# Patient Record
Sex: FEMALE | Race: WHITE | NOT HISPANIC OR LATINO | Employment: OTHER | URBAN - NONMETROPOLITAN AREA
[De-identification: names, ages, dates, MRNs, and addresses within clinical notes are randomized per-mention and may not be internally consistent; named-entity substitution may affect disease eponyms.]

---

## 2018-02-02 ENCOUNTER — APPOINTMENT (OUTPATIENT)
Dept: URGENT CARE | Facility: PHYSICIAN GROUP | Age: 48
End: 2018-02-02

## 2019-04-24 ENCOUNTER — OFFICE VISIT (OUTPATIENT)
Dept: URGENT CARE | Facility: PHYSICIAN GROUP | Age: 49
End: 2019-04-24
Payer: COMMERCIAL

## 2019-04-24 VITALS
RESPIRATION RATE: 16 BRPM | TEMPERATURE: 98.6 F | HEART RATE: 86 BPM | WEIGHT: 195 LBS | BODY MASS INDEX: 28.88 KG/M2 | HEIGHT: 69 IN | SYSTOLIC BLOOD PRESSURE: 132 MMHG | OXYGEN SATURATION: 98 % | DIASTOLIC BLOOD PRESSURE: 80 MMHG

## 2019-04-24 DIAGNOSIS — R06.2 WHEEZING: ICD-10-CM

## 2019-04-24 DIAGNOSIS — H65.93 FLUID LEVEL BEHIND TYMPANIC MEMBRANE OF BOTH EARS: ICD-10-CM

## 2019-04-24 DIAGNOSIS — J06.9 URI WITH COUGH AND CONGESTION: ICD-10-CM

## 2019-04-24 PROCEDURE — 99204 OFFICE O/P NEW MOD 45 MIN: CPT | Performed by: PHYSICIAN ASSISTANT

## 2019-04-24 RX ORDER — METHYLPREDNISOLONE 4 MG/1
TABLET ORAL
Qty: 21 TAB | Refills: 0 | Status: SHIPPED | OUTPATIENT
Start: 2019-04-24 | End: 2019-07-26

## 2019-04-24 NOTE — PROGRESS NOTES
Chief Complaint   Patient presents with   • Nasal Congestion   • Fatigue   • Sinus Problem   • Headache       HISTORY OF PRESENT ILLNESS: Patient is a 49 y.o. female who presents today for the following:    Dry cough x 3 days  + nasal congestion (yellow/green drainage, clear during the day), ear pressure, ST, face feels hot, SOB  OTC meds: benadryl, nasal spray (afrin), alkaseltzer  Denies h/o asthma  Smoked for 31 years, quit about 7 years ago     There are no active problems to display for this patient.      Allergies:Codeine and Vicodin [hydrocodone-acetaminophen]    Current Outpatient Prescriptions Ordered in [facility]   Medication Sig Dispense Refill   • MethylPREDNISolone (MEDROL DOSEPAK) 4 MG Tablet Therapy Pack Use as package directs 21 Tab 0     No current Epic-ordered facility-administered medications on file.        No past medical history on file.    Social History   Substance Use Topics   • Smoking status: Former Smoker     Packs/day: 1.00     Years: 31.00     Quit date: 4/24/2012   • Smokeless tobacco: Never Used   • Alcohol use No       No family status information on file.   No family history on file.    Review of Systems:   Constitutional ROS: No unexpected change in weight, No weakness, No fatigue  Eye ROS: No recent significant change in vision, No eye pain, redness, discharge  Ear ROS: No drainage, No tinnitus or vertigo, No recent change in hearing  Mouth/Throat ROS: No teeth or gum problems, No bleeding gums, No tongue complaints  Neck ROS: No swollen glands, No significant pain in neck  Pulmonary ROS: Positive for mild intermittent shortness of breath.  Cardiovascular ROS: No diaphoresis, No edema, No palpitations  Gastrointestinal ROS: No change in bowel habits, No significant change in appetite, No nausea, vomiting, diarrhea, or constipation  Musculoskeletal/Extremities ROS: No peripheral edema, No pain, redness or swelling on the joints  Hematologic/Lymphatic ROS: No chills, No night sweats, No  "weight loss  Skin/Integumentary ROS: No edema, No evidence of rash, No itching      Exam:  /80   Pulse 86   Temp 37 °C (98.6 °F) (Temporal)   Resp 16   Ht 1.753 m (5' 9\")   Wt 88.5 kg (195 lb)   SpO2 98%   General: Well developed, well nourished. No distress.  Eye: PERRL/EOMI; conjunctivae clear, lids normal.  ENMT: Lips without lesions, MMM. Oropharynx is clear. Bilateral TMs are within normal limits with clear fluid partially bilaterally and bulging.  Pulmonary: Unlabored respiratory effort.  Fine scattered and expiratory wheezes.  Cardiovascular: Regular rate and rhythm without murmur.    Neurologic: Grossly nonfocal. No facial asymmetry noted.  Lymph: No cervical lymphadenopathy noted.  Skin: Warm, dry, good turgor. No rashes in visible areas.   Psych: Normal mood. Alert and oriented x3. Judgment and insight is normal.    Assessment/Plan:  Discussed likely viral etiology versus seasonal allergies.  Use all medication as directed. Discussed appropriate over-the-counter symptomatic medication, and when to return to clinic. Follow up for worsening or persistent symptoms.  1. URI with cough and congestion     2. Fluid level behind tympanic membrane of both ears     3. Wheezing  MethylPREDNISolone (MEDROL DOSEPAK) 4 MG Tablet Therapy Pack       "

## 2019-04-24 NOTE — LETTER
April 24, 2019         Patient: Michael Larsen   YOB: 1970   Date of Visit: 4/24/2019           To Whom it May Concern:    Michael Larsen was seen in my clinic on 4/24/2019.      If you have any questions or concerns, please don't hesitate to call.        Sincerely,           Selin Galindo P.A.-C.  Electronically Signed

## 2019-05-30 ENCOUNTER — HOSPITAL ENCOUNTER (OUTPATIENT)
Facility: MEDICAL CENTER | Age: 49
End: 2019-05-30
Attending: OBSTETRICS & GYNECOLOGY
Payer: COMMERCIAL

## 2019-05-30 ENCOUNTER — GYNECOLOGY VISIT (OUTPATIENT)
Dept: OBGYN | Facility: MEDICAL CENTER | Age: 49
End: 2019-05-30
Payer: COMMERCIAL

## 2019-05-30 VITALS
BODY MASS INDEX: 29.18 KG/M2 | WEIGHT: 197 LBS | HEIGHT: 69 IN | SYSTOLIC BLOOD PRESSURE: 120 MMHG | DIASTOLIC BLOOD PRESSURE: 78 MMHG

## 2019-05-30 DIAGNOSIS — Z01.419 WELL WOMAN EXAM: ICD-10-CM

## 2019-05-30 DIAGNOSIS — N93.9 ABNORMAL VAGINAL BLEEDING: ICD-10-CM

## 2019-05-30 DIAGNOSIS — N84.0 UTERINE POLYP: ICD-10-CM

## 2019-05-30 PROCEDURE — 88175 CYTOPATH C/V AUTO FLUID REDO: CPT

## 2019-05-30 PROCEDURE — 87624 HPV HI-RISK TYP POOLED RSLT: CPT

## 2019-05-30 PROCEDURE — 99386 PREV VISIT NEW AGE 40-64: CPT | Mod: 25 | Performed by: OBSTETRICS & GYNECOLOGY

## 2019-05-30 RX ORDER — ACETAMINOPHEN 500 MG
500-1000 TABLET ORAL EVERY 6 HOURS PRN
COMMUNITY
End: 2019-07-26

## 2019-05-30 NOTE — NON-PROVIDER
Pt here for new pt appt  Pt states that she has been having abnormal bleeding for months  Good #[id number]  Pharmacy verified.

## 2019-05-30 NOTE — LETTER
May 30, 2019         Patient: Michael Larsen   YOB: 1970   Date of Visit: 5/30/2019           To Whom it May Concern:    Michael Larsen was seen in my clinic on 5/30/2019. She may return to work on 05/30/2019.    If you have any questions or concerns, please don't hesitate to call.        Sincerely,           Prieto Santa M.D.  Electronically Signed

## 2019-05-30 NOTE — PROGRESS NOTES
Chief complaint: Annual exam and vaginal bleeding     Michael Larsen is a 49 y.o.  female who presents for her Annual Gynecologic Exam      HPI Comments: Pt presents for her annual well woman exam.  Patient reports that she underwent menopause around age 41.  She reports the last few months she is beginning to experience daily vaginal bleeding.  Prior she would bleed every few days.  She did notice a large lump which appears to be coming from her cervix on examination.  She reports bleeding after intercourse as well. No pain  No LMP recorded (lmp unknown). Patient is postmenopausal.    Mammogram unsure  Dexa scan never  Colonoscopy never    Review of Systems:   Pertinent positives documented in HPI and all other systems reviewed & are negative    PGYN Hx: None    All PMH, PSH, allergies, social history and FH reviewed and updated today:  Past Medical History:   Diagnosis Date   • Bleeding from the nose    • Bleeding tendency (HCC)    • Bronchitis    • Hay fever    • Hemorrhoids    • Hypertension    • Measles    • Migraine    • Urinary tract infection    • Venereal disease      Past Surgical History:   Procedure Laterality Date   • GYN SURGERY     • LUMPECTOMY       Medications:   Current Outpatient Prescriptions Ordered in [facility]   Medication Sig Dispense Refill   • Calcium Carbonate Antacid (TUMS E-X PO) Take  by mouth.     • Probiotic Product (PROBIOTIC-10 PO) Take  by mouth.     • Naproxen Sodium (ALEVE PO) Take  by mouth.     • acetaminophen (TYLENOL) 500 MG Tab Take 500-1,000 mg by mouth every 6 hours as needed.     • MethylPREDNISolone (MEDROL DOSEPAK) 4 MG Tablet Therapy Pack Use as package directs 21 Tab 0     No current Epic-ordered facility-administered medications on file.      Codeine and Vicodin [hydrocodone-acetaminophen]  Social History     Social History   • Marital status:      Spouse name: N/A   • Number of children: N/A   • Years of education: N/A     Social History Main Topics   •  "Smoking status: Former Smoker     Packs/day: 1.00     Years: 31.00     Quit date: 4/24/2012   • Smokeless tobacco: Never Used   • Alcohol use No   • Drug use: Yes     Types: Marijuana      Comment: occasionally; denies h/o illicitis   • Sexual activity: Yes     Other Topics Concern   • Not on file     Social History Narrative   • No narrative on file     Family History   Problem Relation Age of Onset   • Hypertension Mother    • Heart Disease Maternal Grandmother           Objective:   Vital measurements:  /78 (BP Location: Left arm, Patient Position: Sitting)   Ht 1.753 m (5' 9\")   Wt 89.4 kg (197 lb)   Body mass index is 29.09 kg/m². (Goal BM I>18 <25)    Physical Exam   Nursing note and vitals reviewed.  Constitutional: She is oriented to person, place, and time. She appears well-developed and well-nourished. No distress.     HEENT:   Head: Normocephalic and atraumatic.   Right Ear: External ear normal.   Left Ear: External ear normal.   Nose: Nose normal.   Eyes: Conjunctivae and EOM are normal. Pupils are equal, round, and reactive to light. No scleral icterus.     Neck: Normal range of motion. Neck supple. No tracheal deviation present. No thyromegaly present. No cervical or supraclavicular lymphadenopathy.    Pulmonary/Chest: Effort normal and breath sounds normal. No respiratory distress. She has no wheezes. She has no rales. She exhibits no tenderness.     Cardiovascular: Regular, rate and rhythm. No edema.    Breast:  Symmetrical, normal consistency without masses., No dimpling or skin changes, Normal nipples without discharge, no axillary lymphadenopathy    Abdominal: Soft. Bowel sounds are normal. She exhibits no distension and no mass. No tenderness. She has no rebound and no guarding.     Genitourinary:  Pelvic exam was performed with patient supine.  External genitalia with no abnormal pigmentation, labial fusion, rash, tenderness, lesion or injury to the labia bilaterally.  BUS normal  Vagina " is pink and moist with no lesions, foul discharge, erythema, tenderness or bleeding. No foreign body around the vagina or signs of injury.   Cervix exhibits no motion tenderness, no discharge and no friability, there is a large approximately 4 cm, fingerlike mass protruding through the cervix.  Suspicious for fibroid.  I attempted to remove this mass in the office with the help of ring forceps, patient complained of significant pain in the attempt was stopped.  Uterus is 8 cm not deviated, not enlarged, not fixed and not tender.  Right adnexa displays no mass, no tenderness and no fullness.  Left adnexa displays no mass, no tenderness and no fullness.     Musculoskeletal: Normal range of motion. Non tender. She exhibits no edema and no tenderness.     Lymphadenopathy: She has no cervical or supraclavicular adenopathy.     Neurological: She is alert and oriented to person, place, and time. She exhibits normal muscle tone.     Skin: Skin is warm and dry. No rash noted. She is not diaphoretic. No erythema. No pallor.     Psychiatric: She has a normal mood and affect. Her behavior is normal. Judgment and thought content normal.        Assessment:     1. Well woman exam  THINPREP PAP WITH HPV    REFERRAL TO GASTROENTEROLOGY    MA-SCREEN MAMMO W/CAD-BILAT    CANCELED: MA-SCREEN MAMMO W/CAD-BILAT   2. Abnormal vaginal bleeding  US-PELVIC COMPLETE (TRANSABDOMINAL/TRANSVAGINAL) (COMBO)   3. Uterine polyp  REFERRAL TO GYNECOLOGY         Plan:   Pap and physical exam performed.  HPV testing also performed  Self breast awareness discussed.  Counseling: breast self exam, mammography screening and menopause  Encouraged exercise and proper diet.  Mammograms annually. Patient given order for screening kathy mammogram.  See medications and orders placed in encounter report.  Weight bearing exercise daily to strengthen bones  Calcium 1200mg daily and 800 IU daily    Likely an endometrial polyp.  Will order ultrasound to assess for the  possibility of other polyps.  Patient was unable to tolerate removal in the office.  Referral for hysteroscopy with D&C made.    Referral to gastroenterology also made    All questions answered

## 2019-05-31 LAB
CYTOLOGY REG CYTOL: NORMAL
HPV HR 12 DNA CVX QL NAA+PROBE: NEGATIVE
HPV16 DNA SPEC QL NAA+PROBE: NEGATIVE
HPV18 DNA SPEC QL NAA+PROBE: NEGATIVE
SPECIMEN SOURCE: NORMAL

## 2019-06-18 ENCOUNTER — HOSPITAL ENCOUNTER (OUTPATIENT)
Dept: RADIOLOGY | Facility: MEDICAL CENTER | Age: 49
End: 2019-06-18
Attending: OBSTETRICS & GYNECOLOGY
Payer: COMMERCIAL

## 2019-06-18 DIAGNOSIS — N93.9 ABNORMAL VAGINAL BLEEDING: ICD-10-CM

## 2019-06-18 PROCEDURE — 76830 TRANSVAGINAL US NON-OB: CPT

## 2019-07-02 ENCOUNTER — GYNECOLOGY VISIT (OUTPATIENT)
Dept: OBGYN | Facility: CLINIC | Age: 49
End: 2019-07-02
Payer: COMMERCIAL

## 2019-07-02 VITALS
WEIGHT: 197 LBS | BODY MASS INDEX: 29.18 KG/M2 | HEIGHT: 69 IN | SYSTOLIC BLOOD PRESSURE: 126 MMHG | DIASTOLIC BLOOD PRESSURE: 88 MMHG

## 2019-07-02 DIAGNOSIS — R10.30 LOWER ABDOMINAL PAIN: ICD-10-CM

## 2019-07-02 DIAGNOSIS — N95.0 POSTMENOPAUSAL BLEEDING: ICD-10-CM

## 2019-07-02 PROCEDURE — 99214 OFFICE O/P EST MOD 30 MIN: CPT | Performed by: OBSTETRICS & GYNECOLOGY

## 2019-07-02 NOTE — PROGRESS NOTES
Chief complaint;    Michael Larsen is a 49 y.o.  who presents complaining of postmenopausal bleeding she has a little bit of bleeding every day wears a light a pad that she changes 2 times per day.  Patient is also having some lower abdominal pelvic pain which she has had over the last year patient does have GI issues and is got an appointment with GI for colonoscopy.  Patient is here today to review ultrasound results.    Review of systems; denies fever chills abdominal pain, denies chest pain shortness of breath or urinary symptoms  Past medical history-  Past Medical History:   Diagnosis Date   • Bleeding from the nose    • Bleeding tendency (HCC)    • Bronchitis    • Hay fever    • Hemorrhoids    • Hypertension    • Measles    • Migraine    • Urinary tract infection    • Venereal disease      Past surgical history-  Past Surgical History:   Procedure Laterality Date   • GYN SURGERY     • LUMPECTOMY       Allergies-Codeine and Vicodin [hydrocodone-acetaminophen]  Medications-  Current Outpatient Prescriptions on File Prior to Visit   Medication Sig Dispense Refill   • Calcium Carbonate Antacid (TUMS E-X PO) Take  by mouth.     • Probiotic Product (PROBIOTIC-10 PO) Take  by mouth.     • Naproxen Sodium (ALEVE PO) Take  by mouth.     • acetaminophen (TYLENOL) 500 MG Tab Take 500-1,000 mg by mouth every 6 hours as needed.     • MethylPREDNISolone (MEDROL DOSEPAK) 4 MG Tablet Therapy Pack Use as package directs 21 Tab 0     No current facility-administered medications on file prior to visit.      Social history-  Social History     Social History   • Marital status:      Spouse name: N/A   • Number of children: N/A   • Years of education: N/A     Occupational History   • Not on file.     Social History Main Topics   • Smoking status: Former Smoker     Packs/day: 1.00     Years: 3100     Quit date: 2012   • Smokeless tobacco: Never Used   • Alcohol use No   • Drug use: Yes     Types: Marijuana       "Comment: occasionally; denies h/o illicitis   • Sexual activity: Yes     Other Topics Concern   • Not on file     Social History Narrative   • No narrative on file     Past Family History-no history of breast or ovarian cancer    Physical examination;  Alert and oriented x3  General a thin well-developed well-nourished female in no apparent distress  Vitals:    07/02/19 0818   BP: 126/88   Weight: 89.4 kg (197 lb)   Height: 1.753 m (5' 9\")     Trans-vaginal ultrasound is normal and 2 atrial thickness 8.5 mm, no uterine fibroids ovaries not visualized    Impression;  Postmenopausal bleeding-possible endocervical polyp    Plan;  Patient is scheduled to have hysteroscopy with D&C and removal of cervical polyp/fibroid  Discussed the patient's lower abdominal pain and the need for further work-up per gastroenterology  Discussed the need for removal of the endocervical polyp/fibroid with Dr. Santa as originally scheduled  Discussed the results of endometrial thickness and lack of ovarian masses on ultrasound  All questions answered in detail  Discussed Assubel etiologies for her lower abdominal discomfort    25  Minutes spent with the patient in face-to-face contact, 100% of the time spent on counseling and coordination of care. All questions answered in detail.  "

## 2019-07-17 ENCOUNTER — GYNECOLOGY VISIT (OUTPATIENT)
Dept: OBGYN | Facility: MEDICAL CENTER | Age: 49
End: 2019-07-17
Payer: COMMERCIAL

## 2019-07-17 VITALS
BODY MASS INDEX: 28.88 KG/M2 | DIASTOLIC BLOOD PRESSURE: 82 MMHG | SYSTOLIC BLOOD PRESSURE: 120 MMHG | WEIGHT: 195 LBS | HEIGHT: 69 IN

## 2019-07-17 DIAGNOSIS — D21.9 FIBROID: ICD-10-CM

## 2019-07-17 DIAGNOSIS — N95.0 POSTMENOPAUSAL BLEEDING: ICD-10-CM

## 2019-07-17 NOTE — PROGRESS NOTES
"History and Physical Exam    Chief Complaint   Patient presents with   • Gynecologic Exam     Pre-op   Chief complaint: Preoperative visit    History of present illness: 49 y.o. presents with persistent postmenopausal bleeding and desires surgical therapy. Risks, benefits and alternative therapies have been discussed and patient still desires surgical therapy. Questions answered.    Please see previous dictations.  Patient has history of postmenopausal bleeding with thickened endometrium as well as a prolapsing cervical fibroid.    Pertinent positives documented in HPI and all other systems reviewed & are negative      All PMH, PSH, allergies, social history and FH reviewed and updated today:  Past Medical History:   Diagnosis Date   • Bleeding from the nose    • Bleeding tendency (HCC)    • Bronchitis    • Hay fever    • Hemorrhoids    • Hypertension    • Measles    • Migraine    • Urinary tract infection    • Venereal disease        Past Surgical History:   Procedure Laterality Date   • GYN SURGERY     • LUMPECTOMY         [unfilled]    Allergies:   Allergies   Allergen Reactions   • Codeine    • Vicodin [Hydrocodone-Acetaminophen]        Social History     Social History   • Marital status:      Spouse name: N/A   • Number of children: N/A   • Years of education: N/A     Occupational History   • Not on file.     Social History Main Topics   • Smoking status: Former Smoker     Packs/day: 1.00     Years: 31.00     Quit date: 4/24/2012   • Smokeless tobacco: Never Used   • Alcohol use No   • Drug use: Yes     Types: Marijuana      Comment: occasionally; denies h/o illicitis   • Sexual activity: Yes     Other Topics Concern   • Not on file     Social History Narrative   • No narrative on file       Family History   Problem Relation Age of Onset   • Hypertension Mother    • Heart Disease Maternal Grandmother        Physical exam:  /82 (BP Location: Left arm, Patient Position: Sitting)   Ht 1.753 m (5' 9\")   " Wt 88.5 kg (195 lb)     GENERAL APPEARANCE: healthy, alert, no distress  NECK nontender, no masses, thyromegaly or nodules  HEART RRR with normal S1 and S2 ,no murmurs, no gallops, no peripheral edema  LUNG clear to auscultation, normal respiratory effort  ABDOMEN Abdomen soft, non-tender. BS normal. No masses,  No organomegaly  EXTREMITIES:negative clubbing, cyanosis, edema    NEURO Awake, alert and oriented x 3, Normal gait, no sensory deficits  SKIN No rashes, or ulcers or lesions seen  PSYCHIATRIC: Patient shows appropriate affect, is alert and oriented x3, intact judgment and insight.        No diagnosis found.    Michael Larsen is a 49 y.o.  female with persistent postmenopausal bleeding/prolapsed cervical fibroid who presents for surgical consultation for a hysteroscopy, D&C and polypectomy/myomectomy.    Specific risks include but are not limited to pain, infection, bleeding, blood transfusion, damage to bowel, bladder or ureters, pelvic scarring, pelvic pain, pain with intercourse, DVT/pulmonary embolism, need for laparotomy and anesthesia risks.    After discussion of risks and benefits, all questions regarding procedure were answered for patient. Consents were signed.    Needs to be NPO after midnight day of surgery. Clean abdomen and umbilicus w/ antibacterial soap morning of surgery.    Postoperative course discussed with patient. Patient should return to office or emergency room for #1 fever greater than 101, #2 heavy vaginal bleeding soaking greater than one pad per hour, #3 uncontrolled pain, #4 inability to tolerate regular diet pass gas or moved bowels.    Follow up in 2 weeks after surgery for postop check.     Spent 30 mins with the patient with over 50% of the time discussing the procedure, risk and benefits and obtaining consent.

## 2019-07-26 DIAGNOSIS — Z01.812 PRE-OPERATIVE LABORATORY EXAMINATION: ICD-10-CM

## 2019-07-26 LAB — HCG SERPL QL: POSITIVE

## 2019-07-26 PROCEDURE — 36415 COLL VENOUS BLD VENIPUNCTURE: CPT

## 2019-07-26 PROCEDURE — 84703 CHORIONIC GONADOTROPIN ASSAY: CPT

## 2019-07-26 RX ORDER — COVID-19 ANTIGEN TEST
2 KIT MISCELLANEOUS
COMMUNITY
End: 2020-06-08

## 2019-07-29 ENCOUNTER — HOSPITAL ENCOUNTER (OUTPATIENT)
Facility: MEDICAL CENTER | Age: 49
End: 2019-07-29
Attending: OBSTETRICS & GYNECOLOGY | Admitting: OBSTETRICS & GYNECOLOGY
Payer: COMMERCIAL

## 2019-07-29 ENCOUNTER — ANESTHESIA EVENT (OUTPATIENT)
Dept: SURGERY | Facility: MEDICAL CENTER | Age: 49
End: 2019-07-29
Payer: COMMERCIAL

## 2019-07-29 ENCOUNTER — TELEPHONE (OUTPATIENT)
Dept: OBGYN | Facility: CLINIC | Age: 49
End: 2019-07-29

## 2019-07-29 ENCOUNTER — ANESTHESIA (OUTPATIENT)
Dept: SURGERY | Facility: MEDICAL CENTER | Age: 49
End: 2019-07-29
Payer: COMMERCIAL

## 2019-07-29 VITALS
HEIGHT: 65 IN | BODY MASS INDEX: 32.73 KG/M2 | OXYGEN SATURATION: 94 % | HEART RATE: 78 BPM | WEIGHT: 196.43 LBS | DIASTOLIC BLOOD PRESSURE: 91 MMHG | TEMPERATURE: 97.4 F | SYSTOLIC BLOOD PRESSURE: 155 MMHG | RESPIRATION RATE: 16 BRPM

## 2019-07-29 DIAGNOSIS — N84.0 ENDOMETRIAL POLYP: ICD-10-CM

## 2019-07-29 DIAGNOSIS — N95.0 POSTMENOPAUSAL BLEEDING: ICD-10-CM

## 2019-07-29 LAB — PATHOLOGY CONSULT NOTE: NORMAL

## 2019-07-29 PROCEDURE — 160046 HCHG PACU - 1ST 60 MINS PHASE II: Performed by: OBSTETRICS & GYNECOLOGY

## 2019-07-29 PROCEDURE — 160025 RECOVERY II MINUTES (STATS): Performed by: OBSTETRICS & GYNECOLOGY

## 2019-07-29 PROCEDURE — 160009 HCHG ANES TIME/MIN: Performed by: OBSTETRICS & GYNECOLOGY

## 2019-07-29 PROCEDURE — 160035 HCHG PACU - 1ST 60 MINS PHASE I: Performed by: OBSTETRICS & GYNECOLOGY

## 2019-07-29 PROCEDURE — 160041 HCHG SURGERY MINUTES - EA ADDL 1 MIN LEVEL 4: Performed by: OBSTETRICS & GYNECOLOGY

## 2019-07-29 PROCEDURE — 502240 HCHG MISC OR SUPPLY RC 0272: Performed by: OBSTETRICS & GYNECOLOGY

## 2019-07-29 PROCEDURE — 88305 TISSUE EXAM BY PATHOLOGIST: CPT | Mod: 59

## 2019-07-29 PROCEDURE — 501394 HCHG SOLUTION SORBITOL 3% 3000ML BAG: Performed by: OBSTETRICS & GYNECOLOGY

## 2019-07-29 PROCEDURE — 502587 HCHG PACK, D&C: Performed by: OBSTETRICS & GYNECOLOGY

## 2019-07-29 PROCEDURE — 700105 HCHG RX REV CODE 258: Performed by: OBSTETRICS & GYNECOLOGY

## 2019-07-29 PROCEDURE — 700111 HCHG RX REV CODE 636 W/ 250 OVERRIDE (IP): Performed by: ANESTHESIOLOGY

## 2019-07-29 PROCEDURE — 160048 HCHG OR STATISTICAL LEVEL 1-5: Performed by: OBSTETRICS & GYNECOLOGY

## 2019-07-29 PROCEDURE — 700101 HCHG RX REV CODE 250: Performed by: ANESTHESIOLOGY

## 2019-07-29 PROCEDURE — 160029 HCHG SURGERY MINUTES - 1ST 30 MINS LEVEL 4: Performed by: OBSTETRICS & GYNECOLOGY

## 2019-07-29 PROCEDURE — 160002 HCHG RECOVERY MINUTES (STAT): Performed by: OBSTETRICS & GYNECOLOGY

## 2019-07-29 PROCEDURE — 58558 HYSTEROSCOPY BIOPSY: CPT | Performed by: OBSTETRICS & GYNECOLOGY

## 2019-07-29 PROCEDURE — 160036 HCHG PACU - EA ADDL 30 MINS PHASE I: Performed by: OBSTETRICS & GYNECOLOGY

## 2019-07-29 RX ORDER — HYDROMORPHONE HYDROCHLORIDE 1 MG/ML
0.4 INJECTION, SOLUTION INTRAMUSCULAR; INTRAVENOUS; SUBCUTANEOUS
Status: DISCONTINUED | OUTPATIENT
Start: 2019-07-29 | End: 2019-07-29 | Stop reason: HOSPADM

## 2019-07-29 RX ORDER — SODIUM CHLORIDE, SODIUM LACTATE, POTASSIUM CHLORIDE, CALCIUM CHLORIDE 600; 310; 30; 20 MG/100ML; MG/100ML; MG/100ML; MG/100ML
INJECTION, SOLUTION INTRAVENOUS CONTINUOUS
Status: DISCONTINUED | OUTPATIENT
Start: 2019-07-29 | End: 2019-07-29 | Stop reason: HOSPADM

## 2019-07-29 RX ORDER — OXYCODONE AND ACETAMINOPHEN 10; 325 MG/1; MG/1
1-2 TABLET ORAL EVERY 4 HOURS PRN
Qty: 6 TAB | Refills: 0 | Status: SHIPPED | OUTPATIENT
Start: 2019-07-29 | End: 2019-07-31

## 2019-07-29 RX ORDER — HALOPERIDOL 5 MG/ML
1 INJECTION INTRAMUSCULAR
Status: COMPLETED | OUTPATIENT
Start: 2019-07-29 | End: 2019-07-29

## 2019-07-29 RX ORDER — ONDANSETRON 2 MG/ML
4 INJECTION INTRAMUSCULAR; INTRAVENOUS
Status: COMPLETED | OUTPATIENT
Start: 2019-07-29 | End: 2019-07-29

## 2019-07-29 RX ORDER — HYDROMORPHONE HYDROCHLORIDE 1 MG/ML
0.1 INJECTION, SOLUTION INTRAMUSCULAR; INTRAVENOUS; SUBCUTANEOUS
Status: DISCONTINUED | OUTPATIENT
Start: 2019-07-29 | End: 2019-07-29 | Stop reason: HOSPADM

## 2019-07-29 RX ORDER — OXYCODONE HCL 5 MG/5 ML
10 SOLUTION, ORAL ORAL
Status: DISCONTINUED | OUTPATIENT
Start: 2019-07-29 | End: 2019-07-29 | Stop reason: HOSPADM

## 2019-07-29 RX ORDER — HYDROMORPHONE HYDROCHLORIDE 1 MG/ML
0.2 INJECTION, SOLUTION INTRAMUSCULAR; INTRAVENOUS; SUBCUTANEOUS
Status: DISCONTINUED | OUTPATIENT
Start: 2019-07-29 | End: 2019-07-29 | Stop reason: HOSPADM

## 2019-07-29 RX ORDER — MEPERIDINE HYDROCHLORIDE 25 MG/ML
12.5 INJECTION INTRAMUSCULAR; INTRAVENOUS; SUBCUTANEOUS
Status: DISCONTINUED | OUTPATIENT
Start: 2019-07-29 | End: 2019-07-29 | Stop reason: HOSPADM

## 2019-07-29 RX ORDER — DIPHENHYDRAMINE HYDROCHLORIDE 50 MG/ML
12.5 INJECTION INTRAMUSCULAR; INTRAVENOUS
Status: DISCONTINUED | OUTPATIENT
Start: 2019-07-29 | End: 2019-07-29 | Stop reason: HOSPADM

## 2019-07-29 RX ORDER — OXYCODONE HCL 5 MG/5 ML
5 SOLUTION, ORAL ORAL
Status: DISCONTINUED | OUTPATIENT
Start: 2019-07-29 | End: 2019-07-29 | Stop reason: HOSPADM

## 2019-07-29 RX ADMIN — ONDANSETRON 4 MG: 2 INJECTION INTRAMUSCULAR; INTRAVENOUS at 11:13

## 2019-07-29 RX ADMIN — SODIUM CHLORIDE, POTASSIUM CHLORIDE, SODIUM LACTATE AND CALCIUM CHLORIDE: 600; 310; 30; 20 INJECTION, SOLUTION INTRAVENOUS at 09:47

## 2019-07-29 RX ADMIN — FENTANYL CITRATE 25 MCG: 0.05 INJECTION, SOLUTION INTRAMUSCULAR; INTRAVENOUS at 11:45

## 2019-07-29 RX ADMIN — SODIUM CHLORIDE, POTASSIUM CHLORIDE, SODIUM LACTATE AND CALCIUM CHLORIDE: 600; 310; 30; 20 INJECTION, SOLUTION INTRAVENOUS at 09:00

## 2019-07-29 RX ADMIN — PROPOFOL 150 MG: 10 INJECTION, EMULSION INTRAVENOUS at 09:55

## 2019-07-29 RX ADMIN — FENTANYL CITRATE 50 MCG: 50 INJECTION, SOLUTION INTRAMUSCULAR; INTRAVENOUS at 09:50

## 2019-07-29 RX ADMIN — HALOPERIDOL LACTATE 1 MG: 5 INJECTION, SOLUTION INTRAMUSCULAR at 11:43

## 2019-07-29 RX ADMIN — MIDAZOLAM HYDROCHLORIDE 2 MG: 1 INJECTION, SOLUTION INTRAMUSCULAR; INTRAVENOUS at 09:53

## 2019-07-29 RX ADMIN — FENTANYL CITRATE 50 MCG: 50 INJECTION, SOLUTION INTRAMUSCULAR; INTRAVENOUS at 09:55

## 2019-07-29 RX ADMIN — HALOPERIDOL LACTATE 1 MG: 5 INJECTION, SOLUTION INTRAMUSCULAR at 12:08

## 2019-07-29 RX ADMIN — FENTANYL CITRATE 50 MCG: 50 INJECTION, SOLUTION INTRAMUSCULAR; INTRAVENOUS at 10:22

## 2019-07-29 RX ADMIN — FENTANYL CITRATE 50 MCG: 50 INJECTION, SOLUTION INTRAMUSCULAR; INTRAVENOUS at 10:15

## 2019-07-29 RX ADMIN — DIPHENHYDRAMINE HYDROCHLORIDE 12.5 MG: 50 INJECTION INTRAMUSCULAR; INTRAVENOUS at 12:35

## 2019-07-29 ASSESSMENT — PAIN SCALES - GENERAL: PAIN_LEVEL: 2

## 2019-07-29 NOTE — OR SURGEON
Immediate Post OP Note    PreOp Diagnosis: Vaginal bleeding, endometrial polyp    PostOp Diagnosis: Same    Procedure(s):  HYSTEROSCOPY, WITH VIDEO IMAGING -WITH MYOSURE - Wound Class: Clean Contaminated  DILATION AND CURETTAGE - Wound Class: Clean Contaminated    Surgeon(s):  Prieto Santa M.D.    Anesthesiologist/Type of Anesthesia:  Anesthesiologist: Hood Duarte M.D./General    Surgical Staff:  Circulator: Bambi Hughes R.N.  Scrub Person: Brigid Pathak    Specimens removed if any:  ID Type Source Tests Collected by Time Destination   A : polyp Tissue Other PATHOLOGY SPECIMEN Prieto Santa M.D. 7/29/2019 10:17 AM    B : endometrial currettings  Tissue Other PATHOLOGY SPECIMEN Prieto Santa M.D. 7/29/2019 10:17 AM        Estimated Blood Loss: 5 mL    Findings: Large, approximately 4 cm endometrial polyp noted.  Prolapsing through the cervical canal.  Small, 5 mm polyp noted on the right aspect of the uterus    Complications: None        7/29/2019 11:02 AM Prieto Santa M.D.

## 2019-07-29 NOTE — ANESTHESIA POSTPROCEDURE EVALUATION
Patient: Michael Larsen    Procedure Summary     Date:  07/29/19 Room / Location:  [facility] ROOM 21 / SURGERY SAME DAY [facility]    Anesthesia Start:  0947 Anesthesia Stop:  1056    Procedures:       HYSTEROSCOPY, WITH VIDEO IMAGING -WITH MYOSURE (N/A Uterus)      DILATION AND CURETTAGE (N/A Uterus) Diagnosis:  (UTERINE POLYPS)    Surgeon:  Prieto Santa M.D. Responsible Provider:  Hood Duarte M.D.    Anesthesia Type:  general ASA Status:  2          Final Anesthesia Type: general  Last vitals  BP   Blood Pressure: 155/91, NIBP: 112/77    Temp   36.6 °C (97.9 °F)    Pulse   Pulse: 70   Resp   16    SpO2   96 %      Anesthesia Post Evaluation    Patient location during evaluation: PACU  Patient participation: complete - patient participated  Level of consciousness: awake and alert  Pain score: 2    Airway patency: patent  Anesthetic complications: no  Cardiovascular status: hemodynamically stable  Respiratory status: acceptable  Hydration status: euvolemic    PONV: none           Nurse Pain Score: 0 (NPRS)

## 2019-07-29 NOTE — ANESTHESIA PREPROCEDURE EVALUATION
Relevant Problems   No relevant active problems       Physical Exam    Airway   Mallampati: II  TM distance: >3 FB  Neck ROM: full       Cardiovascular - normal exam  Rhythm: regular  Rate: normal  (-) murmur     Dental - normal exam         Pulmonary - normal exam  Breath sounds clear to auscultation     Abdominal   Abdomen: soft  Bowel sounds: normal   Neurological - normal exam                 Anesthesia Plan    ASA 2       Plan - general       Airway plan will be LMA        Induction: intravenous          Informed Consent:

## 2019-07-29 NOTE — OR NURSING
1126 Small amount of dried blood noted on pad under patient, pad changed, will continue to monitor. Pt states she still has nausea, but it has improved.  1131 pt asleep.  1143 Pt states nausea is back, also c/o 6/10 pain, medicated see MAR.   1207 One episode of emesis, re-medicated with haldol.  Pt's  at bedside, prescription given.   1235 Pt had another episode of emesis about 20cc, yellow. Medicated with Benadryl IV.  Pt had stress incontinence after emesis episode, small amount of urine noted. Pericare done, pad changed.   1315 pt states she is feeling better, tolerating ice chips. Pt meets criteria for phase 2.   1350 Pt tolerating water. Pt up to bathroom, smalla mount of blood on peripad, voided. Pt states she does not need pain medication.   1400 Patient verbalized readiness to go home. Discharge instructions discussed with patient and , copy given. Patient verbalized understanding to instructions. Belongings with patient. Dentures on.  1402 Discharge criteria met. PIV out, Discharged via wheelcahir.

## 2019-07-29 NOTE — OP REPORT
DATE OF SERVICE: July 29, 2019     PREOPERATIVE DIAGNOSES:  1.  Vaginal bleeding  2.  Endometrial polyp     POSTOPERATIVE DIAGNOSES:  1.  Same  2.  Intrauterine polyp  PROCEDURE PERFORMED: Hysteroscopy with polypectomy and endometrial curettings    SURGEON:  Prieto Santa MD     ASSISTANT: none     ANESTHESIA:  Spinal.     ANESTHESIOLOGIST: Hood Duarte MD     SPECIMEN: Endometrial curettings, endometrial polyp     ESTIMATED BLOOD LOSS: 5 mL     FINDINGS: Large, approximately 4 cm endometrial polyp noted, prolapsing through cervical canal.  Small 5 mm polyp on the right aspect of the uterus.     COMPLICATIONS:  None.     PROCEDURE:  After appropriate consents were obtained, the patient was taken to the operating room where spinal  anesthesia was applied without complications.  The patient was then prepped and draped in the usual sterile manner.       Cervix visualized the help of a speculum, there is noted to be a fingerlike projection through the cervical canal.  Is then grasped with ring forceps and approximately 4 cm polyp was removed.    Hysteroscope was then introduced into the uterus.  There is noted to be a stock at the uterine fundus, likely from the prior removed and immature polyp.  There is also noted to be a very small, approximate 5 mill meter polyp on the right aspect of the uterus.    Endometrial curettings were obtained.    The stalk and 5 mm polyp on the right aspect the uterus were then removed to help with the MyoSure device.    Evaluation of the uterus once again showed no evidence of any further polyps.    Fluid deficit: 450 mL    Sponge, instrument, and lap counts were correct x2.  Patient tolerated the procedure well and went to recovery room in stable condition.        ____________________________________  Prieto Santa MD

## 2019-07-29 NOTE — ANESTHESIA PROCEDURE NOTES
Airway  Date/Time: 7/29/2019 9:55 AM  Performed by: JENN MADDEN  Authorized by: JENN MADDEN     Location:  OR  Urgency:  Elective  Indications for Airway Management:  Anesthesia  Spontaneous Ventilation: absent    Sedation Level:  Deep  Preoxygenated: Yes    Final Airway Type:  Supraglottic airway  Final Supraglottic Airway:  Standard LMA  SGA Size:  3  Number of Attempts at Approach:  1

## 2019-07-29 NOTE — OR NURSING
1054  Pt arrived to PACU from OR with Dr. Duarte and RN.  LMA in place.  Pt sleeping comfortably, maintaining sats well on RA.  VSS.  Abd assessed; soft with minimal sanginous mel bleeding noted.    1058  LMA dc'd  1113  Pt waking up but very drowsy.  Reporting need to void.  Bedpan placed.  Pt c/o nausea - Zofran given.  Quease ease given for comfort.  1126  Small void from pt.  Report to Jennyfer PARDO

## 2019-07-29 NOTE — TELEPHONE ENCOUNTER
Pt called stating she had surgery today and was unable to get pain medication, needs clarification.  Called pharmacy, spoke with pharmacist Sadia who stated she spoke with someone and got it taken care of. Spoke with patient and notified her

## 2019-07-29 NOTE — ANESTHESIA TIME REPORT
Anesthesia Start and Stop Event Times     Date Time Event    7/29/2019 0947 Anesthesia Start     1056 Anesthesia Stop        Responsible Staff  07/29/19    Name Role Begin End    Hood Duarte M.D. Anesth 0947 1056        Preop Diagnosis (Free Text):  Pre-op Diagnosis     UTERINE POLYPS        Preop Diagnosis (Codes):  Diagnosis Information     Diagnosis Code(s):         Post op Diagnosis  Polyp, uterus corpus      Premium Reason  Non-Premium    Comments:

## 2019-07-29 NOTE — DISCHARGE INSTRUCTIONS
ACTIVITY: Rest and take it easy for the first 24 hours.  A responsible adult is recommended to remain with you during that time.  It is normal to feel sleepy.  We encourage you to not do anything that requires balance, judgment or coordination.    MILD FLU-LIKE SYMPTOMS ARE NORMAL. YOU MAY EXPERIENCE GENERALIZED MUSCLE ACHES, THROAT IRRITATION, HEADACHE AND/OR SOME NAUSEA.    FOR 24 HOURS DO NOT:  Drive, operate machinery or run household appliances.  Drink beer or alcoholic beverages.   Make important decisions or sign legal documents.    SPECIAL INSTRUCTIONS: see hand-out.    Please call/return to clinic if:     Pain uncontrolled by pain medication   Fever greater 100.4   Heavy vaginal bleeding   Or if you have any other question concerns     Follow-up 2 weeks in clinic for postoperative exam    DIET: To avoid nausea, slowly advance diet as tolerated, avoiding spicy or greasy foods for the first day.  Add more substantial food to your diet according to your physician's instructions.  Babies can be fed formula or breast milk as soon as they are hungry.  INCREASE FLUIDS AND FIBER TO AVOID CONSTIPATION.    SURGICAL DRESSING/BATHING: may shower tomorrow, no tub bath/hot tubs/swimming until cleared by doctor.     FOLLOW-UP APPOINTMENT:  A follow-up appointment should be arranged with your doctor ; call to schedule.    You should CALL YOUR PHYSICIAN if you develop:  Fever greater than 101 degrees F.  Pain not relieved by medication, or persistent nausea or vomiting.  Excessive bleeding (blood soaking through dressing) or unexpected drainage from the wound.  Extreme redness or swelling around the incision site, drainage of pus or foul smelling drainage.  Inability to urinate or empty your bladder within 8 hours.  Problems with breathing or chest pain.    You should call 911 if you develop problems with breathing or chest pain.  If you are unable to contact your doctor or surgical center, you should go to the nearest  emergency room or urgent care center.  Physician's telephone #: Dr. Santa [phone]    If any questions arise, call your doctor.  If your doctor is not available, please feel free to call the Surgical Center at [phone].  The Center is open Monday through Friday from 7AM to 7PM.  You can also call the HEALTH HOTLINE open 24 hours/day, 7 days/week and speak to a nurse at [phone], or toll free at [phone].    A registered nurse may call you a few days after your surgery to see how you are doing after your procedure.    MEDICATIONS: Resume taking daily medication.  Take prescribed pain medication with food.  If no medication is prescribed, you may take non-aspirin pain medication if needed.  PAIN MEDICATION CAN BE VERY CONSTIPATING.  Take a stool softener or laxative such as senokot, pericolace, or milk of magnesia if needed.    Prescription given for percocet.  Last pain medication given at none.    If your physician has prescribed pain medication that includes Acetaminophen (Tylenol), do not take additional Acetaminophen (Tylenol) while taking the prescribed medication.    Depression / Suicide Risk    As you are discharged from this [facility] facility, it is important to learn how to keep safe from harming yourself.    Recognize the warning signs:  · Abrupt changes in personality, positive or negative- including increase in energy   · Giving away possessions  · Change in eating patterns- significant weight changes-  positive or negative  · Change in sleeping patterns- unable to sleep or sleeping all the time   · Unwillingness or inability to communicate  · Depression  · Unusual sadness, discouragement and loneliness  · Talk of wanting to die  · Neglect of personal appearance   · Rebelliousness- reckless behavior  · Withdrawal from people/activities they love  · Confusion- inability to concentrate     If you or a loved one observes any of these behaviors or has concerns about self-harm,  here's what you can do:  · Talk about it- your feelings and reasons for harming yourself  · Remove any means that you might use to hurt yourself (examples: pills, rope, extension cords, firearm)  · Get professional help from the community (Mental Health, Substance Abuse, psychological counseling)  · Do not be alone:Call your Safe Contact- someone whom you trust who will be there for you.  · Call your local CRISIS HOTLINE 798-6483 or 411-145-9105  · Call your local Children's Mobile Crisis Response Team [place] (931) 981-1582 or www.Wayin  · Call the toll free National Suicide Prevention Hotlines   · National Suicide Prevention Lifeline 704-612-[facility] (3503)  · National Hope Line Network 800-SUICIDE (150-1274)

## 2019-08-14 ENCOUNTER — GYNECOLOGY VISIT (OUTPATIENT)
Dept: OBGYN | Facility: MEDICAL CENTER | Age: 49
End: 2019-08-14
Payer: COMMERCIAL

## 2019-08-14 VITALS — BODY MASS INDEX: 32.28 KG/M2 | SYSTOLIC BLOOD PRESSURE: 140 MMHG | DIASTOLIC BLOOD PRESSURE: 80 MMHG | WEIGHT: 194 LBS

## 2019-08-14 DIAGNOSIS — Z48.89 POSTOPERATIVE VISIT: ICD-10-CM

## 2019-08-14 PROCEDURE — 99024 POSTOP FOLLOW-UP VISIT: CPT | Performed by: OBSTETRICS & GYNECOLOGY

## 2019-08-22 PROBLEM — Z48.89 POSTOPERATIVE VISIT: Status: ACTIVE | Noted: 2019-08-22

## 2019-08-22 NOTE — PROGRESS NOTES
Chief complaint: Postoperative visit    SUBJECTIVE:  Michael Larsen presents to the clinic for weeks following hysteroscopy and D&C    Eating a regular diet without difficulty.   Bowel movement are Normal.    The patient is not having any pain. .   Patient Denies Incisional pain, drainage or redness    OBJECTIVE:  /80   Wt 88 kg (194 lb)   LMP  (LMP Unknown) Comment: postmenopausal bleeding   BMI 32.28 kg/m²   Current Outpatient Medications on File Prior to Visit   Medication Sig Dispense Refill   • Naproxen Sodium (ALEVE) 220 MG Cap Take 2 Caps by mouth 1 time daily as needed.     • Aspirin Effervescent (CARO-SELTZER PO) Take 1-2 Tabs by mouth as needed. Pt unsure of dose     • Alpha-D-Galactosidase (ANTI-GAS PO) Take 1 Tab by mouth as needed. Pt unsure of dose     • raNITIdine HCl (ACID REDUCER PO) Take 1 Tab by mouth as needed. Pt unsure of dose     • Calcium Carbonate Antacid (TUMS E-X PO) Take 1 Tab by mouth as needed. Pt unsure of dose     • Probiotic Product (PROBIOTIC-10 PO) Take 1 Tab by mouth every bedtime.       No current facility-administered medications on file prior to visit.        Constitutional:  alert, healthy, no distress.  Abdomen:  soft, bowel sounds active, non-tender, non-distended.  Incision: No incision.    IMPRESSION: Doing well postoperatively.  Pt is to increase activities as tolerated.    Lab:   Recent Results (from the past 1008 hour(s))   HCG,QUAL (OUTPATIENT ONLY)    Collection Time: 07/26/19  9:17 AM   Result Value Ref Range    Beta-Hcg Qualitative Serum Positive (A) Negative   Histology Request    Collection Time: 07/29/19 12:45 PM   Result Value Ref Range    Pathology Request Sent to Histo      SURGICAL PATHOLOGY CONSULTATION    FINAL DIAGNOSIS:    A. Uterine polyp:         Benign endometrial polyp.         No atypia or malignancy identified.  B. Endometrial curettings:         Disrupted fragments of benign endometrial glands and stroma          admixed with blood  and detached strips of benign squamous          epithelium.         No hyperplasia, atypia, or malignancy identified.      PLAN:  Continue any current medications.  (See Med List for details.)  Return to clinic: prn if symptoms worsen or fail to improve.    Normal endometrial polyp.  No evidence of malignancy.    Discussed with patient that polyps may return.  If patient has any further bleeding, she should return to clinic    All questions answered

## 2020-06-03 SDOH — ECONOMIC STABILITY: INCOME INSECURITY: IN THE LAST 12 MONTHS, WAS THERE A TIME WHEN YOU WERE NOT ABLE TO PAY THE MORTGAGE OR RENT ON TIME?: NO

## 2020-06-03 SDOH — ECONOMIC STABILITY: HOUSING INSECURITY: IN THE LAST 12 MONTHS, HOW MANY PLACES HAVE YOU LIVED?: 1

## 2020-06-03 SDOH — ECONOMIC STABILITY: HOUSING INSECURITY
IN THE LAST 12 MONTHS, WAS THERE A TIME WHEN YOU DID NOT HAVE A STEADY PLACE TO SLEEP OR SLEPT IN A SHELTER (INCLUDING NOW)?: NO

## 2020-06-03 SDOH — ECONOMIC STABILITY: TRANSPORTATION INSECURITY
IN THE PAST 12 MONTHS, HAS THE LACK OF TRANSPORTATION KEPT YOU FROM MEDICAL APPOINTMENTS OR FROM GETTING MEDICATIONS?: NO

## 2020-06-03 SDOH — HEALTH STABILITY: MENTAL HEALTH
STRESS IS WHEN SOMEONE FEELS TENSE, NERVOUS, ANXIOUS, OR CAN'T SLEEP AT NIGHT BECAUSE THEIR MIND IS TROUBLED. HOW STRESSED ARE YOU?: NOT AT ALL

## 2020-06-03 SDOH — HEALTH STABILITY: PHYSICAL HEALTH: ON AVERAGE, HOW MANY MINUTES DO YOU ENGAGE IN EXERCISE AT THIS LEVEL?: 30 MINUTES

## 2020-06-03 SDOH — HEALTH STABILITY: PHYSICAL HEALTH: ON AVERAGE, HOW MANY DAYS PER WEEK DO YOU ENGAGE IN MODERATE TO STRENUOUS EXERCISE (LIKE A BRISK WALK)?: 3 DAYS

## 2020-06-03 SDOH — ECONOMIC STABILITY: TRANSPORTATION INSECURITY
IN THE PAST 12 MONTHS, HAS LACK OF RELIABLE TRANSPORTATION KEPT YOU FROM MEDICAL APPOINTMENTS, MEETINGS, WORK OR FROM GETTING THINGS NEEDED FOR DAILY LIVING?: NO

## 2020-06-03 ASSESSMENT — SOCIAL DETERMINANTS OF HEALTH (SDOH)
WITHIN THE PAST 12 MONTHS, YOU WORRIED THAT YOUR FOOD WOULD RUN OUT BEFORE YOU GOT THE MONEY TO BUY MORE: NEVER TRUE
HOW OFTEN DO YOU HAVE A DRINK CONTAINING ALCOHOL: MONTHLY OR LESS
HOW OFTEN DO YOU ATTENT MEETINGS OF THE CLUB OR ORGANIZATION YOU BELONG TO?: 1 TO 4 TIMES PER YEAR
HOW OFTEN DO YOU HAVE SIX OR MORE DRINKS ON ONE OCCASION: NEVER
IN A TYPICAL WEEK, HOW MANY TIMES DO YOU TALK ON THE PHONE WITH FAMILY, FRIENDS, OR NEIGHBORS?: THREE TIMES A WEEK
HOW HARD IS IT FOR YOU TO PAY FOR THE VERY BASICS LIKE FOOD, HOUSING, MEDICAL CARE, AND HEATING?: NOT VERY HARD
HOW MANY DRINKS CONTAINING ALCOHOL DO YOU HAVE ON A TYPICAL DAY WHEN YOU ARE DRINKING: 1 OR 2
WITHIN THE PAST 12 MONTHS, THE FOOD YOU BOUGHT JUST DIDN'T LAST AND YOU DIDN'T HAVE MONEY TO GET MORE: NEVER TRUE
DO YOU BELONG TO ANY CLUBS OR ORGANIZATIONS SUCH AS CHURCH GROUPS UNIONS, FRATERNAL OR ATHLETIC GROUPS, OR SCHOOL GROUPS?: YES
HOW OFTEN DO YOU GET TOGETHER WITH FRIENDS OR RELATIVES?: TWICE A WEEK
HOW OFTEN DO YOU ATTEND CHURCH OR RELIGIOUS SERVICES?: 1 TO 4 TIMES PER YEAR

## 2020-06-07 NOTE — PROGRESS NOTES
Subjective:     CC:  Diagnoses of Need for vaccination, Finger numbness, Generalized abdominal pain, History of vertigo, Gastroesophageal reflux disease without esophagitis, Pain in both hands, Encounter for screening mammogram for breast cancer, Obesity (BMI 30-39.9), and Ear congestion, bilateral were pertinent to this visit.    HISTORY OF THE PRESENT ILLNESS: Patient is a 50 y.o. female. This pleasant patient is here today to establish care and discuss hand pain/numbness, hx of vertigo    Hand numbness  Onset about 2-3 weeks ago after putting prolonged pressure on her left hand while painting.  Has numbness     Abdominal pain  Onset: started having hysteroscopy July 29, 2019 and D&C.  States she had a fibroid removed at that time.  She then had a colonoscopy 11/15/2019 and was told she needed to use a squaty potty due to her anatomy making having BMs difficult.  States she gets constipated regularly and stools are getting more narrow over the last year.  States she sometimes feels like she gets frequent diarrhea and at other times has severe constipation.  Her  had a colon abscess which is what she is concerned she could have.  She takes calcium carbonate, fiber and multivitamins without relief.  States she is not eating a well balanced diet.  She is eating out a lot, chips, cookies, dairy.  Pain is primarily right lower pelvic region and does not radiate.    History of vertigo  Onset: Started after rolling over in bed.  She then started vomiting and continued to feel lightheaded for 4 days.  Symptoms improved with BRAT diet.  Improved: 4 days later. Had similar symptoms about 1 month later after standing up very quickly at their cabin where there was not good ventilation and had a gas heater going, but no recurrence of symptoms since that time.  That last episode was 2 week ago. She reports 2 head injuries in the past- age 5 and 15 yo.  Symptoms will reoccur if she looks up for a long time.  No current  chest pain, palpitations.      GERD (gastroesophageal reflux disease)  Onset: several years ago.  No blood in stools.  Worse after eating citrus.  Has improved with ranitidine in the past    Ear congestion, bilateral  Has had chronic ear congestion, AM nasal stuffiness.  No fevers, chills, sore throat.  Has not tried anything to improve symptoms.      Allergies: Codeine; Other food; Penicillins; and Vicodin [hydrocodone-acetaminophen]    Current Outpatient Medications Ordered in Epic   Medication Sig Dispense Refill   • Multiple Vitamins-Calcium (ONE-A-DAY WOMENS FORMULA PO) Take 1 Tab by mouth every day.     • fluticasone (FLONASE) 50 MCG/ACT nasal spray Spray 1 Spray in nose every day. 16 g 1   • Alpha-D-Galactosidase (ANTI-GAS PO) Take 1 Tab by mouth as needed. Pt unsure of dose     • Calcium Carbonate Antacid (TUMS E-X PO) Take 1 Tab by mouth as needed. Pt unsure of dose     • Probiotic Product (PROBIOTIC-10 PO) Take 1 Tab by mouth every bedtime.       No current Epic-ordered facility-administered medications on file.        Past Medical History:   Diagnosis Date   • Bleeding from the nose     hx of last one 10 years ago    • Bowel habit changes     constipation    • Dental disorder     dentures    • Hay fever    • Heart burn    • Hemorrhoids    • Hypertension     borderline, never been on medication    • Indigestion    • Measles     as a child    • Migraine    • Pain     lower back pain   • PONV (postoperative nausea and vomiting) 07/29/2019   • Urinary tract infection 1994   • Venereal disease        Past Surgical History:   Procedure Laterality Date   • HYSTEROSCOPY WITH VIDEO OPERATIVE N/A 7/29/2019    Procedure: HYSTEROSCOPY, WITH VIDEO IMAGING -WITH MYOSURE;  Surgeon: Prieto Santa M.D.;  Location: SURGERY SAME [facility];  Service: Obstetrics   • DILATION AND CURETTAGE N/A 7/29/2019    Procedure: DILATION AND CURETTAGE;  Surgeon: Prieto Santa M.D.;  Location: SURGERY SAME DAY [facility]  "ORS;  Service: Obstetrics   • LUMPECTOMY Left 1988   • LAPAROSCOPY  1987   • GYN SURGERY         Social History     Tobacco Use   • Smoking status: Former Smoker     Packs/day: 1.00     Years: 31.00     Pack years: 31.00     Last attempt to quit: 2012     Years since quittin.1   • Smokeless tobacco: Never Used   Substance Use Topics   • Alcohol use: Yes     Frequency: Monthly or less     Drinks per session: 1 or 2     Binge frequency: Never     Comment: rarely   • Drug use: Yes     Frequency: 7.0 times per week     Types: Marijuana     Comment: occasionally; denies h/o illicitis       Social History     Social History Narrative   • Not on file       Family History   Problem Relation Age of Onset   • Hypertension Mother    • Lung Disease Mother         COPD   • Heart Disease Maternal Grandmother    • Lung Disease Maternal Uncle         lung cancer       Health Maintenance: Tdap given today, mammogram ordered    ROS:   Gen: no fevers/chills, no changes in weight  Eyes: no changes in vision  ENT: no sore throat, no hearing loss, no bloody nose  Pulm: no sob, no cough  CV: no chest pain, no palpitations  GI: no nausea/vomiting, no diarrhea  : no dysuria  MSk: no myalgias  Skin: no rash  Neuro: no headaches  Heme/Lymph: no easy bruising      Objective:     Exam: /82 (BP Location: Left arm, BP Cuff Size: Adult)   Pulse 88   Temp 36.6 °C (97.9 °F) (Temporal)   Resp 16   Ht 1.651 m (5' 5\")   Wt 85.5 kg (188 lb 7.9 oz)   SpO2 95%  Body mass index is 31.37 kg/m².    General: Normal appearing. No distress.  HEENT: Normocephalic.  Canals are clear bilaterally, tympanic membranes are benign, nasal mucosa boggy, pale with clear nasal discharge, oropharynx is without erythema, edema or exudates.  No sinus tenderness with palpations.  Eyes: Eyes conjunctiva clear lids without ptosis, pupils equal and reactive to light accommodation, ears normal shape and contour  Neck: Supple. Thyroid is not " enlarged.  Pulmonary: Clear to ausculation.  Normal effort. No rales, ronchi, or wheezing.  Cardiovascular: Regular rate and rhythm without murmur.   Abdomen: Soft, nontender, nondistended. Normal bowel sounds. Liver and spleen are not palpable  Neurologic: No gross motor deficits  Lymph: No cervical or supraclavicular lymph nodes are palpable  Skin: Warm and dry.  No obvious lesions.  Musculoskeletal: Normal gait. No extremity cyanosis, clubbing, or edema.  +numbness of left thumb, 2nd and lateral 3rd distant digits.  Tenderness of bilateral base of thumbs but otherwise hands are nontender and no wrist pain.  Strength 5/5 bilateral hands and wrists.  Elbow is nontender with full range of motion.  Psych: Normal mood and affect. Alert and oriented x3. Judgment and insight is normal.  PHQ2 =0    Assessment & Plan:   50 y.o. female with the following -    1. Need for vaccination  Pt is due for Tdap.  Risks and benefits discussed.  Vaccine given  - Tdap Vaccine =>6YO IM    2. Finger numbness  New issue starting about 2-3 weeks ago after prolonged pressure applied to her left wrist.  No change in symptoms with Phalen's test.  Likely carpal tunnel.  Hand xray ordered.  Recommended carpal tunnel brace at night and during the day when doing repetitive motions and avoiding prolonged wrist pressure.  Pt to follow-up in 1 month for re-evaluation or sooner if symptoms worsen.    3. Generalized abdominal pain  Chronic, stable, intermittent.  Benign examination today.  Previous pelvic US reviewed and pt is now s/p fibroid removal, so not likely due to fibroid.  She reports colonoscopy 11/2019 without significant abnormalities, however her stool is more narrow since that time with intermittent diarrhea and constipation.  Will check labs, abdominal xray and GI referral given. Follow-up precautions discussed   - CBC WITH DIFFERENTIAL; Future  - Comp Metabolic Panel; Future  - Lipid Profile; Future  - HEMOGLOBIN A1C; Future  -  DX-ABDOMEN COMPLETE WITH AP OR PA CXR; Future  - REFERRAL TO GASTROENTEROLOGY    4. History of vertigo  Chronic, intermittent with last episode 2 weeks ago.  Possibly due to sinus congestion from allergies given her examination.  Will do trial of flonase and follow-up precautions given.    5. Gastroesophageal reflux disease without esophagitis  Chronic, stable.  No red flag signs today.  -hold ranitidine given contaminants recently noted by FDA  -continue tums prn  -GI referral given her allergies limit use of PPIs, H2 blockers potentially  -encouraged GERD prevention diet  -follow-up precautions given    6. Pain in both hands  Chronic for the last several years, slowly worsening.  Likely due to OA.  Will check xray and treat based on results  - DX-HAND 2- LEFT; Future  - DX-HAND 2- RIGHT; Future    7. Encounter for screening mammogram for breast cancer  Pt states she is asymptomatic.  Screening mammogram ordered  - MA-SCREENING MAMMO BILAT W/CAD; Future    8. Obesity (BMI 30-39.9)  Chronic, stable.    - Patient identified as having weight management issue.  Appropriate orders and counseling given.    9. Ear congestion, bilateral  Chronic, intermittent.  Likely due to allergies given normal ear examination today.  Will do trial of flonase and pt to return in 1 month for recheck or sooner if symptoms worsen.  - fluticasone (FLONASE) 50 MCG/ACT nasal spray; Spray 1 Spray in nose every day.  Dispense: 16 g; Refill: 1      Return in about 4 weeks (around 7/6/2020) for follow-up hand and abdominal pain.    Please note that this dictation was created using voice recognition software. I have made every reasonable attempt to correct obvious errors, but I expect that there are errors of grammar and possibly content that I did not discover before finalizing the note.

## 2020-06-08 ENCOUNTER — OFFICE VISIT (OUTPATIENT)
Dept: MEDICAL GROUP | Facility: MEDICAL CENTER | Age: 50
End: 2020-06-08
Payer: COMMERCIAL

## 2020-06-08 VITALS
HEART RATE: 88 BPM | OXYGEN SATURATION: 95 % | BODY MASS INDEX: 31.4 KG/M2 | WEIGHT: 188.49 LBS | HEIGHT: 65 IN | RESPIRATION RATE: 16 BRPM | TEMPERATURE: 97.9 F | DIASTOLIC BLOOD PRESSURE: 82 MMHG | SYSTOLIC BLOOD PRESSURE: 132 MMHG

## 2020-06-08 DIAGNOSIS — R20.0 FINGER NUMBNESS: ICD-10-CM

## 2020-06-08 DIAGNOSIS — E66.9 OBESITY (BMI 30-39.9): ICD-10-CM

## 2020-06-08 DIAGNOSIS — Z23 NEED FOR VACCINATION: ICD-10-CM

## 2020-06-08 DIAGNOSIS — R10.84 GENERALIZED ABDOMINAL PAIN: ICD-10-CM

## 2020-06-08 DIAGNOSIS — H93.8X3 EAR CONGESTION, BILATERAL: ICD-10-CM

## 2020-06-08 DIAGNOSIS — M79.641 PAIN IN BOTH HANDS: ICD-10-CM

## 2020-06-08 DIAGNOSIS — Z12.31 ENCOUNTER FOR SCREENING MAMMOGRAM FOR BREAST CANCER: ICD-10-CM

## 2020-06-08 DIAGNOSIS — M79.642 PAIN IN BOTH HANDS: ICD-10-CM

## 2020-06-08 DIAGNOSIS — Z87.898 HISTORY OF VERTIGO: ICD-10-CM

## 2020-06-08 DIAGNOSIS — K21.9 GASTROESOPHAGEAL REFLUX DISEASE WITHOUT ESOPHAGITIS: ICD-10-CM

## 2020-06-08 PROBLEM — R10.9 ABDOMINAL PAIN: Status: ACTIVE | Noted: 2020-06-08

## 2020-06-08 PROBLEM — Z48.89 POSTOPERATIVE VISIT: Status: RESOLVED | Noted: 2019-08-22 | Resolved: 2020-06-08

## 2020-06-08 PROBLEM — M79.643 HAND PAIN: Status: ACTIVE | Noted: 2020-06-08

## 2020-06-08 PROBLEM — D21.9 FIBROID: Status: RESOLVED | Noted: 2019-07-17 | Resolved: 2020-06-08

## 2020-06-08 PROBLEM — N95.0 POSTMENOPAUSAL BLEEDING: Status: RESOLVED | Noted: 2019-07-17 | Resolved: 2020-06-08

## 2020-06-08 PROCEDURE — 99204 OFFICE O/P NEW MOD 45 MIN: CPT | Mod: 25 | Performed by: FAMILY MEDICINE

## 2020-06-08 PROCEDURE — 90715 TDAP VACCINE 7 YRS/> IM: CPT | Performed by: FAMILY MEDICINE

## 2020-06-08 PROCEDURE — 90471 IMMUNIZATION ADMIN: CPT | Performed by: FAMILY MEDICINE

## 2020-06-08 RX ORDER — FLUTICASONE PROPIONATE 50 MCG
1 SPRAY, SUSPENSION (ML) NASAL DAILY
Qty: 16 G | Refills: 1 | Status: SHIPPED
Start: 2020-06-08 | End: 2020-10-21

## 2020-06-08 ASSESSMENT — PATIENT HEALTH QUESTIONNAIRE - PHQ9: CLINICAL INTERPRETATION OF PHQ2 SCORE: 0

## 2020-06-08 NOTE — ASSESSMENT & PLAN NOTE
Onset: started having hysteroscopy July 29, 2019 and D&C.  States she had a fibroid removed at that time.  She then had a colonoscopy 11/15/2019 and was told she needed to use a squaty potty due to her anatomy making having BMs difficult.  States she gets constipated regularly and stools are getting more narrow over the last year.  States she sometimes feels like she gets frequent diarrhea and at other times has severe constipation.  Her  had a colon abscess which is what she is concerned she could have.  She takes calcium carbonate, fiber and multivitamins without relief.  States she is not eating a well balanced diet.  She is eating out a lot, chips, cookies, dairy.  Pain is primarily right lower pelvic region and does not radiate.

## 2020-06-08 NOTE — ASSESSMENT & PLAN NOTE
Has had chronic ear congestion, AM nasal stuffiness.  No fevers, chills, sore throat.  Has not tried anything to improve symptoms.

## 2020-06-08 NOTE — ASSESSMENT & PLAN NOTE
Onset: Started after rolling over in bed.  She then started vomiting and continued to feel lightheaded for 4 days.  Symptoms improved with BRAT diet.  Improved: 4 days later. Had similar symptoms about 1 month later after standing up very quickly at their cabin where there was not good ventilation and had a gas heater going, but no recurrence of symptoms since that time.  That last episode was 2 week ago. She reports 2 head injuries in the past- age 5 and 15 yo.  Symptoms will reoccur if she looks up for a long time.  No current chest pain, palpitations.

## 2020-06-08 NOTE — ASSESSMENT & PLAN NOTE
Onset: several years ago.  No blood in stools.  Worse after eating citrus.  Has improved with ranitidine in the past

## 2020-06-08 NOTE — ASSESSMENT & PLAN NOTE
Onset about 2-3 weeks ago after putting prolonged pressure on her left hand while painting.  Has numbness

## 2020-07-01 ENCOUNTER — HOSPITAL ENCOUNTER (OUTPATIENT)
Dept: RADIOLOGY | Facility: MEDICAL CENTER | Age: 50
End: 2020-07-01
Attending: FAMILY MEDICINE
Payer: COMMERCIAL

## 2020-07-01 ENCOUNTER — HOSPITAL ENCOUNTER (OUTPATIENT)
Dept: LAB | Facility: MEDICAL CENTER | Age: 50
End: 2020-07-01
Attending: FAMILY MEDICINE
Payer: COMMERCIAL

## 2020-07-01 ENCOUNTER — HOSPITAL ENCOUNTER (OUTPATIENT)
Dept: LAB | Facility: MEDICAL CENTER | Age: 50
End: 2020-07-01
Attending: INTERNAL MEDICINE
Payer: COMMERCIAL

## 2020-07-01 DIAGNOSIS — M79.641 PAIN IN BOTH HANDS: ICD-10-CM

## 2020-07-01 DIAGNOSIS — R10.84 GENERALIZED ABDOMINAL PAIN: ICD-10-CM

## 2020-07-01 DIAGNOSIS — M79.642 PAIN IN BOTH HANDS: ICD-10-CM

## 2020-07-01 DIAGNOSIS — Z12.31 ENCOUNTER FOR SCREENING MAMMOGRAM FOR BREAST CANCER: ICD-10-CM

## 2020-07-01 LAB
ALBUMIN SERPL BCP-MCNC: 4 G/DL (ref 3.2–4.9)
ALBUMIN SERPL BCP-MCNC: 4.2 G/DL (ref 3.2–4.9)
ALBUMIN/GLOB SERPL: 1.4 G/DL
ALP SERPL-CCNC: 104 U/L (ref 30–99)
ALP SERPL-CCNC: 105 U/L (ref 30–99)
ALT SERPL-CCNC: 13 U/L (ref 2–50)
ALT SERPL-CCNC: 14 U/L (ref 2–50)
ANION GAP SERPL CALC-SCNC: 10 MMOL/L (ref 7–16)
AST SERPL-CCNC: 12 U/L (ref 12–45)
AST SERPL-CCNC: 12 U/L (ref 12–45)
BASOPHILS # BLD AUTO: 0.4 % (ref 0–1.8)
BASOPHILS # BLD AUTO: 0.6 % (ref 0–1.8)
BASOPHILS # BLD: 0.04 K/UL (ref 0–0.12)
BASOPHILS # BLD: 0.05 K/UL (ref 0–0.12)
BILIRUB CONJ SERPL-MCNC: <0.2 MG/DL (ref 0.1–0.5)
BILIRUB INDIRECT SERPL-MCNC: ABNORMAL MG/DL (ref 0–1)
BILIRUB SERPL-MCNC: 0.7 MG/DL (ref 0.1–1.5)
BILIRUB SERPL-MCNC: 0.7 MG/DL (ref 0.1–1.5)
BUN SERPL-MCNC: 12 MG/DL (ref 8–22)
CALCIUM SERPL-MCNC: 9.7 MG/DL (ref 8.5–10.5)
CHLORIDE SERPL-SCNC: 103 MMOL/L (ref 96–112)
CHOLEST SERPL-MCNC: 189 MG/DL (ref 100–199)
CO2 SERPL-SCNC: 27 MMOL/L (ref 20–33)
CREAT SERPL-MCNC: 0.6 MG/DL (ref 0.5–1.4)
CRP SERPL HS-MCNC: 0.91 MG/DL (ref 0–0.75)
EOSINOPHIL # BLD AUTO: 0.17 K/UL (ref 0–0.51)
EOSINOPHIL # BLD AUTO: 0.18 K/UL (ref 0–0.51)
EOSINOPHIL NFR BLD: 1.9 % (ref 0–6.9)
EOSINOPHIL NFR BLD: 2 % (ref 0–6.9)
ERYTHROCYTE [DISTWIDTH] IN BLOOD BY AUTOMATED COUNT: 45.9 FL (ref 35.9–50)
ERYTHROCYTE [DISTWIDTH] IN BLOOD BY AUTOMATED COUNT: 46.4 FL (ref 35.9–50)
EST. AVERAGE GLUCOSE BLD GHB EST-MCNC: 117 MG/DL
FASTING STATUS PATIENT QL REPORTED: NORMAL
GLOBULIN SER CALC-MCNC: 2.9 G/DL (ref 1.9–3.5)
GLUCOSE SERPL-MCNC: 99 MG/DL (ref 65–99)
HBA1C MFR BLD: 5.7 % (ref 0–5.6)
HCT VFR BLD AUTO: 46.6 % (ref 37–47)
HCT VFR BLD AUTO: 48.1 % (ref 37–47)
HDLC SERPL-MCNC: 47 MG/DL
HGB BLD-MCNC: 15 G/DL (ref 12–16)
HGB BLD-MCNC: 15.1 G/DL (ref 12–16)
IMM GRANULOCYTES # BLD AUTO: 0.01 K/UL (ref 0–0.11)
IMM GRANULOCYTES # BLD AUTO: 0.02 K/UL (ref 0–0.11)
IMM GRANULOCYTES NFR BLD AUTO: 0.1 % (ref 0–0.9)
IMM GRANULOCYTES NFR BLD AUTO: 0.2 % (ref 0–0.9)
LDLC SERPL CALC-MCNC: 125 MG/DL
LYMPHOCYTES # BLD AUTO: 3.24 K/UL (ref 1–4.8)
LYMPHOCYTES # BLD AUTO: 3.34 K/UL (ref 1–4.8)
LYMPHOCYTES NFR BLD: 35.4 % (ref 22–41)
LYMPHOCYTES NFR BLD: 36.9 % (ref 22–41)
MCH RBC QN AUTO: 29.3 PG (ref 27–33)
MCH RBC QN AUTO: 29.8 PG (ref 27–33)
MCHC RBC AUTO-ENTMCNC: 31.4 G/DL (ref 33.6–35)
MCHC RBC AUTO-ENTMCNC: 32.2 G/DL (ref 33.6–35)
MCV RBC AUTO: 92.5 FL (ref 81.4–97.8)
MCV RBC AUTO: 93.4 FL (ref 81.4–97.8)
MONOCYTES # BLD AUTO: 0.5 K/UL (ref 0–0.85)
MONOCYTES # BLD AUTO: 0.6 K/UL (ref 0–0.85)
MONOCYTES NFR BLD AUTO: 5.5 % (ref 0–13.4)
MONOCYTES NFR BLD AUTO: 6.6 % (ref 0–13.4)
NEUTROPHILS # BLD AUTO: 4.95 K/UL (ref 2–7.15)
NEUTROPHILS # BLD AUTO: 5.1 K/UL (ref 2–7.15)
NEUTROPHILS NFR BLD: 54.8 % (ref 44–72)
NEUTROPHILS NFR BLD: 55.6 % (ref 44–72)
NRBC # BLD AUTO: 0 K/UL
NRBC # BLD AUTO: 0 K/UL
NRBC BLD-RTO: 0 /100 WBC
NRBC BLD-RTO: 0 /100 WBC
PLATELET # BLD AUTO: 332 K/UL (ref 164–446)
PLATELET # BLD AUTO: 350 K/UL (ref 164–446)
PMV BLD AUTO: 10.1 FL (ref 9–12.9)
PMV BLD AUTO: 10.2 FL (ref 9–12.9)
POTASSIUM SERPL-SCNC: 4.8 MMOL/L (ref 3.6–5.5)
PROT SERPL-MCNC: 7 G/DL (ref 6–8.2)
PROT SERPL-MCNC: 7.1 G/DL (ref 6–8.2)
RBC # BLD AUTO: 5.04 M/UL (ref 4.2–5.4)
RBC # BLD AUTO: 5.15 M/UL (ref 4.2–5.4)
SODIUM SERPL-SCNC: 140 MMOL/L (ref 135–145)
T4 FREE SERPL-MCNC: 1.22 NG/DL (ref 0.93–1.7)
TRIGL SERPL-MCNC: 86 MG/DL (ref 0–149)
TSH SERPL DL<=0.005 MIU/L-ACNC: 1.5 UIU/ML (ref 0.38–5.33)
WBC # BLD AUTO: 9 K/UL (ref 4.8–10.8)
WBC # BLD AUTO: 9.2 K/UL (ref 4.8–10.8)

## 2020-07-01 PROCEDURE — 77067 SCR MAMMO BI INCL CAD: CPT

## 2020-07-01 PROCEDURE — 80076 HEPATIC FUNCTION PANEL: CPT

## 2020-07-01 PROCEDURE — 83516 IMMUNOASSAY NONANTIBODY: CPT

## 2020-07-01 PROCEDURE — 73120 X-RAY EXAM OF HAND: CPT | Mod: RT

## 2020-07-01 PROCEDURE — 74022 RADEX COMPL AQT ABD SERIES: CPT

## 2020-07-01 PROCEDURE — 82784 ASSAY IGA/IGD/IGG/IGM EACH: CPT

## 2020-07-01 PROCEDURE — 80061 LIPID PANEL: CPT

## 2020-07-01 PROCEDURE — 85025 COMPLETE CBC W/AUTO DIFF WBC: CPT

## 2020-07-01 PROCEDURE — 83036 HEMOGLOBIN GLYCOSYLATED A1C: CPT

## 2020-07-01 PROCEDURE — 80053 COMPREHEN METABOLIC PANEL: CPT

## 2020-07-01 PROCEDURE — 84443 ASSAY THYROID STIM HORMONE: CPT

## 2020-07-01 PROCEDURE — 73120 X-RAY EXAM OF HAND: CPT | Mod: LT

## 2020-07-01 PROCEDURE — 86140 C-REACTIVE PROTEIN: CPT

## 2020-07-01 PROCEDURE — 85025 COMPLETE CBC W/AUTO DIFF WBC: CPT | Mod: 91

## 2020-07-01 PROCEDURE — 36415 COLL VENOUS BLD VENIPUNCTURE: CPT

## 2020-07-01 PROCEDURE — 84439 ASSAY OF FREE THYROXINE: CPT

## 2020-07-03 DIAGNOSIS — R92.8 ABNORMAL MAMMOGRAM: ICD-10-CM

## 2020-07-03 LAB
IGA SERPL-MCNC: 185 MG/DL (ref 68–408)
TTG IGA SER IA-ACNC: <2 U/ML (ref 0–3)

## 2020-07-06 ENCOUNTER — OFFICE VISIT (OUTPATIENT)
Dept: MEDICAL GROUP | Facility: MEDICAL CENTER | Age: 50
End: 2020-07-06
Payer: COMMERCIAL

## 2020-07-06 VITALS
SYSTOLIC BLOOD PRESSURE: 120 MMHG | BODY MASS INDEX: 31.49 KG/M2 | WEIGHT: 189 LBS | OXYGEN SATURATION: 96 % | HEART RATE: 80 BPM | TEMPERATURE: 97.9 F | DIASTOLIC BLOOD PRESSURE: 72 MMHG | HEIGHT: 65 IN

## 2020-07-06 DIAGNOSIS — M79.641 PAIN IN BOTH HANDS: ICD-10-CM

## 2020-07-06 DIAGNOSIS — M79.642 PAIN IN BOTH HANDS: ICD-10-CM

## 2020-07-06 DIAGNOSIS — R20.0 HAND NUMBNESS: ICD-10-CM

## 2020-07-06 DIAGNOSIS — R92.8 ABNORMAL MAMMOGRAM: ICD-10-CM

## 2020-07-06 DIAGNOSIS — K21.9 GASTROESOPHAGEAL REFLUX DISEASE WITHOUT ESOPHAGITIS: ICD-10-CM

## 2020-07-06 DIAGNOSIS — R10.84 GENERALIZED ABDOMINAL PAIN: ICD-10-CM

## 2020-07-06 DIAGNOSIS — H93.8X3 EAR CONGESTION, BILATERAL: ICD-10-CM

## 2020-07-06 PROCEDURE — 99213 OFFICE O/P EST LOW 20 MIN: CPT | Performed by: FAMILY MEDICINE

## 2020-07-06 ASSESSMENT — FIBROSIS 4 INDEX: FIB4 SCORE: 0.5

## 2020-07-06 NOTE — PROGRESS NOTES
Subjective:     CC: hand numbness, abdominal pain, ear congestion, mammogram results    HPI:   Michael presents today with     Abnormal mammogram  Pt had BIRAD 0 mammogram and has pending US.  She reports having hx of mass on that breast in the past and having a biopsy that was benign.  No noted skin lesions.    Hand numbness  Onset about 1.5 months ago.  States the numbness has resolved except for mild numbness on her right middle finger.  Reports having fallen onto her right elbow on the sidewalk prior to doing the painting.  States she has chronic pain on the end of her right elbow, now, since that fall.    GERD (gastroesophageal reflux disease)  Chronic, stable, intermittent.  Occurs after eating.  States that she does not have current pain.    Abdominal pain  Chronic, stable, intermittent.  She has seen GI since last visit and they ordered labs and has a follow-up appointment pending.    Hand pain  Chronic, has improved with using wrist guards.  She is avoiding repetitive motions.  States strength is normal.    Ear congestion, bilateral  Chronic, persistent.  Improves with yawning and feels popping sensation.  She has used hte nasal       Past Medical History:   Diagnosis Date   • Bleeding from the nose     hx of last one 10 years ago    • Bowel habit changes     constipation    • Dental disorder     dentures    • Hay fever    • Heart burn    • Hemorrhoids    • Hypertension     borderline, never been on medication    • Indigestion    • Measles     as a child    • Migraine    • Pain     lower back pain   • PONV (postoperative nausea and vomiting) 2019   • Urinary tract infection    • Venereal disease        Social History     Tobacco Use   • Smoking status: Former Smoker     Packs/day: 1.00     Years: 31.00     Pack years: 31.00     Last attempt to quit: 2012     Years since quittin.2   • Smokeless tobacco: Never Used   Substance Use Topics   • Alcohol use: Yes     Frequency: Monthly or less     " Drinks per session: 1 or 2     Binge frequency: Never     Comment: rarely   • Drug use: Yes     Frequency: 7.0 times per week     Types: Marijuana     Comment: occasionally; denies h/o illicitis       Current Outpatient Medications Ordered in Epic   Medication Sig Dispense Refill   • Multiple Vitamins-Calcium (ONE-A-DAY WOMENS FORMULA PO) Take 1 Tab by mouth every day.     • fluticasone (FLONASE) 50 MCG/ACT nasal spray Spray 1 Spray in nose every day. 16 g 1   • Alpha-D-Galactosidase (ANTI-GAS PO) Take 1 Tab by mouth as needed. Pt unsure of dose     • Calcium Carbonate Antacid (TUMS E-X PO) Take 1 Tab by mouth as needed. Pt unsure of dose     • Probiotic Product (PROBIOTIC-10 PO) Take 1 Tab by mouth every bedtime.       No current Epic-ordered facility-administered medications on file.        Allergies:  Codeine; Other food; Penicillins; and Vicodin [hydrocodone-acetaminophen]    Health Maintenance: encouraged zoster vaccination    ROS:  Gen: no fevers/chills, no changes in weight  Eyes: no changes in vision  ENT: no sore throat, no hearing loss, no bloody nose  Pulm: no sob, no cough  CV: no chest pain, no palpitations  GI: no nausea/vomiting, no diarrhea  : no dysuria  MSk: no myalgias  Skin: no rash  Neuro: no headaches, no numbness/tingling  Heme/Lymph: no easy bruising      Objective:     Exam:  /72 (BP Location: Left arm, Patient Position: Sitting, BP Cuff Size: Adult long)   Pulse 80   Temp 36.6 °C (97.9 °F) (Temporal)   Ht 1.651 m (5' 5\")   Wt 85.7 kg (189 lb)   LMP  (LMP Unknown) Comment: postmenopausal bleeding   SpO2 96%   BMI 31.45 kg/m²  Body mass index is 31.45 kg/m².    Gen: Alert and oriented, No apparent distress.  Neck: Neck is supple without lymphadenopathy.  HEENT: TMs with fluid noted behind bilaterally.  No TM erythema. Normal TM light reflexes.  PERRL.  Pt declined nasal and OP evaluation given COVID19 exposure risk  Lungs: Normal effort, CTA bilaterally, no wheezes, rhonchi, " or rales  Abd:  Soft, nontender, nondistended.  Normoactive bowel sounds.  CV: Regular rate and rhythm. No murmurs, rubs, or gallops.  Ext: No clubbing, cyanosis, edema. Mild numbness of left 2nd and third distal fingers, otherwise UE sensation intact.  Left elbow olecrenon tenderness to palpation but no step offs, bruising, or skin abnormalities.  Strength 5/5 UE bilaterally.    Assessment & Plan:     50 y.o. female with the following -     1. Abnormal mammogram  New issue.  Pt to get US in 1 week.  Will follow-up results.    2. Hand numbness  Chronic, improving.  Will check elbow xray and if normal refer to physiatry.  Encouraged tylenol prn elbow pain and avoid repetitive motions.  Pt to use wrist guards if doing repetitive motions.    3. Gastroesophageal reflux disease without esophagitis  Chronic, stable, intermittent.  Pt to follow-up with GI as scheduled.    4. Generalized abdominal pain  Chronic, stable, intermittent.  Pt to follow-up with GI as scheduled.    5. Pain in both hands  Chronic, improving  Encouraged tylenol prn pain and avoid repetitive motions.  Pt to use wrist guards if doing repetitive motions.    6. Ear congestion, bilateral  Chronic, stable.  Pt has only used flonase 2-3 times since last visit.  No fevers, chills, PND, sore throat to suggest infection.  Pt to do flonase daily for 2-3 weeks and return if no improvement or sooner if symptoms worsen. Patient expressed understanding and agreement with plan.      Return if symptoms worsen or fail to improve.    Please note that this dictation was created using voice recognition software. I have made every reasonable attempt to correct obvious errors, but I expect that there are errors of grammar and possibly content that I did not discover before finalizing the note.

## 2020-07-06 NOTE — ASSESSMENT & PLAN NOTE
Chronic, has improved with using wrist guards.  She is avoiding repetitive motions.  States strength is normal.

## 2020-07-06 NOTE — ASSESSMENT & PLAN NOTE
Onset about 1.5 months ago.  States the numbness has resolved except for mild numbness on her right middle finger.  Reports having fallen onto her right elbow on the sidewalk prior to doing the painting.  States she has chronic pain on the end of her right elbow, now, since that fall.

## 2020-07-06 NOTE — ASSESSMENT & PLAN NOTE
Chronic, stable, intermittent.  She has seen GI since last visit and they ordered labs and has a follow-up appointment pending.

## 2020-07-06 NOTE — ASSESSMENT & PLAN NOTE
Pt had BIRAD 0 mammogram and has pending US.  She reports having hx of mass on that breast in the past and having a biopsy that was benign.  No noted skin lesions.

## 2020-07-09 ENCOUNTER — HOSPITAL ENCOUNTER (OUTPATIENT)
Dept: RADIOLOGY | Facility: MEDICAL CENTER | Age: 50
End: 2020-07-09
Attending: FAMILY MEDICINE
Payer: COMMERCIAL

## 2020-07-09 DIAGNOSIS — R92.8 ABNORMAL MAMMOGRAM: ICD-10-CM

## 2020-07-09 PROCEDURE — 76642 ULTRASOUND BREAST LIMITED: CPT | Mod: RT

## 2020-07-10 ENCOUNTER — HOSPITAL ENCOUNTER (OUTPATIENT)
Dept: RADIOLOGY | Facility: MEDICAL CENTER | Age: 50
End: 2020-07-10
Attending: FAMILY MEDICINE
Payer: COMMERCIAL

## 2020-07-10 DIAGNOSIS — R92.8 ABNORMAL FINDING ON BREAST IMAGING: ICD-10-CM

## 2020-07-10 LAB — PATHOLOGY CONSULT NOTE: NORMAL

## 2020-07-10 PROCEDURE — 88305 TISSUE EXAM BY PATHOLOGIST: CPT

## 2020-07-10 PROCEDURE — 19083 BX BREAST 1ST LESION US IMAG: CPT | Mod: RT

## 2020-07-13 ENCOUNTER — PATIENT MESSAGE (OUTPATIENT)
Dept: MEDICAL GROUP | Facility: MEDICAL CENTER | Age: 50
End: 2020-07-13

## 2020-07-13 ENCOUNTER — TELEPHONE (OUTPATIENT)
Dept: RADIOLOGY | Facility: MEDICAL CENTER | Age: 50
End: 2020-07-13

## 2020-07-13 ENCOUNTER — TELEPHONE (OUTPATIENT)
Dept: MEDICAL GROUP | Facility: MEDICAL CENTER | Age: 50
End: 2020-07-13

## 2020-07-13 NOTE — TELEPHONE ENCOUNTER
Called patient and LVM for her to return call to discuss the good news regarding her biopsy results.  Requested she let me know when a good time to call her back would be if I am not available when she returns the call. -Dr. Kiran

## 2020-07-17 DIAGNOSIS — R92.8 ABNORMAL MAMMOGRAM: ICD-10-CM

## 2020-08-25 NOTE — PROGRESS NOTES
Subjective:     CC: blood in urine, concern for UTI    HPI:   Michael presents today with     Hematuria  Onset: 1 day  Had pelvic pressure, blush redness noted when wiping and pressure-like discomfort with urination yesterday. Has mild low back pain. Denies vaginal bleeding, but states that the blood she noticed after wiping could be coming from her vagina.  No abnormal vaginal discharge.  She stayed home all day and was in bed due to having stomach pains, decreased appetite.  She drank ensure to stay well hydrated.  States she had pressure with urination but no pain.  No urgency or frequency today.  No bleeding today and pelvic discomfort has improved.  No fevers or chills.  States she had similar issues in the past which resolve after a day.   She has not taken anything to help improve symptoms.    Irritability  She reports feeling more irritable lately than normal and feels that her co-workers are unprofessional and are not reliable, stab each other in the back and that her boss is not supportive.      Pelvic pain  Onset about 3-4 months ago.  Pt reports suprapubic and RLQ discomfort intermittently.  Symptoms are sometimes associated with nausea, vomiting, diarrhea.  Pain is sharp and achy.  She tries to rest when symptoms occur and it then self resolves after about 24 hours.  She has seen GI in the past re: abdominal pain.  She reports symptoms started after having D&C for hemorrhoids and that it feels similar to when she had hemorrhoids in the past.  No weight loss.      Past Medical History:   Diagnosis Date   • Bleeding from the nose     hx of last one 10 years ago    • Bowel habit changes     constipation    • Dental disorder     dentures    • Hay fever    • Heart burn    • Hemorrhoids    • Hypertension     borderline, never been on medication    • Indigestion    • Measles     as a child    • Migraine    • Pain     lower back pain   • PONV (postoperative nausea and vomiting) 07/29/2019   • Urinary tract  "infection    • Venereal disease        Social History     Tobacco Use   • Smoking status: Former Smoker     Packs/day: 1.00     Years: 31.00     Pack years: 31.00     Quit date: 2012     Years since quittin.3   • Smokeless tobacco: Never Used   Substance Use Topics   • Alcohol use: Yes     Frequency: Monthly or less     Drinks per session: 1 or 2     Binge frequency: Never     Comment: rarely   • Drug use: Yes     Frequency: 7.0 times per week     Types: Marijuana     Comment: occasionally; denies h/o illicitis       Current Outpatient Medications Ordered in Epic   Medication Sig Dispense Refill   • Multiple Vitamins-Calcium (ONE-A-DAY WOMENS FORMULA PO) Take 1 Tab by mouth every day.     • fluticasone (FLONASE) 50 MCG/ACT nasal spray Spray 1 Spray in nose every day. 16 g 1   • Alpha-D-Galactosidase (ANTI-GAS PO) Take 1 Tab by mouth as needed. Pt unsure of dose     • Calcium Carbonate Antacid (TUMS E-X PO) Take 1 Tab by mouth as needed. Pt unsure of dose     • Probiotic Product (PROBIOTIC-10 PO) Take 1 Tab by mouth every bedtime.       No current Epic-ordered facility-administered medications on file.        Allergies:  Codeine, Other food, Penicillins, and Vicodin [hydrocodone-acetaminophen]    Health Maintenance: pap is UTD    ROS:  Gen: no fevers/chills, no changes in weight  Eyes: no changes in vision  ENT: no sore throat, no hearing loss, no bloody nose  Pulm: no sob, no cough  CV: no chest pain, no palpitations  GI: no nausea/vomiting, no diarrhea  : no dysuria  MSk: no myalgias  Skin: no rash  Neuro: no headaches, no numbness/tingling  Heme/Lymph: no easy bruising      Objective:       Exam:  /80 (BP Location: Right arm, Patient Position: Sitting, BP Cuff Size: Adult long)   Pulse 88   Temp 36.8 °C (98.2 °F) (Temporal)   Resp 16   Ht 1.651 m (5' 5\")   Wt 84.4 kg (186 lb)   LMP  (LMP Unknown) Comment: postmenopausal bleeding   SpO2 96%   BMI 30.95 kg/m²  Body mass index is 30.95 " kg/m².    Gen: Alert and oriented, No apparent distress.  Neck: Neck is supple without lymphadenopathy.  Lungs: Normal effort, CTA bilaterally, no wheezes, rhonchi, or rales  CV: Regular rate and rhythm. No murmurs, rubs, or gallops.  Ext: No clubbing, cyanosis, edema.  PHQ9 = 1  Pelvic exam:  Perineum: No external lesions are noted, color is symmetrical throughout  Vagina: Vaginal vault is well rugated.  Cervix: Parous, nonfriable  Uterus:enlarged size  Bimanual: uterine size & shape: enlarged but not tender.  No cervical motion tenderness   Chaperone offered and pt agreed.  Chaperone was Cherrie Guillermo MA.      Assessment & Plan:     50 y.o. female with the following -     1. Hematuria, unspecified type  New issue.  UA today with leukocytes and nitrites but with 2+ blood.  Pt states urinary discomfort has resolved, has no CVA tenderness.  Will send for culture and treat if positive.  Will check pelvic US given enlarged uterus to determine if bleeding is possibly from uterine source such as fibroid.  Will plan to refer to urology if negative. Patient expressed understanding and agreement with plan.  - URINE CULTURE(NEW); Future  - US-PELVIC COMPLETE (TRANSABDOMINAL/TRANSVAGINAL) (COMBO); Future  - POCT Urinalysis    2. Irritability  Chronic issue per patient report.  She denies anxiety and depression.  She declines  referral.  Denies SI/HI.  Encouraged stress reduction methods and follow-up precautions given.    3. Pelvic pain  New issue, intermittent for the last few months.  Will check US.  Previous GI workup revealed no celiac disease.  Her colonoscopy is up to date.  Pt to follow-up after US for further evaluation and treatment. Patient expressed understanding and agreement with plan.  - US-PELVIC COMPLETE (TRANSABDOMINAL/TRANSVAGINAL) (COMBO); Future  - POCT Urinalysis    Return if symptoms worsen or fail to improve.    Please note that this dictation was created using voice recognition software. I have made  every reasonable attempt to correct obvious errors, but I expect that there are errors of grammar and possibly content that I did not discover before finalizing the note.

## 2020-08-26 ENCOUNTER — OFFICE VISIT (OUTPATIENT)
Dept: MEDICAL GROUP | Facility: MEDICAL CENTER | Age: 50
End: 2020-08-26
Payer: COMMERCIAL

## 2020-08-26 ENCOUNTER — HOSPITAL ENCOUNTER (OUTPATIENT)
Facility: MEDICAL CENTER | Age: 50
End: 2020-08-26
Attending: FAMILY MEDICINE
Payer: COMMERCIAL

## 2020-08-26 VITALS
RESPIRATION RATE: 16 BRPM | WEIGHT: 186 LBS | HEART RATE: 88 BPM | TEMPERATURE: 98.2 F | SYSTOLIC BLOOD PRESSURE: 140 MMHG | BODY MASS INDEX: 30.99 KG/M2 | HEIGHT: 65 IN | DIASTOLIC BLOOD PRESSURE: 80 MMHG | OXYGEN SATURATION: 96 %

## 2020-08-26 DIAGNOSIS — R31.9 HEMATURIA, UNSPECIFIED TYPE: ICD-10-CM

## 2020-08-26 DIAGNOSIS — R45.4 IRRITABILITY: ICD-10-CM

## 2020-08-26 DIAGNOSIS — R10.2 PELVIC PAIN: ICD-10-CM

## 2020-08-26 LAB
APPEARANCE UR: CLEAR
BILIRUB UR STRIP-MCNC: NEGATIVE MG/DL
COLOR UR AUTO: YELLOW
GLUCOSE UR STRIP.AUTO-MCNC: NEGATIVE MG/DL
KETONES UR STRIP.AUTO-MCNC: NEGATIVE MG/DL
LEUKOCYTE ESTERASE UR QL STRIP.AUTO: NEGATIVE
NITRITE UR QL STRIP.AUTO: NEGATIVE
PH UR STRIP.AUTO: 5.5 [PH] (ref 5–8)
PROT UR QL STRIP: NEGATIVE MG/DL
RBC UR QL AUTO: NORMAL
SP GR UR STRIP.AUTO: 1.03
UROBILINOGEN UR STRIP-MCNC: 0.2 MG/DL

## 2020-08-26 PROCEDURE — 99213 OFFICE O/P EST LOW 20 MIN: CPT | Performed by: FAMILY MEDICINE

## 2020-08-26 PROCEDURE — 81002 URINALYSIS NONAUTO W/O SCOPE: CPT | Performed by: FAMILY MEDICINE

## 2020-08-26 PROCEDURE — 87086 URINE CULTURE/COLONY COUNT: CPT

## 2020-08-26 ASSESSMENT — PATIENT HEALTH QUESTIONNAIRE - PHQ9: CLINICAL INTERPRETATION OF PHQ2 SCORE: 0

## 2020-08-26 ASSESSMENT — FIBROSIS 4 INDEX: FIB4 SCORE: 0.5

## 2020-08-26 NOTE — LETTER
[facility]  [id number]     August 26, 2020    Patient: Michael Larsen   YOB: 1970   Date of Visit: 8/26/2020       To Whom It May Concern:    Michael Larsen was seen and treated in our department on 8/26/2020.  Please excuse her from work from 8/25/20-8/26/2020.  She can return to work on 8/27/2020 without restrictions.    Sincerely,     Sade Kiran D.O.

## 2020-08-27 ENCOUNTER — GYNECOLOGY VISIT (OUTPATIENT)
Dept: OBGYN | Facility: CLINIC | Age: 50
End: 2020-08-27
Payer: COMMERCIAL

## 2020-08-27 VITALS — BODY MASS INDEX: 31.32 KG/M2 | DIASTOLIC BLOOD PRESSURE: 86 MMHG | WEIGHT: 188.2 LBS | SYSTOLIC BLOOD PRESSURE: 135 MMHG

## 2020-08-27 DIAGNOSIS — R10.2 PELVIC PAIN: ICD-10-CM

## 2020-08-27 PROCEDURE — 99213 OFFICE O/P EST LOW 20 MIN: CPT | Performed by: OBSTETRICS & GYNECOLOGY

## 2020-08-27 ASSESSMENT — FIBROSIS 4 INDEX: FIB4 SCORE: 0.5

## 2020-08-27 NOTE — NON-PROVIDER
Pt here for Gyn visit  Good Phone#: [phone]  Pharmacy verified.  Pt states having pelvic px for on and off x 1 yr.  Pt states no other complaints for today.

## 2020-08-29 LAB
BACTERIA UR CULT: NORMAL
SIGNIFICANT IND 70042: NORMAL
SITE SITE: NORMAL
SOURCE SOURCE: NORMAL

## 2020-08-30 ENCOUNTER — HOSPITAL ENCOUNTER (OUTPATIENT)
Dept: RADIOLOGY | Facility: MEDICAL CENTER | Age: 50
End: 2020-08-30
Attending: FAMILY MEDICINE
Payer: COMMERCIAL

## 2020-08-30 DIAGNOSIS — R31.9 HEMATURIA, UNSPECIFIED TYPE: ICD-10-CM

## 2020-08-30 DIAGNOSIS — R10.2 PELVIC PAIN: ICD-10-CM

## 2020-08-30 PROCEDURE — 76830 TRANSVAGINAL US NON-OB: CPT

## 2020-09-01 ENCOUNTER — GYNECOLOGY VISIT (OUTPATIENT)
Dept: OBGYN | Facility: CLINIC | Age: 50
End: 2020-09-01
Payer: COMMERCIAL

## 2020-09-01 VITALS — DIASTOLIC BLOOD PRESSURE: 84 MMHG | BODY MASS INDEX: 31.12 KG/M2 | WEIGHT: 187 LBS | SYSTOLIC BLOOD PRESSURE: 126 MMHG

## 2020-09-01 DIAGNOSIS — R10.2 PELVIC PAIN: ICD-10-CM

## 2020-09-01 DIAGNOSIS — R10.31 RIGHT LOWER QUADRANT ABDOMINAL PAIN: ICD-10-CM

## 2020-09-01 PROCEDURE — 99213 OFFICE O/P EST LOW 20 MIN: CPT | Performed by: OBSTETRICS & GYNECOLOGY

## 2020-09-01 ASSESSMENT — FIBROSIS 4 INDEX: FIB4 SCORE: 0.5

## 2020-09-01 NOTE — PROGRESS NOTES
Chief complaint: Follow-up pelvic pain      History of present illness: 50 y.o. presents to office for follow-up ultrasound for pelvic pain.  Patient still reports right lower quadrant pain, some association with food intake.  Still reports pain is sharp, in the right lower quadrant, radiation to her back.      Review of systems:  Pertinent positives documented in HPI and a comprehensive review of system is negative    All PMH, PSH, allergies, social history and FH reviewed and updated today:  Past Medical History:   Diagnosis Date   • Bleeding from the nose     hx of last one 10 years ago    • Bowel habit changes     constipation    • Dental disorder     dentures    • Hay fever    • Heart burn    • Hemorrhoids    • Hypertension     borderline, never been on medication    • Indigestion    • Measles     as a child    • Migraine    • Pain     lower back pain   • PONV (postoperative nausea and vomiting) 07/29/2019   • Urinary tract infection 1994   • Venereal disease        Past Surgical History:   Procedure Laterality Date   • HYSTEROSCOPY WITH VIDEO OPERATIVE N/A 7/29/2019    Procedure: HYSTEROSCOPY, WITH VIDEO IMAGING -WITH MYOSURE;  Surgeon: Prieto Santa M.D.;  Location: SURGERY SAME DAY [facility];  Service: Obstetrics   • DILATION AND CURETTAGE N/A 7/29/2019    Procedure: DILATION AND CURETTAGE;  Surgeon: Prieto Santa M.D.;  Location: SURGERY SAME DAY [facility];  Service: Obstetrics   • LUMPECTOMY Left 02/1988   • LAPAROSCOPY  04/1987   • GYN SURGERY         Allergies:   Allergies   Allergen Reactions   • Codeine      Vomiting    • Other Food      grapejuice-vomiting    • Penicillins      Vomiting    • Vicodin [Hydrocodone-Acetaminophen]      Nightmares, nausea, twitching        Social History     Socioeconomic History   • Marital status:      Spouse name: Not on file   • Number of children: Not on file   • Years of education: Not on file   • Highest education level: Some college, no  degree   Occupational History   • Not on file   Social Needs   • Financial resource strain: Not very hard   • Food insecurity     Worry: Never true     Inability: Never true   • Transportation needs     Medical: No     Non-medical: No   Tobacco Use   • Smoking status: Former Smoker     Packs/day: 1.00     Years: 31.     Pack years: 31.     Quit date: 2012     Years since quittin.3   • Smokeless tobacco: Never Used   Substance and Sexual Activity   • Alcohol use: Yes     Frequency: Monthly or less     Drinks per session: 1 or 2     Binge frequency: Never     Comment: rarely   • Drug use: Yes     Frequency: 7.0 times per week     Types: Marijuana     Comment: occasionally; denies h/o illicitis   • Sexual activity: Yes   Lifestyle   • Physical activity     Days per week: 3 days     Minutes per session: 30 min   • Stress: Not at all   Relationships   • Social connections     Talks on phone: Three times a week     Gets together: Twice a week     Attends Mu-ism service: 1 to 4 times per year     Active member of club or organization: Yes     Attends meetings of clubs or organizations: 1 to 4 times per year     Relationship status:    • Intimate partner violence     Fear of current or ex partner: Not on file     Emotionally abused: Not on file     Physically abused: Not on file     Forced sexual activity: Not on file   Other Topics Concern   • Not on file   Social History Narrative   • Not on file       Family History   Problem Relation Age of Onset   • Hypertension Mother    • Lung Disease Mother         COPD   • Heart Disease Maternal Grandmother    • Lung Disease Maternal Uncle         lung cancer       Physical exam:  /84 (BP Location: Right arm, Patient Position: Sitting)   Wt 84.8 kg (187 lb)     GENERAL APPEARANCE: healthy, alert, no distress  NEURO Awake, alert and oriented x 3, Normal gait, no sensory deficits  SKIN No rashes, or ulcers or lesions seen  PSYCHIATRIC: Patient shows  appropriate affect, is alert and oriented x3, intact judgment and insight.      Assessment:  1. Right lower quadrant abdominal pain  CT-ABDOMEN-PELVIS WITH & W/O       Plan:    Pelvic ultrasound does not show any abnormalities of the uterus and/or ovaries.  Discussed the patient the fact that she is postmenopausal coupled a normal ultrasound that the pain is unlikely to be gynecological in origin.  Neck step at this time is to obtain a CT of the abdomen pelvis to assess for any causes of her pain.  If any abnormalities are noted, patient be referred to the appropriate specialty.  If no abnormalities are noted, discussed with the patient possibility of returning to gastroenterologist to discuss her symptoms as she does report a longstanding history of GI problems throughout her entire life.    All questions answered    Spent  15 minutes in face-to-face patient contact in which greater than 50% of that visit was spent in counseling/coordination of care including medical and surgical options of care.

## 2020-09-11 NOTE — PROGRESS NOTES
Chief Complaint   Patient presents with   • Gynecologic Exam     Complaint: Pelvic pain    History of present illness: 50 y.o. presents to office for discussion of pelvic pain.  Patient reports right lower quadrant pain, some association with food intake.  Describes a sharp with radiation to her back, 710 in intensity, associated with some food intake.  Patient also reports constipation.  No fevers, chills, sweats, chest pain or shortness of breath      Review of systems:  Pertinent positives documented in HPI and a comprehensive review of system is negative    All PMH, PSH, allergies, social history and FH reviewed and updated today:  Past Medical History:   Diagnosis Date   • Bleeding from the nose     hx of last one 10 years ago    • Bowel habit changes     constipation    • Dental disorder     dentures    • Hay fever    • Heart burn    • Hemorrhoids    • Hypertension     borderline, never been on medication    • Indigestion    • Measles     as a child    • Migraine    • Pain     lower back pain   • PONV (postoperative nausea and vomiting) 07/29/2019   • Urinary tract infection 1994   • Venereal disease        Past Surgical History:   Procedure Laterality Date   • HYSTEROSCOPY WITH VIDEO OPERATIVE N/A 7/29/2019    Procedure: HYSTEROSCOPY, WITH VIDEO IMAGING -WITH MYOSURE;  Surgeon: Prieto Santa M.D.;  Location: SURGERY SAME DAY [facility];  Service: Obstetrics   • DILATION AND CURETTAGE N/A 7/29/2019    Procedure: DILATION AND CURETTAGE;  Surgeon: Prieto Santa M.D.;  Location: SURGERY SAME DAY [facility];  Service: Obstetrics   • LUMPECTOMY Left 02/1988   • LAPAROSCOPY  04/1987   • GYN SURGERY         Allergies:   Allergies   Allergen Reactions   • Codeine      Vomiting    • Other Food      grapejuice-vomiting    • Penicillins      Vomiting    • Vicodin [Hydrocodone-Acetaminophen]      Nightmares, nausea, twitching        Social History     Socioeconomic History   • Marital status:       Spouse name: Not on file   • Number of children: Not on file   • Years of education: Not on file   • Highest education level: Some college, no degree   Occupational History   • Not on file   Social Needs   • Financial resource strain: Not very hard   • Food insecurity     Worry: Never true     Inability: Never true   • Transportation needs     Medical: No     Non-medical: No   Tobacco Use   • Smoking status: Former Smoker     Packs/day: 1.00     Years: 31.00     Pack years: 31.00     Quit date: 2012     Years since quittin.3   • Smokeless tobacco: Never Used   Substance and Sexual Activity   • Alcohol use: Yes     Frequency: Monthly or less     Drinks per session: 1 or 2     Binge frequency: Never     Comment: rarely   • Drug use: Yes     Frequency: 7.0 times per week     Types: Marijuana     Comment: occasionally; denies h/o illicitis   • Sexual activity: Yes   Lifestyle   • Physical activity     Days per week: 3 days     Minutes per session: 30 min   • Stress: Not at all   Relationships   • Social connections     Talks on phone: Three times a week     Gets together: Twice a week     Attends Pentecostal service: 1 to 4 times per year     Active member of club or organization: Yes     Attends meetings of clubs or organizations: 1 to 4 times per year     Relationship status:    • Intimate partner violence     Fear of current or ex partner: Not on file     Emotionally abused: Not on file     Physically abused: Not on file     Forced sexual activity: Not on file   Other Topics Concern   • Not on file   Social History Narrative   • Not on file       Family History   Problem Relation Age of Onset   • Hypertension Mother    • Lung Disease Mother         COPD   • Heart Disease Maternal Grandmother    • Lung Disease Maternal Uncle         lung cancer       Physical exam:  /86   Wt 85.4 kg (188 lb 3.2 oz)     GENERAL APPEARANCE: healthy, alert, no distress  NECK nontender, no masses, thyromegaly or  nodules  ABDOMEN Abdomen soft, mild right lower quadrant tenderness to palpation. BS normal. No masses,  No organomegaly  FEMALE GYN: normal female external genitalia without lesions, BUS normal, no vaginal discharge noted, vulva pink without erythema or friability, urethra is normal without discharge or scarring, no bladder fullness or masses, normal external genitalia, no erythema, no discharge, normal vagina and normal vaginal tone, normal cervix, mild palpation right lower quadrant, no masses, normal anus and perineum.  NEURO Awake, alert and oriented x 3, Normal gait, no sensory deficits  SKIN No rashes, or ulcers or lesions seen  PSYCHIATRIC: Patient shows appropriate affect, is alert and oriented x3, intact judgment and insight.      Assessment:  1. Pelvic pain         Plan:    No clear etiology for her pain at this time.  Given her other comorbidities and GI issues, I do find it unlikely that this is secondary to a gynecological problem.  I will order an ultrasound for evaluation of the pelvis, patient was asked to follow-up after the ultrasound    Also discussed with the patient use of MiraLAX to help with bowel movements.    If ultrasound is negative for any pathology, will discuss follow-up with gastroenterology with patient    Questions answered    Spent  15 minutes in face-to-face patient contact in which greater than 50% of that visit was spent in counseling/coordination of care including medical and surgical options of care.

## 2020-09-26 ENCOUNTER — HOSPITAL ENCOUNTER (OUTPATIENT)
Dept: RADIOLOGY | Facility: MEDICAL CENTER | Age: 50
End: 2020-09-26
Attending: OBSTETRICS & GYNECOLOGY
Payer: COMMERCIAL

## 2020-09-26 DIAGNOSIS — R10.31 RIGHT LOWER QUADRANT ABDOMINAL PAIN: ICD-10-CM

## 2020-09-26 PROCEDURE — 74177 CT ABD & PELVIS W/CONTRAST: CPT

## 2020-09-26 PROCEDURE — 700117 HCHG RX CONTRAST REV CODE 255: Performed by: INTERNAL MEDICINE

## 2020-09-26 RX ADMIN — IOHEXOL 100 ML: 350 INJECTION, SOLUTION INTRAVENOUS at 10:50

## 2020-09-26 RX ADMIN — IOHEXOL 25 ML: 240 INJECTION, SOLUTION INTRATHECAL; INTRAVASCULAR; INTRAVENOUS; ORAL at 10:49

## 2020-09-30 ENCOUNTER — TELEPHONE (OUTPATIENT)
Dept: OBGYN | Facility: CLINIC | Age: 50
End: 2020-09-30

## 2020-09-30 NOTE — TELEPHONE ENCOUNTER
Patient called to schedule a follow up based on CT results. Per , patient's results are normal, he recommends that she see a GI for the abdominal pain, as it is not related to gynecology. If patient would like to schedule a follow up, Dr. Santa is willing to see her at his next available [facility] November. [facility]

## 2020-10-20 NOTE — PROGRESS NOTES
Subjective:     CC: follow-up hematuria and CT scan.  She does not want to follow-up regarding her hands or other medical issues today    HPI:   Michael presents today with     Abdominal pain  Chronic for many months, located primarily in the right lower quadrant, stable.  States she has put herself on a diet and is losing weight and has found that the pain is slightly improved but not resolved.  She was previously seeing GI but was lost to follow-up.  Had intermittent constipation but states that has improved with diet and probiotic supplementation.  She is avoiding NSAIDS since that improves her GERD symptoms.    Nephrolithiasis  Unclear onset but she has noticed intermittent blush urine without stone passage.    No fevers, chills, nausea, vomiting, suprapubic pain.    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  She tried topical icy hot at home without relief.  She reports no weakness or numbness unless she is very tired and then her left leg feels more fatigued than right.  No changes in bowels.      Suspected COVID-19 virus infection  Pt reports at the end of her visit, that her close coworker's  and child both have COVID19.  The patient has had a frontal headache for the last day which is similar to her baseline headaches. She feels slightly more fatigued than normal but is very stressed at home and irritable due to wanting to quit her job.  No cough or SOB.  No fevers or chills.  No diarrhea.  She would like COVID19  testing.    Irritability  Chronic, stable.  She denies depression or anxiety.  No SI/Hi.  She has many home stressors with her son and son's girlfriend living in her home and she has a contentious relationship with her son's girlfriend.  She does not report feeling unsafe at home.  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      Past Medical History:   Diagnosis Date   • Bleeding from the nose     hx of last one 10 years ago    • Bowel habit changes     constipation    • Dental disorder     dentures    • Hay fever    • Heart burn    • Hemorrhoids    • Hypertension     borderline, never been on medication    • Indigestion    • Measles     as a child    • Migraine    • Pain     lower back pain   • PONV (postoperative nausea and vomiting) 2019   • Urinary tract infection    • Venereal disease        Social History     Tobacco Use   • Smoking status: Former Smoker     Packs/day: 1.00     Years: 31.00     Pack years: 31.00     Quit date: 2012     Years since quittin.4   • Smokeless tobacco: Never Used   Substance Use Topics   • Alcohol use: Yes     Frequency: Monthly or less     Drinks per session: 1 or 2     Binge frequency: Never     Comment: rarely   • Drug use: Yes     Frequency: 7.0 times per week     Types: Marijuana     Comment: occasionally; denies h/o illicitis       Current Outpatient Medications Ordered in Epic   Medication Sig Dispense Refill   • Multiple Vitamins-Calcium (ONE-A-DAY WOMENS FORMULA PO) Take 1 Tab by mouth every day.     • Probiotic Product (PROBIOTIC-10 PO) Take 1 Tab by mouth every bedtime.       No current Epic-ordered facility-administered medications on file.        Allergies:  Codeine, Other food, Penicillins, and Vicodin [hydrocodone-acetaminophen]    Health Maintenance: vaccinations deferred given suspected COVID19    ROS:  Gen: no fevers/chills, no changes in weight  Eyes: no changes in vision  ENT: no sore  "throat, no hearing loss, no bloody nose  Pulm: no sob, no cough  CV: no chest pain, no palpitations  GI: no nausea/vomiting, no diarrhea  : no dysuria  MSk: no myalgias  Skin: no rash  Neuro: no headaches, no numbness/tingling  Heme/Lymph: no easy bruising      Objective:       Exam:  /78 (BP Location: Left arm, Patient Position: Sitting, BP Cuff Size: Adult)   Pulse 92   Temp 36.7 °C (98.1 °F) (Temporal)   Resp 14   Ht 1.651 m (5' 5\")   Wt 84.8 kg (187 lb)   LMP  (LMP Unknown) Comment: postmenopausal bleeding   SpO2 97%   BMI 31.12 kg/m²  Body mass index is 31.12 kg/m².    Gen: Alert and oriented, No apparent distress.  Neck: Neck is supple without lymphadenopathy.  Lungs: Normal effort, CTA bilaterally, no wheezes, rhonchi, or rales  Abd: Soft, nontender, nondistended.  No CVA tenderness.  CV: Regular rate and rhythm. No murmurs, rubs, or gallops.  Ext: No clubbing, cyanosis, edema.  Back: Full ROM, 5/5 LE strength, sensation intact bilaterally in LE, no TTP over spinous processes, +paraspinals mild hypertonicity, SI joint nontender, straight leg raise negative  Psych: Mood irritated.  Affect very irritable, yelling, cursing when talking about home and work life but then laughing and joyful.  No SI/HI.    CT abdomen results from 9/26/2020 reviewed with patient    Assessment & Plan:     50 y.o. female with the following -     1. Generalized abdominal pain  Chronic, stable.  Pt has been referred to GI in the past but was lost to follow-up.  Strongly recommended she schedule follow-up appointment with GI for further evaluation and treatment.    2. Nephrolithiasis  New issue.  7mm renal stone noted on CT scan 9/26/2020.  Pt has been having intermittent blood tinge when wiping without known passage of stone.  Recommended hydration, ibuprofen with food (risk for worsening GERD and GI bleed discussed) and urgent referral to urology given.  Follow-up precautions given.  - REFERRAL TO UROLOGY    3. Chronic " bilateral low back pain without sciatica  Chronic, stable.  Pt declines further work up and/or PT referral.  Recommended OTC biofreeze, salon paws and tylenol prn.  Will monitor.    4. Suspected COVID-19 virus infection  New issue.  Discussed options of testing through the county vs insurance.  She desires testing through insurance.  Recommended she call to find out the cost prior to getting tested.  Recommended home quarantine until results obtained.  Encouraged rest, hydration, tylenol OTC prn. Follow-up precautions given.  - COVID/SARS COV-2 PCR; Future  Addendum: COVID19 test was negative.  [employer] message sent to patient and letter written for her re: results. -Dr. Sade Kiran    5. Irritability  Chronic, stable.  Pt declines further work-up at this time and states she just needs to talk about her frustrations, so she comes to PCP to discuss. No SI/HI. Will monitor.      Return in about 4 weeks (around 11/18/2020) for low back pain and kidney stone followup.    Please note that this dictation was created using voice recognition software. I have made every reasonable attempt to correct obvious errors, but I expect that there are errors of grammar and possibly content that I did not discover before finalizing the note.

## 2020-10-21 ENCOUNTER — OFFICE VISIT (OUTPATIENT)
Dept: MEDICAL GROUP | Facility: MEDICAL CENTER | Age: 50
End: 2020-10-21
Payer: COMMERCIAL

## 2020-10-21 ENCOUNTER — HOSPITAL ENCOUNTER (OUTPATIENT)
Dept: LAB | Facility: MEDICAL CENTER | Age: 50
End: 2020-10-21
Attending: FAMILY MEDICINE
Payer: COMMERCIAL

## 2020-10-21 VITALS
HEIGHT: 65 IN | WEIGHT: 187 LBS | OXYGEN SATURATION: 97 % | TEMPERATURE: 98.1 F | RESPIRATION RATE: 14 BRPM | DIASTOLIC BLOOD PRESSURE: 78 MMHG | HEART RATE: 92 BPM | SYSTOLIC BLOOD PRESSURE: 138 MMHG | BODY MASS INDEX: 31.16 KG/M2

## 2020-10-21 DIAGNOSIS — R10.84 GENERALIZED ABDOMINAL PAIN: ICD-10-CM

## 2020-10-21 DIAGNOSIS — N20.0 NEPHROLITHIASIS: ICD-10-CM

## 2020-10-21 DIAGNOSIS — Z20.822 SUSPECTED COVID-19 VIRUS INFECTION: ICD-10-CM

## 2020-10-21 DIAGNOSIS — M54.50 CHRONIC BILATERAL LOW BACK PAIN WITHOUT SCIATICA: ICD-10-CM

## 2020-10-21 DIAGNOSIS — G89.29 CHRONIC BILATERAL LOW BACK PAIN WITHOUT SCIATICA: ICD-10-CM

## 2020-10-21 DIAGNOSIS — R45.4 IRRITABILITY: ICD-10-CM

## 2020-10-21 LAB — COVID ORDER STATUS COVID19: NORMAL

## 2020-10-21 PROCEDURE — C9803 HOPD COVID-19 SPEC COLLECT: HCPCS

## 2020-10-21 PROCEDURE — U0003 INFECTIOUS AGENT DETECTION BY NUCLEIC ACID (DNA OR RNA); SEVERE ACUTE RESPIRATORY SYNDROME CORONAVIRUS 2 (SARS-COV-2) (CORONAVIRUS DISEASE [COVID-19]), AMPLIFIED PROBE TECHNIQUE, MAKING USE OF HIGH THROUGHPUT TECHNOLOGIES AS DESCRIBED BY CMS-2020-01-R: HCPCS

## 2020-10-21 PROCEDURE — 99214 OFFICE O/P EST MOD 30 MIN: CPT | Mod: CS | Performed by: FAMILY MEDICINE

## 2020-10-21 ASSESSMENT — FIBROSIS 4 INDEX: FIB4 SCORE: 0.5

## 2020-10-21 NOTE — ASSESSMENT & PLAN NOTE
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  She tried topical icy hot at home without relief.  She reports no weakness or numbness unless she is very tired and then her left leg feels more fatigued than right.  No changes in bowels.

## 2020-10-21 NOTE — ASSESSMENT & PLAN NOTE
Chronic, stable.  She denies depression or anxiety.  No SI/Hi.  She has many home stressors with her son and son's girlfriend living in her home and she has a contentious relationship with her son's girlfriend.  She does not report feeling unsafe at home.  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

## 2020-10-21 NOTE — LETTER
October 21, 2020    To Whom It May Concern:         This is confirmation that Michael Larsen attended her scheduled appointment with Sade Kiran D.O. on 10/21/20.  She is to stay at home in quarantine until her COVID19 test results come back.  At that time, further instructions will be given to her.         If you have any questions please do not hesitate to call me at the phone number listed below.    Sincerely,          Sade Kiran D.O.  [phone]

## 2020-10-21 NOTE — ASSESSMENT & PLAN NOTE
Pt reports at the end of her visit, that her close coworker's  and child both have COVID19.  The patient has had a frontal headache for the last day which is similar to her baseline headaches. She feels slightly more fatigued than normal but is very stressed at home and irritable due to wanting to quit her job.  No cough or SOB.  No fevers or chills.  No diarrhea.  She would like COVID19 testing.

## 2020-10-21 NOTE — ASSESSMENT & PLAN NOTE
Chronic for many months, located primarily in the right lower quadrant, stable.  States she has put herself on a diet and is losing weight and has found that the pain is slightly improved but not resolved.  She was previously seeing GI but was lost to follow-up.  Had intermittent constipation but states that has improved with diet and probiotic supplementation.  She is avoiding NSAIDS since that improves her GERD symptoms.

## 2020-10-21 NOTE — LETTER
10/22/2020               Michael Larsen   [address]  [facility] [address]        Dear Michael (MR#[id number]),    This letter is to inform you that your COVID-19 test result is NEGATIVE.  This means that the virus that causes COVID-19 was not found in your sample.      A review of your test during your recent visit requires that we notify you of the following:    Your nasal swab was negative for the novel coronavirus (COVID-19).     You are encouraged to stay at home until you have had no fever for 24 hours without the use of fever reducing medications and your symptoms are improving. There is no need for further self-quarantine for 14 days for COVID-19 unless otherwise directed by the Health Department.     For any further questions regarding COVID-19, please contact the [facility] at [phone].  Thank you for your cooperation in the matter.      Sincerely,      [facility]

## 2020-10-22 ENCOUNTER — TELEPHONE (OUTPATIENT)
Dept: MEDICAL GROUP | Facility: MEDICAL CENTER | Age: 50
End: 2020-10-22

## 2020-10-22 LAB
SARS-COV-2 RNA RESP QL NAA+PROBE: NOTDETECTED
SPECIMEN SOURCE: NORMAL

## 2020-10-22 NOTE — TELEPHONE ENCOUNTER
----- Message from Sade Kiran D.O. sent at 10/22/2020  9:36 AM PDT -----  Results released to [employer].  Please inform her that her COVID19 test was negative and provide her with the letter I have written today regarding her results. -Dr. Sade Kiran

## 2020-11-17 NOTE — PROGRESS NOTES
Virtual Visit: Established Patient   This visit was conducted via [email or web address] using secure and encrypted videoconferencing technology. The patient was in a private location in the state of [place]     The patient's identity was confirmed and verbal consent was obtained for this virtual visit.    Subjective:   CC:   Chief Complaint   Patient presents with   • Follow-Up   • Results     CT results   • Lab Results       Michael Larsen is a 50 y.o. female presenting for evaluation and management of:    Since last visit, she has seen the urologist who stated that since it is not obstructing he is not concerned regarding the kidney stone. She has a follow-up appointment with him on 11/30/2020. She discussed that she has an overactive bladder with him and will follow-up with him regarding her bladder.      She continues to have continued lower abdominal pain but has not scheduled her appointment, yet.  Last week she missed 1.5 days from work due to having diarrhea, stomach pain.  Her son had the same issue and they feel it was due to food poisoning. Diarrhea symptoms have now resolved and her chronic lower abdominal pain is at baseline with some epigastric pain after eating.  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  Pain is sharp and not gas-pain like.  Pain is worse after eating red meat, onions, gas-producing foods.  She had some rectal bleeding 5 days ago when having a hard bowel movement.  No blood in her stools, but it was only when she wiped.  Blood in stools has resolved. She is changing insurances in about 6 week and declines to see GI before then due to having changing insurance soon.    Pt states her previous covid symptoms- fever, fatigue, congestion have resolved.  States she is now feeling well and just has rare occasional stomach upset but states that is more related to her dietary choices.  She feels like when she eat  better and doesn't overeat, then she feels better.    ROS   Denies any recent fevers or chills. No vomiting. No chest pains or shortness of breath.     Allergies   Allergen Reactions   • Codeine      Vomiting    • Other Food      grapejuice-vomiting    • Penicillins      Vomiting    • Vicodin [Hydrocodone-Acetaminophen]      Nightmares, nausea, twitching        Current medicines (including changes today)  Current Outpatient Medications   Medication Sig Dispense Refill   • FIBER ADULT GUMMIES PO Take 1 Each by mouth every day.     • Multiple Vitamins-Calcium (ONE-A-DAY WOMENS FORMULA PO) Take 1 Tab by mouth every day.     • Probiotic Product (PROBIOTIC-10 PO) Take 1 Tab by mouth every bedtime.       No current facility-administered medications for this visit.        Patient Active Problem List    Diagnosis Date Noted   • Rectal bleeding 11/18/2020   • Generalized abdominal pain 11/18/2020   • Nephrolithiasis 10/21/2020   • Chronic bilateral low back pain without sciatica 10/21/2020   • Suspected COVID-19 virus infection 10/21/2020   • Hematuria 08/26/2020   • Irritability 08/26/2020   • Pelvic pain 08/26/2020   • Abnormal mammogram 07/03/2020   • Hand numbness 06/08/2020   • Abdominal pain 06/08/2020   • History of vertigo 06/08/2020   • GERD (gastroesophageal reflux disease) 06/08/2020   • Hand pain 06/08/2020   • Obesity (BMI 30-39.9) 06/08/2020   • Ear congestion, bilateral 06/08/2020       Family History   Problem Relation Age of Onset   • Hypertension Mother    • Lung Disease Mother         COPD   • Heart Disease Maternal Grandmother    • Lung Disease Maternal Uncle         lung cancer       She  has a past medical history of Bleeding from the nose, Bowel habit changes, Dental disorder, Hay fever, Heart burn, Hemorrhoids, Hypertension, Indigestion, Measles, Migraine, Pain, PONV (postoperative nausea and vomiting) (07/29/2019), Urinary tract infection (1994), and Venereal disease.  She  has a past surgical  "history that includes lumpectomy (Left, 02/1988); gyn surgery; laparoscopy (04/1987); hysteroscopy with video operative (N/A, 7/29/2019); and dilation and curettage (N/A, 7/29/2019).       Objective:   /89 (BP Location: Left arm, Patient Position: Sitting, BP Cuff Size: Adult)   Pulse 71   Ht 1.651 m (5' 5\")   Wt 84.8 kg (187 lb)   LMP  (LMP Unknown) Comment: postmenopausal bleeding   BMI 31.12 kg/m²     Physical Exam:  Constitutional: Alert, no distress, well-groomed.  Skin: No rashes in visible areas.  Eye: Round. Conjunctiva clear, lids normal. No icterus.   ENMT: Lips pink without lesions, good dentition, moist mucous membranes. Phonation normal.  Neck: No masses, no thyromegaly. Moves freely without pain.  Respiratory: Unlabored respiratory effort, no cough or audible wheeze  Abd: Pt reports no tenderness when pushing on her abdomen.  Psych: Alert and oriented x3, normal affect and mood.       Assessment and Plan:   The following treatment plan was discussed:     1. Nephrolithiasis  Chronic issue, stable, asymptomatic.  Pt to follow-up with nephrology given her stone was noted to be 7mm and would likely have difficulty passing.  Follow-up precautions given.    2. Gastroesophageal reflux disease without esophagitis  Chronic, stable, well controlled with dietary changes with flares noted only when she does not eat a GERD prevention diet.  Recommended GERD prevention diet at all times and follow-up with GI re:generalized abdominal pain asap given recent rectal bleeding and persistent pain.    3. Rectal bleeding  New issue 5 days ago, self improved.  Unclear etiology but likely hemorrhoids since blood was only when wiping and no blood noted in stools or tarry stools.  Recommended she schedule appt with GI asap for further evaluation and possible colonoscopy. Patient expressed understanding and agreement with plan.    4. Generalized abdominal pain  Chronic, overall stable but she is reporting some " epigastric pain at times.  Will check H. Pylori and treat if possible.  Otherwise, recommended GI evaluation for colonoscopy and GERD prevention diet as per above and to avoid known triggers including red meat and gas producing foods.  Follow-up precautions given for worsening symptoms. Patient expressed understanding and agreement with plan.  - H.PYLORI STOOL ANTIGEN; Future    Follow-up: Return in about 4 weeks (around 12/16/2020) for Medication review.

## 2020-11-18 ENCOUNTER — TELEMEDICINE (OUTPATIENT)
Dept: MEDICAL GROUP | Facility: MEDICAL CENTER | Age: 50
End: 2020-11-18
Payer: COMMERCIAL

## 2020-11-18 VITALS
WEIGHT: 187 LBS | SYSTOLIC BLOOD PRESSURE: 120 MMHG | BODY MASS INDEX: 31.16 KG/M2 | DIASTOLIC BLOOD PRESSURE: 89 MMHG | HEIGHT: 65 IN | HEART RATE: 71 BPM

## 2020-11-18 DIAGNOSIS — K21.9 GASTROESOPHAGEAL REFLUX DISEASE WITHOUT ESOPHAGITIS: ICD-10-CM

## 2020-11-18 DIAGNOSIS — N20.0 NEPHROLITHIASIS: ICD-10-CM

## 2020-11-18 DIAGNOSIS — K62.5 RECTAL BLEEDING: ICD-10-CM

## 2020-11-18 DIAGNOSIS — R10.84 GENERALIZED ABDOMINAL PAIN: ICD-10-CM

## 2020-11-18 PROCEDURE — 99214 OFFICE O/P EST MOD 30 MIN: CPT | Mod: 95,CR | Performed by: FAMILY MEDICINE

## 2020-11-18 ASSESSMENT — FIBROSIS 4 INDEX: FIB4 SCORE: 0.5

## 2020-12-11 ENCOUNTER — APPOINTMENT (OUTPATIENT)
Dept: RADIOLOGY | Facility: MEDICAL CENTER | Age: 50
DRG: 660 | End: 2020-12-11
Attending: UROLOGY
Payer: COMMERCIAL

## 2020-12-11 ENCOUNTER — ANESTHESIA (OUTPATIENT)
Dept: SURGERY | Facility: MEDICAL CENTER | Age: 50
End: 2020-12-11

## 2020-12-11 ENCOUNTER — HOSPITAL ENCOUNTER (OUTPATIENT)
Facility: MEDICAL CENTER | Age: 50
End: 2020-12-11
Attending: PHYSICIAN ASSISTANT
Payer: COMMERCIAL

## 2020-12-11 ENCOUNTER — APPOINTMENT (OUTPATIENT)
Dept: RADIOLOGY | Facility: MEDICAL CENTER | Age: 50
DRG: 660 | End: 2020-12-11
Attending: EMERGENCY MEDICINE
Payer: COMMERCIAL

## 2020-12-11 ENCOUNTER — OFFICE VISIT (OUTPATIENT)
Dept: MEDICAL GROUP | Facility: MEDICAL CENTER | Age: 50
End: 2020-12-11
Payer: COMMERCIAL

## 2020-12-11 ENCOUNTER — HOSPITAL ENCOUNTER (INPATIENT)
Facility: MEDICAL CENTER | Age: 50
LOS: 2 days | DRG: 660 | End: 2020-12-14
Attending: EMERGENCY MEDICINE | Admitting: STUDENT IN AN ORGANIZED HEALTH CARE EDUCATION/TRAINING PROGRAM
Payer: COMMERCIAL

## 2020-12-11 ENCOUNTER — ANESTHESIA EVENT (OUTPATIENT)
Dept: SURGERY | Facility: MEDICAL CENTER | Age: 50
End: 2020-12-11

## 2020-12-11 VITALS
DIASTOLIC BLOOD PRESSURE: 88 MMHG | OXYGEN SATURATION: 98 % | SYSTOLIC BLOOD PRESSURE: 132 MMHG | HEART RATE: 76 BPM | WEIGHT: 188.4 LBS | HEIGHT: 65 IN | TEMPERATURE: 97.8 F | BODY MASS INDEX: 31.39 KG/M2

## 2020-12-11 DIAGNOSIS — R31.9 HEMATURIA, UNSPECIFIED TYPE: ICD-10-CM

## 2020-12-11 DIAGNOSIS — N20.1 OBSTRUCTION OF LEFT URETEROPELVIC JUNCTION (UPJ) DUE TO STONE: ICD-10-CM

## 2020-12-11 DIAGNOSIS — N39.0 URINARY TRACT INFECTION WITH HEMATURIA, SITE UNSPECIFIED: ICD-10-CM

## 2020-12-11 DIAGNOSIS — Z96.0 STATUS POST CYSTOSCOPY WITH URETERAL STENT PLACEMENT: ICD-10-CM

## 2020-12-11 DIAGNOSIS — R31.9 URINARY TRACT INFECTION WITH HEMATURIA, SITE UNSPECIFIED: ICD-10-CM

## 2020-12-11 PROBLEM — D72.829 LEUKOCYTOSIS: Status: ACTIVE | Noted: 2020-12-11

## 2020-12-11 LAB
ALBUMIN SERPL BCP-MCNC: 4.1 G/DL (ref 3.2–4.9)
ALBUMIN/GLOB SERPL: 1.2 G/DL
ALP SERPL-CCNC: 112 U/L (ref 30–99)
ALT SERPL-CCNC: 13 U/L (ref 2–50)
ANION GAP SERPL CALC-SCNC: 11 MMOL/L (ref 7–16)
APPEARANCE UR: ABNORMAL
APPEARANCE UR: NORMAL
AST SERPL-CCNC: 20 U/L (ref 12–45)
BACTERIA #/AREA URNS HPF: ABNORMAL /HPF
BASOPHILS # BLD AUTO: 0.4 % (ref 0–1.8)
BASOPHILS # BLD: 0.05 K/UL (ref 0–0.12)
BILIRUB SERPL-MCNC: 0.8 MG/DL (ref 0.1–1.5)
BILIRUB UR QL STRIP.AUTO: ABNORMAL
BILIRUB UR STRIP-MCNC: NEGATIVE MG/DL
BUN SERPL-MCNC: 12 MG/DL (ref 8–22)
CALCIUM SERPL-MCNC: 9.5 MG/DL (ref 8.4–10.2)
CHLORIDE SERPL-SCNC: 101 MMOL/L (ref 96–112)
CO2 SERPL-SCNC: 26 MMOL/L (ref 20–33)
COLOR UR AUTO: NORMAL
COLOR UR: YELLOW
COVID ORDER STATUS COVID19: NORMAL
CREAT SERPL-MCNC: 0.67 MG/DL (ref 0.5–1.4)
EOSINOPHIL # BLD AUTO: 0.15 K/UL (ref 0–0.51)
EOSINOPHIL NFR BLD: 1.3 % (ref 0–6.9)
ERYTHROCYTE [DISTWIDTH] IN BLOOD BY AUTOMATED COUNT: 43.3 FL (ref 35.9–50)
GLOBULIN SER CALC-MCNC: 3.3 G/DL (ref 1.9–3.5)
GLUCOSE SERPL-MCNC: 124 MG/DL (ref 65–99)
GLUCOSE UR STRIP.AUTO-MCNC: NEGATIVE MG/DL
GLUCOSE UR STRIP.AUTO-MCNC: NEGATIVE MG/DL
HCT VFR BLD AUTO: 44.9 % (ref 37–47)
HGB BLD-MCNC: 14.6 G/DL (ref 12–16)
IMM GRANULOCYTES # BLD AUTO: 0.04 K/UL (ref 0–0.11)
IMM GRANULOCYTES NFR BLD AUTO: 0.3 % (ref 0–0.9)
KETONES UR STRIP.AUTO-MCNC: 40 MG/DL
KETONES UR STRIP.AUTO-MCNC: NORMAL MG/DL
LEUKOCYTE ESTERASE UR QL STRIP.AUTO: NEGATIVE
LEUKOCYTE ESTERASE UR QL STRIP.AUTO: NEGATIVE
LIPASE SERPL-CCNC: 21 U/L (ref 7–58)
LYMPHOCYTES # BLD AUTO: 4.39 K/UL (ref 1–4.8)
LYMPHOCYTES NFR BLD: 38.1 % (ref 22–41)
MCH RBC QN AUTO: 29.4 PG (ref 27–33)
MCHC RBC AUTO-ENTMCNC: 32.5 G/DL (ref 33.6–35)
MCV RBC AUTO: 90.5 FL (ref 81.4–97.8)
MICRO URNS: ABNORMAL
MONOCYTES # BLD AUTO: 0.56 K/UL (ref 0–0.85)
MONOCYTES NFR BLD AUTO: 4.9 % (ref 0–13.4)
MUCOUS THREADS #/AREA URNS HPF: ABNORMAL /HPF
NEUTROPHILS # BLD AUTO: 6.32 K/UL (ref 2–7.15)
NEUTROPHILS NFR BLD: 55 % (ref 44–72)
NITRITE UR QL STRIP.AUTO: NEGATIVE
NITRITE UR QL STRIP.AUTO: POSITIVE
NRBC # BLD AUTO: 0 K/UL
NRBC BLD-RTO: 0 /100 WBC
PH UR STRIP.AUTO: 6.5 [PH] (ref 5–8)
PH UR STRIP.AUTO: 6.5 [PH] (ref 5–8)
PLATELET # BLD AUTO: 338 K/UL (ref 164–446)
PMV BLD AUTO: 9.7 FL (ref 9–12.9)
POTASSIUM SERPL-SCNC: 3.6 MMOL/L (ref 3.6–5.5)
PROT SERPL-MCNC: 7.4 G/DL (ref 6–8.2)
PROT UR QL STRIP: 100 MG/DL
PROT UR QL STRIP: NORMAL MG/DL
RBC # BLD AUTO: 4.96 M/UL (ref 4.2–5.4)
RBC # URNS HPF: >150 /HPF
RBC UR QL AUTO: ABNORMAL
RBC UR QL AUTO: NORMAL
SARS-COV+SARS-COV-2 AG RESP QL IA.RAPID: NOTDETECTED
SODIUM SERPL-SCNC: 138 MMOL/L (ref 135–145)
SP GR UR STRIP.AUTO: 1.02
SP GR UR STRIP.AUTO: 1.03
SPECIMEN SOURCE: NORMAL
UROBILINOGEN UR STRIP-MCNC: 0.2 MG/DL
WBC # BLD AUTO: 11.5 K/UL (ref 4.8–10.8)
WBC #/AREA URNS HPF: ABNORMAL /HPF

## 2020-12-11 PROCEDURE — 501838 HCHG SUTURE GENERAL: Performed by: UROLOGY

## 2020-12-11 PROCEDURE — 51702 INSERT TEMP BLADDER CATH: CPT

## 2020-12-11 PROCEDURE — 96374 THER/PROPH/DIAG INJ IV PUSH: CPT

## 2020-12-11 PROCEDURE — 0T978ZX DRAINAGE OF LEFT URETER, VIA NATURAL OR ARTIFICIAL OPENING ENDOSCOPIC, DIAGNOSTIC: ICD-10-PCS | Performed by: UROLOGY

## 2020-12-11 PROCEDURE — 700117 HCHG RX CONTRAST REV CODE 255: Performed by: UROLOGY

## 2020-12-11 PROCEDURE — 501329 HCHG SET, CYSTO IRRIG Y TUR: Performed by: UROLOGY

## 2020-12-11 PROCEDURE — G0378 HOSPITAL OBSERVATION PER HR: HCPCS

## 2020-12-11 PROCEDURE — 87205 SMEAR GRAM STAIN: CPT

## 2020-12-11 PROCEDURE — 160048 HCHG OR STATISTICAL LEVEL 1-5: Performed by: UROLOGY

## 2020-12-11 PROCEDURE — 500879 HCHG PACK, CYSTO: Performed by: UROLOGY

## 2020-12-11 PROCEDURE — 74176 CT ABD & PELVIS W/O CONTRAST: CPT

## 2020-12-11 PROCEDURE — 85025 COMPLETE CBC W/AUTO DIFF WBC: CPT

## 2020-12-11 PROCEDURE — 160009 HCHG ANES TIME/MIN: Performed by: UROLOGY

## 2020-12-11 PROCEDURE — 160036 HCHG PACU - EA ADDL 30 MINS PHASE I: Performed by: UROLOGY

## 2020-12-11 PROCEDURE — 99220 PR INITIAL OBSERVATION CARE,LEVL III: CPT | Performed by: HOSPITALIST

## 2020-12-11 PROCEDURE — 99291 CRITICAL CARE FIRST HOUR: CPT

## 2020-12-11 PROCEDURE — 160035 HCHG PACU - 1ST 60 MINS PHASE I: Performed by: UROLOGY

## 2020-12-11 PROCEDURE — 81001 URINALYSIS AUTO W/SCOPE: CPT

## 2020-12-11 PROCEDURE — 87086 URINE CULTURE/COLONY COUNT: CPT

## 2020-12-11 PROCEDURE — 303105 HCHG CATHETER EXTRA

## 2020-12-11 PROCEDURE — 36415 COLL VENOUS BLD VENIPUNCTURE: CPT

## 2020-12-11 PROCEDURE — 0T778DZ DILATION OF LEFT URETER WITH INTRALUMINAL DEVICE, VIA NATURAL OR ARTIFICIAL OPENING ENDOSCOPIC: ICD-10-PCS | Performed by: UROLOGY

## 2020-12-11 PROCEDURE — 87075 CULTR BACTERIA EXCEPT BLOOD: CPT

## 2020-12-11 PROCEDURE — 87102 FUNGUS ISOLATION CULTURE: CPT

## 2020-12-11 PROCEDURE — 83690 ASSAY OF LIPASE: CPT

## 2020-12-11 PROCEDURE — 700105 HCHG RX REV CODE 258: Performed by: EMERGENCY MEDICINE

## 2020-12-11 PROCEDURE — 80053 COMPREHEN METABOLIC PANEL: CPT

## 2020-12-11 PROCEDURE — C2617 STENT, NON-COR, TEM W/O DEL: HCPCS | Performed by: UROLOGY

## 2020-12-11 PROCEDURE — 700111 HCHG RX REV CODE 636 W/ 250 OVERRIDE (IP)

## 2020-12-11 PROCEDURE — 160039 HCHG SURGERY MINUTES - EA ADDL 1 MIN LEVEL 3: Performed by: UROLOGY

## 2020-12-11 PROCEDURE — C1769 GUIDE WIRE: HCPCS | Performed by: UROLOGY

## 2020-12-11 PROCEDURE — C9803 HOPD COVID-19 SPEC COLLECT: HCPCS | Performed by: EMERGENCY MEDICINE

## 2020-12-11 PROCEDURE — 87086 URINE CULTURE/COLONY COUNT: CPT | Mod: 91

## 2020-12-11 PROCEDURE — 160028 HCHG SURGERY MINUTES - 1ST 30 MINS LEVEL 3: Performed by: UROLOGY

## 2020-12-11 PROCEDURE — 87426 SARSCOV CORONAVIRUS AG IA: CPT

## 2020-12-11 PROCEDURE — BT1F1ZZ FLUOROSCOPY OF LEFT KIDNEY, URETER AND BLADDER USING LOW OSMOLAR CONTRAST: ICD-10-PCS | Performed by: UROLOGY

## 2020-12-11 PROCEDURE — 81002 URINALYSIS NONAUTO W/O SCOPE: CPT | Performed by: PHYSICIAN ASSISTANT

## 2020-12-11 PROCEDURE — 700111 HCHG RX REV CODE 636 W/ 250 OVERRIDE (IP): Performed by: EMERGENCY MEDICINE

## 2020-12-11 PROCEDURE — 99213 OFFICE O/P EST LOW 20 MIN: CPT | Performed by: PHYSICIAN ASSISTANT

## 2020-12-11 PROCEDURE — 160002 HCHG RECOVERY MINUTES (STAT): Performed by: UROLOGY

## 2020-12-11 PROCEDURE — 99000 SPECIMEN HANDLING OFFICE-LAB: CPT | Performed by: PHYSICIAN ASSISTANT

## 2020-12-11 PROCEDURE — 96375 TX/PRO/DX INJ NEW DRUG ADDON: CPT

## 2020-12-11 DEVICE — STENT UROLOGICAL POLARIS 6X24  ULTRA: Type: IMPLANTABLE DEVICE | Site: URETER | Status: NON-FUNCTIONAL

## 2020-12-11 RX ORDER — HYDROMORPHONE HYDROCHLORIDE 1 MG/ML
0.1 INJECTION, SOLUTION INTRAMUSCULAR; INTRAVENOUS; SUBCUTANEOUS
Status: DISCONTINUED | OUTPATIENT
Start: 2020-12-11 | End: 2020-12-11 | Stop reason: HOSPADM

## 2020-12-11 RX ORDER — DIPHENHYDRAMINE HYDROCHLORIDE 50 MG/ML
12.5 INJECTION INTRAMUSCULAR; INTRAVENOUS
Status: DISCONTINUED | OUTPATIENT
Start: 2020-12-11 | End: 2020-12-11 | Stop reason: HOSPADM

## 2020-12-11 RX ORDER — SODIUM CHLORIDE, SODIUM LACTATE, POTASSIUM CHLORIDE, CALCIUM CHLORIDE 600; 310; 30; 20 MG/100ML; MG/100ML; MG/100ML; MG/100ML
1000 INJECTION, SOLUTION INTRAVENOUS ONCE
Status: COMPLETED | OUTPATIENT
Start: 2020-12-11 | End: 2020-12-12

## 2020-12-11 RX ORDER — ONDANSETRON 2 MG/ML
4 INJECTION INTRAMUSCULAR; INTRAVENOUS ONCE
Status: COMPLETED | OUTPATIENT
Start: 2020-12-11 | End: 2020-12-11

## 2020-12-11 RX ORDER — OXYCODONE HCL 5 MG/5 ML
10 SOLUTION, ORAL ORAL
Status: DISCONTINUED | OUTPATIENT
Start: 2020-12-11 | End: 2020-12-11 | Stop reason: HOSPADM

## 2020-12-11 RX ORDER — CEFTRIAXONE 2 G/1
INJECTION, POWDER, FOR SOLUTION INTRAMUSCULAR; INTRAVENOUS
Status: COMPLETED
Start: 2020-12-11 | End: 2020-12-11

## 2020-12-11 RX ORDER — MORPHINE SULFATE 4 MG/ML
4 INJECTION, SOLUTION INTRAMUSCULAR; INTRAVENOUS ONCE
Status: COMPLETED | OUTPATIENT
Start: 2020-12-11 | End: 2020-12-11

## 2020-12-11 RX ORDER — HYDROMORPHONE HYDROCHLORIDE 1 MG/ML
0.2 INJECTION, SOLUTION INTRAMUSCULAR; INTRAVENOUS; SUBCUTANEOUS
Status: DISCONTINUED | OUTPATIENT
Start: 2020-12-11 | End: 2020-12-11 | Stop reason: HOSPADM

## 2020-12-11 RX ORDER — MIDAZOLAM HYDROCHLORIDE 1 MG/ML
1 INJECTION INTRAMUSCULAR; INTRAVENOUS
Status: DISCONTINUED | OUTPATIENT
Start: 2020-12-11 | End: 2020-12-11 | Stop reason: HOSPADM

## 2020-12-11 RX ORDER — OXYCODONE HCL 5 MG/5 ML
5 SOLUTION, ORAL ORAL
Status: DISCONTINUED | OUTPATIENT
Start: 2020-12-11 | End: 2020-12-11 | Stop reason: HOSPADM

## 2020-12-11 RX ORDER — IPRATROPIUM BROMIDE AND ALBUTEROL SULFATE 2.5; .5 MG/3ML; MG/3ML
3 SOLUTION RESPIRATORY (INHALATION)
Status: DISCONTINUED | OUTPATIENT
Start: 2020-12-11 | End: 2020-12-11 | Stop reason: HOSPADM

## 2020-12-11 RX ORDER — SODIUM CHLORIDE 9 MG/ML
INJECTION, SOLUTION INTRAVENOUS
Status: DISPENSED
Start: 2020-12-11 | End: 2020-12-12

## 2020-12-11 RX ORDER — SODIUM CHLORIDE, SODIUM LACTATE, POTASSIUM CHLORIDE, CALCIUM CHLORIDE 600; 310; 30; 20 MG/100ML; MG/100ML; MG/100ML; MG/100ML
INJECTION, SOLUTION INTRAVENOUS CONTINUOUS
Status: DISCONTINUED | OUTPATIENT
Start: 2020-12-11 | End: 2020-12-11 | Stop reason: HOSPADM

## 2020-12-11 RX ORDER — HYDROMORPHONE HYDROCHLORIDE 1 MG/ML
0.4 INJECTION, SOLUTION INTRAMUSCULAR; INTRAVENOUS; SUBCUTANEOUS
Status: DISCONTINUED | OUTPATIENT
Start: 2020-12-11 | End: 2020-12-11 | Stop reason: HOSPADM

## 2020-12-11 RX ORDER — LABETALOL HYDROCHLORIDE 5 MG/ML
5 INJECTION, SOLUTION INTRAVENOUS
Status: DISCONTINUED | OUTPATIENT
Start: 2020-12-11 | End: 2020-12-11 | Stop reason: HOSPADM

## 2020-12-11 RX ORDER — HALOPERIDOL 5 MG/ML
1 INJECTION INTRAMUSCULAR
Status: DISCONTINUED | OUTPATIENT
Start: 2020-12-11 | End: 2020-12-11 | Stop reason: HOSPADM

## 2020-12-11 RX ORDER — ONDANSETRON 2 MG/ML
INJECTION INTRAMUSCULAR; INTRAVENOUS
Status: COMPLETED
Start: 2020-12-11 | End: 2020-12-11

## 2020-12-11 RX ORDER — MEPERIDINE HYDROCHLORIDE 25 MG/ML
12.5 INJECTION INTRAMUSCULAR; INTRAVENOUS; SUBCUTANEOUS
Status: DISCONTINUED | OUTPATIENT
Start: 2020-12-11 | End: 2020-12-11 | Stop reason: HOSPADM

## 2020-12-11 RX ORDER — KETOROLAC TROMETHAMINE 30 MG/ML
30 INJECTION, SOLUTION INTRAMUSCULAR; INTRAVENOUS ONCE
Status: COMPLETED | OUTPATIENT
Start: 2020-12-11 | End: 2020-12-11

## 2020-12-11 RX ORDER — HYDRALAZINE HYDROCHLORIDE 20 MG/ML
5 INJECTION INTRAMUSCULAR; INTRAVENOUS
Status: DISCONTINUED | OUTPATIENT
Start: 2020-12-11 | End: 2020-12-11 | Stop reason: HOSPADM

## 2020-12-11 RX ORDER — ONDANSETRON 2 MG/ML
4 INJECTION INTRAMUSCULAR; INTRAVENOUS
Status: COMPLETED | OUTPATIENT
Start: 2020-12-11 | End: 2020-12-11

## 2020-12-11 RX ADMIN — CEFTRIAXONE SODIUM 2 G: 2 INJECTION, POWDER, FOR SOLUTION INTRAMUSCULAR; INTRAVENOUS at 18:31

## 2020-12-11 RX ADMIN — ONDANSETRON 4 MG: 2 INJECTION INTRAMUSCULAR; INTRAVENOUS at 22:54

## 2020-12-11 RX ADMIN — ONDANSETRON HYDROCHLORIDE 4 MG: 2 SOLUTION INTRAMUSCULAR; INTRAVENOUS at 18:32

## 2020-12-11 RX ADMIN — SODIUM CHLORIDE, POTASSIUM CHLORIDE, SODIUM LACTATE AND CALCIUM CHLORIDE 1000 ML: 600; 310; 30; 20 INJECTION, SOLUTION INTRAVENOUS at 20:35

## 2020-12-11 RX ADMIN — MORPHINE SULFATE 4 MG: 4 INJECTION INTRAVENOUS at 19:19

## 2020-12-11 RX ADMIN — KETOROLAC TROMETHAMINE 30 MG: 30 INJECTION, SOLUTION INTRAMUSCULAR; INTRAVENOUS at 18:32

## 2020-12-11 ASSESSMENT — ENCOUNTER SYMPTOMS
CHILLS: 1
SHORTNESS OF BREATH: 1
COUGH: 0
NAUSEA: 1
PSYCHIATRIC NEGATIVE: 1
GASTROINTESTINAL NEGATIVE: 1
CARDIOVASCULAR NEGATIVE: 1
DIARRHEA: 1
FLANK PAIN: 1
FEVER: 1
RESPIRATORY NEGATIVE: 1
CONSTIPATION: 1
EYES NEGATIVE: 1
CONSTITUTIONAL NEGATIVE: 1
VOMITING: 1
ABDOMINAL PAIN: 1
NEUROLOGICAL NEGATIVE: 1
WHEEZING: 0
HEADACHES: 1

## 2020-12-11 ASSESSMENT — PATIENT HEALTH QUESTIONNAIRE - PHQ9
2. FEELING DOWN, DEPRESSED, IRRITABLE, OR HOPELESS: NOT AT ALL
SUM OF ALL RESPONSES TO PHQ9 QUESTIONS 1 AND 2: 0
1. LITTLE INTEREST OR PLEASURE IN DOING THINGS: NOT AT ALL

## 2020-12-11 ASSESSMENT — FIBROSIS 4 INDEX
FIB4 SCORE: 0.5
FIB4 SCORE: 0.5

## 2020-12-11 NOTE — PROGRESS NOTES
Subjective:      Michael Larsen is a 50 y.o. female who presents with Cystitis (back pain, abdominal cramps, blood in urine, fatigue, nausea)          Chief Complaint   Patient presents with   • Cystitis     back pain, abdominal cramps, blood in urine, fatigue, nausea       HPI  Patient presents for same day access to discuss concern with blood in urine. She is a patient of Dr. Kiran and has an ongoing workup with urology due to blood in urine. This initially started in August 2020. She has had associated pelvic/lower abdominal discomfort, fatigue, nausea. She has had normal pelvic ultrasound, normal exam with gynecology and generally normal CT with 7mm renal stone. She is scheduled to have uroscopy procedure with urology. She noticed an increase in dark urine and odor and was advised by Dr. Kiran to come in for evaluation to r/o infection. She has no fever/chills. No vomiting. No vaginal bleeding or discharge. She has bilat back/flank pain. She does have h/o passing a kidney stone 30 years ago.     ROSno weight change. No fever/chills. No headache/dizziness. No focal or general weakness. No sore throat, nasal congestion, ear pain. No chest pain, SOB or difficulty breathing. No rash or skin lesion. No joint redness or swelling.    Active Ambulatory Problems     Diagnosis Date Noted   • Hand numbness 06/08/2020   • Abdominal pain 06/08/2020   • History of vertigo 06/08/2020   • GERD (gastroesophageal reflux disease) 06/08/2020   • Hand pain 06/08/2020   • Obesity (BMI 30-39.9) 06/08/2020   • Ear congestion, bilateral 06/08/2020   • Abnormal mammogram 07/03/2020   • Hematuria 08/26/2020   • Irritability 08/26/2020   • Pelvic pain 08/26/2020   • Nephrolithiasis 10/21/2020   • Chronic bilateral low back pain without sciatica 10/21/2020   • Suspected COVID-19 virus infection 10/21/2020   • Rectal bleeding 11/18/2020   • Generalized abdominal pain 11/18/2020     Resolved Ambulatory Problems     Diagnosis Date  "Noted   • Fibroid 07/17/2019   • Postmenopausal bleeding 07/17/2019   • Postoperative visit 08/22/2019     Past Medical History:   Diagnosis Date   • Bleeding from the nose    • Bowel habit changes    • Dental disorder    • Hay fever    • Heart burn    • Hemorrhoids    • Hypertension    • Indigestion    • Measles    • Migraine    • Pain    • PONV (postoperative nausea and vomiting) 07/29/2019   • Urinary tract infection 1994   • Venereal disease      Current Outpatient Medications   Medication Sig Dispense Refill   • FIBER ADULT GUMMIES PO Take 1 Each by mouth every day.     • Multiple Vitamins-Calcium (ONE-A-DAY WOMENS FORMULA PO) Take 1 Tab by mouth every day.     • Probiotic Product (PROBIOTIC-10 PO) Take 1 Tab by mouth every bedtime.       No current facility-administered medications for this visit.    allergy:Codeine, Other food, Penicillins, and Vicodin [hydrocodone-acetaminophen]  Non-smoker   Objective:     /88 (BP Location: Right arm, Patient Position: Sitting, BP Cuff Size: Adult long)   Pulse 76   Temp 36.6 °C (97.8 °F) (Temporal)   Ht 1.651 m (5' 5\")   Wt 85.5 kg (188 lb 6.4 oz)   LMP  (LMP Unknown) Comment: postmenopausal bleeding   SpO2 98%   BMI 31.35 kg/m²      Physical Exam  Vitals signs and nursing note reviewed.   Constitutional:       General: She is not in acute distress.     Appearance: Normal appearance. She is normal weight. She is not ill-appearing, toxic-appearing or diaphoretic.   Cardiovascular:      Rate and Rhythm: Normal rate and regular rhythm.   Skin:     General: Skin is warm and dry.      Coloration: Skin is not pale.      Findings: No rash.   Neurological:      General: No focal deficit present.      Mental Status: She is alert and oriented to person, place, and time.   Psychiatric:         Mood and Affect: Mood normal.         Behavior: Behavior normal.         Thought Content: Thought content normal.         Judgment: Judgment normal.                 Lab Results "   Component Value Date/Time    POCCOLOR Brown 12/11/2020 01:45 PM    POCAPPEAR Cloudy 12/11/2020 01:45 PM    POCLEUKEST Negative 12/11/2020 01:45 PM    POCNITRITE Negative 12/11/2020 01:45 PM    POCUROBILIGE 0.2 12/11/2020 01:45 PM    POCPROTEIN 1+ 12/11/2020 01:45 PM    POCURPH 6.5 12/11/2020 01:45 PM    POCBLOOD 3+ 12/11/2020 01:45 PM    POCSPGRV 1.030 12/11/2020 01:45 PM    POCKETONES Trace 12/11/2020 01:45 PM    POCBILIRUBIN Negative 12/11/2020 01:45 PM    POCGLUCUA Negative 12/11/2020 01:45 PM      Assessment/Plan:        1. Hematuria, unspecified type    - POCT Urinalysis  - URINE CULTURE(NEW); Future    No current evidence for infection. ER precautions given. Will contact patient with culture results. She will f/u with urology as scheduled.

## 2020-12-12 LAB
ALBUMIN SERPL BCP-MCNC: 3.9 G/DL (ref 3.2–4.9)
ALBUMIN/GLOB SERPL: 1.3 G/DL
ALP SERPL-CCNC: 102 U/L (ref 30–99)
ALT SERPL-CCNC: 11 U/L (ref 2–50)
ANION GAP SERPL CALC-SCNC: 13 MMOL/L (ref 7–16)
AST SERPL-CCNC: 16 U/L (ref 12–45)
BASOPHILS # BLD AUTO: 0.1 % (ref 0–1.8)
BASOPHILS # BLD: 0.02 K/UL (ref 0–0.12)
BILIRUB SERPL-MCNC: 0.8 MG/DL (ref 0.1–1.5)
BUN SERPL-MCNC: 12 MG/DL (ref 8–22)
CALCIUM SERPL-MCNC: 8.9 MG/DL (ref 8.4–10.2)
CHLORIDE SERPL-SCNC: 104 MMOL/L (ref 96–112)
CO2 SERPL-SCNC: 22 MMOL/L (ref 20–33)
CREAT SERPL-MCNC: 0.6 MG/DL (ref 0.5–1.4)
EOSINOPHIL # BLD AUTO: 0 K/UL (ref 0–0.51)
EOSINOPHIL NFR BLD: 0 % (ref 0–6.9)
ERYTHROCYTE [DISTWIDTH] IN BLOOD BY AUTOMATED COUNT: 44 FL (ref 35.9–50)
GLOBULIN SER CALC-MCNC: 2.9 G/DL (ref 1.9–3.5)
GLUCOSE SERPL-MCNC: 122 MG/DL (ref 65–99)
HCT VFR BLD AUTO: 43.2 % (ref 37–47)
HGB BLD-MCNC: 13.9 G/DL (ref 12–16)
IMM GRANULOCYTES # BLD AUTO: 0.05 K/UL (ref 0–0.11)
IMM GRANULOCYTES NFR BLD AUTO: 0.3 % (ref 0–0.9)
LYMPHOCYTES # BLD AUTO: 2.1 K/UL (ref 1–4.8)
LYMPHOCYTES NFR BLD: 13.7 % (ref 22–41)
MCH RBC QN AUTO: 29.1 PG (ref 27–33)
MCHC RBC AUTO-ENTMCNC: 32.2 G/DL (ref 33.6–35)
MCV RBC AUTO: 90.4 FL (ref 81.4–97.8)
MONOCYTES # BLD AUTO: 0.75 K/UL (ref 0–0.85)
MONOCYTES NFR BLD AUTO: 4.9 % (ref 0–13.4)
NEUTROPHILS # BLD AUTO: 12.43 K/UL (ref 2–7.15)
NEUTROPHILS NFR BLD: 81 % (ref 44–72)
NRBC # BLD AUTO: 0 K/UL
NRBC BLD-RTO: 0 /100 WBC
PLATELET # BLD AUTO: 301 K/UL (ref 164–446)
PMV BLD AUTO: 9.7 FL (ref 9–12.9)
POTASSIUM SERPL-SCNC: 3.8 MMOL/L (ref 3.6–5.5)
PROT SERPL-MCNC: 6.8 G/DL (ref 6–8.2)
RBC # BLD AUTO: 4.78 M/UL (ref 4.2–5.4)
SODIUM SERPL-SCNC: 139 MMOL/L (ref 135–145)
WBC # BLD AUTO: 15.4 K/UL (ref 4.8–10.8)

## 2020-12-12 PROCEDURE — 770006 HCHG ROOM/CARE - MED/SURG/GYN SEMI*

## 2020-12-12 PROCEDURE — 96375 TX/PRO/DX INJ NEW DRUG ADDON: CPT

## 2020-12-12 PROCEDURE — 96372 THER/PROPH/DIAG INJ SC/IM: CPT

## 2020-12-12 PROCEDURE — A9270 NON-COVERED ITEM OR SERVICE: HCPCS | Performed by: HOSPITALIST

## 2020-12-12 PROCEDURE — 96365 THER/PROPH/DIAG IV INF INIT: CPT

## 2020-12-12 PROCEDURE — 85025 COMPLETE CBC W/AUTO DIFF WBC: CPT

## 2020-12-12 PROCEDURE — 99232 SBSQ HOSP IP/OBS MODERATE 35: CPT | Performed by: STUDENT IN AN ORGANIZED HEALTH CARE EDUCATION/TRAINING PROGRAM

## 2020-12-12 PROCEDURE — A9270 NON-COVERED ITEM OR SERVICE: HCPCS | Performed by: STUDENT IN AN ORGANIZED HEALTH CARE EDUCATION/TRAINING PROGRAM

## 2020-12-12 PROCEDURE — 96376 TX/PRO/DX INJ SAME DRUG ADON: CPT

## 2020-12-12 PROCEDURE — 700105 HCHG RX REV CODE 258: Performed by: HOSPITALIST

## 2020-12-12 PROCEDURE — 700102 HCHG RX REV CODE 250 W/ 637 OVERRIDE(OP): Performed by: HOSPITALIST

## 2020-12-12 PROCEDURE — 36415 COLL VENOUS BLD VENIPUNCTURE: CPT

## 2020-12-12 PROCEDURE — 80053 COMPREHEN METABOLIC PANEL: CPT

## 2020-12-12 PROCEDURE — 700102 HCHG RX REV CODE 250 W/ 637 OVERRIDE(OP): Performed by: STUDENT IN AN ORGANIZED HEALTH CARE EDUCATION/TRAINING PROGRAM

## 2020-12-12 PROCEDURE — 700111 HCHG RX REV CODE 636 W/ 250 OVERRIDE (IP): Performed by: HOSPITALIST

## 2020-12-12 RX ORDER — OXYCODONE HYDROCHLORIDE 5 MG/1
5 TABLET ORAL
Status: DISCONTINUED | OUTPATIENT
Start: 2020-12-12 | End: 2020-12-14 | Stop reason: HOSPADM

## 2020-12-12 RX ORDER — POLYETHYLENE GLYCOL 3350 17 G/17G
1 POWDER, FOR SOLUTION ORAL
Status: DISCONTINUED | OUTPATIENT
Start: 2020-12-12 | End: 2020-12-14 | Stop reason: HOSPADM

## 2020-12-12 RX ORDER — SODIUM CHLORIDE 9 MG/ML
INJECTION, SOLUTION INTRAVENOUS CONTINUOUS
Status: DISCONTINUED | OUTPATIENT
Start: 2020-12-12 | End: 2020-12-13

## 2020-12-12 RX ORDER — NAPROXEN SODIUM 220 MG
220-440 TABLET ORAL 2 TIMES DAILY PRN
Status: ON HOLD | COMMUNITY
End: 2020-12-24

## 2020-12-12 RX ORDER — MORPHINE SULFATE 4 MG/ML
4 INJECTION, SOLUTION INTRAMUSCULAR; INTRAVENOUS
Status: DISCONTINUED | OUTPATIENT
Start: 2020-12-12 | End: 2020-12-14 | Stop reason: HOSPADM

## 2020-12-12 RX ORDER — ONDANSETRON 2 MG/ML
4 INJECTION INTRAMUSCULAR; INTRAVENOUS EVERY 4 HOURS PRN
Status: DISCONTINUED | OUTPATIENT
Start: 2020-12-12 | End: 2020-12-14 | Stop reason: HOSPADM

## 2020-12-12 RX ORDER — ACETAMINOPHEN 325 MG/1
650 TABLET ORAL EVERY 6 HOURS PRN
Status: DISCONTINUED | OUTPATIENT
Start: 2020-12-12 | End: 2020-12-14 | Stop reason: HOSPADM

## 2020-12-12 RX ORDER — AMOXICILLIN 250 MG
2 CAPSULE ORAL 2 TIMES DAILY
Status: DISCONTINUED | OUTPATIENT
Start: 2020-12-12 | End: 2020-12-14 | Stop reason: HOSPADM

## 2020-12-12 RX ORDER — PHENAZOPYRIDINE HYDROCHLORIDE 200 MG/1
200 TABLET, FILM COATED ORAL
Status: DISCONTINUED | OUTPATIENT
Start: 2020-12-12 | End: 2020-12-14 | Stop reason: HOSPADM

## 2020-12-12 RX ORDER — BISACODYL 10 MG
10 SUPPOSITORY, RECTAL RECTAL
Status: DISCONTINUED | OUTPATIENT
Start: 2020-12-12 | End: 2020-12-14 | Stop reason: HOSPADM

## 2020-12-12 RX ORDER — TAMSULOSIN HYDROCHLORIDE 0.4 MG/1
0.4 CAPSULE ORAL
Status: DISCONTINUED | OUTPATIENT
Start: 2020-12-12 | End: 2020-12-14 | Stop reason: HOSPADM

## 2020-12-12 RX ORDER — PROCHLORPERAZINE EDISYLATE 5 MG/ML
5-10 INJECTION INTRAMUSCULAR; INTRAVENOUS EVERY 4 HOURS PRN
Status: DISCONTINUED | OUTPATIENT
Start: 2020-12-12 | End: 2020-12-14 | Stop reason: HOSPADM

## 2020-12-12 RX ORDER — OXYCODONE HYDROCHLORIDE 10 MG/1
10 TABLET ORAL
Status: DISCONTINUED | OUTPATIENT
Start: 2020-12-12 | End: 2020-12-14 | Stop reason: HOSPADM

## 2020-12-12 RX ORDER — HEPARIN SODIUM 5000 [USP'U]/ML
5000 INJECTION, SOLUTION INTRAVENOUS; SUBCUTANEOUS EVERY 8 HOURS
Status: DISCONTINUED | OUTPATIENT
Start: 2020-12-12 | End: 2020-12-14 | Stop reason: HOSPADM

## 2020-12-12 RX ORDER — OXYBUTYNIN CHLORIDE 5 MG/1
5 TABLET ORAL 3 TIMES DAILY PRN
Status: DISCONTINUED | OUTPATIENT
Start: 2020-12-12 | End: 2020-12-14 | Stop reason: HOSPADM

## 2020-12-12 RX ORDER — ONDANSETRON 4 MG/1
4 TABLET, ORALLY DISINTEGRATING ORAL EVERY 4 HOURS PRN
Status: DISCONTINUED | OUTPATIENT
Start: 2020-12-12 | End: 2020-12-14 | Stop reason: HOSPADM

## 2020-12-12 RX ORDER — PROMETHAZINE HYDROCHLORIDE 25 MG/1
12.5-25 SUPPOSITORY RECTAL EVERY 4 HOURS PRN
Status: DISCONTINUED | OUTPATIENT
Start: 2020-12-12 | End: 2020-12-14 | Stop reason: HOSPADM

## 2020-12-12 RX ORDER — PROMETHAZINE HYDROCHLORIDE 25 MG/1
12.5-25 TABLET ORAL EVERY 4 HOURS PRN
Status: DISCONTINUED | OUTPATIENT
Start: 2020-12-12 | End: 2020-12-14 | Stop reason: HOSPADM

## 2020-12-12 RX ADMIN — TAMSULOSIN HYDROCHLORIDE 0.4 MG: 0.4 CAPSULE ORAL at 14:46

## 2020-12-12 RX ADMIN — PHENAZOPYRIDINE HYDROCHLORIDE 200 MG: 200 TABLET ORAL at 16:38

## 2020-12-12 RX ADMIN — SODIUM CHLORIDE: 9 INJECTION, SOLUTION INTRAVENOUS at 11:53

## 2020-12-12 RX ADMIN — OXYCODONE HYDROCHLORIDE 10 MG: 10 TABLET ORAL at 16:38

## 2020-12-12 RX ADMIN — HEPARIN SODIUM 5000 UNITS: 5000 INJECTION, SOLUTION INTRAVENOUS; SUBCUTANEOUS at 22:48

## 2020-12-12 RX ADMIN — OXYCODONE HYDROCHLORIDE 10 MG: 10 TABLET ORAL at 20:56

## 2020-12-12 RX ADMIN — ONDANSETRON 4 MG: 2 INJECTION INTRAMUSCULAR; INTRAVENOUS at 20:56

## 2020-12-12 RX ADMIN — SENNOSIDES-DOCUSATE SODIUM TAB 8.6-50 MG 2 TABLET: 8.6-5 TAB at 16:38

## 2020-12-12 RX ADMIN — ONDANSETRON 4 MG: 2 INJECTION INTRAMUSCULAR; INTRAVENOUS at 11:48

## 2020-12-12 RX ADMIN — ONDANSETRON 4 MG: 2 INJECTION INTRAMUSCULAR; INTRAVENOUS at 00:18

## 2020-12-12 RX ADMIN — MORPHINE SULFATE 4 MG: 4 INJECTION INTRAVENOUS at 00:27

## 2020-12-12 RX ADMIN — SODIUM CHLORIDE: 9 INJECTION, SOLUTION INTRAVENOUS at 00:19

## 2020-12-12 RX ADMIN — HEPARIN SODIUM 5000 UNITS: 5000 INJECTION, SOLUTION INTRAVENOUS; SUBCUTANEOUS at 14:46

## 2020-12-12 RX ADMIN — MORPHINE SULFATE 4 MG: 4 INJECTION INTRAVENOUS at 03:30

## 2020-12-12 RX ADMIN — HEPARIN SODIUM 5000 UNITS: 5000 INJECTION, SOLUTION INTRAVENOUS; SUBCUTANEOUS at 05:12

## 2020-12-12 RX ADMIN — ONDANSETRON 4 MG: 2 INJECTION INTRAMUSCULAR; INTRAVENOUS at 16:38

## 2020-12-12 RX ADMIN — PROCHLORPERAZINE EDISYLATE 10 MG: 5 INJECTION INTRAMUSCULAR; INTRAVENOUS at 03:02

## 2020-12-12 RX ADMIN — OXYCODONE HYDROCHLORIDE 10 MG: 10 TABLET ORAL at 11:44

## 2020-12-12 RX ADMIN — MORPHINE SULFATE 4 MG: 4 INJECTION INTRAVENOUS at 09:52

## 2020-12-12 RX ADMIN — CEFTRIAXONE SODIUM 2 G: 2 INJECTION, POWDER, FOR SOLUTION INTRAMUSCULAR; INTRAVENOUS at 05:12

## 2020-12-12 ASSESSMENT — COGNITIVE AND FUNCTIONAL STATUS - GENERAL
SUGGESTED CMS G CODE MODIFIER DAILY ACTIVITY: CH
MOBILITY SCORE: 24
SUGGESTED CMS G CODE MODIFIER MOBILITY: CH
DAILY ACTIVITIY SCORE: 24

## 2020-12-12 ASSESSMENT — ENCOUNTER SYMPTOMS
COUGH: 0
BLURRED VISION: 0
NECK PAIN: 0
DIZZINESS: 0
CARDIOVASCULAR NEGATIVE: 1
PALPITATIONS: 0
HEADACHES: 0
DOUBLE VISION: 0
FLANK PAIN: 1
CHILLS: 1
MYALGIAS: 0
RESPIRATORY NEGATIVE: 1
HEARTBURN: 0
FEVER: 0
EYES NEGATIVE: 1
DIARRHEA: 0
SHORTNESS OF BREATH: 0
ABDOMINAL PAIN: 1
DEPRESSION: 0

## 2020-12-12 ASSESSMENT — PAIN DESCRIPTION - PAIN TYPE
TYPE: ACUTE PAIN

## 2020-12-12 NOTE — ED PROVIDER NOTES
ED Provider Note    This patient was cared for during the COVID-19 pandemic. History and physical exam may be limited/truncated by the inherent challenges of PPE and the need to decrease staff exposure to novel coronavirus. Some aspects of disease management may be different to protect staff and help slow the spread of disease. I verified that, if possible, the patient was wearing a mask and I was wearing appropriate PPE every time I encountered the patient.       CHIEF COMPLAINT  Chief Complaint   Patient presents with   • Blood in Urine     Onset yesterday TO  today Told to come to ER if S/S worsen   • Abdominal Pain   • Back Pain     Upper and lower       HPI  Michael Larsen is a 50 y.o. female who presents with blood in her urine and abdominal pain for 1 day.  It is a cramping 10/10 pain, constant, no modifying factors.  Has associated nausea and vomiting. Loose stool but no blood in her stool.  No fever, no chills. No URI symptoms. No chest pain. She has had burning with urination and increased frequency. She was out today and pain got worse and felt she wasn't going to be able to get home which is an hour away.    She has a history of abdominal pain but this is different.  This is worse then past pain and feels different because it is constant.  She is due to see a urology for hematuria and lower abdominal pain.  Also supposed to see a GI doctor.         REVIEW OF SYSTEMS  See HPI. All other systems are negative.     PAST MEDICAL HISTORY   has a past medical history of Bleeding from the nose, Bowel habit changes, Dental disorder, Hay fever, Heart burn, Hemorrhoids, Hypertension, Indigestion, Measles, Migraine, Pain, PONV (postoperative nausea and vomiting) (07/29/2019), Urinary tract infection (1994), and Venereal disease.    SOCIAL HISTORY  Social History     Tobacco Use   • Smoking status: Former Smoker     Packs/day: 1.00     Years: 31.00     Pack years: 31.00     Quit date: 4/24/2012     Years  "since quittin.6   • Smokeless tobacco: Never Used   Substance and Sexual Activity   • Alcohol use: Yes     Frequency: Monthly or less     Drinks per session: 1 or 2     Binge frequency: Never     Comment: rarely   • Drug use: Yes     Frequency: 7.0 times per week     Types: Marijuana     Comment: occasionally; denies h/o illicitis   • Sexual activity: Yes       SURGICAL HISTORY   has a past surgical history that includes lumpectomy (Left, 1988); gyn surgery; laparoscopy (1987); hysteroscopy with video operative (N/A, 2019); and dilation and curettage (N/A, 2019).    CURRENT MEDICATIONS  Home Medications     Reviewed by Kike Ramires R.N. (Registered Nurse) on 20 at 2142  Med List Status: <None>   Medication Last Dose Status   FIBER ADULT GUMMIES PO  Active   Multiple Vitamins-Calcium (ONE-A-DAY WOMENS FORMULA PO)  Active   povidone-iodine (Betadine) 0.23% oral solution 15 mL  Active   Probiotic Product (PROBIOTIC-10 PO)  Active   SODIUM CHLORIDE 0.9 % IV SOLN  Active                ALLERGIES  Allergies   Allergen Reactions   • Codeine      Vomiting    • Other Food      grapejuice-vomiting    • Penicillins      Vomiting    • Vicodin [Hydrocodone-Acetaminophen]      Nightmares, nausea, twitching        PHYSICAL EXAM  VITAL SIGNS: /103   Pulse 61   Temp 36.2 °C (97.2 °F) (Temporal)   Resp 12   Ht 1.651 m (5' 5\")   Wt 84.9 kg (187 lb 2.7 oz)   LMP  (LMP Unknown) Comment: postmenopausal bleeding   SpO2 100%   BMI 31.15 kg/m² .  Constitutional: Alert uncomfortable appearing  HENT: No signs of trauma, Bilateral external ears normal, Nose normal.   Eyes: Pupils are equal and reactive, Conjunctiva normal, Non-icteric.   Neck: Normal range of motion, No tenderness, Supple, No stridor.   Cardiovascular: Regular rate and rhythm, no murmurs.   Thorax & Lungs: Normal breath sounds, No respiratory distress, No wheezing, No chest tenderness.   Abdomen: Bowel sounds normal, Soft, " epigastric and mid lower abdominal pain with palpation, No masses, No peritoneal signs.  Skin: Warm, Dry, No erythema, No rash.   Back: No bony tenderness, No CVA tenderness.   Musculoskeletal:  no major deformities noted.   Neurologic: Alert,  No focal deficits noted.   Psychiatric: Affect normal, Judgment normal, Mood normal.       DIAGNOSTIC STUDIES / PROCEDURES      LABS  Labs Reviewed   CBC WITH DIFFERENTIAL - Abnormal; Notable for the following components:       Result Value    WBC 11.5 (*)     MCHC 32.5 (*)     All other components within normal limits   COMP METABOLIC PANEL - Abnormal; Notable for the following components:    Glucose 124 (*)     Alkaline Phosphatase 112 (*)     All other components within normal limits   URINALYSIS,CULTURE IF INDICATED - Abnormal; Notable for the following components:    Character Hazy (*)     Ketones 40 (*)     Protein 100 (*)     Bilirubin Small (*)     Nitrite Positive (*)     Occult Blood Large (*)     All other components within normal limits    Narrative:     Indication for culture:->Patient WITHOUT an indwelling Gardner  catheter in place with new onset of Dysuria, Frequency,  Urgency, and/or Suprapubic pain   URINE MICROSCOPIC (W/UA) - Abnormal; Notable for the following components:    RBC >150 (*)     Bacteria Moderate (*)     All other components within normal limits    Narrative:     Indication for culture:->Patient WITHOUT an indwelling Gardner  catheter in place with new onset of Dysuria, Frequency,  Urgency, and/or Suprapubic pain   LIPASE   ESTIMATED GFR   COVID/SARS COV-2    Narrative:     Is patient being admitted?->Yes  Does this patient meet criteria for Rush/Cepheid per [facility]  Inpatient Workflow? (See workflow link below)->Yes  Expected turn around time?->(Felicita/Abbott) Antigen dry swab  test asymptomatic surgical patient  Have you been in close contact with a person who is suspected  or known to be positive for COVID-19 within the last 30 days  (e.g. last  seen that person < 30 days ago)->No   URINE CULTURE(NEW)    Narrative:     Indication for culture:->Patient WITHOUT an indwelling Gardner  catheter in place with new onset of Dysuria, Frequency,  Urgency, and/or Suprapubic pain   SARS-COV ANTIGEN MARILU    Narrative:     Is patient being admitted?->Yes  Does this patient meet criteria for Rush/Cepheid per [facility]  Inpatient Workflow? (See workflow link below)->Yes  Expected turn around time?->(Felicita/Abbott) Antigen dry swab  test asymptomatic surgical patient  Have you been in close contact with a person who is suspected  or known to be positive for COVID-19 within the last 30 days  (e.g. last seen that person < 30 days ago)->No   FUNGAL CULTURE    Narrative:     Indication for culture:->Patient WITHOUT an indwelling Gardner  catheter in place with new onset of Dysuria, Frequency,  Urgency, and/or Suprapubic pain       RADIOLOGY  DX-PORTABLE FLUOROSCOPY < 1 HOUR Is the patient pregnant? No   Final Result      Fluoroscopy provided for left ureteral stent placement.      CT-RENAL COLIC EVALUATION(A/P W/O)   Final Result         9 mm left UPJ stone with mild hydronephrosis.                   Medical records reviewed for continuity of care. Patient was seen earlier today in her PMD office. She has had an ongoing work up for hematuria and pelvic/lower abdominal discomfort.  She has had a normal pelvic ultrasound, normal exam with gynecology, CT with 7mm renal stone.  She is scheduled to see urology.     CT 9/27/20: 1.  Normal appendix.  2.  No CT evidence for colitis or diverticulitis.  3.  No focal mesenteric inflammatory process.  4.  Probable small liver cysts, too definitively characterize.    US pelvis 8/30/20 unable to visualize right ovary, normal let ovary      Differential Diagnosis includes but is not limited to: exacerbation of ongoing pain, infected stone, pyelonephritis, ureteral stone, hydronephrosis, pancreatitis    COURSE & MEDICAL DECISION MAKING  Pertinent Labs  & Imaging studies reviewed. (See chart for details)      This is a 50-year-old female that presents with back and abdominal pain.  She has a history of a large left-sided renal stone.  She is quite uncomfortable appearing and while she has had some of these issues ongoing it seems that her symptoms got significantly worse acutely tonight.    Urinalysis shows evidence of infection with positive nitrates no evidence of epithelial cells and moderate bacteria.    Kidney function is normal she has a minimally elevated white count.    Given her discomfort a Noncon CT scan was performed which showed a 9 mm UPJ stone.  Patient was given antibiotics for presumed UTI.  Given the large stone evidence of hydronephrosis and evidence of urine infection I did speak with urology who recommended admission for stent placement.    I spoke with Dr. Crain, hospitalist who is agreeable to consult for hospitalization.  Patient will be hospitalized in guarded condition.    HYDRATION: Based on the patient's presentation of Dehydration the patient was given IV fluids. IV Hydration was used because oral hydration failed due to NPO status. Upon recheck following hydration, the patient was improved.        FINAL IMPRESSION  1. Obstruction of left ureteropelvic junction (UPJ) due to stone     2. Urinary tract infection with hematuria, site unspecified         2.   3.    This dictation has been creating using voice recognition software. The accuracy of the dictation is limited the abilities of the software.  I expect there may be some errors of grammar and possibly content. I made every attempt to manually correct the errors within my dictation. However errors related to this voice recognition software may still exist and should be interpreted within the appropriate context.      The note accurately reflects work and decisions made by me.  Tanya Charlton M.D.  12/11/2020  11:14 PM

## 2020-12-12 NOTE — PROGRESS NOTES
Report received from PACU RN.Pt has stable vitals with complains of pain 7/10 and nausea. Waiting for pharmacist to verify and administered. All needs met.Call light in place.Bed in low position.Rounding in place

## 2020-12-12 NOTE — PROGRESS NOTES
"[facility]    Progress Note    Service: [facility]  Patient's Name: Michael Larsen  MRN: [id number]  Admit Date:12/11/2020  Today's Date: 12/12/2020   Room #: 2210/01      Identification:  50 y.o. female with left kidney stone now s/p left ureteral stent placement    POD#1     Overnight-Interval events:   - fevers/chills Subjective/ROS:   Patient seen and examined. Moderate abdominal pain and stent irritation. Overall not feeling well. WBC 15  No CP/SOB/F/C     Physical Exam:  Current Vitals:   /81   Pulse 81   Temp 37.1 °C (98.7 °F) (Oral)   Resp 18   Ht 1.651 m (5' 5\")   Wt 84.9 kg (187 lb 2.7 oz)   LMP  (LMP Unknown) Comment: postmenopausal bleeding   SpO2 99%   BMI 31.15 kg/m²     12/10 1900 - 12/12 0659  In: 1172.7 [I.V.:1172.7]  Out: 50 [Urine:50]   GEN : normocephalic, mild distress, A&O X4   RES:  no acute respiratory distress  ABD: Soft ND  :   No garcia  INC:    EXT:  No edema, SCD's in place     Labs:   Lab Results   Component Value Date/Time    WBC 15.4 (H) 12/12/2020 06:48 AM    HEMATOCRIT 43.2 12/12/2020 06:48 AM    SODIUM 139 12/12/2020 04:39 AM    POTASSIUM 3.8 12/12/2020 04:39 AM    CHLORIDE 104 12/12/2020 04:39 AM    CO2 22 12/12/2020 04:39 AM    BUN 12 12/12/2020 04:39 AM    CREATININE 0.60 12/12/2020 04:39 AM    CALCIUM 8.9 12/12/2020 04:39 AM        Lab Results   Component Value Date/Time    GLUCOSE 122 (H) 12/12/2020 04:39 AM    GLUCOSE 124 (H) 12/11/2020 05:06 PM    GLUCOSE 99 07/01/2020 09:30 AM          Assessment/Plan  Michael Larsen is a 50 y.o. female with left kidney stone and possible UTI now s/p ureteral stent placement.    - will need 10 days antibiotics. Follow cultures, abx per medicine and culture directed  - moderate stent irritation, can try flomax, pyridium and oxybutynin prn  - monitor labs  - will need outpatient management of stent and stone, we will arrange in 2-3 weeks  - urology following    Clifton Covarrubias PA-C  [facility]           "

## 2020-12-12 NOTE — ASSESSMENT & PLAN NOTE
Due to stone and presumed UTI  Continue Ceftriaxone  Patient is not septic at this time will not be placed on sepsis protocol

## 2020-12-12 NOTE — CONSULTS
[facility] Consult/H&P Note    Primary Service: medicine  Attending: Tanya Charlton M.D.  Patient's Name/MRN: Michael Larsen, [id number]    Admit Date:12/11/2020  Today's Date: 12/11/2020   Length of stay:  LOS: 0 days   Room #: -ROOM 2/02      Reason for consult/chief complaint: left flank pain  ID/HPI: Michael Larsen is a 50 y.o. female patient with 9.4mm left UPJ stone, flank pain x 1 day, nausea and vomiting. Her ua was +bacteria/+N, minimal wbcs, wbc count 11.5, and vitals wnl - afebrile. This is her first stone. She endorses symptoms of UTI - dysuria and pain and hematuria for last 24 hours. Severe pain in the left flank, not well controlled with morphine.        Past Medical History:   Past Medical History:   Diagnosis Date   • Bleeding from the nose     hx of last one 10 years ago    • Bowel habit changes     constipation    • Dental disorder     dentures    • Hay fever    • Heart burn    • Hemorrhoids    • Hypertension     borderline, never been on medication    • Indigestion    • Measles     as a child    • Migraine    • Pain     lower back pain   • PONV (postoperative nausea and vomiting) 07/29/2019   • Urinary tract infection 1994   • Venereal disease         Past Surgical History:   Past Surgical History:   Procedure Laterality Date   • HYSTEROSCOPY WITH VIDEO OPERATIVE N/A 7/29/2019    Procedure: HYSTEROSCOPY, WITH VIDEO IMAGING -WITH MYOSURE;  Surgeon: Prieto Santa M.D.;  Location: SURGERY SAME DAY [facility] ORS;  Service: Obstetrics   • DILATION AND CURETTAGE N/A 7/29/2019    Procedure: DILATION AND CURETTAGE;  Surgeon: Prieto Santa M.D.;  Location: SURGERY SAME DAY [facility] ORS;  Service: Obstetrics   • LUMPECTOMY Left 02/1988   • LAPAROSCOPY  04/1987   • GYN SURGERY          Family History:   Family History   Problem Relation Age of Onset   • Hypertension Mother    • Lung Disease Mother         COPD   • Heart Disease Maternal Grandmother    • Lung Disease Maternal Uncle   "       lung cancer         Social History:   Social History     Tobacco Use   • Smoking status: Former Smoker     Packs/day: 1.00     Years: 31.00     Pack years: 31.00     Quit date: 2012     Years since quittin.6   • Smokeless tobacco: Never Used   Substance Use Topics   • Alcohol use: Yes     Frequency: Monthly or less     Drinks per session: 1 or 2     Binge frequency: Never     Comment: rarely   • Drug use: Yes     Frequency: 7.0 times per week     Types: Marijuana     Comment: occasionally; denies h/o illicitis      Social History     Social History Narrative   • Not on file        Allergies: she Codeine, Other food, Penicillins, and Vicodin [hydrocodone-acetaminophen]    Medications:   (Not in a hospital admission)        Review of Systems  Review of Systems   Constitutional: Negative.    HENT: Negative.    Eyes: Negative.    Respiratory: Negative.    Cardiovascular: Negative.    Gastrointestinal: Negative.    Genitourinary: Positive for flank pain and hematuria.   Skin: Negative.    Neurological: Negative.    Endo/Heme/Allergies: Negative.    Psychiatric/Behavioral: Negative.    All other systems reviewed and are negative.       Physical Exam  VITAL SIGNS: /103   Pulse 60   Temp 36.3 °C (97.4 °F) (Temporal)   Resp 16   Ht 1.651 m (5' 5\")   Wt 84.9 kg (187 lb 2.7 oz)   LMP  (LMP Unknown) Comment: postmenopausal bleeding   SpO2 97%   BMI 31.15 kg/m²   Physical Exam   Constitutional: She is oriented to person, place, and time. She appears distressed.   HENT:   Head: Normocephalic and atraumatic.   Eyes: Pupils are equal, round, and reactive to light. Conjunctivae are normal.   Neck: Normal range of motion. Neck supple.   Cardiovascular: Normal rate and regular rhythm.   Pulmonary/Chest: Effort normal and breath sounds normal.   Abdominal: Soft. Bowel sounds are normal.   Musculoskeletal:         General: No tenderness or edema.   Neurological: She is alert and oriented to person, place, " and time. GCS score is 15.   Skin: Skin is warm. She is diaphoretic.   Psychiatric: Mood, memory, affect and judgment normal.   Vitals reviewed.        Labs:  Recent Labs     12/11/20  1706   WBC 11.5*   RBC 4.96   HEMOGLOBIN 14.6   HEMATOCRIT 44.9   MCV 90.5   MCH 29.4   MCHC 32.5*   RDW 43.3   PLATELETCT 338   MPV 9.7     Recent Labs     12/11/20  1706   SODIUM 138   POTASSIUM 3.6   CHLORIDE 101   CO2 26   GLUCOSE 124*   BUN 12   CREATININE 0.67   CALCIUM 9.5         Glucose:  Recent Labs     12/11/20  1706   GLUCOSE 124*     Coags:  No results for input(s): INR in the last 72 hours.      Urinalysis:   Recent Labs     12/11/20  1706   COLORURINE Yellow   CLARITY Hazy*   SPECGRAVITY 1.025   PHURINE 6.5   GLUCOSEUR Negative   KETONES 40*   NITRITE Positive*   OCCULTBLOOD Large*   RBCURINE >150*   BACTERIA Moderate*       Imaging:  CT-RENAL COLIC EVALUATION(A/P W/O)   Final Result         9 mm left UPJ stone with mild hydronephrosis.                   I personally reviewed the imaging      Assessment/Recommendation   50 y.o. F with 9mm UPJ stone and UA concerning for urinary tract infection, she is hemodynamically stable and otherwise showing no signs of urosepsis.     · To OR for left ureteral stent and cystoscopy  · Admit to hospital medicine, observe overnight for any signs of urosepsis.   · Discussed r/b/a of this surgery and signed consent day of procedure.   · Will need definitive stone surgery a few weeks from now and after course of antibiotics.

## 2020-12-12 NOTE — ASSESSMENT & PLAN NOTE
Seen on CT  Urologist Dr. Paulson consulted, s/p cystoscopy and ureteral stent placement 12/11  Concern for complicated UTI due to obstruction, continue Ceftriaxone for now  Will need 10 day course of Abx, urine culture pending. Will narrow Abx coverage when culture results  Pain from ureteral stent irritation, continue pain control with narcotics, flomax, pyridium, and oxybutynin prn per urology recommendation  Patient will need to follow up with urology for definitive treatment of nephrolithiasis in 2-3 weeks following discharge

## 2020-12-12 NOTE — PROGRESS NOTES
Pharmacy Medication Reconciliation      Medication reconciliation updated and complete per pt  Allergies have been verified and updated   No oral ABX within the last 14 days  Patient home pharmacy:[facility]

## 2020-12-12 NOTE — H&P
Hospital Medicine History & Physical Note    Date of Service  12/11/2020    Primary Care Physician  Sade Kiran D.O.    Consultants  Urologist Dr. Paulson    Code Status  Full code per patient    Chief Complaint  Chief Complaint   Patient presents with   • Blood in Urine     Onset yesterday TO UC today Told to come to ER if S/S worsen   • Abdominal Pain   • Back Pain     Upper and lower        History of Presenting Illness  50 y.o. female w/h/o heartburn, hemorrhoids, hypertension, chronic back pain who presented 12/11/2020 with hematuria and abdominal pain.  Patient is a  and went to work today and noticed that she had hematuria.  She also started having nausea vomiting and abdominal pain.  She is worried and could not keep fluids down.  Thus she came to the hospital.  She does have history of nephrolithiasis.  She was found to have a obstructive stone and this urologist was consulted and hospitalist was consulted for admission.      Review of Systems  Review of Systems   Constitutional: Positive for chills and fever.   Respiratory: Positive for shortness of breath. Negative for cough and wheezing.    Cardiovascular: Negative for chest pain.   Gastrointestinal: Positive for abdominal pain, constipation, diarrhea, nausea and vomiting.   Genitourinary: Positive for dysuria.   Neurological: Positive for headaches (at work).     all other systems reviewed and are negative    Past Medical History   has a past medical history of Bleeding from the nose, Bowel habit changes, Dental disorder, Hay fever, Heart burn, Hemorrhoids, Hypertension, Indigestion, Measles, Migraine, Pain, PONV (postoperative nausea and vomiting) (07/29/2019), Urinary tract infection (1994), and Venereal disease.    Surgical History   has a past surgical history that includes lumpectomy (Left, 02/1988); gyn surgery; laparoscopy (04/1987); hysteroscopy with video operative (N/A, 7/29/2019); and dilation and curettage (N/A,  "7/29/2019).    Family History  family history includes Heart Disease in her maternal grandmother; Hypertension in her mother; Lung Disease in her maternal uncle and mother.    Social History   reports that she quit smoking about 8 years ago. She has a 31.00 pack-year smoking history. She has never used smokeless tobacco. She reports current alcohol use. She reports current drug use. Frequency: 7.00 times per week. Drug: Marijuana.    Allergies  Allergies   Allergen Reactions   • Codeine      Vomiting    • Other Food      grapejuice-vomiting    • Penicillins      Vomiting    • Vicodin [Hydrocodone-Acetaminophen]      Nightmares, nausea, twitching        Medications  No current facility-administered medications on file prior to encounter.      Current Outpatient Medications on File Prior to Encounter   Medication Sig Dispense Refill   • FIBER ADULT GUMMIES PO Take 1 Each by mouth every day.     • Multiple Vitamins-Calcium (ONE-A-DAY WOMENS FORMULA PO) Take 1 Tab by mouth every day.     • Probiotic Product (PROBIOTIC-10 PO) Take 1 Tab by mouth every bedtime.         Physical Exam  Weight/BMI: Body mass index is 31.15 kg/m².  /102   Pulse 84   Temp 36.3 °C (97.4 °F) (Temporal)   Resp 16   Ht 1.651 m (5' 5\")   Wt 84.9 kg (187 lb 2.7 oz)   SpO2 100%    Vitals:    12/11/20 1654 12/11/20 1851 12/11/20 1909   BP: 160/102     Pulse: 75 (!) 59 84   Resp: 16     Temp: 36.3 °C (97.4 °F)     TempSrc: Temporal     SpO2: 97% 100% 100%   Weight: 84.9 kg (187 lb 2.7 oz)     Height: 1.651 m (5' 5\")      Oxygen Therapy:  Pulse Oximetry: 100 %, O2 Delivery Device: None - Room Air  Physical Exam  Constitutional:       General: She is not in acute distress.     Appearance: She is well-developed. She is not diaphoretic.   HENT:      Head: Normocephalic.      Right Ear: External ear normal.      Left Ear: External ear normal.      Mouth/Throat:      Pharynx: No oropharyngeal exudate or posterior oropharyngeal erythema.   Eyes: "      General:         Right eye: No discharge.         Left eye: No discharge.      Extraocular Movements: Extraocular movements intact.      Conjunctiva/sclera: Conjunctivae normal.   Cardiovascular:      Rate and Rhythm: Normal rate and regular rhythm.      Heart sounds: No gallop.    Pulmonary:      Effort: No respiratory distress.      Breath sounds: No wheezing.   Abdominal:      General: There is no distension.      Tenderness: There is abdominal tenderness. There is guarding.   Musculoskeletal:         General: No swelling or tenderness.   Skin:     General: Skin is warm.   Neurological:      Mental Status: She is alert and oriented to person, place, and time. Mental status is at baseline.   Psychiatric:      Comments: Anxious and initially somewhat agitated         Laboratory:   Objective   Recent Results (from the past 24 hour(s))   POCT Urinalysis    Collection Time: 12/11/20  1:45 PM   Result Value Ref Range    POC Color Brown Negative    POC Appearance Cloudy Negative    POC Leukocyte Esterase Negative Negative    POC Nitrites Negative Negative    POC Urobiligen 0.2 Negative (0.2) mg/dL    POC Protein 1+ Negative mg/dL    POC Urine PH 6.5 5.0 - 8.0    POC Blood 3+ Negative    POC Specific Gravity 1.030 <1.005 - >1.030    POC Ketones Trace Negative mg/dL    POC Bilirubin Negative Negative mg/dL    POC Glucose Negative Negative mg/dL   CBC WITH DIFFERENTIAL    Collection Time: 12/11/20  5:06 PM   Result Value Ref Range    WBC 11.5 (H) 4.8 - 10.8 K/uL    RBC 4.96 4.20 - 5.40 M/uL    Hemoglobin 14.6 12.0 - 16.0 g/dL    Hematocrit 44.9 37.0 - 47.0 %    MCV 90.5 81.4 - 97.8 fL    MCH 29.4 27.0 - 33.0 pg    MCHC 32.5 (L) 33.6 - 35.0 g/dL    RDW 43.3 35.9 - 50.0 fL    Platelet Count 338 164 - 446 K/uL    MPV 9.7 9.0 - 12.9 fL    Neutrophils-Polys 55.00 44.00 - 72.00 %    Lymphocytes 38.10 22.00 - 41.00 %    Monocytes 4.90 0.00 - 13.40 %    Eosinophils 1.30 0.00 - 6.90 %    Basophils 0.40 0.00 - 1.80 %     Immature Granulocytes 0.30 0.00 - 0.90 %    Nucleated RBC 0.00 /100 WBC    Neutrophils (Absolute) 6.32 2.00 - 7.15 K/uL    Lymphs (Absolute) 4.39 1.00 - 4.80 K/uL    Monos (Absolute) 0.56 0.00 - 0.85 K/uL    Eos (Absolute) 0.15 0.00 - 0.51 K/uL    Baso (Absolute) 0.05 0.00 - 0.12 K/uL    Immature Granulocytes (abs) 0.04 0.00 - 0.11 K/uL    NRBC (Absolute) 0.00 K/uL   COMP METABOLIC PANEL    Collection Time: 12/11/20  5:06 PM   Result Value Ref Range    Sodium 138 135 - 145 mmol/L    Potassium 3.6 3.6 - 5.5 mmol/L    Chloride 101 96 - 112 mmol/L    Co2 26 20 - 33 mmol/L    Anion Gap 11.0 7.0 - 16.0    Glucose 124 (H) 65 - 99 mg/dL    Bun 12 8 - 22 mg/dL    Creatinine 0.67 0.50 - 1.40 mg/dL    Calcium 9.5 8.4 - 10.2 mg/dL    AST(SGOT) 20 12 - 45 U/L    ALT(SGPT) 13 2 - 50 U/L    Alkaline Phosphatase 112 (H) 30 - 99 U/L    Total Bilirubin 0.8 0.1 - 1.5 mg/dL    Albumin 4.1 3.2 - 4.9 g/dL    Total Protein 7.4 6.0 - 8.2 g/dL    Globulin 3.3 1.9 - 3.5 g/dL    A-G Ratio 1.2 g/dL   LIPASE    Collection Time: 12/11/20  5:06 PM   Result Value Ref Range    Lipase 21 7 - 58 U/L   URINALYSIS,CULTURE IF INDICATED    Collection Time: 12/11/20  5:06 PM    Specimen: Urine, Clean Catch; Blood   Result Value Ref Range    Color Yellow     Character Hazy (A)     Specific Gravity 1.025 <1.035    Ph 6.5 5.0 - 8.0    Glucose Negative Negative mg/dL    Ketones 40 (A) Negative mg/dL    Protein 100 (A) Negative mg/dL    Bilirubin Small (A) Negative    Nitrite Positive (A) Negative    Leukocyte Esterase Negative Negative    Occult Blood Large (A) Negative    Micro Urine Req Microscopic    URINE MICROSCOPIC (W/UA)    Collection Time: 12/11/20  5:06 PM   Result Value Ref Range    WBC 0-2 /hpf    RBC >150 (A) /hpf    Bacteria Moderate (A) None /hpf    Mucous Threads Few /hpf   ESTIMATED GFR    Collection Time: 12/11/20  5:06 PM   Result Value Ref Range    GFR If African American >60 >60 mL/min/1.73 m 2    GFR If Non  >60 >60  mL/min/1.73 m 2       (click the triangle to expand results)    Imaging:  CT-RENAL COLIC EVALUATION(A/P W/O)   Final Result         9 mm left UPJ stone with mild hydronephrosis.                   Assessment/Plan:  I anticipate this patient is appropriate for observation status at this time.    Leukocytosis- (present on admission)  Assessment & Plan  Due to stone and UTI    Patient is not septic at this time will not be placed on sepsis protocol    Obstruction of left ureteropelvic junction (UPJ) due to stone- (present on admission)  Assessment & Plan  Seen on CT  Urologist Dr. Paulson consulted  Covering with ceftriaxone  Urine culture pending  N.p.o. for surgery  Strain all Urine    Chronic bilateral low back pain without sciatica- (present on admission)  Assessment & Plan  Chronic pain    Obesity (BMI 30-39.9)- (present on admission)  Assessment & Plan  Body mass index is 31.15 kg/m².      GERD (gastroesophageal reflux disease)- (present on admission)  Assessment & Plan  Not on meds for this outpatient      VTE prophylaxis:  sc heparin

## 2020-12-12 NOTE — OP REPORT
[facility] Operative Report    Pre-operative Diagnosis: 1. Left ureteral urinary stone  2. Obstructive pyelonephritis     Post-operative Diagnosis: Same as above   Procedure: CYSTOSCOPY, WITH URETERAL STENT INSERTION:    Surgeon: Daniel Paulson M.D., MD   OR staff: Circulator: Kike Ramires R.N.  Scrub Person: Phoenix Galvan  Radiology Technologist: Kady Brown    Anesthesia: Anesthesiologist: Brett Quintana M.D.  General   Estimated Blood Loss: minimal   IV fluids <1L crystalloid   Specimens: Urine from left kidney (open ended catheter past the stone in renal pelvis)   Drains: 1. [id number] JJ stent OFF strings  2. 16F 2 way garcia catheter, gravity drainage.   Complications: None   Wound class infected   Condition: Stable, procedure well tolerated    Disposition:  PACU   Findings: 1. JJ stent  easily place on respective side  2. Stone 9mm left UPJ visible on fluoroscopy  3. RPG showed moderate hydronephrosis, stone likely pushed back into the kidney with wire  4. moderate purulent material from UO after stent placement     Indications for Procedure:  50 y.o. female with hx of 9mm  Stone at the left upj,.  Her WBC was 12, and her. UA was +N/bacteria suspicious for an infection.  she had no fevers in the ED but was having chills and uti symptoms.  Because of concern for an infected stone, the decision was made to take the patinet for urgent cystooscopy with stenting of the affected side.  After a full discussion of alternatives, risks, and benefits the patient consented to proceeding with cystoscopy and JJ stent insertion on respective side.  She understands the risk of inability to access ureter, the need for second procedures, that she may have stent discomfort until this is removed, bleeding, infection, severe sepsis ureteral injury or stricture, bladder injury, post op urinary retention requiring garcia catheter, and the general pulmonary and cardiovascular risks associated with anesthesia.      Procedure in Detail:  After obtaining informed consent patient was brought to room, and placed under general anesthesia. She was then placed in the lithotomy position and her genitals were prepped and draped in the usual sterile fashion.  The patient received ceftriaxone in the ED for perioperative antiobiotics. SCDs were placed for DVT prophylaxis    With RPG:  Cystoscopy was then performed with 21Fr scope, 30 degree lens. The anterior urethra was normal. The ureters were noted to be in orthotopic position. The bladder had mild cystitis cystica in appearance. There was no bladder stones, significant cloudiness, or puss noted from the ureters.  Sensor wire was placed up the left ureter and pushed stone back into kidney. Open ended catheter placed over the wire and fluid was drawn back and sent for culture. Pyelogram was performed with 2cc contrast. Sensor wire was replaced into kidney.  A [id number] stent withOUT strings was placed using fluoro, curl extending into left lower pole. A nice curl was visualized in the bladder endoscopically.    A 16 Gabonese 2-way Gardner catheter which was connected to gravity drainage and 10 cc placed in the balloon.  The case was terminated at this point the patient awoken from anesthesia and brought to the PACU in condition.    Disposition: PACU, overnight 1-2 d admission to hospital medicine, catheter likely out tomorrow, discharge on po antibiotics, definitive stone surgery in 2-4 weeks.

## 2020-12-12 NOTE — PROGRESS NOTES
Hospital Medicine Daily Progress Note    Date of Service  12/12/2020    Chief Complaint  50 y.o. female admitted 12/11/2020 with hematuria, abdominal pain, back pain    Hospital Course  50 y.o. female w/h/o heartburn, hemorrhoids, hypertension, chronic back pain who presented 12/11/2020 with hematuria and abdominal pain.  Patient is a  and went to work today and noticed that she had hematuria.  She also started having nausea vomiting and abdominal pain.  She is worried and could not keep fluids down.  Thus she came to the hospital.  She does have history of nephrolithiasis.  She was found to have a obstructive stone and this urologist was consulted and hospitalist was consulted for admission.    Patient underwent cystoscopy and ureteral stent placement 12/11 by urology. Concern for infected obstructed nephrolithiasis. She was started on Ceftriaxone and urine cultures pending.    Interval Problem Update  Patient feeling somewhat improved today. She has garcia inplace and reports improved pain, however is having cramping sensation following stent placement.    Consultants/Specialty  Urology - Dr. Paulson    Code Status  Full Code    Disposition  TBD    Review of Systems  Review of Systems   Constitutional: Positive for chills and malaise/fatigue. Negative for fever.   HENT: Negative.  Negative for hearing loss and tinnitus.    Eyes: Negative.  Negative for blurred vision and double vision.   Respiratory: Negative.  Negative for cough and shortness of breath.    Cardiovascular: Negative.  Negative for chest pain and palpitations.   Gastrointestinal: Positive for abdominal pain. Negative for diarrhea and heartburn.   Genitourinary: Positive for flank pain and hematuria. Negative for dysuria.   Musculoskeletal: Negative for myalgias and neck pain.   Skin: Negative.  Negative for rash.   Neurological: Negative for dizziness and headaches.   Psychiatric/Behavioral: Negative for depression and suicidal  ideas.        Physical Exam  Temp:  [36 °C (96.8 °F)-37.1 °C (98.7 °F)] 37.1 °C (98.7 °F)  Pulse:  [] 81  Resp:  [12-18] 18  BP: (116-160)/() 133/81  SpO2:  [94 %-100 %] 99 %    Physical Exam  Constitutional:       General: She is not in acute distress.  HENT:      Head: Normocephalic and atraumatic.      Nose: Nose normal.      Mouth/Throat:      Mouth: Mucous membranes are moist.      Pharynx: Oropharynx is clear.   Eyes:      Conjunctiva/sclera: Conjunctivae normal.      Pupils: Pupils are equal, round, and reactive to light.   Neck:      Musculoskeletal: Normal range of motion. No neck rigidity.   Cardiovascular:      Rate and Rhythm: Normal rate and regular rhythm.   Pulmonary:      Effort: Pulmonary effort is normal.      Breath sounds: Normal breath sounds.   Abdominal:      Palpations: Abdomen is soft.      Tenderness: There is abdominal tenderness. There is no guarding or rebound.   Musculoskeletal: Normal range of motion.      Right lower leg: No edema.      Left lower leg: No edema.   Lymphadenopathy:      Cervical: No cervical adenopathy.   Skin:     General: Skin is warm and dry.      Capillary Refill: Capillary refill takes less than 2 seconds.   Neurological:      General: No focal deficit present.      Mental Status: She is alert and oriented to person, place, and time.   Psychiatric:         Mood and Affect: Mood normal.         Behavior: Behavior normal.         Fluids    Intake/Output Summary (Last 24 hours) at 12/12/2020 1356  Last data filed at 12/12/2020 0900  Gross per 24 hour   Intake 1532.72 ml   Output 50 ml   Net 1482.72 ml       Laboratory  Recent Labs     12/11/20  1706 12/12/20  0648   WBC 11.5* 15.4*   RBC 4.96 4.78   HEMOGLOBIN 14.6 13.9   HEMATOCRIT 44.9 43.2   MCV 90.5 90.4   MCH 29.4 29.1   MCHC 32.5* 32.2*   RDW 43.3 44.0   PLATELETCT 338 301   MPV 9.7 9.7     Recent Labs     12/11/20  1706 12/12/20  0439   SODIUM 138 139   POTASSIUM 3.6 3.8   CHLORIDE 101 104   CO2 26  22   GLUCOSE 124* 122*   BUN 12 12   CREATININE 0.67 0.60   CALCIUM 9.5 8.9                   Imaging  DX-PORTABLE FLUOROSCOPY < 1 HOUR Is the patient pregnant? No   Final Result      Fluoroscopy provided for left ureteral stent placement.      CT-RENAL COLIC EVALUATION(A/P W/O)   Final Result         9 mm left UPJ stone with mild hydronephrosis.                    Assessment/Plan  Leukocytosis- (present on admission)  Assessment & Plan  Due to stone and UTI    Patient is not septic at this time will not be placed on sepsis protocol    Obstruction of left ureteropelvic junction (UPJ) due to stone- (present on admission)  Assessment & Plan  Seen on CT  Urologist Dr. Paulson consulted  Covering with ceftriaxone  Urine culture pending  N.p.o. for surgery  Strain all Urine    Chronic bilateral low back pain without sciatica- (present on admission)  Assessment & Plan  Chronic pain    Obesity (BMI 30-39.9)- (present on admission)  Assessment & Plan  Body mass index is 31.15 kg/m².      GERD (gastroesophageal reflux disease)- (present on admission)  Assessment & Plan  Not on meds for this outpatient         VTE prophylaxis: Heparin

## 2020-12-12 NOTE — OR NURSING
2234: To PACU from OR via gurney, sleeping, respirations spontaneous and non-labored. Garcia to gravity draining clear pink urine.  2240: Rouses spontaneously, denies discomfort    2245: large bile colored emesis, plan antiemetic at pt request. Pt c/o urge to void, educated on presence of garcia.  2255: O2 resumed as SPO2 drops to 88% on RA  2300: Drowsy, dozing intermittently, nausea decreased.  2315: Stable for transfer to floor

## 2020-12-13 LAB
ALBUMIN SERPL BCP-MCNC: 3.3 G/DL (ref 3.2–4.9)
ALBUMIN/GLOB SERPL: 1.1 G/DL
ALP SERPL-CCNC: 89 U/L (ref 30–99)
ALT SERPL-CCNC: 12 U/L (ref 2–50)
ANION GAP SERPL CALC-SCNC: 12 MMOL/L (ref 7–16)
AST SERPL-CCNC: 16 U/L (ref 12–45)
BILIRUB SERPL-MCNC: 0.7 MG/DL (ref 0.1–1.5)
BUN SERPL-MCNC: 10 MG/DL (ref 8–22)
CALCIUM SERPL-MCNC: 8.6 MG/DL (ref 8.4–10.2)
CHLORIDE SERPL-SCNC: 104 MMOL/L (ref 96–112)
CO2 SERPL-SCNC: 23 MMOL/L (ref 20–33)
CREAT SERPL-MCNC: 0.56 MG/DL (ref 0.5–1.4)
ERYTHROCYTE [DISTWIDTH] IN BLOOD BY AUTOMATED COUNT: 44.9 FL (ref 35.9–50)
GLOBULIN SER CALC-MCNC: 3 G/DL (ref 1.9–3.5)
GLUCOSE SERPL-MCNC: 92 MG/DL (ref 65–99)
GRAM STN SPEC: NORMAL
HCT VFR BLD AUTO: 41.7 % (ref 37–47)
HGB BLD-MCNC: 13.3 G/DL (ref 12–16)
MAGNESIUM SERPL-MCNC: 1.7 MG/DL (ref 1.5–2.5)
MCH RBC QN AUTO: 29.3 PG (ref 27–33)
MCHC RBC AUTO-ENTMCNC: 31.9 G/DL (ref 33.6–35)
MCV RBC AUTO: 91.9 FL (ref 81.4–97.8)
PLATELET # BLD AUTO: 274 K/UL (ref 164–446)
PMV BLD AUTO: 10 FL (ref 9–12.9)
POTASSIUM SERPL-SCNC: 3.3 MMOL/L (ref 3.6–5.5)
PROT SERPL-MCNC: 6.3 G/DL (ref 6–8.2)
RBC # BLD AUTO: 4.54 M/UL (ref 4.2–5.4)
SIGNIFICANT IND 70042: NORMAL
SITE SITE: NORMAL
SODIUM SERPL-SCNC: 139 MMOL/L (ref 135–145)
SOURCE SOURCE: NORMAL
WBC # BLD AUTO: 14.4 K/UL (ref 4.8–10.8)

## 2020-12-13 PROCEDURE — 700102 HCHG RX REV CODE 250 W/ 637 OVERRIDE(OP): Performed by: HOSPITALIST

## 2020-12-13 PROCEDURE — 99232 SBSQ HOSP IP/OBS MODERATE 35: CPT | Performed by: STUDENT IN AN ORGANIZED HEALTH CARE EDUCATION/TRAINING PROGRAM

## 2020-12-13 PROCEDURE — 85027 COMPLETE CBC AUTOMATED: CPT

## 2020-12-13 PROCEDURE — A9270 NON-COVERED ITEM OR SERVICE: HCPCS | Performed by: HOSPITALIST

## 2020-12-13 PROCEDURE — A9270 NON-COVERED ITEM OR SERVICE: HCPCS | Performed by: STUDENT IN AN ORGANIZED HEALTH CARE EDUCATION/TRAINING PROGRAM

## 2020-12-13 PROCEDURE — 80053 COMPREHEN METABOLIC PANEL: CPT

## 2020-12-13 PROCEDURE — 700111 HCHG RX REV CODE 636 W/ 250 OVERRIDE (IP): Performed by: HOSPITALIST

## 2020-12-13 PROCEDURE — 770006 HCHG ROOM/CARE - MED/SURG/GYN SEMI*

## 2020-12-13 PROCEDURE — 83735 ASSAY OF MAGNESIUM: CPT

## 2020-12-13 PROCEDURE — 36415 COLL VENOUS BLD VENIPUNCTURE: CPT

## 2020-12-13 PROCEDURE — 700102 HCHG RX REV CODE 250 W/ 637 OVERRIDE(OP): Performed by: STUDENT IN AN ORGANIZED HEALTH CARE EDUCATION/TRAINING PROGRAM

## 2020-12-13 PROCEDURE — 700105 HCHG RX REV CODE 258: Performed by: HOSPITALIST

## 2020-12-13 RX ORDER — FAMOTIDINE 20 MG/1
20 TABLET, FILM COATED ORAL 2 TIMES DAILY PRN
Status: DISCONTINUED | OUTPATIENT
Start: 2020-12-13 | End: 2020-12-14 | Stop reason: HOSPADM

## 2020-12-13 RX ORDER — POTASSIUM CHLORIDE 20 MEQ/1
20 TABLET, EXTENDED RELEASE ORAL 2 TIMES DAILY
Status: COMPLETED | OUTPATIENT
Start: 2020-12-13 | End: 2020-12-13

## 2020-12-13 RX ADMIN — OXYCODONE HYDROCHLORIDE 10 MG: 10 TABLET ORAL at 20:29

## 2020-12-13 RX ADMIN — MORPHINE SULFATE 4 MG: 4 INJECTION INTRAVENOUS at 11:44

## 2020-12-13 RX ADMIN — OXYCODONE HYDROCHLORIDE 5 MG: 5 TABLET ORAL at 08:52

## 2020-12-13 RX ADMIN — PHENAZOPYRIDINE HYDROCHLORIDE 200 MG: 200 TABLET ORAL at 08:52

## 2020-12-13 RX ADMIN — SODIUM CHLORIDE: 9 INJECTION, SOLUTION INTRAVENOUS at 00:10

## 2020-12-13 RX ADMIN — CEFTRIAXONE SODIUM 2 G: 2 INJECTION, POWDER, FOR SOLUTION INTRAMUSCULAR; INTRAVENOUS at 05:50

## 2020-12-13 RX ADMIN — OXYBUTYNIN CHLORIDE 5 MG: 5 TABLET ORAL at 03:20

## 2020-12-13 RX ADMIN — ACETAMINOPHEN 650 MG: 325 TABLET, FILM COATED ORAL at 17:57

## 2020-12-13 RX ADMIN — PHENAZOPYRIDINE HYDROCHLORIDE 200 MG: 200 TABLET ORAL at 11:12

## 2020-12-13 RX ADMIN — POTASSIUM CHLORIDE 20 MEQ: 1500 TABLET, EXTENDED RELEASE ORAL at 16:45

## 2020-12-13 RX ADMIN — TAMSULOSIN HYDROCHLORIDE 0.4 MG: 0.4 CAPSULE ORAL at 08:52

## 2020-12-13 RX ADMIN — HEPARIN SODIUM 5000 UNITS: 5000 INJECTION, SOLUTION INTRAVENOUS; SUBCUTANEOUS at 05:53

## 2020-12-13 RX ADMIN — ONDANSETRON 4 MG: 4 TABLET, ORALLY DISINTEGRATING ORAL at 20:29

## 2020-12-13 RX ADMIN — OXYBUTYNIN CHLORIDE 5 MG: 5 TABLET ORAL at 11:15

## 2020-12-13 RX ADMIN — POTASSIUM CHLORIDE 20 MEQ: 1500 TABLET, EXTENDED RELEASE ORAL at 08:52

## 2020-12-13 RX ADMIN — ONDANSETRON 4 MG: 2 INJECTION INTRAMUSCULAR; INTRAVENOUS at 11:15

## 2020-12-13 RX ADMIN — ACETAMINOPHEN 650 MG: 325 TABLET, FILM COATED ORAL at 11:14

## 2020-12-13 RX ADMIN — ACETAMINOPHEN 650 MG: 325 TABLET, FILM COATED ORAL at 03:19

## 2020-12-13 RX ADMIN — SENNOSIDES-DOCUSATE SODIUM TAB 8.6-50 MG 2 TABLET: 8.6-5 TAB at 05:50

## 2020-12-13 RX ADMIN — HEPARIN SODIUM 5000 UNITS: 5000 INJECTION, SOLUTION INTRAVENOUS; SUBCUTANEOUS at 15:05

## 2020-12-13 RX ADMIN — ONDANSETRON 4 MG: 2 INJECTION INTRAMUSCULAR; INTRAVENOUS at 03:19

## 2020-12-13 RX ADMIN — FAMOTIDINE 20 MG: 20 TABLET, FILM COATED ORAL at 17:54

## 2020-12-13 RX ADMIN — PHENAZOPYRIDINE HYDROCHLORIDE 200 MG: 200 TABLET ORAL at 16:44

## 2020-12-13 RX ADMIN — SENNOSIDES-DOCUSATE SODIUM TAB 8.6-50 MG 2 TABLET: 8.6-5 TAB at 16:44

## 2020-12-13 ASSESSMENT — ENCOUNTER SYMPTOMS
MYALGIAS: 0
HEADACHES: 0
DIARRHEA: 0
DEPRESSION: 0
EYES NEGATIVE: 1
CARDIOVASCULAR NEGATIVE: 1
COUGH: 0
RESPIRATORY NEGATIVE: 1
DOUBLE VISION: 0
BLURRED VISION: 0
FLANK PAIN: 1
ABDOMINAL PAIN: 1
FEVER: 0
CHILLS: 1
SHORTNESS OF BREATH: 0
HEARTBURN: 0
PALPITATIONS: 0
NECK PAIN: 0
DIZZINESS: 0

## 2020-12-13 ASSESSMENT — PAIN DESCRIPTION - PAIN TYPE
TYPE: ACUTE PAIN

## 2020-12-13 NOTE — PROGRESS NOTES
Garcia catheter discontinued per MD orders, 500ml of bloody clear urine in garcia catheter bag, tolerated procedure well.

## 2020-12-13 NOTE — PROGRESS NOTES
Received report from day shift nurse. Assumed pt care at 1915. Pt is A&Ox4, resting comfortably in bed. Pt on r.a. No signs of SOB/respiratory distress. Labs noted, VSS. Pt c/o no pain at this moment. Assessment complete. Garcia observed draining hematuria. Fall precautions in place. Bed at lowest position. Call light and personal belongings within reach. RN encouraging use of IS and mobility.     Overnight pt c/o intermittent nausea, medicated per MAR. Pt c/o bladder irritation, educated on placement of stent and garcia, given Oxybutynin per MAR. Urine draining well through garcia, continues to be blood tinged.

## 2020-12-13 NOTE — PROGRESS NOTES
"[facility]    Progress Note    Service: [facility]  Patient's Name: Michael Larsen  MRN: [id number]  Admit Date:12/11/2020  Today's Date: 12/13/2020   Room #: 2210/01      Identification:  50 y.o. female with left kidney stone now s/p left ureteral stent placement    POD#2     Overnight-Interval events:   - none Subjective/ROS:   Patient seen and examined. Feeling improved from prior, able to get some food in. Notes headache. Continues to have hematuria. Gardner in place. Mild stent irritation. WBC trending down  No CP/SOB/F/C     Physical Exam:  Current Vitals:   /69   Pulse 75   Temp 37.3 °C (99.2 °F) (Oral)   Resp 18   Ht 1.651 m (5' 5\")   Wt 84.9 kg (187 lb 2.7 oz)   LMP  (LMP Unknown) Comment: postmenopausal bleeding   SpO2 94%   BMI 31.15 kg/m²     12/11 1900 - 12/13 0659  In: 2132.7 [P.O.:960; I.V.:1172.7]  Out: 1150 [Urine:1150]   GEN : normocephalic, mild distress, A&O X4   RES:  no acute respiratory distress  ABD: Soft ND  :   Gardner with sofia red  INC:    EXT:  No edema, SCD's in place     Labs:   Lab Results   Component Value Date/Time    WBC 14.4 (H) 12/13/2020 04:34 AM    HEMATOCRIT 41.7 12/13/2020 04:34 AM    SODIUM 139 12/13/2020 04:34 AM    POTASSIUM 3.3 (L) 12/13/2020 04:34 AM    CHLORIDE 104 12/13/2020 04:34 AM    CO2 23 12/13/2020 04:34 AM    BUN 10 12/13/2020 04:34 AM    CREATININE 0.56 12/13/2020 04:34 AM    CALCIUM 8.6 12/13/2020 04:34 AM    MAGNESIUM 1.7 12/13/2020 04:34 AM        Lab Results   Component Value Date/Time    GLUCOSE 92 12/13/2020 04:34 AM    GLUCOSE 122 (H) 12/12/2020 04:39 AM    GLUCOSE 124 (H) 12/11/2020 05:06 PM    GLUCOSE 99 07/01/2020 09:30 AM          Assessment/Plan  Michael Larsen is a 50 y.o. female with left kidney stone and possible UTI now s/p ureteral stent placement.    - recommend home on 10 days antibiotics. Follow cultures, abx per medicine and culture directed  - moderate stent irritation, can try flomax, pyridium and oxybutynin " prn. Can discharged on these meds  - garcia to be removed  - monitor labs  - will need outpatient management of stent and stone, we will arrange in 2-3 weeks  - urology signing off and okay with discharge when medically cleared    Clifton Covarrubias PA-C  [facility]

## 2020-12-13 NOTE — PROGRESS NOTES
Urinated in toilet post garcia catheter removal urine yellow and clear patient denies pain or discomfort while urinating.

## 2020-12-13 NOTE — PROGRESS NOTES
Hospital Medicine Daily Progress Note    Date of Service  12/13/2020    Chief Complaint  50 y.o. female admitted 12/11/2020 with hematuria, abdominal pain, back pain    Hospital Course  50 y.o. female w/h/o heartburn, hemorrhoids, hypertension, chronic back pain who presented 12/11/2020 with hematuria and abdominal pain.  Patient is a  and went to work today and noticed that she had hematuria.  She also started having nausea vomiting and abdominal pain.  She is worried and could not keep fluids down.  Thus she came to the hospital.  She does have history of nephrolithiasis.  She was found to have a obstructive stone and this urologist was consulted and hospitalist was consulted for admission.    Patient underwent cystoscopy and ureteral stent placement 12/11 by urology. Concern for infected obstructed nephrolithiasis. She was started on Ceftriaxone and urine cultures pending.    Interval Problem Update    Patient feeling much better. Still some suprapubic discomfort likely from ureteral stent. Gardner removed today. Awaiting results of cultures obtained during cystoscopy.    Consultants/Specialty  Urology - Dr. Paulson    Code Status  Full Code    Disposition  Likely home    Review of Systems  Review of Systems   Constitutional: Positive for chills and malaise/fatigue. Negative for fever.   HENT: Negative.  Negative for hearing loss and tinnitus.    Eyes: Negative.  Negative for blurred vision and double vision.   Respiratory: Negative.  Negative for cough and shortness of breath.    Cardiovascular: Negative.  Negative for chest pain and palpitations.   Gastrointestinal: Positive for abdominal pain. Negative for diarrhea and heartburn.   Genitourinary: Positive for flank pain and hematuria. Negative for dysuria.   Musculoskeletal: Negative for myalgias and neck pain.   Skin: Negative.  Negative for rash.   Neurological: Negative for dizziness and headaches.   Psychiatric/Behavioral: Negative for  depression and suicidal ideas.        Physical Exam  Temp:  [36.9 °C (98.5 °F)-37.3 °C (99.2 °F)] 37.1 °C (98.7 °F)  Pulse:  [72-80] 76  Resp:  [18] 18  BP: (125-161)/(69-95) 144/84  SpO2:  [92 %-94 %] 92 %    Physical Exam  Constitutional:       General: She is not in acute distress.  HENT:      Head: Normocephalic and atraumatic.      Nose: Nose normal.      Mouth/Throat:      Mouth: Mucous membranes are moist.      Pharynx: Oropharynx is clear.   Eyes:      Conjunctiva/sclera: Conjunctivae normal.      Pupils: Pupils are equal, round, and reactive to light.   Neck:      Musculoskeletal: Normal range of motion. No neck rigidity.   Cardiovascular:      Rate and Rhythm: Normal rate and regular rhythm.   Pulmonary:      Effort: Pulmonary effort is normal.      Breath sounds: Normal breath sounds.   Abdominal:      Palpations: Abdomen is soft.      Tenderness: There is abdominal tenderness. There is no guarding or rebound.   Musculoskeletal: Normal range of motion.      Right lower leg: No edema.      Left lower leg: No edema.   Lymphadenopathy:      Cervical: No cervical adenopathy.   Skin:     General: Skin is warm and dry.      Capillary Refill: Capillary refill takes less than 2 seconds.   Neurological:      General: No focal deficit present.      Mental Status: She is alert and oriented to person, place, and time.   Psychiatric:         Mood and Affect: Mood normal.         Behavior: Behavior normal.         Fluids    Intake/Output Summary (Last 24 hours) at 12/13/2020 1121  Last data filed at 12/13/2020 0900  Gross per 24 hour   Intake 960 ml   Output 1600 ml   Net -640 ml       Laboratory  Recent Labs     12/11/20  1706 12/12/20  0648 12/13/20  0434   WBC 11.5* 15.4* 14.4*   RBC 4.96 4.78 4.54   HEMOGLOBIN 14.6 13.9 13.3   HEMATOCRIT 44.9 43.2 41.7   MCV 90.5 90.4 91.9   MCH 29.4 29.1 29.3   MCHC 32.5* 32.2* 31.9*   RDW 43.3 44.0 44.9   PLATELETCT 338 301 274   MPV 9.7 9.7 10.0     Recent Labs      12/11/20  1706 12/12/20  0439 12/13/20  0434   SODIUM 138 139 139   POTASSIUM 3.6 3.8 3.3*   CHLORIDE 101 104 104   CO2 26 22 23   GLUCOSE 124* 122* 92   BUN 12 12 10   CREATININE 0.67 0.60 0.56   CALCIUM 9.5 8.9 8.6                   Imaging  DX-PORTABLE FLUOROSCOPY < 1 HOUR Is the patient pregnant? No   Final Result      Fluoroscopy provided for left ureteral stent placement.      CT-RENAL COLIC EVALUATION(A/P W/O)   Final Result         9 mm left UPJ stone with mild hydronephrosis.                    Assessment/Plan  * Obstruction of left ureteropelvic junction (UPJ) due to stone- (present on admission)  Assessment & Plan  Seen on CT  Urologist Dr. Paulson consulted, s/p cystoscopy and ureteral stent placement 12/11  Concern for complicated UTI due to obstruction, continue Ceftriaxone for now  Will need 10 day course of Abx, urine culture pending. Will narrow Abx coverage when culture results  Pain from ureteral stent irritation, continue pain control with narcotics, flomax, pyridium, and oxybutynin prn per urology recommendation  Patient will need to follow up with urology for definitive treatment of nephrolithiasis in 2-3 weeks following discharge    Leukocytosis- (present on admission)  Assessment & Plan  Due to stone and presumed UTI  Continue Ceftriaxone  Patient is not septic at this time will not be placed on sepsis protocol    Chronic bilateral low back pain without sciatica- (present on admission)  Assessment & Plan  Chronic pain    Obesity (BMI 30-39.9)- (present on admission)  Assessment & Plan  Body mass index is 31.15 kg/m².      GERD (gastroesophageal reflux disease)- (present on admission)  Assessment & Plan  Not on meds for this outpatient       VTE prophylaxis: Heparin

## 2020-12-14 VITALS
SYSTOLIC BLOOD PRESSURE: 126 MMHG | HEART RATE: 81 BPM | WEIGHT: 187.17 LBS | OXYGEN SATURATION: 93 % | RESPIRATION RATE: 18 BRPM | DIASTOLIC BLOOD PRESSURE: 82 MMHG | HEIGHT: 65 IN | BODY MASS INDEX: 31.18 KG/M2 | TEMPERATURE: 99 F

## 2020-12-14 PROBLEM — D72.829 LEUKOCYTOSIS: Status: RESOLVED | Noted: 2020-12-11 | Resolved: 2020-12-14

## 2020-12-14 PROBLEM — N20.1 OBSTRUCTION OF LEFT URETEROPELVIC JUNCTION (UPJ) DUE TO STONE: Status: RESOLVED | Noted: 2020-12-11 | Resolved: 2020-12-14

## 2020-12-14 PROBLEM — Z96.0 STATUS POST CYSTOSCOPY WITH URETERAL STENT PLACEMENT: Status: ACTIVE | Noted: 2020-12-14

## 2020-12-14 LAB
ANION GAP SERPL CALC-SCNC: 13 MMOL/L (ref 7–16)
BACTERIA UR CULT: NORMAL
BASOPHILS # BLD AUTO: 0.5 % (ref 0–1.8)
BASOPHILS # BLD: 0.04 K/UL (ref 0–0.12)
BUN SERPL-MCNC: 8 MG/DL (ref 8–22)
CALCIUM SERPL-MCNC: 8.9 MG/DL (ref 8.4–10.2)
CHLORIDE SERPL-SCNC: 103 MMOL/L (ref 96–112)
CO2 SERPL-SCNC: 24 MMOL/L (ref 20–33)
CREAT SERPL-MCNC: 0.53 MG/DL (ref 0.5–1.4)
EOSINOPHIL # BLD AUTO: 0.14 K/UL (ref 0–0.51)
EOSINOPHIL NFR BLD: 1.7 % (ref 0–6.9)
ERYTHROCYTE [DISTWIDTH] IN BLOOD BY AUTOMATED COUNT: 42.6 FL (ref 35.9–50)
GLUCOSE SERPL-MCNC: 79 MG/DL (ref 65–99)
HCT VFR BLD AUTO: 41 % (ref 37–47)
HGB BLD-MCNC: 13.2 G/DL (ref 12–16)
IMM GRANULOCYTES # BLD AUTO: 0.02 K/UL (ref 0–0.11)
IMM GRANULOCYTES NFR BLD AUTO: 0.2 % (ref 0–0.9)
LYMPHOCYTES # BLD AUTO: 3.13 K/UL (ref 1–4.8)
LYMPHOCYTES NFR BLD: 37.4 % (ref 22–41)
MAGNESIUM SERPL-MCNC: 1.9 MG/DL (ref 1.5–2.5)
MCH RBC QN AUTO: 29 PG (ref 27–33)
MCHC RBC AUTO-ENTMCNC: 32.2 G/DL (ref 33.6–35)
MCV RBC AUTO: 90.1 FL (ref 81.4–97.8)
MONOCYTES # BLD AUTO: 0.57 K/UL (ref 0–0.85)
MONOCYTES NFR BLD AUTO: 6.8 % (ref 0–13.4)
NEUTROPHILS # BLD AUTO: 4.48 K/UL (ref 2–7.15)
NEUTROPHILS NFR BLD: 53.4 % (ref 44–72)
NRBC # BLD AUTO: 0 K/UL
NRBC BLD-RTO: 0 /100 WBC
PLATELET # BLD AUTO: 276 K/UL (ref 164–446)
PMV BLD AUTO: 10.6 FL (ref 9–12.9)
POTASSIUM SERPL-SCNC: 3.5 MMOL/L (ref 3.6–5.5)
RBC # BLD AUTO: 4.55 M/UL (ref 4.2–5.4)
SIGNIFICANT IND 70042: NORMAL
SITE SITE: NORMAL
SODIUM SERPL-SCNC: 140 MMOL/L (ref 135–145)
SOURCE SOURCE: NORMAL
WBC # BLD AUTO: 8.4 K/UL (ref 4.8–10.8)

## 2020-12-14 PROCEDURE — 700105 HCHG RX REV CODE 258: Performed by: HOSPITALIST

## 2020-12-14 PROCEDURE — 700111 HCHG RX REV CODE 636 W/ 250 OVERRIDE (IP): Performed by: HOSPITALIST

## 2020-12-14 PROCEDURE — 85025 COMPLETE CBC W/AUTO DIFF WBC: CPT

## 2020-12-14 PROCEDURE — 99239 HOSP IP/OBS DSCHRG MGMT >30: CPT | Performed by: STUDENT IN AN ORGANIZED HEALTH CARE EDUCATION/TRAINING PROGRAM

## 2020-12-14 PROCEDURE — 700102 HCHG RX REV CODE 250 W/ 637 OVERRIDE(OP): Performed by: STUDENT IN AN ORGANIZED HEALTH CARE EDUCATION/TRAINING PROGRAM

## 2020-12-14 PROCEDURE — A9270 NON-COVERED ITEM OR SERVICE: HCPCS | Performed by: HOSPITALIST

## 2020-12-14 PROCEDURE — 80048 BASIC METABOLIC PNL TOTAL CA: CPT

## 2020-12-14 PROCEDURE — A9270 NON-COVERED ITEM OR SERVICE: HCPCS | Performed by: STUDENT IN AN ORGANIZED HEALTH CARE EDUCATION/TRAINING PROGRAM

## 2020-12-14 PROCEDURE — 36415 COLL VENOUS BLD VENIPUNCTURE: CPT

## 2020-12-14 PROCEDURE — 83735 ASSAY OF MAGNESIUM: CPT

## 2020-12-14 PROCEDURE — 700102 HCHG RX REV CODE 250 W/ 637 OVERRIDE(OP): Performed by: HOSPITALIST

## 2020-12-14 RX ORDER — TAMSULOSIN HYDROCHLORIDE 0.4 MG/1
0.4 CAPSULE ORAL
Qty: 20 CAP | Refills: 0 | Status: ON HOLD
Start: 2020-12-15 | End: 2020-12-24

## 2020-12-14 RX ORDER — OXYCODONE HYDROCHLORIDE AND ACETAMINOPHEN 5; 325 MG/1; MG/1
1 TABLET ORAL EVERY 6 HOURS PRN
Qty: 12 TAB | Refills: 0 | Status: SHIPPED | OUTPATIENT
Start: 2020-12-14 | End: 2020-12-17

## 2020-12-14 RX ORDER — ONDANSETRON 4 MG/1
4 TABLET, ORALLY DISINTEGRATING ORAL EVERY 4 HOURS PRN
Qty: 10 TAB | Refills: 0 | Status: ON HOLD
Start: 2020-12-14 | End: 2020-12-24

## 2020-12-14 RX ORDER — FAMOTIDINE 20 MG/1
20 TABLET, FILM COATED ORAL 2 TIMES DAILY PRN
Qty: 60 TAB | Refills: 0 | Status: SHIPPED | OUTPATIENT
Start: 2020-12-14 | End: 2024-01-26

## 2020-12-14 RX ORDER — PHENAZOPYRIDINE HYDROCHLORIDE 200 MG/1
200 TABLET, FILM COATED ORAL 3 TIMES DAILY PRN
Qty: 20 TAB | Refills: 0 | Status: SHIPPED
Start: 2020-12-14 | End: 2021-03-17

## 2020-12-14 RX ORDER — OXYBUTYNIN CHLORIDE 5 MG/1
5 TABLET ORAL 3 TIMES DAILY PRN
Qty: 60 TAB | Refills: 0 | Status: SHIPPED
Start: 2020-12-14 | End: 2021-07-20

## 2020-12-14 RX ORDER — SULFAMETHOXAZOLE AND TRIMETHOPRIM 800; 160 MG/1; MG/1
1 TABLET ORAL 2 TIMES DAILY
Qty: 20 TAB | Refills: 0 | Status: SHIPPED | OUTPATIENT
Start: 2020-12-14 | End: 2020-12-24

## 2020-12-14 RX ORDER — POTASSIUM CHLORIDE 20 MEQ/1
40 TABLET, EXTENDED RELEASE ORAL ONCE
Status: COMPLETED | OUTPATIENT
Start: 2020-12-14 | End: 2020-12-14

## 2020-12-14 RX ADMIN — SENNOSIDES-DOCUSATE SODIUM TAB 8.6-50 MG 2 TABLET: 8.6-5 TAB at 04:01

## 2020-12-14 RX ADMIN — CEFTRIAXONE SODIUM 2 G: 2 INJECTION, POWDER, FOR SOLUTION INTRAMUSCULAR; INTRAVENOUS at 04:04

## 2020-12-14 RX ADMIN — PHENAZOPYRIDINE HYDROCHLORIDE 200 MG: 200 TABLET ORAL at 11:19

## 2020-12-14 RX ADMIN — OXYCODONE HYDROCHLORIDE 10 MG: 10 TABLET ORAL at 04:01

## 2020-12-14 RX ADMIN — POTASSIUM CHLORIDE 40 MEQ: 1500 TABLET, EXTENDED RELEASE ORAL at 08:09

## 2020-12-14 RX ADMIN — ONDANSETRON 4 MG: 4 TABLET, ORALLY DISINTEGRATING ORAL at 04:01

## 2020-12-14 RX ADMIN — TAMSULOSIN HYDROCHLORIDE 0.4 MG: 0.4 CAPSULE ORAL at 08:09

## 2020-12-14 RX ADMIN — PHENAZOPYRIDINE HYDROCHLORIDE 200 MG: 200 TABLET ORAL at 08:10

## 2020-12-14 RX ADMIN — MAGNESIUM HYDROXIDE 30 ML: 400 SUSPENSION ORAL at 08:24

## 2020-12-14 RX ADMIN — ONDANSETRON 4 MG: 4 TABLET, ORALLY DISINTEGRATING ORAL at 08:13

## 2020-12-14 NOTE — DISCHARGE INSTRUCTIONS
Discharge Instructions    Discharged to home by car with relative. Discharged via wheelchair, hospital escort: Yes.  Special equipment needed: Not Applicable    Be sure to schedule a follow-up appointment with your primary care doctor or any specialists as instructed.     Discharge Plan:        I understand that a diet low in cholesterol, fat, and sodium is recommended for good health. Unless I have been given specific instructions below for another diet, I accept this instruction as my diet prescription.   Other diet: Regular as tolerated     Special Instructions: None    · Is patient discharged on Warfarin / Coumadin?   No     Depression / Suicide Risk    As you are discharged from this [facility] facility, it is important to learn how to keep safe from harming yourself.    Recognize the warning signs:  · Abrupt changes in personality, positive or negative- including increase in energy   · Giving away possessions  · Change in eating patterns- significant weight changes-  positive or negative  · Change in sleeping patterns- unable to sleep or sleeping all the time   · Unwillingness or inability to communicate  · Depression  · Unusual sadness, discouragement and loneliness  · Talk of wanting to die  · Neglect of personal appearance   · Rebelliousness- reckless behavior  · Withdrawal from people/activities they love  · Confusion- inability to concentrate     If you or a loved one observes any of these behaviors or has concerns about self-harm, here's what you can do:  · Talk about it- your feelings and reasons for harming yourself  · Remove any means that you might use to hurt yourself (examples: pills, rope, extension cords, firearm)  · Get professional help from the community (Mental Health, Substance Abuse, psychological counseling)  · Do not be alone:Call your Safe Contact- someone whom you trust who will be there for you.  · Call your local CRISIS HOTLINE 862-9322 or 389-613-5643  · Call your local Children's  Mobile Crisis Response Team [place] (557) 998-5298 or www.[employer]  · Call the toll free National Suicide Prevention Hotlines   · National Suicide Prevention Lifeline 769-905-IJLZ (4231)  · [employer] Hope Line Network 800-SUICIDE (445-6359)    Kidney Stones    Kidney stones (urolithiasis) are solid, rock-like deposits that form inside of the organs that make urine (kidneys). A kidney stone may form in a kidney and move into the bladder, where it can cause intense pain and block the flow of urine. Kidney stones are created when high levels of certain minerals are found in the urine. They are usually passed through urination, but in some cases, medical treatment may be needed to remove them.  What are the causes?  Kidney stones may be caused by:  · A condition in which certain glands produce too much parathyroid hormone (primary hyperparathyroidism), which causes too much calcium buildup in the blood.  · Buildup of uric acid crystals in the bladder (hyperuricosuria). Uric acid is a chemical that the body produces when you eat certain foods. It usually exits the body in the urine.  · Narrowing (stricture) of one or both of the tubes that drain urine from the kidneys to the bladder (ureters).  · A kidney blockage that is present at birth (congenital obstruction).  · Past surgery on the kidney or the ureters, such as gastric bypass surgery.  What increases the risk?  The following factors make you more likely to develop kidney stones:  · Having had a kidney stone in the past.  · Having a family history of kidney stones.  · Not drinking enough water.  · Eating a diet that is high in protein, salt (sodium), or sugar.  · Being overweight or obese.  What are the signs or symptoms?  Symptoms of a kidney stone may include:  · Nausea.  · Vomiting.  · Blood in the urine (hematuria).  · Pain in the side of the abdomen, right below the ribs (flank pain). Pain usually spreads (radiates) to the groin.  · Needing to  urinate frequently or urgently.  How is this diagnosed?  This condition may be diagnosed based on:  · Your medical history.  · A physical exam.  · Blood tests.  · Urine tests.  · CT scan.  · Abdominal X-ray.  · A procedure to examine the inside of the bladder (cystoscopy).  How is this treated?  Treatment for kidney stones depends on the size, location, and makeup of the stones. Treatment may involve:  · Analyzing your urine before and after you pass the stone through urination.  · Being monitored at the hospital until you pass the stone through urination.  · Increasing your fluid intake and decreasing the amount of calcium and protein in your diet.  · A procedure to break up kidney stones in the bladder using:  ? A focused beam of light (laser therapy).  ? Shock waves (extracorporeal shock wave lithotripsy).  · Surgery to remove kidney stones. This may be needed if you have severe pain or have stones that block your urinary tract.  Follow these instructions at home:  Eating and drinking  · Drink enough fluid to keep your urine clear or pale yellow. This will help you to pass the kidney stone.  · If directed, change your diet. This may include:  ? Limiting how much sodium you eat.  ? Eating more fruits and vegetables.  ? Limiting how much meat, poultry, fish, and eggs you eat.  · Follow instructions from your health care provider about eating or drinking restrictions.  General instructions  · Collect urine samples as told by your health care provider. You may need to collect a urine sample:  ? 24 hours after you pass the stone.  ? 8-12 weeks after passing the kidney stone, and every 6-12 months after that.  · Strain your urine every time you urinate, for as long as directed. Use the strainer that your health care provider recommends.  · Do not throw out the kidney stone after passing it. Keep the stone so it can be tested by your health care provider. Testing the makeup of your kidney stone may help prevent you from  getting kidney stones in the future.  · Take over-the-counter and prescription medicines only as told by your health care provider.  · Keep all follow-up visits as told by your health care provider. This is important. You may need follow-up X-rays or ultrasounds to make sure that your stone has passed.  How is this prevented?  To prevent another kidney stone:  · Drink enough fluid to keep your urine clear or pale yellow. This is the best way to prevent kidney stones.  · Eat a healthy diet and follow recommendations from your health care provider about foods to avoid. You may be instructed to eat a low-protein diet. Recommendations vary depending on the type of kidney stone that you have.  · Maintain a healthy weight.  Contact a health care provider if:  · You have pain that gets worse or does not get better with medicine.  Get help right away if:  · You have a fever or chills.  · You develop severe pain.  · You develop new abdominal pain.  · You faint.  · You are unable to urinate.  This information is not intended to replace advice given to you by your health care provider. Make sure you discuss any questions you have with your health care provider.  Document Released: 12/18/2006 Document Revised: 07/30/2019 Document Reviewed: 06/02/2017  [employer] Patient Education © 2020 [employer]  Urinary Tract Infection, Adult  A urinary tract infection (UTI) is an infection of any part of the urinary tract. The urinary tract includes:  · The kidneys.  · The ureters.  · The bladder.  · The urethra.  These organs make, store, and get rid of pee (urine) in the body.  What are the causes?  This is caused by germs (bacteria) in your genital area. These germs grow and cause swelling (inflammation) of your urinary tract.  What increases the risk?  You are more likely to develop this condition if:  · You have a small, thin tube (catheter) to drain pee.  · You cannot control when you pee or poop (incontinence).  · You are female,  and:  ? You use these methods to prevent pregnancy:  ? A medicine that kills sperm (spermicide).  ? A device that blocks sperm (diaphragm).  ? You have low levels of a female hormone (estrogen).  ? You are pregnant.  · You have genes that add to your risk.  · You are sexually active.  · You take antibiotic medicines.  · You have trouble peeing because of:  ? A prostate that is bigger than normal, if you are male.  ? A blockage in the part of your body that drains pee from the bladder (urethra).  ? A kidney stone.  ? A nerve condition that affects your bladder (neurogenic bladder).  ? Not getting enough to drink.  ? Not peeing often enough.  · You have other conditions, such as:  ? Diabetes.  ? A weak disease-fighting system (immune system).  ? Sickle cell disease.  ? Gout.  ? Injury of the spine.  What are the signs or symptoms?  Symptoms of this condition include:  · Needing to pee right away (urgently).  · Peeing often.  · Peeing small amounts often.  · Pain or burning when peeing.  · Blood in the pee.  · Pee that smells bad or not like normal.  · Trouble peeing.  · Pee that is cloudy.  · Fluid coming from the vagina, if you are female.  · Pain in the belly or lower back.  Other symptoms include:  · Throwing up (vomiting).  · No urge to eat.  · Feeling mixed up (confused).  · Being tired and grouchy (irritable).  · A fever.  · Watery poop (diarrhea).  How is this treated?  This condition may be treated with:  · Antibiotic medicine.  · Other medicines.  · Drinking enough water.  Follow these instructions at home:    Medicines  · Take over-the-counter and prescription medicines only as told by your doctor.  · If you were prescribed an antibiotic medicine, take it as told by your doctor. Do not stop taking it even if you start to feel better.  General instructions  · Make sure you:  ? Pee until your bladder is empty.  ? Do not hold pee for a long time.  ? Empty your bladder after sex.  ? Wipe from front to back after  pooping if you are a female. Use each tissue one time when you wipe.  · Drink enough fluid to keep your pee pale yellow.  · Keep all follow-up visits as told by your doctor. This is important.  Contact a doctor if:  · You do not get better after 1-2 days.  · Your symptoms go away and then come back.  Get help right away if:  · You have very bad back pain.  · You have very bad pain in your lower belly.  · You have a fever.  · You are sick to your stomach (nauseous).  · You are throwing up.  Summary  · A urinary tract infection (UTI) is an infection of any part of the urinary tract.  · This condition is caused by germs in your genital area.  · There are many risk factors for a UTI. These include having a small, thin tube to drain pee and not being able to control when you pee or poop.  · Treatment includes antibiotic medicines for germs.  · Drink enough fluid to keep your pee pale yellow.  This information is not intended to replace advice given to you by your health care provider. Make sure you discuss any questions you have with your health care provider.  Document Released: 06/05/2009 Document Revised: 12/05/2019 Document Reviewed: 06/27/2019  [employer] Patient Education © 2020 [employer]

## 2020-12-14 NOTE — PROGRESS NOTES
Educated pt on discharge instruction. Questions answered. Pt verbalizes understanding. Pt calls for her ride. IV out.

## 2020-12-14 NOTE — DISCHARGE SUMMARY
Discharge Summary    CHIEF COMPLAINT ON ADMISSION  Chief Complaint   Patient presents with   • Blood in Urine     Onset yesterday TO UC today Told to come to ER if S/S worsen   • Abdominal Pain   • Back Pain     Upper and lower       Reason for Admission  back pain, blood in urine, n/v      Admission Date  12/11/2020    CODE STATUS  Full Code    HPI & HOSPITAL COURSE    50 y.o. female w/h/o heartburn, hemorrhoids, hypertension, chronic back pain who presented 12/11/2020 with hematuria and abdominal pain.  Patient is a  and went to work today and noticed that she had hematuria.  She also started having nausea vomiting and abdominal pain.  She is worried and could not keep fluids down.  Thus she came to the hospital.  She does have history of nephrolithiasis.  She was found to have a obstructive stone and this urologist was consulted and hospitalist was consulted for admission.     Patient underwent cystoscopy and ureteral stent placement 12/11 by urology. Concern for infected obstructed nephrolithiasis. She was started on Ceftriaxone. Patient garcia was removed and she was able to urinate normally. Urine cultures with no growth, however I discussed with urologist and there was hilary pus from kidney to ureteral stone and she will be treated with 14 days of empirical Abx due to retained ureteral stent.    Patient will be treated with 10 additional days of Bactrim. She will be discharged with percocet for pain and with Flomax, oxybutynin, and pyridium to take PRN for ureteral / bladder spasms and irritation from stent.    Patient will need to follow up with urology for definitive treatment of nephrolithiasis.    Therefore, she is discharged in good and stable condition to home with close outpatient follow-up.    The patient met 2-midnight criteria for an inpatient stay at the time of discharge.    Discharge Date  12/14/2020    FOLLOW UP ITEMS POST DISCHARGE  Follow up with PCP for further  management  Plan    DISCHARGE DIAGNOSES  Principal Problem (Resolved):    Obstruction of left ureteropelvic junction (UPJ) due to stone POA: Yes  Active Problems:    GERD (gastroesophageal reflux disease) POA: Yes    Obesity (BMI 30-39.9) POA: Yes    Chronic bilateral low back pain without sciatica POA: Yes    Status post cystoscopy with ureteral stent placement POA: Yes  Resolved Problems:    Leukocytosis POA: Yes      FOLLOW UP  Future Appointments   Date Time Provider Department Center   12/16/2020  2:40 PM Sdae Kiran D.O. [facility]     Sade Kiran D.O.  [address]  [address]  [facility] [address]  [phone]    In 1 week        MEDICATIONS ON DISCHARGE     Medication List      START taking these medications      Instructions   famotidine 20 MG Tabs  Commonly known as: PEPCID   Take 1 Tab by mouth 2 times a day as needed (Heartburn).  Dose: 20 mg     ondansetron 4 MG Tbdp  Commonly known as: ZOFRAN ODT   Take 1 Tab by mouth every four hours as needed for Nausea (give PO if no IV route available).  Dose: 4 mg     oxybutynin 5 MG Tabs  Commonly known as: DITROPAN   Take 1 Tab by mouth 3 times a day as needed (Bladder irritation).  Dose: 5 mg     oxyCODONE-acetaminophen 5-325 MG Tabs  Commonly known as: PERCOCET   Take 1 Tab by mouth every 6 hours as needed for up to 3 days.  Dose: 1 Tab     phenazopyridine 200 MG Tabs  Commonly known as: PYRIDIUM   Take 1 Tab by mouth 3 times a day as needed (bladder irritation).  Dose: 200 mg     sulfamethoxazole-trimethoprim 800-160 MG tablet  Commonly known as: BACTRIM DS   Take 1 Tab by mouth 2 times a day for 10 days.  Dose: 1 Tab     tamsulosin 0.4 MG capsule  Start taking on: December 15, 2020  Commonly known as: FLOMAX   Take 1 Cap by mouth 1/2 hour after breakfast.  Dose: 0.4 mg        CONTINUE taking these medications      Instructions   Aleve 220 MG tablet  Generic drug: naproxen   Take 220-440 mg by mouth 2 times a day as needed (pain).  Dose:  220-440 mg     FIBER ADULT GUMMIES PO   Take 2 Each by mouth every morning.  Dose: 2 Each     ONE-A-DAY WOMENS FORMULA PO   Take 1 Tab by mouth every evening.  Dose: 1 Tab     PROBIOTIC-10 PO   Take 2 Tabs by mouth every morning.  Dose: 2 Tab            Allergies  Allergies   Allergen Reactions   • Hydrocodone Nausea     Nightmares & Nausea     • Codeine Vomiting and Nausea     Vomiting    • Other Food Vomiting     Grape juice            • Penicillins Vomiting     Vomiting        DIET  Orders Placed This Encounter   Procedures   • Diet Order Diet: Regular     Standing Status:   Standing     Number of Occurrences:   1     Order Specific Question:   Diet:     Answer:   Regular [1]       ACTIVITY  As tolerated.  Weight bearing as tolerated    CONSULTATIONS  Urology - Dr. Paulson    PROCEDURES  12/11: Cystoscopy with ureteral stent placement    LABORATORY  Lab Results   Component Value Date    SODIUM 140 12/14/2020    POTASSIUM 3.5 (L) 12/14/2020    CHLORIDE 103 12/14/2020    CO2 24 12/14/2020    GLUCOSE 79 12/14/2020    BUN 8 12/14/2020    CREATININE 0.53 12/14/2020        Lab Results   Component Value Date    WBC 8.4 12/14/2020    HEMOGLOBIN 13.2 12/14/2020    HEMATOCRIT 41.0 12/14/2020    PLATELETCT 276 12/14/2020        Total time of the discharge process exceeds 47 minutes.

## 2020-12-15 LAB
BACTERIA UR CULT: NORMAL
GRAM STN SPEC: NORMAL
SIGNIFICANT IND 70042: NORMAL
SITE SITE: NORMAL
SOURCE SOURCE: NORMAL

## 2020-12-15 NOTE — PROGRESS NOTES
Virtual Visit: Established Patient   This visit was conducted via Zoom using secure and encrypted videoconferencing technology. The patient was in a private location in the state of [place].    The patient's identity was confirmed and verbal consent was obtained for this virtual visit.    Subjective:   CC:   Chief Complaint   Patient presents with   • Follow-Up     hospitalized       Michael Larsen is a 50 y.o. female presenting for evaluation and management of:    Post-hospital follow-up re: renal stone    Pt was hospitalized on 12/11/2020-12/14/2020.  She was admitted for a ureteral stone and UTI.  She was given flomax, oxybutynin and pyridium prn and Bactrim for 10 days upon discharge and reports taking them as prescribed without negative SEs other than dry mouth which improves with staying well hydrated.  She was also given percocet which she was breaking in half after coming home and has not taken any percocet today since it makes her groggy.  She has a lot of pressure with urinating and urine is very orange.  States her urine is no longer bloody and red.  She has intermittent back pain and lower abdominal pain that is stable and improved compared to prior to hospital admission.    Of note, she was seen by urology 10/2020 and was due to follow-up 11/30/20 for further testing but since she did not have a copy of her CT and had no work coverage, she changed the appointment to 12/30/2020 of this month.  However, upon discharge her urologist inpatient (Dr. Paulson) stated he would like to see her s/p discharge for close follow-up for continuity of care since he put the stent in.  She is awaiting his call re: follow-up appointment.    ROS   Denies any recent fevers or chills. No nausea or vomiting. No chest pains or shortness of breath.     Allergies   Allergen Reactions   • Hydrocodone Nausea     Nightmares & Nausea     • Codeine Vomiting and Nausea     Vomiting    • Other Food Vomiting     Grape  juice            • Penicillins Vomiting     Vomiting        Current medicines (including changes today)  Current Outpatient Medications   Medication Sig Dispense Refill   • sulfamethoxazole-trimethoprim (BACTRIM DS) 800-160 MG tablet Take 1 Tab by mouth 2 times a day for 10 days. 20 Tab 0   • famotidine (PEPCID) 20 MG Tab Take 1 Tab by mouth 2 times a day as needed (Heartburn). 60 Tab 0   • oxyCODONE-acetaminophen (PERCOCET) 5-325 MG Tab Take 1 Tab by mouth every 6 hours as needed for up to 3 days. 12 Tab 0   • ondansetron (ZOFRAN ODT) 4 MG TABLET DISPERSIBLE Take 1 Tab by mouth every four hours as needed for Nausea (give PO if no IV route available). 10 Tab 0   • oxybutynin (DITROPAN) 5 MG Tab Take 1 Tab by mouth 3 times a day as needed (Bladder irritation). 60 Tab 0   • phenazopyridine (PYRIDIUM) 200 MG Tab Take 1 Tab by mouth 3 times a day as needed (bladder irritation). 20 Tab 0   • tamsulosin (FLOMAX) 0.4 MG capsule Take 1 Cap by mouth 1/2 hour after breakfast. 20 Cap 0   • naproxen (ALEVE) 220 MG tablet Take 220-440 mg by mouth 2 times a day as needed (pain).     • FIBER ADULT GUMMIES PO Take 2 Each by mouth every morning.     • Multiple Vitamins-Calcium (ONE-A-DAY WOMENS FORMULA PO) Take 1 Tab by mouth every evening.     • Probiotic Product (PROBIOTIC-10 PO) Take 2 Tabs by mouth every morning.       No current facility-administered medications for this visit.        Patient Active Problem List    Diagnosis Date Noted   • Status post cystoscopy with ureteral stent placement 12/14/2020   • Rectal bleeding 11/18/2020   • Generalized abdominal pain 11/18/2020   • Nephrolithiasis 10/21/2020   • Chronic bilateral low back pain without sciatica 10/21/2020   • Suspected COVID-19 virus infection 10/21/2020   • Hematuria 08/26/2020   • Irritability 08/26/2020   • Pelvic pain 08/26/2020   • Abnormal mammogram 07/03/2020   • Hand numbness 06/08/2020   • Abdominal pain 06/08/2020   • History of vertigo 06/08/2020   • GERD  (gastroesophageal reflux disease) 06/08/2020   • Hand pain 06/08/2020   • Obesity (BMI 30-39.9) 06/08/2020   • Ear congestion, bilateral 06/08/2020       Family History   Problem Relation Age of Onset   • Hypertension Mother    • Lung Disease Mother         COPD   • Heart Disease Maternal Grandmother    • Lung Disease Maternal Uncle         lung cancer       She  has a past medical history of Bleeding from the nose, Bowel habit changes, Dental disorder, Hay fever, Heart burn, Hemorrhoids, Hypertension, Indigestion, Measles, Migraine, Pain, PONV (postoperative nausea and vomiting) (07/29/2019), Urinary tract infection (1994), and Venereal disease.  She  has a past surgical history that includes lumpectomy (Left, 02/1988); gyn surgery; laparoscopy (04/1987); hysteroscopy with video operative (N/A, 7/29/2019); dilation and curettage (N/A, 7/29/2019); and pr cystoscopy,insert ureteral stent (Left, 12/11/2020).       Objective:   /83 (BP Location: Right arm, Patient Position: Sitting, BP Cuff Size: Adult) Comment: pt stated  Pulse 75 Comment: pt stated  Wt 79.4 kg (175 lb) Comment: pt stated  LMP  (LMP Unknown) Comment: postmenopausal bleeding   BMI 29.12 kg/m²     Physical Exam:  Constitutional: Alert, no distress, well-groomed.  Skin: No rashes in visible areas.  Eye: Round. Conjunctiva clear, lids normal. No icterus.   ENMT: Lips pink without lesions, good dentition, moist mucous membranes. Phonation normal.  Neck: No masses, no thyromegaly. Moves freely without pain.  Respiratory: Unlabored respiratory effort, no cough or audible wheeze  Psych: Alert and oriented x3, normal affect and mood.       Assessment and Plan:   The following treatment plan was discussed:     1. Nephrolithiasis  Chronic, worsening issue for which she was recently hospitalized and had stent placed.  She needs close urology follow-up and would like seen by a different urologist than she had previously seen outpatient.  Therefore, new  urgent referral was placed.  Pt continue continue current medications as prescribed, rest, hydrate.  Follow-up and ER precautions discussed. Patient expressed understanding and agreement with plan.  - REFERRAL TO UROLOGY    Follow-up: Return if symptoms worsen or fail to improve.

## 2020-12-16 ENCOUNTER — TELEMEDICINE (OUTPATIENT)
Dept: MEDICAL GROUP | Facility: MEDICAL CENTER | Age: 50
End: 2020-12-16
Payer: COMMERCIAL

## 2020-12-16 VITALS
BODY MASS INDEX: 29.12 KG/M2 | HEART RATE: 75 BPM | WEIGHT: 175 LBS | SYSTOLIC BLOOD PRESSURE: 138 MMHG | DIASTOLIC BLOOD PRESSURE: 83 MMHG

## 2020-12-16 DIAGNOSIS — N20.0 NEPHROLITHIASIS: ICD-10-CM

## 2020-12-16 PROCEDURE — 99213 OFFICE O/P EST LOW 20 MIN: CPT | Mod: 95,CR | Performed by: FAMILY MEDICINE

## 2020-12-16 ASSESSMENT — FIBROSIS 4 INDEX: FIB4 SCORE: 0.87

## 2020-12-17 LAB
BACTERIA SPEC ANAEROBE CULT: NORMAL
SIGNIFICANT IND 70042: NORMAL
SITE SITE: NORMAL
SOURCE SOURCE: NORMAL

## 2020-12-19 ENCOUNTER — HOSPITAL ENCOUNTER (INPATIENT)
Facility: MEDICAL CENTER | Age: 50
LOS: 1 days | DRG: 699 | End: 2020-12-20
Attending: EMERGENCY MEDICINE | Admitting: HOSPITALIST
Payer: COMMERCIAL

## 2020-12-19 ENCOUNTER — APPOINTMENT (OUTPATIENT)
Dept: RADIOLOGY | Facility: MEDICAL CENTER | Age: 50
DRG: 699 | End: 2020-12-19
Attending: EMERGENCY MEDICINE
Payer: COMMERCIAL

## 2020-12-19 DIAGNOSIS — T83.592A: ICD-10-CM

## 2020-12-19 DIAGNOSIS — N39.0 ACUTE URINARY TRACT INFECTION: ICD-10-CM

## 2020-12-19 DIAGNOSIS — K59.00 CONSTIPATION, UNSPECIFIED CONSTIPATION TYPE: ICD-10-CM

## 2020-12-19 DIAGNOSIS — N20.0 LEFT NEPHROLITHIASIS: ICD-10-CM

## 2020-12-19 DIAGNOSIS — N20.0 NEPHROLITHIASIS: ICD-10-CM

## 2020-12-19 LAB
ANION GAP SERPL CALC-SCNC: 9 MMOL/L (ref 7–16)
APPEARANCE UR: CLEAR
BASOPHILS # BLD AUTO: 0.4 % (ref 0–1.8)
BASOPHILS # BLD: 0.05 K/UL (ref 0–0.12)
BILIRUB UR QL STRIP.AUTO: NEGATIVE
BUN SERPL-MCNC: 11 MG/DL (ref 8–22)
CALCIUM SERPL-MCNC: 9.4 MG/DL (ref 8.4–10.2)
CHLORIDE SERPL-SCNC: 104 MMOL/L (ref 96–112)
CO2 SERPL-SCNC: 25 MMOL/L (ref 20–33)
COLOR UR: YELLOW
CREAT SERPL-MCNC: 0.82 MG/DL (ref 0.5–1.4)
EOSINOPHIL # BLD AUTO: 0.39 K/UL (ref 0–0.51)
EOSINOPHIL NFR BLD: 3.4 % (ref 0–6.9)
EPITHELIAL CELLS RENAL  1715R: ABNORMAL /HPF
ERYTHROCYTE [DISTWIDTH] IN BLOOD BY AUTOMATED COUNT: 44.5 FL (ref 35.9–50)
GLUCOSE SERPL-MCNC: 105 MG/DL (ref 65–99)
GLUCOSE UR STRIP.AUTO-MCNC: NEGATIVE MG/DL
HCT VFR BLD AUTO: 41.5 % (ref 37–47)
HGB BLD-MCNC: 13.7 G/DL (ref 12–16)
IMM GRANULOCYTES # BLD AUTO: 0.03 K/UL (ref 0–0.11)
IMM GRANULOCYTES NFR BLD AUTO: 0.3 % (ref 0–0.9)
KETONES UR STRIP.AUTO-MCNC: NEGATIVE MG/DL
LEUKOCYTE ESTERASE UR QL STRIP.AUTO: ABNORMAL
LYMPHOCYTES # BLD AUTO: 2.76 K/UL (ref 1–4.8)
LYMPHOCYTES NFR BLD: 24.3 % (ref 22–41)
MCH RBC QN AUTO: 29.7 PG (ref 27–33)
MCHC RBC AUTO-ENTMCNC: 33 G/DL (ref 33.6–35)
MCV RBC AUTO: 89.8 FL (ref 81.4–97.8)
MICRO URNS: ABNORMAL
MONOCYTES # BLD AUTO: 0.52 K/UL (ref 0–0.85)
MONOCYTES NFR BLD AUTO: 4.6 % (ref 0–13.4)
MUCOUS THREADS #/AREA URNS HPF: ABNORMAL /HPF
NEUTROPHILS # BLD AUTO: 7.61 K/UL (ref 2–7.15)
NEUTROPHILS NFR BLD: 67 % (ref 44–72)
NITRITE UR QL STRIP.AUTO: NEGATIVE
NRBC # BLD AUTO: 0 K/UL
NRBC BLD-RTO: 0 /100 WBC
PH UR STRIP.AUTO: 7 [PH] (ref 5–8)
PLATELET # BLD AUTO: 320 K/UL (ref 164–446)
PMV BLD AUTO: 9.5 FL (ref 9–12.9)
POTASSIUM SERPL-SCNC: 4.1 MMOL/L (ref 3.6–5.5)
PROT UR QL STRIP: NEGATIVE MG/DL
RBC # BLD AUTO: 4.62 M/UL (ref 4.2–5.4)
RBC # URNS HPF: ABNORMAL /HPF
RBC UR QL AUTO: ABNORMAL
SODIUM SERPL-SCNC: 138 MMOL/L (ref 135–145)
SP GR UR STRIP.AUTO: 1.01
WBC # BLD AUTO: 11.4 K/UL (ref 4.8–10.8)
WBC #/AREA URNS HPF: ABNORMAL /HPF

## 2020-12-19 PROCEDURE — 85610 PROTHROMBIN TIME: CPT

## 2020-12-19 PROCEDURE — 700105 HCHG RX REV CODE 258: Performed by: EMERGENCY MEDICINE

## 2020-12-19 PROCEDURE — 85025 COMPLETE CBC W/AUTO DIFF WBC: CPT

## 2020-12-19 PROCEDURE — 74176 CT ABD & PELVIS W/O CONTRAST: CPT

## 2020-12-19 PROCEDURE — 87086 URINE CULTURE/COLONY COUNT: CPT

## 2020-12-19 PROCEDURE — 81001 URINALYSIS AUTO W/SCOPE: CPT

## 2020-12-19 PROCEDURE — 80048 BASIC METABOLIC PNL TOTAL CA: CPT

## 2020-12-19 PROCEDURE — 36415 COLL VENOUS BLD VENIPUNCTURE: CPT

## 2020-12-19 PROCEDURE — 700111 HCHG RX REV CODE 636 W/ 250 OVERRIDE (IP): Performed by: EMERGENCY MEDICINE

## 2020-12-19 PROCEDURE — 96365 THER/PROPH/DIAG IV INF INIT: CPT

## 2020-12-19 PROCEDURE — A9270 NON-COVERED ITEM OR SERVICE: HCPCS | Performed by: EMERGENCY MEDICINE

## 2020-12-19 PROCEDURE — 700102 HCHG RX REV CODE 250 W/ 637 OVERRIDE(OP): Performed by: EMERGENCY MEDICINE

## 2020-12-19 PROCEDURE — 99285 EMERGENCY DEPT VISIT HI MDM: CPT

## 2020-12-19 PROCEDURE — 96375 TX/PRO/DX INJ NEW DRUG ADDON: CPT

## 2020-12-19 RX ORDER — SODIUM CHLORIDE 9 MG/ML
INJECTION, SOLUTION INTRAVENOUS CONTINUOUS
Status: DISCONTINUED | OUTPATIENT
Start: 2020-12-19 | End: 2020-12-20 | Stop reason: HOSPADM

## 2020-12-19 RX ORDER — KETOROLAC TROMETHAMINE 30 MG/ML
30 INJECTION, SOLUTION INTRAMUSCULAR; INTRAVENOUS ONCE
Status: COMPLETED | OUTPATIENT
Start: 2020-12-19 | End: 2020-12-19

## 2020-12-19 RX ORDER — ONDANSETRON 2 MG/ML
4 INJECTION INTRAMUSCULAR; INTRAVENOUS ONCE
Status: COMPLETED | OUTPATIENT
Start: 2020-12-19 | End: 2020-12-19

## 2020-12-19 RX ORDER — BISACODYL 5 MG
10 TABLET, DELAYED RELEASE (ENTERIC COATED) ORAL ONCE
Status: COMPLETED | OUTPATIENT
Start: 2020-12-19 | End: 2020-12-19

## 2020-12-19 RX ADMIN — KETOROLAC TROMETHAMINE 30 MG: 30 INJECTION, SOLUTION INTRAMUSCULAR at 22:40

## 2020-12-19 RX ADMIN — SODIUM CHLORIDE 1000 ML: 9 INJECTION, SOLUTION INTRAVENOUS at 22:39

## 2020-12-19 RX ADMIN — BISACODYL 10 MG: 5 TABLET, COATED ORAL at 23:30

## 2020-12-19 RX ADMIN — ONDANSETRON 4 MG: 2 INJECTION INTRAMUSCULAR; INTRAVENOUS at 22:40

## 2020-12-19 RX ADMIN — CEFTRIAXONE SODIUM 2 G: 2 INJECTION, POWDER, FOR SOLUTION INTRAMUSCULAR; INTRAVENOUS at 23:30

## 2020-12-19 ASSESSMENT — FIBROSIS 4 INDEX: FIB4 SCORE: 0.87

## 2020-12-20 ENCOUNTER — ANESTHESIA EVENT (OUTPATIENT)
Dept: SURGERY | Facility: MEDICAL CENTER | Age: 50
DRG: 699 | End: 2020-12-20
Payer: COMMERCIAL

## 2020-12-20 ENCOUNTER — ANESTHESIA (OUTPATIENT)
Dept: SURGERY | Facility: MEDICAL CENTER | Age: 50
DRG: 699 | End: 2020-12-20
Payer: COMMERCIAL

## 2020-12-20 ENCOUNTER — APPOINTMENT (OUTPATIENT)
Dept: RADIOLOGY | Facility: MEDICAL CENTER | Age: 50
DRG: 699 | End: 2020-12-20
Attending: UROLOGY
Payer: COMMERCIAL

## 2020-12-20 VITALS
BODY MASS INDEX: 31.37 KG/M2 | HEIGHT: 65 IN | SYSTOLIC BLOOD PRESSURE: 136 MMHG | TEMPERATURE: 99 F | DIASTOLIC BLOOD PRESSURE: 83 MMHG | HEART RATE: 78 BPM | OXYGEN SATURATION: 95 % | RESPIRATION RATE: 16 BRPM | WEIGHT: 188.27 LBS

## 2020-12-20 PROBLEM — F12.90 MARIJUANA USE: Status: ACTIVE | Noted: 2020-12-20

## 2020-12-20 PROBLEM — N39.0 COMPLICATED UTI (URINARY TRACT INFECTION): Status: ACTIVE | Noted: 2020-12-20

## 2020-12-20 LAB
COVID ORDER STATUS COVID19: NORMAL
COVID ORDER STATUS COVID19: NORMAL
INR PPP: 0.96 (ref 0.87–1.13)
LACTATE BLD-SCNC: 1 MMOL/L (ref 0.5–2)
LACTATE BLD-SCNC: 1.1 MMOL/L (ref 0.5–2)
LACTATE BLD-SCNC: 1.3 MMOL/L (ref 0.5–2)
PROTHROMBIN TIME: 12.5 SEC (ref 12–14.6)
SARS-COV+SARS-COV-2 AG RESP QL IA.RAPID: NOTDETECTED
SARS-COV-2 RNA RESP QL NAA+PROBE: NOTDETECTED
SPECIMEN SOURCE: NORMAL
SPECIMEN SOURCE: NORMAL

## 2020-12-20 PROCEDURE — 160048 HCHG OR STATISTICAL LEVEL 1-5: Performed by: UROLOGY

## 2020-12-20 PROCEDURE — 501329 HCHG SET, CYSTO IRRIG Y TUR: Performed by: UROLOGY

## 2020-12-20 PROCEDURE — 700111 HCHG RX REV CODE 636 W/ 250 OVERRIDE (IP): Performed by: ANESTHESIOLOGY

## 2020-12-20 PROCEDURE — 36415 COLL VENOUS BLD VENIPUNCTURE: CPT

## 2020-12-20 PROCEDURE — C1769 GUIDE WIRE: HCPCS | Performed by: UROLOGY

## 2020-12-20 PROCEDURE — 160028 HCHG SURGERY MINUTES - 1ST 30 MINS LEVEL 3: Performed by: UROLOGY

## 2020-12-20 PROCEDURE — 83605 ASSAY OF LACTIC ACID: CPT

## 2020-12-20 PROCEDURE — 160002 HCHG RECOVERY MINUTES (STAT): Performed by: UROLOGY

## 2020-12-20 PROCEDURE — 770006 HCHG ROOM/CARE - MED/SURG/GYN SEMI*

## 2020-12-20 PROCEDURE — 700101 HCHG RX REV CODE 250: Performed by: ANESTHESIOLOGY

## 2020-12-20 PROCEDURE — 160035 HCHG PACU - 1ST 60 MINS PHASE I: Performed by: UROLOGY

## 2020-12-20 PROCEDURE — 700105 HCHG RX REV CODE 258: Performed by: EMERGENCY MEDICINE

## 2020-12-20 PROCEDURE — U0003 INFECTIOUS AGENT DETECTION BY NUCLEIC ACID (DNA OR RNA); SEVERE ACUTE RESPIRATORY SYNDROME CORONAVIRUS 2 (SARS-COV-2) (CORONAVIRUS DISEASE [COVID-19]), AMPLIFIED PROBE TECHNIQUE, MAKING USE OF HIGH THROUGHPUT TECHNOLOGIES AS DESCRIBED BY CMS-2020-01-R: HCPCS

## 2020-12-20 PROCEDURE — C9803 HOPD COVID-19 SPEC COLLECT: HCPCS | Performed by: HOSPITALIST

## 2020-12-20 PROCEDURE — 160009 HCHG ANES TIME/MIN: Performed by: UROLOGY

## 2020-12-20 PROCEDURE — 700111 HCHG RX REV CODE 636 W/ 250 OVERRIDE (IP): Performed by: HOSPITALIST

## 2020-12-20 PROCEDURE — 87426 SARSCOV CORONAVIRUS AG IA: CPT

## 2020-12-20 PROCEDURE — 87040 BLOOD CULTURE FOR BACTERIA: CPT

## 2020-12-20 PROCEDURE — 99236 HOSP IP/OBS SAME DATE HI 85: CPT | Mod: AI | Performed by: HOSPITALIST

## 2020-12-20 PROCEDURE — A9270 NON-COVERED ITEM OR SERVICE: HCPCS | Performed by: HOSPITALIST

## 2020-12-20 PROCEDURE — 0TP98DZ REMOVAL OF INTRALUMINAL DEVICE FROM URETER, VIA NATURAL OR ARTIFICIAL OPENING ENDOSCOPIC: ICD-10-PCS | Performed by: UROLOGY

## 2020-12-20 PROCEDURE — 700102 HCHG RX REV CODE 250 W/ 637 OVERRIDE(OP): Performed by: HOSPITALIST

## 2020-12-20 RX ORDER — PROCHLORPERAZINE EDISYLATE 5 MG/ML
5-10 INJECTION INTRAMUSCULAR; INTRAVENOUS EVERY 4 HOURS PRN
Status: DISCONTINUED | OUTPATIENT
Start: 2020-12-20 | End: 2020-12-20 | Stop reason: HOSPADM

## 2020-12-20 RX ORDER — PROMETHAZINE HYDROCHLORIDE 25 MG/1
12.5-25 SUPPOSITORY RECTAL EVERY 4 HOURS PRN
Status: DISCONTINUED | OUTPATIENT
Start: 2020-12-20 | End: 2020-12-20 | Stop reason: HOSPADM

## 2020-12-20 RX ORDER — ONDANSETRON 4 MG/1
4 TABLET, ORALLY DISINTEGRATING ORAL EVERY 4 HOURS PRN
Status: DISCONTINUED | OUTPATIENT
Start: 2020-12-20 | End: 2020-12-20 | Stop reason: HOSPADM

## 2020-12-20 RX ORDER — PROMETHAZINE HYDROCHLORIDE 25 MG/1
12.5-25 TABLET ORAL EVERY 4 HOURS PRN
Status: DISCONTINUED | OUTPATIENT
Start: 2020-12-20 | End: 2020-12-20 | Stop reason: HOSPADM

## 2020-12-20 RX ORDER — HALOPERIDOL 5 MG/ML
0.5 INJECTION INTRAMUSCULAR
Status: DISCONTINUED | OUTPATIENT
Start: 2020-12-20 | End: 2020-12-20 | Stop reason: HOSPADM

## 2020-12-20 RX ORDER — AMOXICILLIN 250 MG
2 CAPSULE ORAL 2 TIMES DAILY
Status: DISCONTINUED | OUTPATIENT
Start: 2020-12-20 | End: 2020-12-20 | Stop reason: HOSPADM

## 2020-12-20 RX ORDER — OXYCODONE HYDROCHLORIDE 5 MG/1
5 TABLET ORAL EVERY 4 HOURS PRN
Qty: 30 TAB | Refills: 0 | Status: SHIPPED | OUTPATIENT
Start: 2020-12-20 | End: 2020-12-30

## 2020-12-20 RX ORDER — SODIUM CHLORIDE, SODIUM LACTATE, POTASSIUM CHLORIDE, AND CALCIUM CHLORIDE .6; .31; .03; .02 G/100ML; G/100ML; G/100ML; G/100ML
500 INJECTION, SOLUTION INTRAVENOUS
Status: DISCONTINUED | OUTPATIENT
Start: 2020-12-20 | End: 2020-12-20 | Stop reason: HOSPADM

## 2020-12-20 RX ORDER — PHENAZOPYRIDINE HYDROCHLORIDE 200 MG/1
200 TABLET, FILM COATED ORAL 3 TIMES DAILY PRN
Status: DISCONTINUED | OUTPATIENT
Start: 2020-12-20 | End: 2020-12-20 | Stop reason: HOSPADM

## 2020-12-20 RX ORDER — SODIUM CHLORIDE, SODIUM LACTATE, POTASSIUM CHLORIDE, CALCIUM CHLORIDE 600; 310; 30; 20 MG/100ML; MG/100ML; MG/100ML; MG/100ML
INJECTION, SOLUTION INTRAVENOUS CONTINUOUS
Status: DISCONTINUED | OUTPATIENT
Start: 2020-12-20 | End: 2020-12-20 | Stop reason: HOSPADM

## 2020-12-20 RX ORDER — TAMSULOSIN HYDROCHLORIDE 0.4 MG/1
0.4 CAPSULE ORAL
Status: DISCONTINUED | OUTPATIENT
Start: 2020-12-20 | End: 2020-12-20 | Stop reason: HOSPADM

## 2020-12-20 RX ORDER — MORPHINE SULFATE 4 MG/ML
2 INJECTION, SOLUTION INTRAMUSCULAR; INTRAVENOUS
Status: DISCONTINUED | OUTPATIENT
Start: 2020-12-20 | End: 2020-12-20 | Stop reason: HOSPADM

## 2020-12-20 RX ORDER — ONDANSETRON 2 MG/ML
4 INJECTION INTRAMUSCULAR; INTRAVENOUS EVERY 4 HOURS PRN
Status: DISCONTINUED | OUTPATIENT
Start: 2020-12-20 | End: 2020-12-20 | Stop reason: HOSPADM

## 2020-12-20 RX ORDER — OXYBUTYNIN CHLORIDE 5 MG/1
5 TABLET ORAL 3 TIMES DAILY PRN
Status: DISCONTINUED | OUTPATIENT
Start: 2020-12-20 | End: 2020-12-20 | Stop reason: HOSPADM

## 2020-12-20 RX ORDER — MIDAZOLAM HYDROCHLORIDE 1 MG/ML
INJECTION INTRAMUSCULAR; INTRAVENOUS PRN
Status: DISCONTINUED | OUTPATIENT
Start: 2020-12-20 | End: 2020-12-20 | Stop reason: SURG

## 2020-12-20 RX ORDER — ONDANSETRON 2 MG/ML
4 INJECTION INTRAMUSCULAR; INTRAVENOUS
Status: DISCONTINUED | OUTPATIENT
Start: 2020-12-20 | End: 2020-12-20 | Stop reason: HOSPADM

## 2020-12-20 RX ORDER — LIDOCAINE HYDROCHLORIDE 20 MG/ML
INJECTION, SOLUTION EPIDURAL; INFILTRATION; INTRACAUDAL; PERINEURAL PRN
Status: DISCONTINUED | OUTPATIENT
Start: 2020-12-20 | End: 2020-12-20 | Stop reason: SURG

## 2020-12-20 RX ORDER — BISACODYL 10 MG
10 SUPPOSITORY, RECTAL RECTAL
Status: DISCONTINUED | OUTPATIENT
Start: 2020-12-20 | End: 2020-12-20 | Stop reason: HOSPADM

## 2020-12-20 RX ORDER — POLYETHYLENE GLYCOL 3350 17 G/17G
1 POWDER, FOR SOLUTION ORAL
Status: DISCONTINUED | OUTPATIENT
Start: 2020-12-20 | End: 2020-12-20 | Stop reason: HOSPADM

## 2020-12-20 RX ADMIN — LIDOCAINE HYDROCHLORIDE 100 MG: 20 INJECTION, SOLUTION EPIDURAL; INFILTRATION; INTRACAUDAL; PERINEURAL at 11:38

## 2020-12-20 RX ADMIN — MIDAZOLAM HYDROCHLORIDE 1 MG: 1 INJECTION, SOLUTION INTRAMUSCULAR; INTRAVENOUS at 11:41

## 2020-12-20 RX ADMIN — SODIUM CHLORIDE 1000 ML: 9 INJECTION, SOLUTION INTRAVENOUS at 02:05

## 2020-12-20 RX ADMIN — MORPHINE SULFATE 2 MG: 4 INJECTION INTRAVENOUS at 13:26

## 2020-12-20 RX ADMIN — ALFENTANIL HYDROCHLORIDE 150 MCG: 500 INJECTION, SOLUTION INTRAVENOUS at 11:39

## 2020-12-20 RX ADMIN — MIDAZOLAM HYDROCHLORIDE 1 MG: 1 INJECTION, SOLUTION INTRAMUSCULAR; INTRAVENOUS at 11:33

## 2020-12-20 RX ADMIN — ALFENTANIL HYDROCHLORIDE 150 MCG: 500 INJECTION, SOLUTION INTRAVENOUS at 11:42

## 2020-12-20 RX ADMIN — PROPOFOL 100 MCG/KG/MIN: 10 INJECTION, EMULSION INTRAVENOUS at 11:39

## 2020-12-20 RX ADMIN — TAMSULOSIN HYDROCHLORIDE 0.4 MG: 0.4 CAPSULE ORAL at 13:25

## 2020-12-20 RX ADMIN — FENTANYL CITRATE 50 MCG: 50 INJECTION, SOLUTION INTRAMUSCULAR; INTRAVENOUS at 12:09

## 2020-12-20 ASSESSMENT — PAIN DESCRIPTION - PAIN TYPE
TYPE: SURGICAL PAIN
TYPE: ACUTE PAIN
TYPE: SURGICAL PAIN
TYPE: ACUTE PAIN

## 2020-12-20 ASSESSMENT — ENCOUNTER SYMPTOMS
VOMITING: 0
COUGH: 0
DEPRESSION: 0
BLURRED VISION: 0
WEAKNESS: 0
NAUSEA: 1
MYALGIAS: 0
SHORTNESS OF BREATH: 0
ABDOMINAL PAIN: 1
BRUISES/BLEEDS EASILY: 0
DOUBLE VISION: 0
DIZZINESS: 0
INSOMNIA: 0
SORE THROAT: 0
NECK PAIN: 0
HEADACHES: 0
PALPITATIONS: 0
FEVER: 0

## 2020-12-20 ASSESSMENT — PAIN SCALES - GENERAL: PAIN_LEVEL: 0

## 2020-12-20 NOTE — ED TRIAGE NOTES
Pt presents to the ED with painful urination. Pt was admitted 1 week ago for kidney stones. Urethral stent was placed. Pt still having severe pain and difficulty urinating.

## 2020-12-20 NOTE — OR NURSING
1152- To PACU from OR via bed. Respirations spontaneous and non-labored on 6L O2 via mask. Rouses spontaneously, reports same bladder discomfort as prior to surgery. No nausea reported at this time. PO fluids offered. SCDs on. LR infusing via PIV.  1207- Reports burning in bladder; 7/10. See MAR.  1222- Reports improved pain, 5/10.   1224- Meets criteria for transfer back to room. Report given to EDVIN Mcconnell. Family updated via telephone. Awaiting transport.

## 2020-12-20 NOTE — PROGRESS NOTES
Patient seen and examined, admitted earlier today.  Patient was anxious and frustrated when seen, phone calls made and patient feeling better about her situation.

## 2020-12-20 NOTE — H&P
Hospital Medicine History & Physical Note    Date of Service  12/20/2020    Primary Care Physician  Sade Kiran D.O.    Consultants  Urology: Dr. Rosenthal    Code Status  Full Code    Chief Complaint  Chief Complaint   Patient presents with   • Painful Urination   • Nephrolithiasis       History of Presenting Illness  50 y.o. female, who was admitted here from 12/11/2020 and discharged last Monday on 12/14/2020 with obstructive uropathy and 9 mm stone that required placement of double-J ureteral stent who presented to the ER on 12/19/2020 with complains of worsening dysuria.  Evaluation done in the ED showed positive UTI.  CT imaging demonstrated left double-J ureteral stent with a loop in the UPJ and stone was pushed back to the lower pole of the left kidney that is now nonobstructive.  Patient is receiving 2 g of Rocephin in the ED.  ERP contacted urology and discussed the case with Dr. Rosenthal who is requesting patient to be n.p.o. and will evaluate in the morning.    Review of Systems  Review of Systems   Constitutional: Positive for malaise/fatigue. Negative for fever.   HENT: Negative for congestion and sore throat.    Eyes: Negative for blurred vision and double vision.   Respiratory: Negative for cough and shortness of breath.    Cardiovascular: Negative for chest pain and palpitations.   Gastrointestinal: Positive for abdominal pain and nausea. Negative for vomiting.   Genitourinary: Positive for dysuria, frequency and urgency.   Musculoskeletal: Negative for myalgias and neck pain.   Skin: Negative for itching and rash.   Neurological: Negative for dizziness, weakness and headaches.   Endo/Heme/Allergies: Does not bruise/bleed easily.   Psychiatric/Behavioral: Negative for depression. The patient does not have insomnia.        Past Medical History   has a past medical history of Bleeding from the nose, Bowel habit changes, Dental disorder, Hay fever, Heart burn, Hemorrhoids, Hypertension, Indigestion,  Measles, Migraine, Pain, PONV (postoperative nausea and vomiting) (07/29/2019), Urinary tract infection (1994), and Venereal disease.    Surgical History   has a past surgical history that includes lumpectomy (Left, 02/1988); gyn surgery; laparoscopy (04/1987); hysteroscopy with video operative (N/A, 7/29/2019); dilation and curettage (N/A, 7/29/2019); and pr cystoscopy,insert ureteral stent (Left, 12/11/2020).     Family History  family history includes Heart Disease in her maternal grandmother; Hypertension in her mother; Lung Disease in her maternal uncle and mother.     Social History   reports that she quit smoking about 8 years ago. She has a 31.00 pack-year smoking history. She has never used smokeless tobacco. She reports current alcohol use. She reports current drug use. Frequency: 7.00 times per week. Drug: Marijuana.    Allergies  Allergies   Allergen Reactions   • Hydrocodone Nausea     Nightmares & Nausea     • Codeine Vomiting and Nausea     Vomiting    • Other Food Vomiting     Grape juice            • Penicillins Vomiting     Vomiting        Medications  Prior to Admission Medications   Prescriptions Last Dose Informant Patient Reported? Taking?   FIBER ADULT GUMMIES PO  Patient Yes No   Sig: Take 2 Each by mouth every morning.   Multiple Vitamins-Calcium (ONE-A-DAY WOMENS FORMULA PO)  Patient Yes No   Sig: Take 1 Tab by mouth every evening.   Probiotic Product (PROBIOTIC-10 PO)  Patient Yes No   Sig: Take 2 Tabs by mouth every morning.   famotidine (PEPCID) 20 MG Tab   No No   Sig: Take 1 Tab by mouth 2 times a day as needed (Heartburn).   naproxen (ALEVE) 220 MG tablet  Patient Yes No   Sig: Take 220-440 mg by mouth 2 times a day as needed (pain).   ondansetron (ZOFRAN ODT) 4 MG TABLET DISPERSIBLE   No No   Sig: Take 1 Tab by mouth every four hours as needed for Nausea (give PO if no IV route available).   oxybutynin (DITROPAN) 5 MG Tab   No No   Sig: Take 1 Tab by mouth 3 times a day as needed  (Bladder irritation).   phenazopyridine (PYRIDIUM) 200 MG Tab   No No   Sig: Take 1 Tab by mouth 3 times a day as needed (bladder irritation).   sulfamethoxazole-trimethoprim (BACTRIM DS) 800-160 MG tablet   No No   Sig: Take 1 Tab by mouth 2 times a day for 10 days.   tamsulosin (FLOMAX) 0.4 MG capsule   No No   Sig: Take 1 Cap by mouth 1/2 hour after breakfast.      Facility-Administered Medications: None       Physical Exam  Temp:  [37 °C (98.6 °F)] 37 °C (98.6 °F)  Pulse:  [89] 89  Resp:  [20] 20  BP: (143)/(97) 143/97  SpO2:  [95 %] 95 %    Physical Exam  Constitutional:       Appearance: Normal appearance.   HENT:      Head: Normocephalic and atraumatic.      Mouth/Throat:      Mouth: Mucous membranes are moist.      Pharynx: Oropharynx is clear.   Eyes:      Extraocular Movements: Extraocular movements intact.      Pupils: Pupils are equal, round, and reactive to light.   Neck:      Musculoskeletal: Normal range of motion and neck supple.   Cardiovascular:      Rate and Rhythm: Normal rate and regular rhythm.      Heart sounds: Normal heart sounds.   Pulmonary:      Effort: Pulmonary effort is normal.      Breath sounds: Normal breath sounds.   Abdominal:      General: Abdomen is flat. Bowel sounds are normal. There is no distension.      Palpations: Abdomen is soft.      Tenderness: There is abdominal tenderness (Pelvic pain on palpation).   Skin:     General: Skin is warm and dry.   Neurological:      General: No focal deficit present.      Mental Status: She is alert and oriented to person, place, and time.   Psychiatric:         Mood and Affect: Mood normal.         Behavior: Behavior normal.         Laboratory:  Recent Labs     12/19/20  2230   WBC 11.4*   RBC 4.62   HEMOGLOBIN 13.7   HEMATOCRIT 41.5   MCV 89.8   MCH 29.7   MCHC 33.0*   RDW 44.5   PLATELETCT 320   MPV 9.5     Recent Labs     12/19/20  2230   SODIUM 138   POTASSIUM 4.1   CHLORIDE 104   CO2 25   GLUCOSE 105*   BUN 11   CREATININE 0.82    CALCIUM 9.4     Recent Labs     12/19/20  2230   GLUCOSE 105*         No results for input(s): NTPROBNP in the last 72 hours.      No results for input(s): TROPONINT in the last 72 hours.    Imaging:  CT-RENAL COLIC EVALUATION(A/P W/O)   Final Result         1. Left double-J ureteral stent with the loop in the UPJ, more medial than expected. Previous UPJ stone was pushed back into the lower pole left kidney, now nonobstructive.      2. No hydronephrosis. No ureteral calculus seen.      3. Cholelithiasis.            Assessment/Plan:  I anticipate this patient will require at least two midnights for appropriate medical management, necessitating inpatient admission.    * Complicated UTI (urinary tract infection)  Assessment & Plan  -Inpatient status to medical floor.  -Recently hospitalized here from 12/11/2020 to 12/14/2020 for obstructive uropathy in need of placement of double-J ureteral stent.  -She has acute UTI without sepsis at this time.  Kidney stone was pushed back to the lower pole of the kidney.  -I appreciate urology consult and recommendation.  ERP discussed case with Dr. Rosenthal.  -We will keep patient n.p.o.  -Pain and nausea control as needed.  -Antibiotic therapy with Rocephin.    Status post cystoscopy with ureteral stent placement- (present on admission)  Assessment & Plan  -As above.    Nephrolithiasis- (present on admission)  Assessment & Plan  -Plan as above.    Chronic bilateral low back pain without sciatica- (present on admission)  Assessment & Plan  -She is now on IV medication for pain control that will likely also control her back pain.      DVT Ppx: SCD's

## 2020-12-20 NOTE — ANESTHESIA QCDR
2019 [facility] Clinical Data Registry (for Quality Improvement)     Postoperative nausea/vomiting risk protocol (Adult = 18 yrs and Pediatric 3-17 yrs)- (430 and 463)  General inhalation anesthetic (NOT TIVA) with PONV risk factors: No  Provision of anti-emetic therapy with at least 2 different classes of agents: N/A  Patient DID NOT receive anti-emetic therapy and reason is documented in Medical Record: N/A    Multimodal Pain Management- (477)  Non-emergent surgery AND patient age >= 18: No  Use of Multimodal Pain Management, two or more drugs and/or interventions, NOT including systemic opioids:   Exception: Documented allergy to multiple classes of analgesics:     Smoking Abstinence (404)  Patient is current smoker (cigarette, pipe, e-cig, marijuanna): Yes  Elective Surgery: No  Abstinence instructions provided prior to day of surgery:   Patient abstained from smoking on day of surgery:     Pre-Op Beta-Blocker in Isolated CABG (44)  Isolated CABG AND patient age >= 18: No  Beta-blocker admin within 24 hours of surgical incision:   Exception:of medical reason(s) for not administering beta blocker within 24 hours prior to surgical incision (e.g., not  indicated,other medical reason):     PACU assessment of acute postoperative pain prior to Anesthesia Care End- Applies to Patients Age = 18- (ABG7)  Initial PACU pain score is which of the following: < 7/10  Patient unable to report pain score: N/A    Post-anesthetic transfer of care checklist/protocol to PACU/ICU- (426 and 427)  Upon conclusion of case, patient transferred to which of the following locations: PACU/Non-ICU  Use of transfer checklist/protocol: Yes  Exclusion: Service Performed in Patient Hospital Room (and thus did not require transfer): N/A  Unplanned admission to ICU related to anesthesia service up through end of PACU care- (MD51)  Unplanned admission to ICU (not initially anticipated at anesthesia start time): No

## 2020-12-20 NOTE — OP REPORT
Preop diagnosis-significant stent pain, indwelling left ureteral stent and left nephrolithiasis  Postop diagnosis-same as preop diagnosis  Procedure performed cystoscopy and stent removal  Surgeon-Ed Rosenthal MD  Anesthesiologist Demetris Yu MD  Anesthesia type-General anesthesia  EBL 0  Complications-none  Specimen-stent    Procedure in detail:  The patient is transferred to the OR on her hospital bed.  She is transferred from that hospital bed to the OR table.  She is then given an excellent general anesthetic.  She is placed into the dorsal lithotomy position.  She is prepped and draped in usual fashion.  Timeout is performed to confirm patient identification, procedure to be performed, review allergies and preop antibiotics.  Once timeout is completed agreed upon the case started.  22 Moroccan rigid cystoscope with 30 degree angle lenses lubricated white balanced and then passed through the urethra.  Stent is immediately encountered coming from the left ureteral orifice.  It is grasped with an alligator grasper and removed.  Is removed intact without difficulty.    Prep was then cleaned off patient.  Is taken out of lithotomy position.  Is transferred back to her hospital bed in the recovery room stable condition

## 2020-12-20 NOTE — OR SURGEON
Immediate Post OP Note    PreOp Diagnosis: Left nephrolithiasis with an indwelling left double-J ureteral stent and significant stent pain    PostOp Diagnosis: Same as preop diagnosis    Procedure(s):  CYSTOSCOPY, WITH URETERAL STENT INSERTION  LITHOTRIPSY, USING LASER  URETEROSCOPY    Surgeon(s):  Ed Rosenthal M.D.    Anesthesiologist/Type of Anesthesia:  Anesthesiologist: Demetris Yu M.D./* No anesthesia type entered *    Surgical Staff:  * No surgical staff found *    Specimens removed if any:  * No specimens in log *    Estimated Blood Loss: 0    Findings: Unremarkable cystoscopy    Complications: None        12/20/2020 11:53 AM Ed Rosenthal M.D.

## 2020-12-20 NOTE — CONSULTS
UROLOGY Consult Note:    Ed Rosenthal M.D.  Date & Time note created:    12/20/2020   5:35 AM     Referring MD:  Dr. Thomas    Patient ID:   Name:             Michael Larsen   YOB: 1970  Age:                 50 y.o.  female   MRN:               [id number]                                                             Reason for Consult:      Pain from and indwelling left ureteral stent and kidney stone left side.    History of Present Illness:    This patient was treated for an acute urinary tract infection and an obstructing left UPJ stone 12/11/2020.  She was discharged home.  She came back to the [facility] yesterday because she is experiencing lower abdominal pain and left flank pain.    Review of Systems:      Constitutional: Denies fevers, Denies weight changes  Eyes: Denies changes in vision, no eye pain  Ears/Nose/Throat/Mouth: Denies nasal congestion or sore throat   Cardiovascular: no chest pain, no palpitations   Respiratory: no shortness of breath , Denies cough  Gastrointestinal/Hepatic: Denies abdominal pain, nausea, vomiting, diarrhea, constipation or GI bleeding   Genitourinary: Denies hematuria, dysuria or frequency  Musculoskeletal/Rheum: Denies  joint pain and swelling, no edema  Skin: Denies rash  Neurological: Denies headache, confusion, memory loss or focal weakness/parasthesias  Psychiatric: denies mood disorder   Endocrine: Roxanna thyroid problems  Heme/Oncology/Lymph Nodes: Denies enlarged lymph nodes, denies brusing or known bleeding disorder  All other systems were reviewed and are negative (AMA/CMS criteria)                Past Medical History:   Past Medical History:   Diagnosis Date   • Bleeding from the nose     hx of last one 10 years ago    • Bowel habit changes     constipation    • Dental disorder     dentures    • Hay fever    • Heart burn    • Hemorrhoids    • Hypertension     borderline, never been on medication    • Indigestion    • Measles      as a child    • Migraine    • Pain     lower back pain   • PONV (postoperative nausea and vomiting) 07/29/2019   • Urinary tract infection 1994   • Venereal disease      Active Hospital Problems    Diagnosis   • Chronic bilateral low back pain without sciatica [M54.5, G89.29]     Priority: Low   • Complicated UTI (urinary tract infection) [N39.0]   • Status post cystoscopy with ureteral stent placement [Z96.0]   • Nephrolithiasis [N20.0]       Past Surgical History:  Past Surgical History:   Procedure Laterality Date   • PB CYSTOSCOPY,INSERT URETERAL STENT Left 12/11/2020    Procedure: CYSTOSCOPY, WITH URETERAL STENT INSERTION;  Surgeon: Daniel Paulson M.D.;  Location: SURGERY [facility];  Service: Urology   • HYSTEROSCOPY WITH VIDEO OPERATIVE N/A 7/29/2019    Procedure: HYSTEROSCOPY, WITH VIDEO IMAGING -WITH MYOSURE;  Surgeon: Prieto Santa M.D.;  Location: SURGERY SAME DAY [facility]S;  Service: Obstetrics   • DILATION AND CURETTAGE N/A 7/29/2019    Procedure: DILATION AND CURETTAGE;  Surgeon: Prieto Santa M.D.;  Location: SURGERY SAME DAY [facility];  Service: Obstetrics   • LUMPECTOMY Left 02/1988   • LAPAROSCOPY  04/1987   • GYN SURGERY         Hospital Medications:    Current Facility-Administered Medications:   •  oxybutynin (DITROPAN) tablet 5 mg, 5 mg, Oral, TID PRN, Tiny Christopher M.D.  •  phenazopyridine (PYRIDIUM) tablet 200 mg, 200 mg, Oral, TID PRN, Tiny Christopher M.D.  •  tamsulosin (FLOMAX) capsule 0.4 mg, 0.4 mg, Oral, AFTER BREAKFAST, Tiny Christopher M.D.  •  senna-docusate (PERICOLACE or SENOKOT S) 8.6-50 MG per tablet 2 Tab, 2 Tab, Oral, BID, Stopped at 12/20/20 0600 **AND** polyethylene glycol/lytes (MIRALAX) PACKET 1 Packet, 1 Packet, Oral, QDAY PRN **AND** magnesium hydroxide (MILK OF MAGNESIA) suspension 30 mL, 30 mL, Oral, QDAY PRN **AND** bisacodyl (DULCOLAX) suppository 10 mg, 10 mg, Rectal, QDAY PRN, Tiny Christopher M.D.  •  lactated  ringers infusion (BOLUS), 500 mL, Intravenous, Once PRN, Tiny Christopher M.D.  •  ondansetron (ZOFRAN) syringe/vial injection 4 mg, 4 mg, Intravenous, Q4HRS PRN, Tiny Christopher M.D.  •  ondansetron (ZOFRAN ODT) dispertab 4 mg, 4 mg, Oral, Q4HRS PRN, Tiny Christopher M.D.  •  promethazine (PHENERGAN) tablet 12.5-25 mg, 12.5-25 mg, Oral, Q4HRS PRN, Tiny Christopher M.D.  •  promethazine (PHENERGAN) suppository 12.5-25 mg, 12.5-25 mg, Rectal, Q4HRS PRN, Tiny Christopher M.D.  •  prochlorperazine (COMPAZINE) injection 5-10 mg, 5-10 mg, Intravenous, Q4HRS PRN, Tiny Christopher M.D.  •  cefTRIAXone (ROCEPHIN) 2 g in  mL IVPB, 2 g, Intravenous, Q24HRS, Tiny Christopher M.D.  •  morphine (pf) 4 mg/mL injection 2 mg, 2 mg, Intravenous, Q3HRS PRN, Tiny Christopher M.D.  •  NS infusion, , Intravenous, Continuous, Tiny Christopher M.D., Last Rate: 100 mL/hr at 12/20/20 0212, Rate Change at 12/20/20 0212    Current Outpatient Medications:  Medications Prior to Admission   Medication Sig Dispense Refill Last Dose   • sulfamethoxazole-trimethoprim (BACTRIM DS) 800-160 MG tablet Take 1 Tab by mouth 2 times a day for 10 days. 20 Tab 0 12/19/2020 at 1700   • famotidine (PEPCID) 20 MG Tab Take 1 Tab by mouth 2 times a day as needed (Heartburn). 60 Tab 0 12/14/2020 at 0600   • ondansetron (ZOFRAN ODT) 4 MG TABLET DISPERSIBLE Take 1 Tab by mouth every four hours as needed for Nausea (give PO if no IV route available). 10 Tab 0    • oxybutynin (DITROPAN) 5 MG Tab Take 1 Tab by mouth 3 times a day as needed (Bladder irritation). 60 Tab 0 12/19/2020 at 1700   • phenazopyridine (PYRIDIUM) 200 MG Tab Take 1 Tab by mouth 3 times a day as needed (bladder irritation). 20 Tab 0 12/18/2020 at 1900   • tamsulosin (FLOMAX) 0.4 MG capsule Take 1 Cap by mouth 1/2 hour after breakfast. 20 Cap 0 12/19/2020 at 0700   • naproxen (ALEVE) 220 MG tablet Take 220-440 mg by mouth 2 times  a day as needed (pain).   2020 at 0900   • FIBER ADULT GUMMIES PO Take 2 Each by mouth every morning.   2020 at 0600   • Multiple Vitamins-Calcium (ONE-A-DAY WOMENS FORMULA PO) Take 1 Tab by mouth every evening.   2020 at 0600   • Probiotic Product (PROBIOTIC-10 PO) Take 2 Tabs by mouth every morning.   2020 at 0600       Medication Allergy:  Allergies   Allergen Reactions   • Hydrocodone Nausea     Nightmares & Nausea     • Codeine Vomiting and Nausea     Vomiting    • Other Food Vomiting     Grape juice            • Penicillins Vomiting     Vomiting        Family History:  Family History   Problem Relation Age of Onset   • Hypertension Mother    • Lung Disease Mother         COPD   • Heart Disease Maternal Grandmother    • Lung Disease Maternal Uncle         lung cancer       Social History:  Social History     Socioeconomic History   • Marital status:      Spouse name: Not on file   • Number of children: Not on file   • Years of education: Not on file   • Highest education level: Some college, no degree   Occupational History   • Not on file   Social Needs   • Financial resource strain: Not very hard   • Food insecurity     Worry: Never true     Inability: Never true   • Transportation needs     Medical: No     Non-medical: No   Tobacco Use   • Smoking status: Former Smoker     Packs/day: 1.00     Years: 31.00     Pack years: 31.00     Quit date: 2012     Years since quittin.6   • Smokeless tobacco: Never Used   Substance and Sexual Activity   • Alcohol use: Yes     Frequency: Monthly or less     Drinks per session: 1 or 2     Binge frequency: Never     Comment: rarely   • Drug use: Yes     Frequency: 7.0 times per week     Types: Marijuana     Comment: occasionally; denies h/o illicitis   • Sexual activity: Yes   Lifestyle   • Physical activity     Days per week: 3 days     Minutes per session: 30 min   • Stress: Not at all   Relationships   • Social connections     Talks  "on phone: Three times a week     Gets together: Twice a week     Attends Christian service: 1 to 4 times per year     Active member of club or organization: Yes     Attends meetings of clubs or organizations: 1 to 4 times per year     Relationship status:    • Intimate partner violence     Fear of current or ex partner: Not on file     Emotionally abused: Not on file     Physically abused: Not on file     Forced sexual activity: Not on file   Other Topics Concern   • Not on file   Social History Narrative   • Not on file         Physical Exam:  Vitals/ General Appearance:   Weight/BMI: Body mass index is 31.33 kg/m².  /84   Pulse 60   Temp 36.6 °C (97.9 °F) (Oral)   Resp 18   Ht 1.651 m (5' 5\")   Wt 85.4 kg (188 lb 4.4 oz)   SpO2 94%   Vitals:    12/19/20 2000 12/20/20 0100 12/20/20 0145   BP: 143/97 125/84 125/84   Pulse: 89 60 60   Resp: 20 18 18   Temp: 37 °C (98.6 °F) 36.6 °C (97.9 °F) 36.6 °C (97.9 °F)   TempSrc: Temporal Oral Oral   SpO2: 95% 94% 94%   Weight: 85.4 kg (188 lb 4.4 oz)     Height: 1.651 m (5' 5\")       Oxygen Therapy:  Pulse Oximetry: 94 %, O2 (LPM): 0, O2 Delivery Device: None - Room Air    Constitutional:   Well developed, Well nourished, No acute distress  HENMT:  Normocephalic, Atraumatic, Oropharynx moist mucous membranes, No oral exudates, Nose normal.  No thyromegaly.  Eyes:  EOMI, Conjunctiva normal, No discharge.  Neck:  Normal range of motion, No cervical tenderness,  no JVD.  Cardiovascular:  Normal heart rate, Normal rhythm, No murmurs, No rubs, No gallops.   Extremitites with intact distal pulses, no cyanosis, or edema.  Lungs:  Normal breath sounds, breath sounds clear to auscultation bilaterally,  no crackles, no wheezing.   Abdomen: Bowel sounds normal, Soft, No tenderness, No guarding, No rebound, No masses, No hepatosplenomegaly.  : Deferred  Skin: Warm, Dry, No erythema, No rash, no induration.  Neurologic: Alert & oriented x 3, No focal deficits noted, " cranial nerves II through X are grossly intact.  Psychiatric: Affect normal, Judgment normal, Mood normal.      MDM (Data Review):     Records reviewed and summarized in current documentation    Lab Data Review:  Recent Results (from the past 24 hour(s))   URINALYSIS CULTURE, IF INDICATED    Collection Time: 12/19/20  8:53 PM    Specimen: Urine, Clean Catch   Result Value Ref Range    Color Yellow     Character Clear     Specific Gravity 1.010 <1.035    Ph 7.0 5.0 - 8.0    Glucose Negative Negative mg/dL    Ketones Negative Negative mg/dL    Protein Negative Negative mg/dL    Bilirubin Negative Negative    Nitrite Negative Negative    Leukocyte Esterase Moderate (A) Negative    Occult Blood Large (A) Negative    Micro Urine Req Microscopic    URINE MICROSCOPIC (W/UA)    Collection Time: 12/19/20  8:53 PM   Result Value Ref Range    WBC 2-5 /hpf    RBC 10-20 (A) /hpf    Epithelial Cells Renal Rare /hpf    Mucous Threads Few /hpf   CBC WITH DIFFERENTIAL    Collection Time: 12/19/20 10:30 PM   Result Value Ref Range    WBC 11.4 (H) 4.8 - 10.8 K/uL    RBC 4.62 4.20 - 5.40 M/uL    Hemoglobin 13.7 12.0 - 16.0 g/dL    Hematocrit 41.5 37.0 - 47.0 %    MCV 89.8 81.4 - 97.8 fL    MCH 29.7 27.0 - 33.0 pg    MCHC 33.0 (L) 33.6 - 35.0 g/dL    RDW 44.5 35.9 - 50.0 fL    Platelet Count 320 164 - 446 K/uL    MPV 9.5 9.0 - 12.9 fL    Neutrophils-Polys 67.00 44.00 - 72.00 %    Lymphocytes 24.30 22.00 - 41.00 %    Monocytes 4.60 0.00 - 13.40 %    Eosinophils 3.40 0.00 - 6.90 %    Basophils 0.40 0.00 - 1.80 %    Immature Granulocytes 0.30 0.00 - 0.90 %    Nucleated RBC 0.00 /100 WBC    Neutrophils (Absolute) 7.61 (H) 2.00 - 7.15 K/uL    Lymphs (Absolute) 2.76 1.00 - 4.80 K/uL    Monos (Absolute) 0.52 0.00 - 0.85 K/uL    Eos (Absolute) 0.39 0.00 - 0.51 K/uL    Baso (Absolute) 0.05 0.00 - 0.12 K/uL    Immature Granulocytes (abs) 0.03 0.00 - 0.11 K/uL    NRBC (Absolute) 0.00 K/uL   BASIC METABOLIC PANEL    Collection Time: 12/19/20 10:30  PM   Result Value Ref Range    Sodium 138 135 - 145 mmol/L    Potassium 4.1 3.6 - 5.5 mmol/L    Chloride 104 96 - 112 mmol/L    Co2 25 20 - 33 mmol/L    Glucose 105 (H) 65 - 99 mg/dL    Bun 11 8 - 22 mg/dL    Creatinine 0.82 0.50 - 1.40 mg/dL    Calcium 9.4 8.4 - 10.2 mg/dL    Anion Gap 9.0 7.0 - 16.0   ESTIMATED GFR    Collection Time: 12/19/20 10:30 PM   Result Value Ref Range    GFR If African American >60 >60 mL/min/1.73 m 2    GFR If Non African American >60 >60 mL/min/1.73 m 2   Prothrombin time (INR)    Collection Time: 12/19/20 10:30 PM   Result Value Ref Range    PT 12.5 12.0 - 14.6 sec    INR 0.96 0.87 - 1.13       Imaging/Procedures Review:    Reviewed    MDM (Assessment and Plan):     Active Hospital Problems    Diagnosis   • Chronic bilateral low back pain without sciatica [M54.5, G89.29]     Priority: Low   • Complicated UTI (urinary tract infection) [N39.0]   • Status post cystoscopy with ureteral stent placement [Z96.0]   • Nephrolithiasis [N20.0]     Her cultures done on 12-11-20 are all negative.She still has an elevated WBC and the urine has leukocytes, which may simply be stent related.  Plan  DC STENT TODAY. RECHECK URINE AND IF INFECTION CLEARED TREAT HER STONE THIS WEEK.

## 2020-12-20 NOTE — ANESTHESIA PREPROCEDURE EVALUATION
Relevant Problems   NEURO   (+) History of vertigo      GI   (+) GERD (gastroesophageal reflux disease)         (+) Nephrolithiasis      Other   (+) Chronic bilateral low back pain without sciatica   (+) Complicated UTI (urinary tract infection)   (+) Hematuria   (+) Marijuana use   (+) Obesity (BMI 30-39.9)   (+) Status post cystoscopy with ureteral stent placement       Physical Exam    Airway   Mallampati: II  TM distance: >3 FB  Neck ROM: full       Cardiovascular - normal exam  Rhythm: regular  Rate: normal  (-) murmur  Comments: By palpation of radial artery   Dental - normal exam           Pulmonary   Comments: No concerns     Abdominal   (+) obese     Neurological     Comments: A&Ox4, grossly intact             Anesthesia Plan    ASA 2 (obstructive nephrolithiasis)- EMERGENT   ASA physical status emergent criteria: other (comment)    Plan - general and MAC       Airway plan will be natural airway        Induction: intravenous    Postoperative Plan: Postoperative administration of opioids is intended.    Pertinent diagnostic labs and testing reviewed    Informed Consent:    Anesthetic plan and risks discussed with patient.    Use of blood products discussed with: patient whom consented to blood products.

## 2020-12-20 NOTE — DISCHARGE INSTRUCTIONS
Discharge Instructions    Discharged to home by car with relative. Discharged via wheelchair, hospital escort: Yes.  Special equipment needed: Not Applicable    Be sure to schedule a follow-up appointment with your primary care doctor or any specialists as instructed.     Discharge Plan:   Diet Plan: Discussed  Activity Level: Discussed  Confirmed Follow up Appointment: Patient to Call and Schedule Appointment  Confirmed Symptoms Management: Discussed  Medication Reconciliation Updated: Yes    I understand that a diet low in cholesterol, fat, and sodium is recommended for good health. Unless I have been given specific instructions below for another diet, I accept this instruction as my diet prescription.   Other diet: Regular    Special Instructions: None    Is patient discharged on Warfarin / Coumadin?   No     Depression / Suicide Risk    As you are discharged from this [facility] facility, it is important to learn how to keep safe from harming yourself.    Recognize the warning signs:  Abrupt changes in personality, positive or negative- including increase in energy   Giving away possessions  Change in eating patterns- significant weight changes-  positive or negative  Change in sleeping patterns- unable to sleep or sleeping all the time   Unwillingness or inability to communicate  Depression  Unusual sadness, discouragement and loneliness  Talk of wanting to die  Neglect of personal appearance   Rebelliousness- reckless behavior  Withdrawal from people/activities they love  Confusion- inability to concentrate     If you or a loved one observes any of these behaviors or has concerns about self-harm, here's what you can do:  Talk about it- your feelings and reasons for harming yourself  Remove any means that you might use to hurt yourself (examples: pills, rope, extension cords, firearm)  Get professional help from the community (Mental Health, Substance Abuse, psychological counseling)  Do not be alone:Call  your Safe Contact- someone whom you trust who will be there for you.  Call your local CRISIS HOTLINE 595-6352 or 861-915-7917  Call your local Children's Mobile Crisis Response Team [place] (577) 557-4420 or www.[employer]tyShare  Call the toll free National Suicide Prevention Hotlines   National Suicide Prevention Lifeline 706-447-EUNM (2953)  [facility] 800-SUICIDE (072-5653)    Percutaneous Nephrolithotomy, Care After  This sheet gives you information about how to care for yourself after your procedure. Your health care provider may also give you more specific instructions. If you have problems or questions, contact your health care provider.  What can I expect after the procedure?  After the procedure, it is common to have:  · Soreness or pain.  · A small amount of blood or clear fluid coming from your incision for a few days.  · Fatigue.  · Some blood in your urine. This will last for a few days.  Follow these instructions at home:  Medicines  · Take over-the-counter and prescription medicines only as told by your health care provider.  · If you were prescribed an antibiotic medicine, take it as told by your health care provider. Do not stop using the antibiotic even if you start to feel better.  · Ask your health care provider if the medicine prescribed to you:  ? Requires you to avoid driving or using heavy machinery.  ? Can cause constipation. You may need to take these actions to prevent or treat constipation:  § Drink enough fluid to keep your urine pale yellow.  § Take over-the-counter or prescription medicines.  § Eat foods that are high in fiber, such as beans, whole grains, and fresh fruits and vegetables.  § Limit foods that are high in fat and processed sugars, such as fried or sweet foods.  Incision care    · Follow instructions from your health care provider about how to take care of your incision. Make sure you:  ? Wash your hands with soap and water before and after you  change your bandage (dressing). If soap and water are not available, use hand .  ? Change your dressing as told by your health care provider.  ? Leave stitches (sutures), skin glue, or adhesive strips in place. These skin closures may need to stay in place for 2 weeks or longer. If adhesive strip edges start to loosen and curl up, you may trim the loose edges. Do not remove adhesive strips completely unless your health care provider tells you to do that.  · Check your incision area every day for signs of infection. Check for:  ? Redness, swelling, or pain.  ? More fluid or blood.  ? Warmth.  ? Pus or a bad smell.  · Do not take baths, swim, or use a hot tub until your health care provider approves. Ask your health care provider if you may take showers. You may only be allowed to take sponge baths.  Activity  · Avoid strenuous activities for as long as told by your health care provider.  · Return to your normal activities as told by your health care provider. Ask your health care provider what activities are safe for you.  General instructions  · If you were sent home with a catheter or surgical drain (nephrostomy tube), follow your health care provider's instructions on how to take care of it.  · Wear compression stockings as told by your health care provider. These stockings help to prevent blood clots and reduce swelling in your legs.  · Do not use any products that contain nicotine or tobacco, such as cigarettes, e-cigarettes, and chewing tobacco. These can delay incision healing after surgery. If you need help quitting, ask your health care provider.  · Keep all follow-up visits as told by your health care provider. This is important.  ? If you still have a stent, your health care provider will need to remove it after 1-2 weeks.  Contact a health care provider if:  · You have a fever.  · You have redness, swelling, or pain around your incision.  · You have more fluid or blood coming from your  incision.  · Your incision feels warm to the touch.  · You have pus or a bad smell coming from your incision.  · You lose your appetite.  · You feel nauseous or you vomit.  · You have been sent home with a urinary catheter or a surgical drain and urine flow suddenly stops, followed by kidney pain.  Get help right away if:  · You notice a large amount of blood in your urine.  · You have blood clots in your urine.  · You cannot urinate.  · You have chest pain or trouble breathing.  Summary  · After the procedure, it is common to feel soreness and discomfort. You may also see blood in your urine.  · You will be told how to care for yourself after the procedure. Follow instructions on how to care for your incision and how to recognize signs of infection.  · Avoid activities that require great effort. Return to activities as told by your health care provider.  · Get help right away if you have blood clots in your urine, you cannot urinate, or you have trouble breathing.  This information is not intended to replace advice given to you by your health care provider. Make sure you discuss any questions you have with your health care provider.  Document Released: 01/13/2017 Document Revised: 07/10/2019 Document Reviewed: 07/10/2019  [employer] Patient Education © 2020 [employer]  Kidney Stones    Kidney stones (urolithiasis) are rock-like masses that form inside of the kidneys. Kidneys are organs that make pee (urine). A kidney stone can cause very bad pain and can block the flow of pee. The stone usually leaves your body (passes) through your pee. You may need to have a doctor take out the stone.  Follow these instructions at home:  Eating and drinking  · Drink enough fluid to keep your pee clear or pale yellow. This will help you pass the stone.  · If told by your doctor, change the foods you eat (your diet). This may include:  ? Limiting how much salt (sodium) you eat.  ? Eating more fruits and vegetables.  ? Limiting how  much meat, poultry, fish, and eggs you eat.  · Follow instructions from your doctor about eating or drinking restrictions.  General instructions  · Collect pee samples as told by your doctor. You may need to collect a pee sample:  ? 24 hours after a stone comes out.  ? 8-12 weeks after a stone comes out, and every 6-12 months after that.  · Strain your pee every time you pee (urinate), for as long as told. Use the strainer that your doctor recommends.  · Do not throw out the stone. Keep it so that it can be tested by your doctor.  · Take over-the-counter and prescription medicines only as told by your doctor.  · Keep all follow-up visits as told by your doctor. This is important. You may need follow-up tests.  Preventing kidney stones  To prevent another kidney stone:  · Drink enough fluid to keep your pee clear or pale yellow. This is the best way to prevent kidney stones.  · Eat healthy foods.  · Avoid certain foods as told by your doctor. You may be told to eat less protein.  · Stay at a healthy weight.  Contact a doctor if:  · You have pain that gets worse or does not get better with medicine.  Get help right away if:  · You have a fever or chills.  · You get very bad pain.  · You get new pain in your belly (abdomen).  · You pass out (faint).  · You cannot pee.  This information is not intended to replace advice given to you by your health care provider. Make sure you discuss any questions you have with your health care provider.  Document Released: 06/05/2009 Document Revised: 07/30/2019 Document Reviewed: 09/05/2017  [employer] Patient Education © 2020 [employer]

## 2020-12-20 NOTE — ANESTHESIA TIME REPORT
Anesthesia Start and Stop Event Times     Date Time Event    12/20/2020 1129 Ready for Procedure     1132 Anesthesia Start     1158 Anesthesia Stop        Responsible Staff  12/20/20    Name Role Begin End    Demetris Yu M.D. Anesth 1132 1158        Preop Diagnosis (Free Text):  Pre-op Diagnosis     Nephrolithiasis        Preop Diagnosis (Codes):    Post op Diagnosis  Nephrolithiasis      Premium Reason  E. Weekend    Comments: Premium for Gigi, 6th call

## 2020-12-20 NOTE — ED PROVIDER NOTES
CHIEF COMPLAINT  Chief Complaint   Patient presents with   • Painful Urination   • Nephrolithiasis       HPI  Michael Larsen is a 50 y.o. female who presents tonight with her  with a chief complaint of difficulty urinating with a lot of pain in her lower abdominal region and left flank.  Patient was seen here a week ago and admitted into the hospital for an obstructive uropathy with infection.  The ureteral stent was placed on the left and she was due to have that taken out within the next 2 weeks.  In the interim she now has increased pain in the left lower quadrant.  She denies any fever or chills but she has noticed some blood in her urine.  She tried calling the urology office with no success or response and decided to come here for further treatment.  She has had no nausea, vomiting.  She states her bowel movements have been less than normal but she has been on narcotics secondary to the persistent pain.    REVIEW OF SYSTEMS  See HPI for further details. All other system reviews are negative.    PAST MEDICAL HISTORY  Past Medical History:   Diagnosis Date   • Bleeding from the nose     hx of last one 10 years ago    • Bowel habit changes     constipation    • Dental disorder     dentures    • Hay fever    • Heart burn    • Hemorrhoids    • Hypertension     borderline, never been on medication    • Indigestion    • Measles     as a child    • Migraine    • Pain     lower back pain   • PONV (postoperative nausea and vomiting) 07/29/2019   • Urinary tract infection 1994   • Venereal disease        FAMILY HISTORY  Family History   Problem Relation Age of Onset   • Hypertension Mother    • Lung Disease Mother         COPD   • Heart Disease Maternal Grandmother    • Lung Disease Maternal Uncle         lung cancer       SOCIAL HISTORY  Social History     Socioeconomic History   • Marital status:      Spouse name: Not on file   • Number of children: Not on file   • Years of education: Not on file    • Highest education level: Some college, no degree   Occupational History   • Not on file   Social Needs   • Financial resource strain: Not very hard   • Food insecurity     Worry: Never true     Inability: Never true   • Transportation needs     Medical: No     Non-medical: No   Tobacco Use   • Smoking status: Former Smoker     Packs/day: 1.00     Years: 31.00     Pack years: 31.00     Quit date: 2012     Years since quittin.6   • Smokeless tobacco: Never Used   Substance and Sexual Activity   • Alcohol use: Yes     Frequency: Monthly or less     Drinks per session: 1 or 2     Binge frequency: Never     Comment: rarely   • Drug use: Yes     Frequency: 7.0 times per week     Types: Marijuana     Comment: occasionally; denies h/o illicitis   • Sexual activity: Yes   Lifestyle   • Physical activity     Days per week: 3 days     Minutes per session: 30 min   • Stress: Not at all   Relationships   • Social connections     Talks on phone: Three times a week     Gets together: Twice a week     Attends Jainism service: 1 to 4 times per year     Active member of club or organization: Yes     Attends meetings of clubs or organizations: 1 to 4 times per year     Relationship status:    • Intimate partner violence     Fear of current or ex partner: Not on file     Emotionally abused: Not on file     Physically abused: Not on file     Forced sexual activity: Not on file   Other Topics Concern   • Not on file   Social History Narrative   • Not on file       SURGICAL HISTORY  Past Surgical History:   Procedure Laterality Date   • PB CYSTOSCOPY,INSERT URETERAL STENT Left 2020    Procedure: CYSTOSCOPY, WITH URETERAL STENT INSERTION;  Surgeon: Daniel Paulson M.D.;  Location: SURGERY [facility];  Service: Urology   • HYSTEROSCOPY WITH VIDEO OPERATIVE N/A 2019    Procedure: HYSTEROSCOPY, WITH VIDEO IMAGING -WITH MYOSURE;  Surgeon: Prieto Santa M.D.;  Location: SURGERY SAME DAY [facility];   "Service: Obstetrics   • DILATION AND CURETTAGE N/A 7/29/2019    Procedure: DILATION AND CURETTAGE;  Surgeon: Prieto Santa M.D.;  Location: SURGERY [facility] DAY [facility];  Service: Obstetrics   • LUMPECTOMY Left 02/1988   • LAPAROSCOPY  04/1987   • GYN SURGERY         CURRENT MEDICATIONS  See nurses note    ALLERGIES  Allergies   Allergen Reactions   • Hydrocodone Nausea     Nightmares & Nausea     • Codeine Vomiting and Nausea     Vomiting    • Other Food Vomiting     Grape juice            • Penicillins Vomiting     Vomiting        PHYSICAL EXAM  VITAL SIGNS: /84   Pulse 60   Temp 36.6 °C (97.9 °F) (Oral)   Resp 18   Ht 1.651 m (5' 5\")   Wt 85.4 kg (188 lb 4.4 oz)   LMP  (LMP Unknown) Comment: postmenopausal bleeding   SpO2 94%   BMI 31.33 kg/m²      Constitutional: Patient is well developed, well nourished in moderate distress from her abdominal pain.  HENT: Normocephalic, Oropharynx moist.   Eyes: PERRL, EOMI   Neck: Supple with  Normal range of motion in flexion, extension and lateral rotation. No tenderness.  Cardiovascular: Normal heart rate and rhythm. No murmur  Thorax & Lungs: Clear and equal breath sounds with good excursion. No respiratory distress, no rhonchi, wheezing or rales.  Abdomen: Bowel sounds normal in all four quadrants. Soft with left flank tenderness and left lower quadrant tenderness upon palpation.  No rebound or guarding.  Skin: Warm, Dry, No rashes.   Back: No cervical, thoracic, or lumbosacral tenderness.   Extremities: Peripheral pulses 4/4 No edema, No tenderness   Musculoskeletal: Normal range of motion in all major joints.  Neurologic: Alert & oriented x 3, Normal motor function, Normal sensory function, DTR's 4/4 bilaterally.  Psychiatric: Affect normal, Judgment normal, Mood normal.     Results for orders placed or performed during the hospital encounter of 12/19/20   CBC WITH DIFFERENTIAL   Result Value Ref Range    WBC 11.4 (H) 4.8 - 10.8 K/uL    RBC 4.62 " 4.20 - 5.40 M/uL    Hemoglobin 13.7 12.0 - 16.0 g/dL    Hematocrit 41.5 37.0 - 47.0 %    MCV 89.8 81.4 - 97.8 fL    MCH 29.7 27.0 - 33.0 pg    MCHC 33.0 (L) 33.6 - 35.0 g/dL    RDW 44.5 35.9 - 50.0 fL    Platelet Count 320 164 - 446 K/uL    MPV 9.5 9.0 - 12.9 fL    Neutrophils-Polys 67.00 44.00 - 72.00 %    Lymphocytes 24.30 22.00 - 41.00 %    Monocytes 4.60 0.00 - 13.40 %    Eosinophils 3.40 0.00 - 6.90 %    Basophils 0.40 0.00 - 1.80 %    Immature Granulocytes 0.30 0.00 - 0.90 %    Nucleated RBC 0.00 /100 WBC    Neutrophils (Absolute) 7.61 (H) 2.00 - 7.15 K/uL    Lymphs (Absolute) 2.76 1.00 - 4.80 K/uL    Monos (Absolute) 0.52 0.00 - 0.85 K/uL    Eos (Absolute) 0.39 0.00 - 0.51 K/uL    Baso (Absolute) 0.05 0.00 - 0.12 K/uL    Immature Granulocytes (abs) 0.03 0.00 - 0.11 K/uL    NRBC (Absolute) 0.00 K/uL   BASIC METABOLIC PANEL   Result Value Ref Range    Sodium 138 135 - 145 mmol/L    Potassium 4.1 3.6 - 5.5 mmol/L    Chloride 104 96 - 112 mmol/L    Co2 25 20 - 33 mmol/L    Glucose 105 (H) 65 - 99 mg/dL    Bun 11 8 - 22 mg/dL    Creatinine 0.82 0.50 - 1.40 mg/dL    Calcium 9.4 8.4 - 10.2 mg/dL    Anion Gap 9.0 7.0 - 16.0   URINALYSIS CULTURE, IF INDICATED    Specimen: Urine, Clean Catch   Result Value Ref Range    Color Yellow     Character Clear     Specific Gravity 1.010 <1.035    Ph 7.0 5.0 - 8.0    Glucose Negative Negative mg/dL    Ketones Negative Negative mg/dL    Protein Negative Negative mg/dL    Bilirubin Negative Negative    Nitrite Negative Negative    Leukocyte Esterase Moderate (A) Negative    Occult Blood Large (A) Negative    Micro Urine Req Microscopic    URINE MICROSCOPIC (W/UA)   Result Value Ref Range    WBC 2-5 /hpf    RBC 10-20 (A) /hpf    Epithelial Cells Renal Rare /hpf    Mucous Threads Few /hpf   ESTIMATED GFR   Result Value Ref Range    GFR If African American >60 >60 mL/min/1.73 m 2    GFR If Non African American >60 >60 mL/min/1.73 m 2   Prothrombin time (INR)   Result Value Ref Range     PT 12.5 12.0 - 14.6 sec    INR 0.96 0.87 - 1.13         RADIOLOGY/PROCEDURES  CT-RENAL COLIC EVALUATION(A/P W/O)   Final Result         1. Left double-J ureteral stent with the loop in the UPJ, more medial than expected. Previous UPJ stone was pushed back into the lower pole left kidney, now nonobstructive.      2. No hydronephrosis. No ureteral calculus seen.      3. Cholelithiasis.            COURSE & MEDICAL DECISION MAKING  Pertinent Labs & Imaging studies reviewed. (See chart for details)  Patient received an IV of normal saline with Toradol and Zofran.  Urinalysis was obtained showing a moderate amount of leukocytes with a large amount of blood.  Her overall white blood cell count was slightly elevated at 11.4 with a stable H&H and no left shift.  BMP is negative.  CT abdomen and pelvis shows a left double ureteral stent with no obstructive uropathy at this time and a 9 mm stone that is in the lower pole of the left kidney.  No hydronephrosis and no ureteral stones.  Patient's KUB did show increased stool consistent with constipation which could be the cause of her abdominal distention.  She has been on a lot of narcotics which makes sense.  I gave her some laxatives along with Rocephin 2 g IV piggyback for her urinary tract infection.  I spoke with Dr. Rosenthal on-call for urology [place] who stated he would like the patient to be n.p.o. after midnight and he will see her tomorrow.  Her pain is currently under control and tolerable.  I spoke with Dr. Mauro who will admit the patient into the hospital for IV antibiotics, pain control.  She is currently in guarded condition.    FINAL IMPRESSION  1.  Acute urinary tract infection  2.  Left nephrolithiasis  3.  Constipation  4.  Inflammatory response to left ureteral stent         Electronically signed by: Jeaneth Thomas D.O., 12/20/2020 2:15 [facility] Provider Note

## 2020-12-20 NOTE — PROGRESS NOTES
Med rec updated and complete  Allergies reviewed  Pt reports that she was taking OXYBUTYNIN 5MG 3 times a day until Friday (12/18/2020) AM then took one on Saturday (12/19/2020) morning @ 0700

## 2020-12-20 NOTE — PROGRESS NOTES
Dr Rosenthal called states Pt is Ok to go home f clear by hospitalists. To F/U in OP basis in his office Thursday to removed stone. Needs Abx.  1235 Report received, Pt back to room. Denies pain. VSS. Pt no other needs reported other than feeling asleep.

## 2020-12-20 NOTE — ANESTHESIA POSTPROCEDURE EVALUATION
Patient: Michael Larsen    Procedure Summary     Date: 12/20/20 Room / Location:  OR  / SURGERY [facility]    Anesthesia Start: 1132 Anesthesia Stop:     Procedures:       CYSTOSCOPY, WITH URETERAL STENT INSERTION      LITHOTRIPSY, USING LASER      URETEROSCOPY Diagnosis:     Surgeons: Ed Rosenthal M.D. Responsible Provider: Demetris Yu M.D.    Anesthesia Type: general, MAC ASA Status: 2 - Emergent          Final Anesthesia Type: general, MAC  Last vitals  BP   Blood Pressure: 117/82    Temp   36.2 °C (97.2 °F)    Pulse   Pulse: 67   Resp   14    SpO2   93 %      Anesthesia Post Evaluation    Patient location during evaluation: PACU  Patient participation: complete - patient participated  Level of consciousness: awake and alert  Pain score: 0    Airway patency: patent  Anesthetic complications: no  Cardiovascular status: hemodynamically stable  Respiratory status: acceptable  Hydration status: euvolemic    PONV: none           Nurse Pain Score: 2 (NPRS)

## 2020-12-20 NOTE — ASSESSMENT & PLAN NOTE
-Inpatient status to medical floor.  -Recently hospitalized here from 12/11/2020 to 12/14/2020 for obstructive uropathy in need of placement of double-J ureteral stent.  -She has acute UTI without sepsis at this time.  Kidney stone was pushed back to the lower pole of the kidney.  -I appreciate urology consult and recommendation.  ERP discussed case with Dr. Rosenthal.  -We will keep patient n.p.o.  -Pain and nausea control as needed.  -Antibiotic therapy with Rocephin.

## 2020-12-20 NOTE — PROGRESS NOTES
Pt arrived via gurney, admitted to room 222-2 from ED at 0145. Ambulated to bathroom self and to bed with steady gait. Pt is A&Ox4, c/o lower abdominal pain reported at 5 on a scale of 0-10, states it is tolerable at this time. Oriented to room call light and smoking policy. Reviewed plan of care with the patient, all questions answered. Fall precaution in place. Bed locked. Bed at lowest position. Call light within reach. Encouraged pt to call for assistance.

## 2020-12-20 NOTE — DISCHARGE SUMMARY
"Discharge Summary    CHIEF COMPLAINT ON ADMISSION  Chief Complaint   Patient presents with   • Painful Urination   • Nephrolithiasis       Reason for Admission  Painful Urination, Back Pain, Abdo*     Admission Date  12/19/2020    CODE STATUS  Full Code    HPI & HOSPITAL COURSE  50 y.o. female, who was admitted here from 12/11/2020 and discharged last Monday on 12/14/2020 with obstructive uropathy and 9 mm stone that required placement of double-J ureteral stent who presented to the ER on 12/19/2020 with complaints of worsening dysuria.  Evaluation done in the ED showed positive UTI.  CT imaging demonstrated left double-J ureteral stent with a loop in the UPJ and stone was pushed back to the lower pole of the left kidney that is now nonobstructive.   ERP contacted urology and discussed the case with Dr. Rosenthal who took the patient to the OR and removed the stent and performed cystoscopy.  Patient will continue on her bactrim she was on and will follow up with Dr Rosenthal in his office on Thursday for removal of the stone.     Therefore, she is discharged in good and stable condition to home with close outpatient follow-up.    The patient recovered much more quickly than anticipated on admission.    Discharge Date  12/20/2020    FOLLOW UP ITEMS POST DISCHARGE  PCP and Urology Dr Rosenthal on 12/24/2020    DISCHARGE DIAGNOSES  Principal Problem:    Complicated UTI (urinary tract infection) POA: Unknown  Active Problems:    Nephrolithiasis POA: Yes    Status post cystoscopy with ureteral stent placement POA: Yes    Marijuana use POA: Unknown      Overview: \"7 times a week\"    Chronic bilateral low back pain without sciatica POA: Yes  Resolved Problems:    * No resolved hospital problems. *      FOLLOW UP  No future appointments.  Sade Kiran D.O.  [address]  [address]  [facility] [address]  [phone]          Ed Rosenthal M.D.  [address]  [facility] [address]  [phone]    On 12/24/2020        MEDICATIONS " ON DISCHARGE     Medication List      START taking these medications      Instructions   oxyCODONE immediate-release 5 MG Tabs  Commonly known as: ROXICODONE   Take 1 Tab by mouth every four hours as needed for Severe Pain for up to 10 days.  Dose: 5 mg        CHANGE how you take these medications      Instructions   oxybutynin 5 MG Tabs  What changed:   · when to take this  · additional instructions  Commonly known as: DITROPAN   Take 1 Tab by mouth 3 times a day as needed (Bladder irritation).  Dose: 5 mg     sulfamethoxazole-trimethoprim 800-160 MG tablet  What changed: additional instructions  Commonly known as: BACTRIM DS   Take 1 Tab by mouth 2 times a day for 10 days.  Dose: 1 Tab        CONTINUE taking these medications      Instructions   Aleve 220 MG tablet  Generic drug: naproxen   Take 220-440 mg by mouth 2 times a day as needed (pain).  Dose: 220-440 mg     famotidine 20 MG Tabs  Commonly known as: PEPCID   Take 1 Tab by mouth 2 times a day as needed (Heartburn).  Dose: 20 mg     FIBER ADULT GUMMIES PO   Take 2 Tabs by mouth every morning.  Dose: 2 Tab     ondansetron 4 MG Tbdp  Commonly known as: ZOFRAN ODT   Take 1 Tab by mouth every four hours as needed for Nausea (give PO if no IV route available).  Dose: 4 mg     ONE-A-DAY WOMENS FORMULA PO   Take 1 Tab by mouth every day. Gummy  Dose: 1 Tab     phenazopyridine 200 MG Tabs  Commonly known as: PYRIDIUM   Take 1 Tab by mouth 3 times a day as needed (bladder irritation).  Dose: 200 mg     PROBIOTIC-10 PO   Take 1 Tab by mouth every morning. Gummy  Dose: 1 Tab     tamsulosin 0.4 MG capsule  Commonly known as: FLOMAX   Take 1 Cap by mouth 1/2 hour after breakfast.  Dose: 0.4 mg            Allergies  Allergies   Allergen Reactions   • Hydrocodone Nausea     Nightmares & Nausea     • Codeine Vomiting and Nausea     Vomiting    • Other Food Vomiting     Grape juice            • Penicillins Vomiting     Vomiting        DIET  Orders Placed This Encounter    Procedures   • Diet Order Diet: Regular     Standing Status:   Standing     Number of Occurrences:   1     Order Specific Question:   Diet:     Answer:   Regular [1]       ACTIVITY  As tolerated.  Weight bearing as tolerated    CONSULTATIONS  Dr Ed Rosenthal - urology    PROCEDURES  12/20/2020 - Galo - Cystoscopy with stent removal.    LABORATORY  Lab Results   Component Value Date    SODIUM 138 12/19/2020    POTASSIUM 4.1 12/19/2020    CHLORIDE 104 12/19/2020    CO2 25 12/19/2020    GLUCOSE 105 (H) 12/19/2020    BUN 11 12/19/2020    CREATININE 0.82 12/19/2020        Lab Results   Component Value Date    WBC 11.4 (H) 12/19/2020    HEMOGLOBIN 13.7 12/19/2020    HEMATOCRIT 41.5 12/19/2020    PLATELETCT 320 12/19/2020        Total time of the discharge process exceeds 35 minutes.

## 2020-12-22 ENCOUNTER — NURSE TRIAGE (OUTPATIENT)
Dept: HEALTH INFORMATION MANAGEMENT | Facility: OTHER | Age: 50
End: 2020-12-22

## 2020-12-22 LAB
BACTERIA UR CULT: NORMAL
SIGNIFICANT IND 70042: NORMAL
SITE SITE: NORMAL
SOURCE SOURCE: NORMAL

## 2020-12-22 NOTE — TELEPHONE ENCOUNTER
Pt in and out of the hospital for kidney stones and UTI.  She is in a lot of pain and can not urinate well.  Pt is crying on the phone and is discouraged with her pain, and unknowns of how to follow up to get kidney stone removed.    Per patient understanding she is to go have kidney stone removed omn 12/23 or 12/24 but does not have an appt yet.  Spoke with [place] at [facility].  Pt was told to be scheduled on 12/24.    Pain is 8/10 hurts to urinate.  Stent removed on Sunday and developed fever and N/V.    Called [facility] on behalf of patient due to difficulty with urination and pain 08/10.    Instructed to call Dr. Galo woo for an appt to have stone removed.    Reason for Disposition  • Patient sounds very sick or weak to the triager    Additional Information  • Negative: Shock suspected (e.g., cold/pale/clammy skin, too weak to stand, low BP, rapid pulse)  • Negative: Sounds like a life-threatening emergency to the triager  • Negative: Followed a genital area injury  • Negative: Followed a genital area injury (penis, scrotum)  • Negative: Vaginal discharge  • Negative: Pus (white, yellow) or bloody discharge from end of penis  • Negative: [1] Taking antibiotic for urinary tract infection (UTI) AND [2] female  • Negative: [1] Taking antibiotic for urinary tract infection (UTI) AND [2] male  • Negative: [1] Discomfort (pain, burning or stinging) when passing urine AND [2] pregnant  • Negative: [1] Discomfort (pain, burning or stinging) when passing urine AND [2] postpartum (from 0 to 6 weeks after delivery)  • Negative: [1] Discomfort (pain, burning or stinging) when passing urine AND [2] female  • Negative: [1] Discomfort (pain, burning or stinging) when passing urine AND [2] male  • Negative: Pain or itching in the vulvar area  • Negative: Pain in scrotum is main symptom  • Negative: Blood in the urine is main symptom  • Negative: Symptoms arising from use of a urinary catheter (Gardner or Coude)  •  "Negative: [1] Unable to urinate (or only a few drops) > 4 hours AND     [2] bladder feels very full (e.g., palpable bladder or strong urge to urinate)  • Negative: [1] Decreased urination and [2] drinking very little AND [2] dehydration suspected (e.g., dark urine, no urine > 12 hours, very dry mouth, very lightheaded)    Answer Assessment - Initial Assessment Questions  1. SYMPTOM: \"What's the main symptom you're concerned about?\" (e.g., frequency, incontinence)      Pain 8/10 difficulty with urination and frustration over feeling poorly r/t renal lithiasis  2. ONSET: \"When did the  pain  start?\"      On going pain over the last few weeks  3. PAIN: \"Is there any pain?\" If so, ask: \"How bad is it?\" (Scale: 1-10; mild, moderate, severe)      8/10  4. CAUSE: \"What do you think is causing the symptoms?\"      Renal lithiasis and UTI symptoms  5. OTHER SYMPTOMS: \"Do you have any other symptoms?\" (e.g., fever, flank pain, blood in urine, pain with urination)      Fever/chills, pain with urination, vomited, flank pain  6. PREGNANCY: \"Is there any chance you are pregnant?\" \"When was your last menstrual period?\"      no    Protocols used: URINARY SYMPTOMS-A-AH      "

## 2020-12-23 ENCOUNTER — PRE-ADMISSION TESTING (OUTPATIENT)
Dept: ADMISSIONS | Facility: MEDICAL CENTER | Age: 50
End: 2020-12-23
Attending: UROLOGY
Payer: COMMERCIAL

## 2020-12-24 ENCOUNTER — HOSPITAL ENCOUNTER (OUTPATIENT)
Facility: MEDICAL CENTER | Age: 50
End: 2020-12-24
Attending: UROLOGY | Admitting: UROLOGY
Payer: COMMERCIAL

## 2020-12-24 ENCOUNTER — ANESTHESIA (OUTPATIENT)
Dept: SURGERY | Facility: MEDICAL CENTER | Age: 50
End: 2020-12-24
Payer: COMMERCIAL

## 2020-12-24 ENCOUNTER — APPOINTMENT (OUTPATIENT)
Dept: RADIOLOGY | Facility: MEDICAL CENTER | Age: 50
End: 2020-12-24
Attending: UROLOGY
Payer: COMMERCIAL

## 2020-12-24 ENCOUNTER — ANESTHESIA EVENT (OUTPATIENT)
Dept: SURGERY | Facility: MEDICAL CENTER | Age: 50
End: 2020-12-24
Payer: COMMERCIAL

## 2020-12-24 VITALS
OXYGEN SATURATION: 98 % | WEIGHT: 176.37 LBS | DIASTOLIC BLOOD PRESSURE: 82 MMHG | TEMPERATURE: 98.8 F | HEIGHT: 65 IN | SYSTOLIC BLOOD PRESSURE: 129 MMHG | HEART RATE: 83 BPM | RESPIRATION RATE: 18 BRPM | BODY MASS INDEX: 29.38 KG/M2

## 2020-12-24 DIAGNOSIS — G89.18 POSTOPERATIVE PAIN: ICD-10-CM

## 2020-12-24 LAB — PATHOLOGY CONSULT NOTE: NORMAL

## 2020-12-24 PROCEDURE — 502240 HCHG MISC OR SUPPLY RC 0272: Performed by: UROLOGY

## 2020-12-24 PROCEDURE — 160028 HCHG SURGERY MINUTES - 1ST 30 MINS LEVEL 3: Performed by: UROLOGY

## 2020-12-24 PROCEDURE — 160036 HCHG PACU - EA ADDL 30 MINS PHASE I: Performed by: UROLOGY

## 2020-12-24 PROCEDURE — 160002 HCHG RECOVERY MINUTES (STAT): Performed by: UROLOGY

## 2020-12-24 PROCEDURE — 160048 HCHG OR STATISTICAL LEVEL 1-5: Performed by: UROLOGY

## 2020-12-24 PROCEDURE — 160046 HCHG PACU - 1ST 60 MINS PHASE II: Performed by: UROLOGY

## 2020-12-24 PROCEDURE — 700111 HCHG RX REV CODE 636 W/ 250 OVERRIDE (IP): Performed by: UROLOGY

## 2020-12-24 PROCEDURE — 700105 HCHG RX REV CODE 258: Performed by: UROLOGY

## 2020-12-24 PROCEDURE — 700111 HCHG RX REV CODE 636 W/ 250 OVERRIDE (IP): Performed by: ANESTHESIOLOGY

## 2020-12-24 PROCEDURE — A9270 NON-COVERED ITEM OR SERVICE: HCPCS | Performed by: UROLOGY

## 2020-12-24 PROCEDURE — 160039 HCHG SURGERY MINUTES - EA ADDL 1 MIN LEVEL 3: Performed by: UROLOGY

## 2020-12-24 PROCEDURE — C1758 CATHETER, URETERAL: HCPCS | Performed by: UROLOGY

## 2020-12-24 PROCEDURE — 700102 HCHG RX REV CODE 250 W/ 637 OVERRIDE(OP): Performed by: ANESTHESIOLOGY

## 2020-12-24 PROCEDURE — C1769 GUIDE WIRE: HCPCS | Performed by: UROLOGY

## 2020-12-24 PROCEDURE — A9270 NON-COVERED ITEM OR SERVICE: HCPCS | Performed by: ANESTHESIOLOGY

## 2020-12-24 PROCEDURE — 88300 SURGICAL PATH GROSS: CPT

## 2020-12-24 PROCEDURE — 160035 HCHG PACU - 1ST 60 MINS PHASE I: Performed by: UROLOGY

## 2020-12-24 PROCEDURE — 700101 HCHG RX REV CODE 250: Performed by: UROLOGY

## 2020-12-24 PROCEDURE — 160025 RECOVERY II MINUTES (STATS): Performed by: UROLOGY

## 2020-12-24 PROCEDURE — 500062 HCHG BASKET: Performed by: UROLOGY

## 2020-12-24 PROCEDURE — 160009 HCHG ANES TIME/MIN: Performed by: UROLOGY

## 2020-12-24 PROCEDURE — 82365 CALCULUS SPECTROSCOPY: CPT

## 2020-12-24 RX ORDER — HYDROMORPHONE HYDROCHLORIDE 1 MG/ML
0.2 INJECTION, SOLUTION INTRAMUSCULAR; INTRAVENOUS; SUBCUTANEOUS
Status: DISCONTINUED | OUTPATIENT
Start: 2020-12-24 | End: 2020-12-24 | Stop reason: HOSPADM

## 2020-12-24 RX ORDER — ACETAMINOPHEN 500 MG
500 TABLET ORAL EVERY 6 HOURS PRN
COMMUNITY

## 2020-12-24 RX ORDER — HYDROMORPHONE HYDROCHLORIDE 1 MG/ML
0.1 INJECTION, SOLUTION INTRAMUSCULAR; INTRAVENOUS; SUBCUTANEOUS
Status: DISCONTINUED | OUTPATIENT
Start: 2020-12-24 | End: 2020-12-24 | Stop reason: HOSPADM

## 2020-12-24 RX ORDER — DIPHENHYDRAMINE HYDROCHLORIDE 50 MG/ML
12.5 INJECTION INTRAMUSCULAR; INTRAVENOUS
Status: DISCONTINUED | OUTPATIENT
Start: 2020-12-24 | End: 2020-12-24 | Stop reason: HOSPADM

## 2020-12-24 RX ORDER — ONDANSETRON 2 MG/ML
INJECTION INTRAMUSCULAR; INTRAVENOUS PRN
Status: DISCONTINUED | OUTPATIENT
Start: 2020-12-24 | End: 2020-12-24 | Stop reason: SURG

## 2020-12-24 RX ORDER — CEFAZOLIN SODIUM 1 G/3ML
INJECTION, POWDER, FOR SOLUTION INTRAMUSCULAR; INTRAVENOUS PRN
Status: DISCONTINUED | OUTPATIENT
Start: 2020-12-24 | End: 2020-12-24 | Stop reason: SURG

## 2020-12-24 RX ORDER — MEPERIDINE HYDROCHLORIDE 25 MG/ML
12.5 INJECTION INTRAMUSCULAR; INTRAVENOUS; SUBCUTANEOUS
Status: DISCONTINUED | OUTPATIENT
Start: 2020-12-24 | End: 2020-12-24 | Stop reason: HOSPADM

## 2020-12-24 RX ORDER — OXYCODONE HCL 5 MG/5 ML
5 SOLUTION, ORAL ORAL
Status: COMPLETED | OUTPATIENT
Start: 2020-12-24 | End: 2020-12-24

## 2020-12-24 RX ORDER — MEPERIDINE HYDROCHLORIDE 25 MG/ML
INJECTION INTRAMUSCULAR; INTRAVENOUS; SUBCUTANEOUS PRN
Status: DISCONTINUED | OUTPATIENT
Start: 2020-12-24 | End: 2020-12-24 | Stop reason: SURG

## 2020-12-24 RX ORDER — MORPHINE SULFATE 4 MG/ML
4 INJECTION, SOLUTION INTRAMUSCULAR; INTRAVENOUS
Status: DISCONTINUED | OUTPATIENT
Start: 2020-12-24 | End: 2020-12-24 | Stop reason: HOSPADM

## 2020-12-24 RX ORDER — HYDROCODONE BITARTRATE AND ACETAMINOPHEN 5; 325 MG/1; MG/1
1 TABLET ORAL EVERY 4 HOURS PRN
Qty: 20 TAB | Refills: 0 | Status: ON HOLD | OUTPATIENT
Start: 2020-12-24 | End: 2020-12-26 | Stop reason: SINTOL

## 2020-12-24 RX ORDER — DEXAMETHASONE SODIUM PHOSPHATE 4 MG/ML
INJECTION, SOLUTION INTRA-ARTICULAR; INTRALESIONAL; INTRAMUSCULAR; INTRAVENOUS; SOFT TISSUE PRN
Status: DISCONTINUED | OUTPATIENT
Start: 2020-12-24 | End: 2020-12-24 | Stop reason: SURG

## 2020-12-24 RX ORDER — SODIUM CHLORIDE, SODIUM LACTATE, POTASSIUM CHLORIDE, CALCIUM CHLORIDE 600; 310; 30; 20 MG/100ML; MG/100ML; MG/100ML; MG/100ML
INJECTION, SOLUTION INTRAVENOUS CONTINUOUS
Status: DISCONTINUED | OUTPATIENT
Start: 2020-12-24 | End: 2020-12-24 | Stop reason: HOSPADM

## 2020-12-24 RX ORDER — KETOROLAC TROMETHAMINE 30 MG/ML
INJECTION, SOLUTION INTRAMUSCULAR; INTRAVENOUS PRN
Status: DISCONTINUED | OUTPATIENT
Start: 2020-12-24 | End: 2020-12-24 | Stop reason: SURG

## 2020-12-24 RX ORDER — HYDROMORPHONE HYDROCHLORIDE 1 MG/ML
0.4 INJECTION, SOLUTION INTRAMUSCULAR; INTRAVENOUS; SUBCUTANEOUS
Status: DISCONTINUED | OUTPATIENT
Start: 2020-12-24 | End: 2020-12-24 | Stop reason: HOSPADM

## 2020-12-24 RX ORDER — METOPROLOL TARTRATE 1 MG/ML
1 INJECTION, SOLUTION INTRAVENOUS
Status: DISCONTINUED | OUTPATIENT
Start: 2020-12-24 | End: 2020-12-24 | Stop reason: HOSPADM

## 2020-12-24 RX ORDER — HALOPERIDOL 5 MG/ML
1 INJECTION INTRAMUSCULAR
Status: DISCONTINUED | OUTPATIENT
Start: 2020-12-24 | End: 2020-12-24 | Stop reason: HOSPADM

## 2020-12-24 RX ORDER — LABETALOL HYDROCHLORIDE 5 MG/ML
5 INJECTION, SOLUTION INTRAVENOUS
Status: DISCONTINUED | OUTPATIENT
Start: 2020-12-24 | End: 2020-12-24 | Stop reason: HOSPADM

## 2020-12-24 RX ORDER — KETOROLAC TROMETHAMINE 30 MG/ML
30 INJECTION, SOLUTION INTRAMUSCULAR; INTRAVENOUS ONCE
Status: COMPLETED | OUTPATIENT
Start: 2020-12-24 | End: 2020-12-24

## 2020-12-24 RX ORDER — OXYCODONE HCL 5 MG/5 ML
10 SOLUTION, ORAL ORAL
Status: COMPLETED | OUTPATIENT
Start: 2020-12-24 | End: 2020-12-24

## 2020-12-24 RX ORDER — HYDRALAZINE HYDROCHLORIDE 20 MG/ML
5 INJECTION INTRAMUSCULAR; INTRAVENOUS
Status: DISCONTINUED | OUTPATIENT
Start: 2020-12-24 | End: 2020-12-24 | Stop reason: HOSPADM

## 2020-12-24 RX ORDER — CIPROFLOXACIN 500 MG/1
500 TABLET, FILM COATED ORAL 2 TIMES DAILY
Qty: 6 TAB | Refills: 0 | Status: ON HOLD | OUTPATIENT
Start: 2020-12-24 | End: 2020-12-29

## 2020-12-24 RX ORDER — ONDANSETRON 2 MG/ML
4 INJECTION INTRAMUSCULAR; INTRAVENOUS
Status: DISCONTINUED | OUTPATIENT
Start: 2020-12-24 | End: 2020-12-24 | Stop reason: HOSPADM

## 2020-12-24 RX ADMIN — OXYCODONE HYDROCHLORIDE 10 MG: 5 SOLUTION ORAL at 14:22

## 2020-12-24 RX ADMIN — HYDROMORPHONE HYDROCHLORIDE 0.2 MG: 1 INJECTION, SOLUTION INTRAMUSCULAR; INTRAVENOUS; SUBCUTANEOUS at 15:05

## 2020-12-24 RX ADMIN — LIDOCAINE HYDROCHLORIDE 0.5 ML: 10 INJECTION, SOLUTION EPIDURAL; INFILTRATION; INTRACAUDAL; PERINEURAL at 12:04

## 2020-12-24 RX ADMIN — SODIUM CHLORIDE, POTASSIUM CHLORIDE, SODIUM LACTATE AND CALCIUM CHLORIDE: 600; 310; 30; 20 INJECTION, SOLUTION INTRAVENOUS at 12:05

## 2020-12-24 RX ADMIN — FENTANYL CITRATE 50 MCG: 50 INJECTION, SOLUTION INTRAMUSCULAR; INTRAVENOUS at 13:07

## 2020-12-24 RX ADMIN — KETOROLAC TROMETHAMINE 30 MG: 30 INJECTION, SOLUTION INTRAMUSCULAR at 15:50

## 2020-12-24 RX ADMIN — ONDANSETRON 4 MG: 2 INJECTION INTRAMUSCULAR; INTRAVENOUS at 13:05

## 2020-12-24 RX ADMIN — HYDROMORPHONE HYDROCHLORIDE 0.2 MG: 1 INJECTION, SOLUTION INTRAMUSCULAR; INTRAVENOUS; SUBCUTANEOUS at 14:50

## 2020-12-24 RX ADMIN — HYDROMORPHONE HYDROCHLORIDE 0.2 MG: 1 INJECTION, SOLUTION INTRAMUSCULAR; INTRAVENOUS; SUBCUTANEOUS at 15:00

## 2020-12-24 RX ADMIN — CEFAZOLIN 2 G: 330 INJECTION, POWDER, FOR SOLUTION INTRAMUSCULAR; INTRAVENOUS at 12:57

## 2020-12-24 RX ADMIN — FENTANYL CITRATE 25 MCG: 50 INJECTION, SOLUTION INTRAMUSCULAR; INTRAVENOUS at 14:23

## 2020-12-24 RX ADMIN — FENTANYL CITRATE 25 MCG: 50 INJECTION, SOLUTION INTRAMUSCULAR; INTRAVENOUS at 14:38

## 2020-12-24 RX ADMIN — HYDROMORPHONE HYDROCHLORIDE 0.2 MG: 1 INJECTION, SOLUTION INTRAMUSCULAR; INTRAVENOUS; SUBCUTANEOUS at 14:45

## 2020-12-24 RX ADMIN — FENTANYL CITRATE 25 MCG: 50 INJECTION, SOLUTION INTRAMUSCULAR; INTRAVENOUS at 14:33

## 2020-12-24 RX ADMIN — MORPHINE SULFATE 4 MG: 4 INJECTION INTRAVENOUS at 15:55

## 2020-12-24 RX ADMIN — DEXAMETHASONE SODIUM PHOSPHATE 8 MG: 4 INJECTION, SOLUTION INTRA-ARTICULAR; INTRALESIONAL; INTRAMUSCULAR; INTRAVENOUS; SOFT TISSUE at 13:05

## 2020-12-24 RX ADMIN — HYDROMORPHONE HYDROCHLORIDE 0.4 MG: 1 INJECTION, SOLUTION INTRAMUSCULAR; INTRAVENOUS; SUBCUTANEOUS at 15:10

## 2020-12-24 RX ADMIN — HYDROMORPHONE HYDROCHLORIDE 0.2 MG: 1 INJECTION, SOLUTION INTRAMUSCULAR; INTRAVENOUS; SUBCUTANEOUS at 14:40

## 2020-12-24 RX ADMIN — FENTANYL CITRATE 25 MCG: 50 INJECTION, SOLUTION INTRAMUSCULAR; INTRAVENOUS at 14:28

## 2020-12-24 RX ADMIN — KETOROLAC TROMETHAMINE 30 MG: 30 INJECTION, SOLUTION INTRAMUSCULAR at 13:11

## 2020-12-24 RX ADMIN — MEPERIDINE HYDROCHLORIDE 25 MG: 25 INJECTION INTRAMUSCULAR; INTRAVENOUS; SUBCUTANEOUS at 13:34

## 2020-12-24 RX ADMIN — FENTANYL CITRATE 50 MCG: 50 INJECTION, SOLUTION INTRAMUSCULAR; INTRAVENOUS at 12:57

## 2020-12-24 RX ADMIN — HYDROMORPHONE HYDROCHLORIDE 0.2 MG: 1 INJECTION, SOLUTION INTRAMUSCULAR; INTRAVENOUS; SUBCUTANEOUS at 14:55

## 2020-12-24 RX ADMIN — PROPOFOL 150 MG: 10 INJECTION, EMULSION INTRAVENOUS at 13:03

## 2020-12-24 RX ADMIN — HYDROMORPHONE HYDROCHLORIDE 0.4 MG: 1 INJECTION, SOLUTION INTRAMUSCULAR; INTRAVENOUS; SUBCUTANEOUS at 15:20

## 2020-12-24 ASSESSMENT — PAIN DESCRIPTION - PAIN TYPE
TYPE: SURGICAL PAIN

## 2020-12-24 ASSESSMENT — FIBROSIS 4 INDEX: FIB4 SCORE: 0.75

## 2020-12-24 ASSESSMENT — PAIN SCALES - GENERAL: PAIN_LEVEL: 1

## 2020-12-24 NOTE — DISCHARGE INSTRUCTIONS
ACTIVITY: Rest and take it easy for the first 24 hours.  A responsible adult is recommended to remain with you during that time.  It is normal to feel sleepy.  We encourage you to not do anything that requires balance, judgment or coordination.    MILD FLU-LIKE SYMPTOMS ARE NORMAL. YOU MAY EXPERIENCE GENERALIZED MUSCLE ACHES, THROAT IRRITATION, HEADACHE AND/OR SOME NAUSEA.    FOR 24 HOURS DO NOT:  Drive, operate machinery or run household appliances.  Drink beer or alcoholic beverages.   Make important decisions or sign legal documents.    SPECIAL INSTRUCTIONS:   1.  Encourage increase p.o. fluids  2.  Report any fever or chills to Dr. Rosenthal  3.  Strain urine for stone fragments.    DIET: To avoid nausea, slowly advance diet as tolerated, avoiding spicy or greasy foods for the first day.  Add more substantial food to your diet according to your physician's instructions. INCREASE FLUIDS AND FIBER TO AVOID CONSTIPATION.    SURGICAL DRESSING/BATHING: ok to shower    FOLLOW-UP APPOINTMENT:  A follow-up appointment should be arranged with your doctor in 1-2 weeks; call to schedule.    You should CALL YOUR PHYSICIAN if you develop:  Fever greater than 101 degrees F.  Pain not relieved by medication, or persistent nausea or vomiting.  Excessive bleeding (blood soaking through dressing) or unexpected drainage from the wound.  Extreme redness or swelling around the incision site, drainage of pus or foul smelling drainage.  Inability to urinate or empty your bladder within 8 hours.  Problems with breathing or chest pain.    You should call 911 if you develop problems with breathing or chest pain.  If you are unable to contact your doctor or surgical center, you should go to the nearest emergency room or urgent care center.      Physician's telephone #: Dr. Rosenthal ([phone])    If any questions arise, call your doctor.  If your doctor is not available, please feel free to call the Surgical Center at [phone]. The  Contact Center is open Monday through Friday 7AM to 5PM and may speak to a nurse at [phone], or toll free at [phone].     A registered nurse may call you a few days after your surgery to see how you are doing after your procedure.    MEDICATIONS: Resume taking daily medication.  Take prescribed pain medication with food.  If no medication is prescribed, you may take non-aspirin pain medication if needed.  PAIN MEDICATION CAN BE VERY CONSTIPATING.  Take a stool softener or laxative such as senokot, pericolace, or milk of magnesia if needed.    Prescription given for Cipro (antibiotics) and Norco (pain meds).  Last pain medication given at 2:22pm.    If your physician has prescribed pain medication that includes Acetaminophen (Tylenol), do not take additional Acetaminophen (Tylenol) while taking the prescribed medication.    Depression / Suicide Risk    As you are discharged from this [facility] facility, it is important to learn how to keep safe from harming yourself.    Recognize the warning signs:  · Abrupt changes in personality, positive or negative- including increase in energy   · Giving away possessions  · Change in eating patterns- significant weight changes-  positive or negative  · Change in sleeping patterns- unable to sleep or sleeping all the time   · Unwillingness or inability to communicate  · Depression  · Unusual sadness, discouragement and loneliness  · Talk of wanting to die  · Neglect of personal appearance   · Rebelliousness- reckless behavior  · Withdrawal from people/activities they love  · Confusion- inability to concentrate     If you or a loved one observes any of these behaviors or has concerns about self-harm, here's what you can do:  · Talk about it- your feelings and reasons for harming yourself  · Remove any means that you might use to hurt yourself (examples: pills, rope, extension cords, firearm)  · Get professional help from the community (Mental Health, Substance  Abuse, psychological counseling)  · Do not be alone:Call your Safe Contact- someone whom you trust who will be there for you.  · Call your local CRISIS HOTLINE 418-8559 or 560-971-4339  · Call your local Children's Mobile Crisis Response Team [place] (916) 641-3268 or www.BufferBox  · Call the toll free National Suicide Prevention Hotlines   · National Suicide Prevention Lifeline 492-981-TOJY (2756)  · National Hope Line Network 800-SUICIDE (847-4323)

## 2020-12-24 NOTE — ANESTHESIA PREPROCEDURE EVALUATION
Relevant Problems   NEURO   (+) History of vertigo      GI   (+) GERD (gastroesophageal reflux disease)         (+) Nephrolithiasis       Physical Exam    Airway   Mallampati: II  TM distance: >3 FB  Neck ROM: full       Cardiovascular - normal exam  Rhythm: regular  Rate: normal  (-) murmur     Dental   (+) upper dentures, lower dentures           Pulmonary - normal exam  Breath sounds clear to auscultation     Abdominal    Neurological - normal exam                 Anesthesia Plan    ASA 2       Plan - general       Airway plan will be LMA        Induction: intravenous    Postoperative Plan: Postoperative administration of opioids is intended.    Pertinent diagnostic labs and testing reviewed    Informed Consent:    Anesthetic plan and risks discussed with patient.    Use of blood products discussed with: patient whom consented to blood products.

## 2020-12-24 NOTE — ANESTHESIA TIME REPORT
Anesthesia Start and Stop Event Times     Date Time Event    12/24/2020 1222 Ready for Procedure     1257 Anesthesia Start     1400 Anesthesia Stop        Responsible Staff  12/24/20    Name Role Begin End    Telly Dumont M.D. Anesth 1257 1400        Preop Diagnosis (Free Text):  Pre-op Diagnosis     Left kidney stone        Preop Diagnosis (Codes):    Post op Diagnosis  Kidney stone on left side      Premium Reason  Non-Premium    Comments:

## 2020-12-24 NOTE — OR SURGEON
Immediate Post OP Note    PreOp Diagnosis: Left nephrolithiasis    PostOp Diagnosis: Left nephrolithiasis    Procedure(s):  CYSTOSCOPY - Wound Class: Clean Contaminated  URETEROSCOPY - Wound Class: Clean Contaminated  LITHOTRIPSY, USING LASER .. . - Wound Class: Clean Contaminated    Surgeon(s):  Ed Rosenthal M.D.    Anesthesiologist/Type of Anesthesia:  Anesthesiologist: Telly Dumont M.D./General    Surgical Staff:  Circulator: Eli Boyd R.N.  Laser Staff: Vitaliy Carlton  Scrub Person: Wilian Rodarte    Specimens removed if any:  ID Type Source Tests Collected by Time Destination   A : Kidney Stone  Other Other PATHOLOGY SPECIMEN Ed Rosenthal M.D. 12/24/2020 1353        Estimated Blood Loss: 0 mL    Findings: 7 x 5 mm stone lower pole left kidney    Complications: None        12/24/2020 1:59 PM Ed Rosenthal M.D.

## 2020-12-24 NOTE — ANESTHESIA POSTPROCEDURE EVALUATION
Patient: Michael Larsen    Procedure Summary     Date: 12/24/20 Room / Location: [place] [address] / SURGERY [facility]    Anesthesia Start: 1257 Anesthesia Stop: 1400    Procedures:       CYSTOSCOPY (Left Ureter)      URETEROSCOPY (Left Ureter)      LITHOTRIPSY, USING LASER .. . (Left Ureter) Diagnosis: (Left kidney stone)    Surgeons: Ed Rosenthal M.D. Responsible Provider: Telly Dumont M.D.    Anesthesia Type: general ASA Status: 2          Final Anesthesia Type: general  Last vitals  BP   Blood Pressure: 135/99    Temp   36.7 °C (98.1 °F)    Pulse   Pulse: 96   Resp   18    SpO2   92 %      Anesthesia Post Evaluation    Patient location during evaluation: PACU  Patient participation: complete - patient participated  Level of consciousness: awake and alert  Pain score: 1    Airway patency: patent  Anesthetic complications: no  Cardiovascular status: hemodynamically stable  Respiratory status: acceptable  Hydration status: euvolemic    PONV: none           Nurse Pain Score: 1 (NPRS)

## 2020-12-24 NOTE — OP REPORT
Preop diagnosis-left nephrolithiasis, 7 x 5 mm stone lower pole left kidney  Postop diagnosis-same as preop diagnosis  Procedure performed-cystoscopy, left ureteroscopy, laser lithotripsy and stone basketing  Surgeon-Ed Rosenthal MD  Anesthesiologist Telly Dumont MD  Anesthesia type-General anesthesia  Specimen multiple stone fragments  Drains-none  EBL 0  Complications-none    Procedure in detail:  Pippa is transferred to the operating room on a gurney.  She is then transferred from the gurney to the OR table.  She is then given an excellent general anesthetic by Dr. Dumont.  She is repositioned into the dorsolithotomy position.  She is prepped and draped in usual fashion.  A timeout is then performed to confirm patient identification, procedure to be performed, site of procedure, review patient allergies and review preoperative antibiotics.  Once timeout was completed and agreed upon by the entire OR crew the case was started.    A 22 Slovak rigid cystoscope was white balanced lubricated passed through the urethra and into the urinary bladder.  Left ureteral orifice is cannulated with a 035 sensor guidewire which passes up the ureter easily.  Over that I then passed the ReserveOut navigator ureteral access catheter.  Once the catheter was in the ureter, and it passed easily, the obturator and guidewire were removed.  I then took the ReserveOut digital disposable ureteroscope and passed this through the ureteral access sheath.  I gained access to the upper ureter and the kidney without any difficulty.  The calyceal system is examined and there is a large stone 7 x 5 mm in the lower pole of the kidney I treated this with a holmium laser.  Settings 0.6 J 60 Hz.  I was able to treat about half the stone.  Part of it broke off into another lower pole calyx and that was treated with both direct and popcorn technique until the fragments were 1 to 2 mm in size and I felt they could easily passed down the  ureter.  I then went back to the lower pole and tried to treat the stone but it was just stuck in a corner and making it difficult for me to reach.  I then remove the laser I passed a 0 tip nitinol stone basket through the scope and I was able to engage the stone in the stone basket and then I moved to the upper pole calyx where I had a straight shot and I could treat the stone easily.  The laser was replaced and again the stone was treated with direct and popcorn technique until it is broken up to multiple small fragments.  I passed the nitinol stone basket and was able to retrieve several of these fragments and these were sent for stone analysis.  After I felt the stone was well treated and the rest the calyceal system was examined and no further large stone fragments are encountered I remove the ureteroscope and the access sheath simultaneously.  The ureter is observed for its entire length.  No ureteral damage is noted.    The cystoscope was then replaced in the bladder the bladder was drained.  The prep was cleaned off the patient.  She is taken out of lithotomy and transferred back to the [facility] into the recovery area in stable condition.

## 2020-12-24 NOTE — ANESTHESIA PROCEDURE NOTES
Airway    Date/Time: 12/24/2020 1:03 PM  Performed by: Telly Dumont M.D.  Authorized by: Telly Dumont M.D.     Location:  OR  Urgency:  Elective  Difficult Airway: No    Indications for Airway Management:  Anesthesia      Spontaneous Ventilation: absent    Sedation Level:  Deep  Preoxygenated: Yes    Mask Difficulty Assessment:  1 - vent by mask  Final Airway Type:  Supraglottic airway  Final Supraglottic Airway:  Standard LMA    SGA Size:  4  Number of Attempts at Approach:  1  Number of Other Approaches Attempted:  0

## 2020-12-24 NOTE — OR NURSING
1540 Notified Dr. Rosenthal regarding Pt's home prescription for Hydrocodone-acetaminophen and being an allergy. MD okay for Pt to take hydrocodone at this time. Also new order received for Toradol 30mg IV x1 dose now.

## 2020-12-25 LAB
BACTERIA BLD CULT: NORMAL
BACTERIA BLD CULT: NORMAL
SIGNIFICANT IND 70042: NORMAL
SIGNIFICANT IND 70042: NORMAL
SITE SITE: NORMAL
SITE SITE: NORMAL
SOURCE SOURCE: NORMAL
SOURCE SOURCE: NORMAL

## 2020-12-25 NOTE — OR NURSING
1545 Notified Dr. SOFI Dumont regarding Pt's pain, 6-8/10 pain scale, and Pt expressed that Morphine IV works better for her. New order from MD for Morphine 4mg IV every 15 minutes prn.

## 2020-12-25 NOTE — OR NURSING
1620 Pt reports 2/10 pain scale at this time. Report given to EDVIN Luevano. Pt's , Jazmine, updated regarding plan of care.    Pt AA/Ox4. VSS. Pt denies numbness or tingling. No nausea or vomiting. SCDs in place.    1630 Pt via igor was transferred to PACU2.

## 2020-12-25 NOTE — OR NURSING
D/c orders received. IV dc'd. Pt changed into clothing with assistance. Pt up and ambulated to BR, steady gait, voided adequately. Discharge instructions given as well as pain management handout; pt and family verbalized understanding and questions answered. Strainer given. Patient states ready to d/c home. Prescriptions given to family . Pt dc'd in w/c with cna in stable condition.

## 2020-12-26 ENCOUNTER — APPOINTMENT (OUTPATIENT)
Dept: RADIOLOGY | Facility: MEDICAL CENTER | Age: 50
End: 2020-12-26
Attending: EMERGENCY MEDICINE
Payer: COMMERCIAL

## 2020-12-26 ENCOUNTER — HOSPITAL ENCOUNTER (OUTPATIENT)
Facility: MEDICAL CENTER | Age: 50
End: 2020-12-29
Attending: EMERGENCY MEDICINE | Admitting: INTERNAL MEDICINE
Payer: COMMERCIAL

## 2020-12-26 DIAGNOSIS — R11.2 NON-INTRACTABLE VOMITING WITH NAUSEA, UNSPECIFIED VOMITING TYPE: ICD-10-CM

## 2020-12-26 DIAGNOSIS — R10.9 ACUTE LEFT FLANK PAIN: ICD-10-CM

## 2020-12-26 DIAGNOSIS — N20.1 LEFT URETERAL STONE: ICD-10-CM

## 2020-12-26 PROBLEM — N23 RENAL COLIC: Status: ACTIVE | Noted: 2020-12-26

## 2020-12-26 LAB
ALBUMIN SERPL BCP-MCNC: 4 G/DL (ref 3.2–4.9)
ALBUMIN/GLOB SERPL: 1.3 G/DL
ALP SERPL-CCNC: 91 U/L (ref 30–99)
ALT SERPL-CCNC: 11 U/L (ref 2–50)
ANION GAP SERPL CALC-SCNC: 13 MMOL/L (ref 7–16)
APPEARANCE UR: ABNORMAL
AST SERPL-CCNC: 13 U/L (ref 12–45)
BASOPHILS # BLD AUTO: 0.3 % (ref 0–1.8)
BASOPHILS # BLD: 0.06 K/UL (ref 0–0.12)
BILIRUB SERPL-MCNC: 0.8 MG/DL (ref 0.1–1.5)
BILIRUB UR QL STRIP.AUTO: NEGATIVE
BUN SERPL-MCNC: 17 MG/DL (ref 8–22)
CALCIUM SERPL-MCNC: 9.5 MG/DL (ref 8.4–10.2)
CHLORIDE SERPL-SCNC: 104 MMOL/L (ref 96–112)
CO2 SERPL-SCNC: 21 MMOL/L (ref 20–33)
COLOR UR: YELLOW
COVID ORDER STATUS COVID19: NORMAL
CREAT SERPL-MCNC: 1.28 MG/DL (ref 0.5–1.4)
EOSINOPHIL # BLD AUTO: 0.04 K/UL (ref 0–0.51)
EOSINOPHIL NFR BLD: 0.2 % (ref 0–6.9)
ERYTHROCYTE [DISTWIDTH] IN BLOOD BY AUTOMATED COUNT: 41.8 FL (ref 35.9–50)
GLOBULIN SER CALC-MCNC: 3.1 G/DL (ref 1.9–3.5)
GLUCOSE SERPL-MCNC: 117 MG/DL (ref 65–99)
GLUCOSE UR STRIP.AUTO-MCNC: NEGATIVE MG/DL
HCT VFR BLD AUTO: 42.6 % (ref 37–47)
HGB BLD-MCNC: 14.4 G/DL (ref 12–16)
IMM GRANULOCYTES # BLD AUTO: 0.07 K/UL (ref 0–0.11)
IMM GRANULOCYTES NFR BLD AUTO: 0.4 % (ref 0–0.9)
KETONES UR STRIP.AUTO-MCNC: >=80 MG/DL
LEUKOCYTE ESTERASE UR QL STRIP.AUTO: NEGATIVE
LIPASE SERPL-CCNC: 15 U/L (ref 7–58)
LYMPHOCYTES # BLD AUTO: 1.47 K/UL (ref 1–4.8)
LYMPHOCYTES NFR BLD: 7.9 % (ref 22–41)
MCH RBC QN AUTO: 29.3 PG (ref 27–33)
MCHC RBC AUTO-ENTMCNC: 33.8 G/DL (ref 33.6–35)
MCV RBC AUTO: 86.8 FL (ref 81.4–97.8)
MICRO URNS: ABNORMAL
MONOCYTES # BLD AUTO: 0.91 K/UL (ref 0–0.85)
MONOCYTES NFR BLD AUTO: 4.9 % (ref 0–13.4)
MUCOUS THREADS #/AREA URNS HPF: NORMAL /HPF
NEUTROPHILS # BLD AUTO: 16.17 K/UL (ref 2–7.15)
NEUTROPHILS NFR BLD: 86.3 % (ref 44–72)
NITRITE UR QL STRIP.AUTO: NEGATIVE
NRBC # BLD AUTO: 0 K/UL
NRBC BLD-RTO: 0 /100 WBC
PH UR STRIP.AUTO: 7.5 [PH] (ref 5–8)
PLATELET # BLD AUTO: 357 K/UL (ref 164–446)
PMV BLD AUTO: 9.2 FL (ref 9–12.9)
POTASSIUM SERPL-SCNC: 3.7 MMOL/L (ref 3.6–5.5)
PROT SERPL-MCNC: 7.1 G/DL (ref 6–8.2)
PROT UR QL STRIP: NEGATIVE MG/DL
RBC # BLD AUTO: 4.91 M/UL (ref 4.2–5.4)
RBC # URNS HPF: NORMAL /HPF
RBC UR QL AUTO: ABNORMAL
SARS-COV+SARS-COV-2 AG RESP QL IA.RAPID: NOTDETECTED
SODIUM SERPL-SCNC: 138 MMOL/L (ref 135–145)
SP GR UR STRIP.AUTO: 1.02
SPECIMEN SOURCE: NORMAL
UNIDENT CRYS URNS QL MICRO: NORMAL /HPF
WBC # BLD AUTO: 18.7 K/UL (ref 4.8–10.8)
WBC #/AREA URNS HPF: NORMAL /HPF

## 2020-12-26 PROCEDURE — G0378 HOSPITAL OBSERVATION PER HR: HCPCS

## 2020-12-26 PROCEDURE — 96376 TX/PRO/DX INJ SAME DRUG ADON: CPT

## 2020-12-26 PROCEDURE — 700111 HCHG RX REV CODE 636 W/ 250 OVERRIDE (IP): Performed by: EMERGENCY MEDICINE

## 2020-12-26 PROCEDURE — 96375 TX/PRO/DX INJ NEW DRUG ADDON: CPT

## 2020-12-26 PROCEDURE — 87426 SARSCOV CORONAVIRUS AG IA: CPT

## 2020-12-26 PROCEDURE — 36415 COLL VENOUS BLD VENIPUNCTURE: CPT

## 2020-12-26 PROCEDURE — 700101 HCHG RX REV CODE 250: Performed by: INTERNAL MEDICINE

## 2020-12-26 PROCEDURE — 80053 COMPREHEN METABOLIC PANEL: CPT

## 2020-12-26 PROCEDURE — 83690 ASSAY OF LIPASE: CPT

## 2020-12-26 PROCEDURE — 81001 URINALYSIS AUTO W/SCOPE: CPT

## 2020-12-26 PROCEDURE — 99219 PR INITIAL OBSERVATION CARE,LEVL II: CPT | Performed by: INTERNAL MEDICINE

## 2020-12-26 PROCEDURE — 74176 CT ABD & PELVIS W/O CONTRAST: CPT

## 2020-12-26 PROCEDURE — 85025 COMPLETE CBC W/AUTO DIFF WBC: CPT

## 2020-12-26 PROCEDURE — 99285 EMERGENCY DEPT VISIT HI MDM: CPT

## 2020-12-26 PROCEDURE — C9803 HOPD COVID-19 SPEC COLLECT: HCPCS | Performed by: EMERGENCY MEDICINE

## 2020-12-26 PROCEDURE — 96374 THER/PROPH/DIAG INJ IV PUSH: CPT

## 2020-12-26 RX ORDER — MORPHINE SULFATE 4 MG/ML
4 INJECTION, SOLUTION INTRAMUSCULAR; INTRAVENOUS ONCE
Status: COMPLETED | OUTPATIENT
Start: 2020-12-26 | End: 2020-12-26

## 2020-12-26 RX ORDER — PROMETHAZINE HYDROCHLORIDE 25 MG/1
12.5-25 SUPPOSITORY RECTAL EVERY 4 HOURS PRN
Status: DISCONTINUED | OUTPATIENT
Start: 2020-12-26 | End: 2020-12-29 | Stop reason: HOSPADM

## 2020-12-26 RX ORDER — BISACODYL 10 MG
10 SUPPOSITORY, RECTAL RECTAL
Status: DISCONTINUED | OUTPATIENT
Start: 2020-12-26 | End: 2020-12-29 | Stop reason: HOSPADM

## 2020-12-26 RX ORDER — OXYCODONE HYDROCHLORIDE 10 MG/1
10 TABLET ORAL
Status: DISCONTINUED | OUTPATIENT
Start: 2020-12-26 | End: 2020-12-29 | Stop reason: HOSPADM

## 2020-12-26 RX ORDER — PROCHLORPERAZINE EDISYLATE 5 MG/ML
10 INJECTION INTRAMUSCULAR; INTRAVENOUS ONCE
Status: COMPLETED | OUTPATIENT
Start: 2020-12-26 | End: 2020-12-26

## 2020-12-26 RX ORDER — POLYETHYLENE GLYCOL 3350 17 G/17G
1 POWDER, FOR SOLUTION ORAL
Status: DISCONTINUED | OUTPATIENT
Start: 2020-12-26 | End: 2020-12-29 | Stop reason: HOSPADM

## 2020-12-26 RX ORDER — FAMOTIDINE 20 MG/1
20 TABLET, FILM COATED ORAL 2 TIMES DAILY PRN
Status: DISCONTINUED | OUTPATIENT
Start: 2020-12-26 | End: 2020-12-28

## 2020-12-26 RX ORDER — ONDANSETRON 4 MG/1
4 TABLET, ORALLY DISINTEGRATING ORAL EVERY 4 HOURS PRN
Status: DISCONTINUED | OUTPATIENT
Start: 2020-12-26 | End: 2020-12-29 | Stop reason: HOSPADM

## 2020-12-26 RX ORDER — OXYCODONE HYDROCHLORIDE 5 MG/1
5 TABLET ORAL
Status: DISCONTINUED | OUTPATIENT
Start: 2020-12-26 | End: 2020-12-29 | Stop reason: HOSPADM

## 2020-12-26 RX ORDER — PROMETHAZINE HYDROCHLORIDE 25 MG/1
12.5-25 TABLET ORAL EVERY 4 HOURS PRN
Status: DISCONTINUED | OUTPATIENT
Start: 2020-12-26 | End: 2020-12-29 | Stop reason: HOSPADM

## 2020-12-26 RX ORDER — HYDROMORPHONE HYDROCHLORIDE 1 MG/ML
0.5 INJECTION, SOLUTION INTRAMUSCULAR; INTRAVENOUS; SUBCUTANEOUS
Status: DISCONTINUED | OUTPATIENT
Start: 2020-12-26 | End: 2020-12-29 | Stop reason: HOSPADM

## 2020-12-26 RX ORDER — ACETAMINOPHEN 325 MG/1
650 TABLET ORAL EVERY 6 HOURS PRN
Status: DISCONTINUED | OUTPATIENT
Start: 2020-12-26 | End: 2020-12-29 | Stop reason: HOSPADM

## 2020-12-26 RX ORDER — ONDANSETRON 2 MG/ML
4 INJECTION INTRAMUSCULAR; INTRAVENOUS EVERY 4 HOURS PRN
Status: DISCONTINUED | OUTPATIENT
Start: 2020-12-26 | End: 2020-12-29 | Stop reason: HOSPADM

## 2020-12-26 RX ORDER — PROCHLORPERAZINE EDISYLATE 5 MG/ML
5-10 INJECTION INTRAMUSCULAR; INTRAVENOUS EVERY 4 HOURS PRN
Status: DISCONTINUED | OUTPATIENT
Start: 2020-12-26 | End: 2020-12-29 | Stop reason: HOSPADM

## 2020-12-26 RX ORDER — SODIUM CHLORIDE AND POTASSIUM CHLORIDE 150; 900 MG/100ML; MG/100ML
INJECTION, SOLUTION INTRAVENOUS CONTINUOUS
Status: DISCONTINUED | OUTPATIENT
Start: 2020-12-26 | End: 2020-12-29 | Stop reason: HOSPADM

## 2020-12-26 RX ORDER — AMOXICILLIN 250 MG
2 CAPSULE ORAL 2 TIMES DAILY
Status: DISCONTINUED | OUTPATIENT
Start: 2020-12-26 | End: 2020-12-29 | Stop reason: HOSPADM

## 2020-12-26 RX ADMIN — MORPHINE SULFATE 4 MG: 4 INJECTION INTRAVENOUS at 15:59

## 2020-12-26 RX ADMIN — POTASSIUM CHLORIDE AND SODIUM CHLORIDE 100 ML: 900; 150 INJECTION, SOLUTION INTRAVENOUS at 20:15

## 2020-12-26 RX ADMIN — PROCHLORPERAZINE EDISYLATE 10 MG: 5 INJECTION INTRAMUSCULAR; INTRAVENOUS at 15:59

## 2020-12-26 RX ADMIN — MORPHINE SULFATE 4 MG: 4 INJECTION INTRAVENOUS at 17:44

## 2020-12-26 ASSESSMENT — ENCOUNTER SYMPTOMS
HEMOPTYSIS: 0
ORTHOPNEA: 0
SPEECH CHANGE: 0
WEIGHT LOSS: 0
CHILLS: 0
DIARRHEA: 0
DOUBLE VISION: 0
HEADACHES: 0
NECK PAIN: 0
HALLUCINATIONS: 0
BACK PAIN: 0
CONSTIPATION: 0
NAUSEA: 1
NERVOUS/ANXIOUS: 0
VOMITING: 1
PHOTOPHOBIA: 0
FLANK PAIN: 1
SPUTUM PRODUCTION: 0
PALPITATIONS: 0
COUGH: 0
ABDOMINAL PAIN: 1
BLURRED VISION: 0
FEVER: 0
POLYDIPSIA: 0
FOCAL WEAKNESS: 0
HEARTBURN: 0
TREMORS: 0
BRUISES/BLEEDS EASILY: 0

## 2020-12-26 ASSESSMENT — COGNITIVE AND FUNCTIONAL STATUS - GENERAL
WALKING IN HOSPITAL ROOM: A LITTLE
DAILY ACTIVITIY SCORE: 23
MOBILITY SCORE: 19
MOVING FROM LYING ON BACK TO SITTING ON SIDE OF FLAT BED: A LITTLE
CLIMB 3 TO 5 STEPS WITH RAILING: A LITTLE
MOVING TO AND FROM BED TO CHAIR: A LITTLE
STANDING UP FROM CHAIR USING ARMS: A LITTLE
SUGGESTED CMS G CODE MODIFIER DAILY ACTIVITY: CI
SUGGESTED CMS G CODE MODIFIER MOBILITY: CK
TOILETING: A LITTLE

## 2020-12-26 ASSESSMENT — PAIN DESCRIPTION - PAIN TYPE: TYPE: ACUTE PAIN

## 2020-12-26 ASSESSMENT — LIFESTYLE VARIABLES: SUBSTANCE_ABUSE: 0

## 2020-12-26 ASSESSMENT — FIBROSIS 4 INDEX: FIB4 SCORE: 0.75

## 2020-12-26 NOTE — ED PROVIDER NOTES
"ED Provider Note    CHIEF COMPLAINT  Chief Complaint   Patient presents with   • Flank Pain     RT severe since 1000am Pt S/P laser litho for renal calc 12/24 by Dr Rosenthal No fever Home meds not working        HPI    Primary care provider: Sade Kiran D.O.   History obtained from: Patient and son  History limited by: None     Michael Larsen is a 50 y.o. female who presents to the ED with son complaining of sudden onset of severe left flank pain radiating to the left abdomen around 10:00 this morning that has been constant and unrelenting despite taking oxycodone.  Patient reports multiple episodes of nausea and vomiting despite taking her Zofran.  She reports that she had a laser treatment for kidney stone with Dr. Rosenthal on December 24 and was doing fine until the pain today.  She reports feeling hot and cold due to the pain today.  She denies dysuria or gross hematuria.  She denies constipation and reports that she had softer stools this morning.  She otherwise denies pain anywhere else or any other symptoms.    REVIEW OF SYSTEMS  Please see HPI for pertinent positives/negatives.  All other systems reviewed and are negative.     PAST MEDICAL HISTORY  Past Medical History:   Diagnosis Date   • Marijuana use 12/20/2020    \"7 times a week\"   • PONV (postoperative nausea and vomiting) 07/29/2019    severe PONV   • Urinary tract infection 1994   • Anesthesia     Severe PONV   • Bleeding from the nose     hx of last one 10 years ago    • Bowel habit changes     constipation    • Dental disorder     dentures full   • Hay fever    • Heart burn    • Hemorrhoids    • Hypertension     borderline, never been on medication    • Indigestion    • Measles     as a child    • Migraine    • Pain     lower back pain   • Venereal disease         SURGICAL HISTORY  Past Surgical History:   Procedure Laterality Date   • PB CYSTOSCOPY,INSERT URETERAL STENT Left 12/24/2020    Procedure: CYSTOSCOPY;  Surgeon: Ed Rosenthal M.D.;  " Location: SURGERY [facility];  Service: Urology   • PB CYSTO/URETERO/PYELOSCOPY, DX Left 2020    Procedure: URETEROSCOPY;  Surgeon: Ed Rosenthal M.D.;  Location: SURGERY [facility];  Service: Urology   • LASERTRIPSY Left 2020    Procedure: LITHOTRIPSY, USING LASER .. .;  Surgeon: Ed Rosenthal M.D.;  Location: SURGERY [facility];  Service: Urology   • PB CYSTOSCOPY,INSERT URETERAL STENT N/A 2020    Procedure: CYSTOSCOPY, WITH URETERAL STENT removal;  Surgeon: Ed Rosenthal M.D.;  Location: [facility];  Service: Urology   • PB CYSTOSCOPY,INSERT URETERAL STENT Left 2020    Procedure: CYSTOSCOPY, WITH URETERAL STENT INSERTION;  Surgeon: Daniel Paulson M.D.;  Location: [facility];  Service: Urology   • HYSTEROSCOPY WITH VIDEO OPERATIVE N/A 2019    Procedure: HYSTEROSCOPY, WITH VIDEO IMAGING -WITH MYOSURE;  Surgeon: Prieto Santa M.D.;  Location: SURGERY SAME DAY [facility];  Service: Obstetrics   • DILATION AND CURETTAGE N/A 2019    Procedure: DILATION AND CURETTAGE;  Surgeon: Prieto Santa M.D.;  Location: SURGERY SAME DAY [facility];  Service: Obstetrics   • LUMPECTOMY Left 1988   • LAPAROSCOPY  1987   • GYN SURGERY          SOCIAL HISTORY  Social History     Tobacco Use   • Smoking status: Former Smoker     Packs/day: 1.00     Years: 31.00     Pack years: 31.00     Quit date: 2012     Years since quittin.6   • Smokeless tobacco: Never Used   Substance and Sexual Activity   • Alcohol use: Yes     Frequency: Monthly or less     Drinks per session: 1 or 2     Binge frequency: Never     Comment: rarely   • Drug use: Yes     Frequency: 7.0 times per week     Types: Marijuana, Inhaled     Comment: marijuanna occ, last 3 days ago   • Sexual activity: Yes        FAMILY HISTORY  Family History   Problem Relation Age of Onset   • Hypertension Mother    • Lung Disease Mother         COPD   • Heart Disease Maternal Grandmother    •  "Lung Disease Maternal Uncle         lung cancer        CURRENT MEDICATIONS  Home Medications    **Home medications have not yet been reviewed for this encounter**          ALLERGIES  Allergies   Allergen Reactions   • Hydrocodone Nausea     Nightmares & Nausea     • Codeine Vomiting and Nausea     Vomiting    • Other Food Vomiting     Grape juice            • Penicillins Vomiting     Vomiting         PHYSICAL EXAM  VITAL SIGNS: /74   Pulse 75   Temp 36.5 °C (97.7 °F) (Temporal)   Resp 20   Ht 1.651 m (5' 5\")   Wt 81.6 kg (179 lb 14.3 oz)   LMP  (LMP Unknown)   SpO2 96%   BMI 29.94 kg/m²  @[id number]::@     Pulse ox interpretation: 96% I interpret this pulse ox as normal     Constitutional: Well developed, well nourished, alert in discomfort, nontoxic appearance    HENT: No external signs of trauma, normocephalic, mask on due to COVID-19 pandemic  Eyes: PERRL, conjunctiva without erythema, no discharge, no icterus    Neck: Soft and supple, trachea midline, no stridor, no tenderness, no LAD, no JVD, good ROM    Cardiovascular: Regular rate and rhythm, no murmurs/rubs/gallops, strong distal pulses and good perfusion    Thorax & Lungs: No respiratory distress, CTAB   Abdomen: Soft, mild left sided tenderness to palpation, nondistended, no guarding, no rebound, normal BS    Back: Left CVAT    Extremities: No cyanosis, no edema, no gross deformity, good ROM, no tenderness, intact distal pulses with brisk cap refill    Skin: Warm, dry, no pallor/cyanosis, no rash noted    Lymphatic: No lymphadenopathy noted    Neuro: A/O times 3, no focal deficits noted    Psychiatric: Cooperative, anxious patient, normal judgement      DIAGNOSTIC STUDIES / PROCEDURES        LABS  All labs reviewed by me.     Results for orders placed or performed during the hospital encounter of 12/26/20   CBC WITH DIFFERENTIAL   Result Value Ref Range    WBC 18.7 (H) 4.8 - 10.8 K/uL    RBC 4.91 4.20 - 5.40 M/uL    Hemoglobin 14.4 12.0 - " 16.0 g/dL    Hematocrit 42.6 37.0 - 47.0 %    MCV 86.8 81.4 - 97.8 fL    MCH 29.3 27.0 - 33.0 pg    MCHC 33.8 33.6 - 35.0 g/dL    RDW 41.8 35.9 - 50.0 fL    Platelet Count 357 164 - 446 K/uL    MPV 9.2 9.0 - 12.9 fL    Neutrophils-Polys 86.30 (H) 44.00 - 72.00 %    Lymphocytes 7.90 (L) 22.00 - 41.00 %    Monocytes 4.90 0.00 - 13.40 %    Eosinophils 0.20 0.00 - 6.90 %    Basophils 0.30 0.00 - 1.80 %    Immature Granulocytes 0.40 0.00 - 0.90 %    Nucleated RBC 0.00 /100 WBC    Neutrophils (Absolute) 16.17 (H) 2.00 - 7.15 K/uL    Lymphs (Absolute) 1.47 1.00 - 4.80 K/uL    Monos (Absolute) 0.91 (H) 0.00 - 0.85 K/uL    Eos (Absolute) 0.04 0.00 - 0.51 K/uL    Baso (Absolute) 0.06 0.00 - 0.12 K/uL    Immature Granulocytes (abs) 0.07 0.00 - 0.11 K/uL    NRBC (Absolute) 0.00 K/uL   COMP METABOLIC PANEL   Result Value Ref Range    Sodium 138 135 - 145 mmol/L    Potassium 3.7 3.6 - 5.5 mmol/L    Chloride 104 96 - 112 mmol/L    Co2 21 20 - 33 mmol/L    Anion Gap 13.0 7.0 - 16.0    Glucose 117 (H) 65 - 99 mg/dL    Bun 17 8 - 22 mg/dL    Creatinine 1.28 0.50 - 1.40 mg/dL    Calcium 9.5 8.4 - 10.2 mg/dL    AST(SGOT) 13 12 - 45 U/L    ALT(SGPT) 11 2 - 50 U/L    Alkaline Phosphatase 91 30 - 99 U/L    Total Bilirubin 0.8 0.1 - 1.5 mg/dL    Albumin 4.0 3.2 - 4.9 g/dL    Total Protein 7.1 6.0 - 8.2 g/dL    Globulin 3.1 1.9 - 3.5 g/dL    A-G Ratio 1.3 g/dL   LIPASE   Result Value Ref Range    Lipase 15 7 - 58 U/L   URINALYSIS,CULTURE IF INDICATED    Specimen: Urine, Clean Catch; Blood   Result Value Ref Range    Color Yellow     Character Cloudy (A)     Specific Gravity 1.020 <1.035    Ph 7.5 5.0 - 8.0    Glucose Negative Negative mg/dL    Ketones >=80 (A) Negative mg/dL    Protein Negative Negative mg/dL    Bilirubin Negative Negative    Nitrite Negative Negative    Leukocyte Esterase Negative Negative    Occult Blood Small (A) Negative    Micro Urine Req Microscopic    URINE MICROSCOPIC (W/UA)   Result Value Ref Range    WBC 0-2 /hpf     RBC 0-2 /hpf    Mucous Threads Few /hpf    Urine Crystals Mod Amorphous /hpf   ESTIMATED GFR   Result Value Ref Range    GFR If  53 (A) >60 mL/min/1.73 m 2    GFR If Non  44 (A) >60 mL/min/1.73 m 2   COVID/SARS CoV-2 PCR    Specimen: Nasopharyngeal; Respirate   Result Value Ref Range    COVID Order Status Received    SARS-COV Antigen MARILU   Result Value Ref Range    SARS-CoV-2 Source Nasal Swab     SARS-COV ANTIGEN MARILU NotDetected Not-Detected        RADIOLOGY  The radiologist's interpretation of all radiological studies have been reviewed by me.     CT-ABDOMEN-PELVIS W/O   Final Result      1.  Mild to moderate left-sided hydronephrosis extending to a left lower ureteral stone present just above the ureterovesical junction. This measures 7 x 3 mm in size.      2.  Multiple left renal calyceal stones which measure up to 6 mm in size.      3.  Likely gallstones within the gallbladder.             COURSE & MEDICAL DECISION MAKING  Nursing notes, VS, PMSFHx reviewed in chart.     Review of past medical records shows the patient was last admitted at [facility] December 24, 2020 for cystoscopy and left ureteroscopy and laser lithotripsy.      Differential diagnoses considered include but are not limited to: Bowel perforation/obstruction, ileus, diverticulitis, pancreatitis, KS/renal colic, UTI/pyelo, colitis, muscle strain      1725: D/W Dr. Santos, urologist on-call for Dr. Rosenthal.  He would like patient admitted by medicine service and kept NPO and he will see the patient after finishing a current surgery.  He does not feel that patient need to be started on antibiotic at this time.    1735: D/W hospitalist who will admit patient      History and physical exam as above.  CT with findings as above.  UA without apparent signs of infection.  Labs were otherwise fairly unremarkable except for leukocytosis which most likely is from stress reaction rather than acute  infectious etiology.  Patient was treated with Compazine for her nausea and morphine for pain.  Findings discussed with Dr. Santos with above recommendations.  Findings and plan discussed with the patient and son and they are agreeable.  Discussed with the hospitalist who graciously agreed to admit patient for further care.      FINAL IMPRESSION  1. Acute left flank pain Acute   2. Left ureteral stone Active   3. Non-intractable vomiting with nausea, unspecified vomiting type Acute          DISPOSITION  Patient will be admitted by hospitalist for further care      Electronically signed by: Alex Hinkle D.O., 12/26/2020 3:43 PM      Portions of this record were made with voice recognition software.  Despite my review, spelling/grammar/context errors may still remain.  Interpretation of this chart should be taken in this context.

## 2020-12-27 ENCOUNTER — APPOINTMENT (OUTPATIENT)
Dept: RADIOLOGY | Facility: MEDICAL CENTER | Age: 50
End: 2020-12-27
Attending: UROLOGY
Payer: COMMERCIAL

## 2020-12-27 ENCOUNTER — ANESTHESIA (OUTPATIENT)
Dept: SURGERY | Facility: MEDICAL CENTER | Age: 50
End: 2020-12-27
Payer: COMMERCIAL

## 2020-12-27 ENCOUNTER — ANESTHESIA EVENT (OUTPATIENT)
Dept: SURGERY | Facility: MEDICAL CENTER | Age: 50
End: 2020-12-27
Payer: COMMERCIAL

## 2020-12-27 LAB
ALBUMIN SERPL BCP-MCNC: 3.7 G/DL (ref 3.2–4.9)
ALBUMIN/GLOB SERPL: 1.3 G/DL
ALP SERPL-CCNC: 85 U/L (ref 30–99)
ALT SERPL-CCNC: 9 U/L (ref 2–50)
ANION GAP SERPL CALC-SCNC: 12 MMOL/L (ref 7–16)
AST SERPL-CCNC: 12 U/L (ref 12–45)
BASOPHILS # BLD AUTO: 0.5 % (ref 0–1.8)
BASOPHILS # BLD: 0.05 K/UL (ref 0–0.12)
BILIRUB SERPL-MCNC: 0.8 MG/DL (ref 0.1–1.5)
BUN SERPL-MCNC: 11 MG/DL (ref 8–22)
CALCIUM SERPL-MCNC: 9.3 MG/DL (ref 8.4–10.2)
CHLORIDE SERPL-SCNC: 106 MMOL/L (ref 96–112)
CO2 SERPL-SCNC: 23 MMOL/L (ref 20–33)
CREAT SERPL-MCNC: 0.81 MG/DL (ref 0.5–1.4)
EOSINOPHIL # BLD AUTO: 0.11 K/UL (ref 0–0.51)
EOSINOPHIL NFR BLD: 1.1 % (ref 0–6.9)
ERYTHROCYTE [DISTWIDTH] IN BLOOD BY AUTOMATED COUNT: 44.1 FL (ref 35.9–50)
GLOBULIN SER CALC-MCNC: 2.9 G/DL (ref 1.9–3.5)
GLUCOSE SERPL-MCNC: 109 MG/DL (ref 65–99)
HCT VFR BLD AUTO: 42 % (ref 37–47)
HGB BLD-MCNC: 13.5 G/DL (ref 12–16)
IMM GRANULOCYTES # BLD AUTO: 0.03 K/UL (ref 0–0.11)
IMM GRANULOCYTES NFR BLD AUTO: 0.3 % (ref 0–0.9)
LYMPHOCYTES # BLD AUTO: 2.94 K/UL (ref 1–4.8)
LYMPHOCYTES NFR BLD: 29.7 % (ref 22–41)
MCH RBC QN AUTO: 28.7 PG (ref 27–33)
MCHC RBC AUTO-ENTMCNC: 32.1 G/DL (ref 33.6–35)
MCV RBC AUTO: 89.4 FL (ref 81.4–97.8)
MONOCYTES # BLD AUTO: 0.69 K/UL (ref 0–0.85)
MONOCYTES NFR BLD AUTO: 7 % (ref 0–13.4)
NEUTROPHILS # BLD AUTO: 6.07 K/UL (ref 2–7.15)
NEUTROPHILS NFR BLD: 61.4 % (ref 44–72)
NRBC # BLD AUTO: 0 K/UL
NRBC BLD-RTO: 0 /100 WBC
PATHOLOGY CONSULT NOTE: NORMAL
PLATELET # BLD AUTO: 324 K/UL (ref 164–446)
PMV BLD AUTO: 9.1 FL (ref 9–12.9)
POTASSIUM SERPL-SCNC: 4 MMOL/L (ref 3.6–5.5)
PROT SERPL-MCNC: 6.6 G/DL (ref 6–8.2)
RBC # BLD AUTO: 4.7 M/UL (ref 4.2–5.4)
SODIUM SERPL-SCNC: 141 MMOL/L (ref 135–145)
WBC # BLD AUTO: 9.9 K/UL (ref 4.8–10.8)

## 2020-12-27 PROCEDURE — 96375 TX/PRO/DX INJ NEW DRUG ADDON: CPT

## 2020-12-27 PROCEDURE — 94760 N-INVAS EAR/PLS OXIMETRY 1: CPT

## 2020-12-27 PROCEDURE — C1769 GUIDE WIRE: HCPCS | Performed by: UROLOGY

## 2020-12-27 PROCEDURE — 700102 HCHG RX REV CODE 250 W/ 637 OVERRIDE(OP): Performed by: INTERNAL MEDICINE

## 2020-12-27 PROCEDURE — 501329 HCHG SET, CYSTO IRRIG Y TUR: Performed by: UROLOGY

## 2020-12-27 PROCEDURE — 160039 HCHG SURGERY MINUTES - EA ADDL 1 MIN LEVEL 3: Performed by: UROLOGY

## 2020-12-27 PROCEDURE — 99225 PR SUBSEQUENT OBSERVATION CARE,LEVEL II: CPT | Performed by: STUDENT IN AN ORGANIZED HEALTH CARE EDUCATION/TRAINING PROGRAM

## 2020-12-27 PROCEDURE — 500879 HCHG PACK, CYSTO: Performed by: UROLOGY

## 2020-12-27 PROCEDURE — 88300 SURGICAL PATH GROSS: CPT

## 2020-12-27 PROCEDURE — 74018 RADEX ABDOMEN 1 VIEW: CPT

## 2020-12-27 PROCEDURE — 160048 HCHG OR STATISTICAL LEVEL 1-5: Performed by: UROLOGY

## 2020-12-27 PROCEDURE — 80053 COMPREHEN METABOLIC PANEL: CPT

## 2020-12-27 PROCEDURE — 82365 CALCULUS SPECTROSCOPY: CPT

## 2020-12-27 PROCEDURE — 700101 HCHG RX REV CODE 250: Performed by: INTERNAL MEDICINE

## 2020-12-27 PROCEDURE — 160009 HCHG ANES TIME/MIN: Performed by: UROLOGY

## 2020-12-27 PROCEDURE — G0378 HOSPITAL OBSERVATION PER HR: HCPCS

## 2020-12-27 PROCEDURE — C2617 STENT, NON-COR, TEM W/O DEL: HCPCS | Performed by: UROLOGY

## 2020-12-27 PROCEDURE — 160035 HCHG PACU - 1ST 60 MINS PHASE I: Performed by: UROLOGY

## 2020-12-27 PROCEDURE — 700111 HCHG RX REV CODE 636 W/ 250 OVERRIDE (IP): Performed by: INTERNAL MEDICINE

## 2020-12-27 PROCEDURE — 160028 HCHG SURGERY MINUTES - 1ST 30 MINS LEVEL 3: Performed by: UROLOGY

## 2020-12-27 PROCEDURE — 36415 COLL VENOUS BLD VENIPUNCTURE: CPT

## 2020-12-27 PROCEDURE — 160002 HCHG RECOVERY MINUTES (STAT): Performed by: UROLOGY

## 2020-12-27 PROCEDURE — A9270 NON-COVERED ITEM OR SERVICE: HCPCS | Performed by: INTERNAL MEDICINE

## 2020-12-27 PROCEDURE — 700111 HCHG RX REV CODE 636 W/ 250 OVERRIDE (IP): Performed by: ANESTHESIOLOGY

## 2020-12-27 PROCEDURE — 85025 COMPLETE CBC W/AUTO DIFF WBC: CPT

## 2020-12-27 DEVICE — STENT UROLOGICAL POLARIS 6X24  ULTRA: Type: IMPLANTABLE DEVICE | Site: URETER | Status: FUNCTIONAL

## 2020-12-27 RX ORDER — ONDANSETRON 2 MG/ML
INJECTION INTRAMUSCULAR; INTRAVENOUS PRN
Status: DISCONTINUED | OUTPATIENT
Start: 2020-12-27 | End: 2020-12-27 | Stop reason: SURG

## 2020-12-27 RX ORDER — DIPHENHYDRAMINE HYDROCHLORIDE 50 MG/ML
INJECTION INTRAMUSCULAR; INTRAVENOUS PRN
Status: DISCONTINUED | OUTPATIENT
Start: 2020-12-27 | End: 2020-12-27 | Stop reason: SURG

## 2020-12-27 RX ORDER — HALOPERIDOL 5 MG/ML
1 INJECTION INTRAMUSCULAR
Status: DISCONTINUED | OUTPATIENT
Start: 2020-12-27 | End: 2020-12-27 | Stop reason: HOSPADM

## 2020-12-27 RX ORDER — DIPHENHYDRAMINE HYDROCHLORIDE 50 MG/ML
12.5 INJECTION INTRAMUSCULAR; INTRAVENOUS
Status: DISCONTINUED | OUTPATIENT
Start: 2020-12-27 | End: 2020-12-27 | Stop reason: HOSPADM

## 2020-12-27 RX ORDER — SODIUM CHLORIDE, SODIUM LACTATE, POTASSIUM CHLORIDE, CALCIUM CHLORIDE 600; 310; 30; 20 MG/100ML; MG/100ML; MG/100ML; MG/100ML
INJECTION, SOLUTION INTRAVENOUS CONTINUOUS
Status: DISCONTINUED | OUTPATIENT
Start: 2020-12-27 | End: 2020-12-27 | Stop reason: HOSPADM

## 2020-12-27 RX ORDER — DEXAMETHASONE SODIUM PHOSPHATE 4 MG/ML
INJECTION, SOLUTION INTRA-ARTICULAR; INTRALESIONAL; INTRAMUSCULAR; INTRAVENOUS; SOFT TISSUE PRN
Status: DISCONTINUED | OUTPATIENT
Start: 2020-12-27 | End: 2020-12-27 | Stop reason: SURG

## 2020-12-27 RX ORDER — HALOPERIDOL 5 MG/ML
INJECTION INTRAMUSCULAR
Status: COMPLETED
Start: 2020-12-27 | End: 2020-12-27

## 2020-12-27 RX ORDER — CEFAZOLIN SODIUM 1 G/3ML
INJECTION, POWDER, FOR SOLUTION INTRAMUSCULAR; INTRAVENOUS PRN
Status: DISCONTINUED | OUTPATIENT
Start: 2020-12-27 | End: 2020-12-27 | Stop reason: SURG

## 2020-12-27 RX ORDER — HALOPERIDOL 5 MG/ML
INJECTION INTRAMUSCULAR PRN
Status: DISCONTINUED | OUTPATIENT
Start: 2020-12-27 | End: 2020-12-27 | Stop reason: SURG

## 2020-12-27 RX ORDER — LORAZEPAM 2 MG/ML
0.5 INJECTION INTRAMUSCULAR
Status: DISCONTINUED | OUTPATIENT
Start: 2020-12-27 | End: 2020-12-27 | Stop reason: HOSPADM

## 2020-12-27 RX ORDER — GENTAMICIN SULFATE 40 MG/ML
INJECTION, SOLUTION INTRAMUSCULAR; INTRAVENOUS PRN
Status: DISCONTINUED | OUTPATIENT
Start: 2020-12-27 | End: 2020-12-27 | Stop reason: SURG

## 2020-12-27 RX ORDER — OXYCODONE HCL 5 MG/5 ML
5 SOLUTION, ORAL ORAL
Status: DISCONTINUED | OUTPATIENT
Start: 2020-12-27 | End: 2020-12-27 | Stop reason: HOSPADM

## 2020-12-27 RX ORDER — ONDANSETRON 2 MG/ML
4 INJECTION INTRAMUSCULAR; INTRAVENOUS
Status: DISCONTINUED | OUTPATIENT
Start: 2020-12-27 | End: 2020-12-27 | Stop reason: HOSPADM

## 2020-12-27 RX ORDER — OXYCODONE HCL 5 MG/5 ML
10 SOLUTION, ORAL ORAL
Status: DISCONTINUED | OUTPATIENT
Start: 2020-12-27 | End: 2020-12-27 | Stop reason: HOSPADM

## 2020-12-27 RX ORDER — MORPHINE SULFATE 4 MG/ML
2 INJECTION, SOLUTION INTRAMUSCULAR; INTRAVENOUS ONCE
Status: ACTIVE | OUTPATIENT
Start: 2020-12-27 | End: 2020-12-28

## 2020-12-27 RX ADMIN — POTASSIUM CHLORIDE AND SODIUM CHLORIDE: 900; 150 INJECTION, SOLUTION INTRAVENOUS at 06:19

## 2020-12-27 RX ADMIN — OXYCODONE HYDROCHLORIDE 5 MG: 5 TABLET ORAL at 13:25

## 2020-12-27 RX ADMIN — GENTAMICIN SULFATE 160 MG: 40 INJECTION, SOLUTION INTRAMUSCULAR; INTRAVENOUS at 09:45

## 2020-12-27 RX ADMIN — FENTANYL CITRATE 50 MCG: 50 INJECTION, SOLUTION INTRAMUSCULAR; INTRAVENOUS at 09:45

## 2020-12-27 RX ADMIN — ONDANSETRON 8 MG: 2 INJECTION INTRAMUSCULAR; INTRAVENOUS at 09:45

## 2020-12-27 RX ADMIN — ONDANSETRON 4 MG: 2 INJECTION INTRAMUSCULAR; INTRAVENOUS at 09:06

## 2020-12-27 RX ADMIN — FENTANYL CITRATE 50 MCG: 50 INJECTION, SOLUTION INTRAMUSCULAR; INTRAVENOUS at 10:19

## 2020-12-27 RX ADMIN — CEFAZOLIN 2 G: 1 INJECTION, POWDER, FOR SOLUTION INTRAVENOUS at 09:45

## 2020-12-27 RX ADMIN — SENNOSIDES-DOCUSATE SODIUM TAB 8.6-50 MG 2 TABLET: 8.6-5 TAB at 17:47

## 2020-12-27 RX ADMIN — DIPHENHYDRAMINE HYDROCHLORIDE 12.5 MG: 50 INJECTION, SOLUTION INTRAMUSCULAR; INTRAVENOUS at 10:31

## 2020-12-27 RX ADMIN — DEXAMETHASONE SODIUM PHOSPHATE 8 MG: 4 INJECTION, SOLUTION INTRAMUSCULAR; INTRAVENOUS at 09:45

## 2020-12-27 RX ADMIN — PROPOFOL 150 MG: 10 INJECTION, EMULSION INTRAVENOUS at 10:00

## 2020-12-27 RX ADMIN — POTASSIUM CHLORIDE AND SODIUM CHLORIDE: 900; 150 INJECTION, SOLUTION INTRAVENOUS at 17:50

## 2020-12-27 RX ADMIN — OXYCODONE HYDROCHLORIDE 5 MG: 5 TABLET ORAL at 17:47

## 2020-12-27 RX ADMIN — OXYCODONE HYDROCHLORIDE 5 MG: 5 TABLET ORAL at 21:10

## 2020-12-27 RX ADMIN — HALOPERIDOL LACTATE 1 MG: 5 INJECTION, SOLUTION INTRAMUSCULAR at 10:31

## 2020-12-27 RX ADMIN — HYDROMORPHONE HYDROCHLORIDE 0.5 MG: 1 INJECTION, SOLUTION INTRAMUSCULAR; INTRAVENOUS; SUBCUTANEOUS at 06:20

## 2020-12-27 ASSESSMENT — ENCOUNTER SYMPTOMS
DIZZINESS: 0
DEPRESSION: 0
NAUSEA: 1
MYALGIAS: 0
SHORTNESS OF BREATH: 0
BLURRED VISION: 0
ABDOMINAL PAIN: 0
PALPITATIONS: 0
SORE THROAT: 0
COUGH: 0
FEVER: 0
FLANK PAIN: 1
VOMITING: 0
NERVOUS/ANXIOUS: 0

## 2020-12-27 ASSESSMENT — PAIN DESCRIPTION - PAIN TYPE
TYPE: ACUTE PAIN;SURGICAL PAIN
TYPE: SURGICAL PAIN
TYPE: ACUTE PAIN

## 2020-12-27 ASSESSMENT — PAIN SCALES - GENERAL: PAIN_LEVEL: 1

## 2020-12-27 NOTE — OR SURGEON
Immediate Post OP Note    PreOp Diagnosis: Left flank pain                                 Left steinstrasse    PostOp Diagnosis: Left flank pain                                  Left distal ureteral obstruction                                   Left nephrolithiasis    Procedure(s):  CYSTOSCOPY  LEFT SEMIRIGID AND FLEXIBLE URETEROSCOPY WITH STONE BASEKETING  LEFT URETERAL STENT INSERTION 6 Bulgarian x 24cm on a string    Surgeon(s):  Sky Santos M.D.    Anesthesiologist/Type of Anesthesia:  Anesthesiologist: Shahram Gomes M.D.  Anesthesia Technician: Juni Keenan/* No anesthesia type entered *    Surgical Staff:  Circulator: Susan Adams R.N.  Scrub Person: Brett Marie  Radiology Technologist: Shauna Ramirez    Specimens removed if any:  Left renal stone fragments    Estimated Blood Loss: N/A    Findings: Obstruction of distal left ureter with passed steinstrasse and several 2-3 mm stones in the lower pole of kidney with clot removed    Complications: None        12/27/2020 10:56 AM Sky Santos M.D.

## 2020-12-27 NOTE — ASSESSMENT & PLAN NOTE
Recurrent renal colic, recently underwent cystoscopy with left ureteral stent, returned with recurrent symptoms, N/V   Repeat scan showed left hydronephrosis with 7 x 3 mm stone  S/p repeat cystoscopy, stone removal and stent placement   Urine negative for infection  Urology following, appreciate help with management, plan for stent removal prior to dc home tomorrow     Supportive care with pain control, anti-emetics   Start IV Toradol to help with discomfort and inflammation

## 2020-12-27 NOTE — PROGRESS NOTES
50 year old status post left CULTS 12/24 now with left steinstrasse. Patient has severe left flank pain with nausea and we discussed the options of care. She wishes to proceed with cystoscopy, left ueteroscopy with laser lithotripsy and possible left stent. All risks and benefits discussed and informed consent has been given to me to proceed.

## 2020-12-27 NOTE — OP REPORT
DATE OF SERVICE:  12/27/2020     PREOPERATIVE DIAGNOSES:  1.  Left flank pain.  2.  Left distal steinstrasse with 7 mm of stone burden.  3.  Intrarenal left calculi, status post prior ureteroscopy with laser   lithotripsy.     POSTOPERATIVE DIAGNOSES:  1.  Left flank pain.  2.  Left ureteral obstruction with past steinstrasse.  3.  Left intrarenal stone burden.     PROCEDURES PERFORMED:  1.  Rigid cystourethroscopy.  2.  Left semi-rigid and flexible ureteroscopy with stone basketing and   tingling of intrarenal blood clots and stone fragments.  3.  Left 6-Arabic x 24 cm Arabic stent placement on a string.     SURGEON:  Sky Santos MD     ANESTHESIA:  General laryngeal mask.     ANESTHESIOLOGIST:  Shahram Gomes MD     DRAINS:  A #6-Sinhala x 24 cm double-J stent in the left ureter with string.     INDICATIONS:  The patient is a patient with a complex stone history.  She   underwent a stent placement several weeks ago, had intractable pain.  Stent   was removed.  She proceeded to undergo on 12/24/2020, a cystoscopy,   ureteroscopy, and laser lithotripsy.  Stent was not positioned.  The patient   did well until acute onset in the evening of 12/26/2020 of left flank pain   with nausea and vomiting.  She presented to the emergency room to [facility] and was found to have a steinstrasse measuring 7x3 mm and enlarged   stone of 5-6 mm in the lower pole of the left kidney.  The patient persisted   to have pain despite hydration and I discussed with the patient the treatment   options and recommended cystoscopy, left ureteroscopy, laser lithotripsy of   the steinstrasse with possible removal of intrarenal stones as well and stent   placement.  I explained to the patient that there is a high likelihood of the   need for a stent.  I would put it on the string as she likely has significant   edema due to multiple procedures recently.  Prior to surgery, we discussed the   risks of the procedure including, but not  limited to risk of perioperative   urinary tract infection, urosepsis, risk of ureteral perforation in 1 in 1000   cases with the potential risk for stricture.  She is aware that failure to   treat the stone could lead to stricture as well.  I have also discussed that   with the stent placement, there is a risk of migration requiring general   anesthetic to remove it, risk of associated urgency, frequency and flank pain.    We also discussed the associated dysuria and hematuria and failure to follow   through its removal can result in significant stone burden and loss of renal   function.  In addition, we discussed the perioperative risk of deep vein   thrombosis, pulmonary embolism, aspiration pneumonia, heart attack, stroke and   death.  Informed consent was given to me by the patient to proceed and she   was marked on her left thigh with my initials DH and Y for safe site surgery.     DESCRIPTION OF PROCEDURE:  After informed consent was obtained, the patient   was brought to the operating room and placed supine.  Bilateral sequential   compression devices were in place and operational and a general laryngeal mask   anesthetic administered in balanced fashion by Dr. Shahram Gomes, and she   was positioned in modified lithotomy with the operative area was Betadine   prepped and draped in the usual sterile fashion.  Surgical timeout was called.    All members of the operative team agreed as the patient's name, procedure to   be performed without objections, attention was directed to the procedure.     Fluoroscopy was brought in.  Fluoroscopic examination did not show any obvious   stones.  I advanced a 21-Serbian rigid cystoscope per urethra.  When I was in   the bladder, I was able to identify the right ureteral orifice.  It was normal   orthotopic, had clear efflux.  The left ureter had significant edema.  No   efflux is noted at a raised ureteral orifice.  No stone was seen in the   bladder.  No tumors or  diverticula were seen.  At this point in time, I   attempted to cannulate the left ureteral orifice, but due to the angle, I   could not do this, so I switched to a semi-rigid ureteroscope and gently   advanced it into the left distal ureter.  Once I got into the ureter, it was   clear that the stones had passed.  The patient had a high-grade obstruction   and had significant hydroureter above the distal ureter.  I advanced the scope   into the mid ureter and then put a safety wire up into the kidney.  Because   of the stone burden in the left kidney and there were multiple   hospitalizations requiring multiple procedures, I elected to go ahead since I   had good access to proceed with flexible ureteroscopy.  A ureteral access   sheath was advanced over the wire to the mid ureter and I advanced the scope   into the kidney.  Utilizing a basket, I removed a significant amount of clot   and three stones, the largest of which was approximately 4-5 mm and will be   submitted for chemical composition analysis.  Once I had completed the removal   of stone fragments in the kidney, serial evaluation of the upper, mid and   lower pole anatomy showed really some minimal amount of clot.  No significant   stone burden.  I then visualized the ureter, which was normal except for the   distal ureter, which was highly edematous, so I left a safety wire in place   and elected to position a stent given the amount of edema in the distal   ureter.  The patient clearly had passed the stones in the steinstrasse, but   had a high-grade obstruction.  Subsequently, a 6-Saudi Arabian x 24 cm double-J stent   was passed up to the left kidney.  When I withdrew the wire, there was a good   coil in the kidney and good coil in the bladder.  The eyelets were seen   draining under high pressure and the string is brought out and taped to her   left inner thigh with benzoin and Steri-Strips.  At the end of the case, she   had tolerated the procedure well, no  complication, was awakened in the   operating room and transferred to the recovery room where she arrived in   stable condition.        ______________________________  MD LILI Pierce/YORDAN    DD:  12/27/2020 13:46  DT:  12/27/2020 15:41    Job#:  [id number]

## 2020-12-27 NOTE — PROGRESS NOTES
Hospital Medicine Daily Progress Note    Date of Service  12/27/2020    Chief Complaint  50 y.o. female with recent left renal stone s/p stenting admitted 12/26/2020 with recurrent left flank     Hospital Course  Mrs. Larsen is a very pleasant 49 yo female with no significant past medical history who was recently admitted with left flank pain found to have a 9 mm stone that required cystoscopy with uretural stent placement. She represents 12/26 with recurrent symptoms associated with nausea and vomiting. CT was done and showed mild to moderate left sided hydronephrosis with multiple kidney stones measuring up to 6 mm.   Her vitals were stable. Labs were significant for WBC 18.7 and CHRISTINE with BUN/Cr 17/1.28. UA revealed small occult blood with Neg LE and nitrites.   Urology was consulted and patient underwent cystoscopy with stone removal and left ureteral stent insertion.      Interval Problem Update  Patient seen and examined at bedside, still with flank pain on left but more tolerable with pain medications   - urology consulted, to OR  for cystoscopy and left stent placement and clot removal.   - labs unremarkable today, WBC likely reactive, no abx therapy needed at this time   - supportive care with pain control and IVF   - monitor overnight, if improved plan to dc  Home tomorrow     Consultants/Specialty  Urology    Code Status  Full Code    Disposition  Home when medically clear, anticipate 12/28    Review of Systems  Review of Systems   Constitutional: Negative for fever.   HENT: Negative for congestion and sore throat.    Eyes: Negative for blurred vision.   Respiratory: Negative for cough and shortness of breath.    Cardiovascular: Negative for chest pain, palpitations and leg swelling.   Gastrointestinal: Positive for nausea. Negative for abdominal pain and vomiting.   Genitourinary: Positive for flank pain and urgency. Negative for dysuria.   Musculoskeletal: Negative for myalgias.   Neurological: Negative  for dizziness.   Psychiatric/Behavioral: Negative for depression. The patient is not nervous/anxious.         Physical Exam  Temp:  [36 °C (96.8 °F)-37.2 °C (99 °F)] 36.4 °C (97.5 °F)  Pulse:  [70-99] 77  Resp:  [15-22] 15  BP: (120-155)/(71-93) 134/74  SpO2:  [91 %-100 %] 97 %    Physical Exam  Constitutional:       General: She is not in acute distress.     Appearance: Normal appearance. She is normal weight.   HENT:      Head: Normocephalic and atraumatic.      Mouth/Throat:      Mouth: Mucous membranes are moist.      Pharynx: Oropharynx is clear.   Eyes:      Extraocular Movements: Extraocular movements intact.      Pupils: Pupils are equal, round, and reactive to light.   Neck:      Musculoskeletal: Normal range of motion.   Cardiovascular:      Rate and Rhythm: Normal rate and regular rhythm.      Heart sounds: No murmur.   Pulmonary:      Effort: Pulmonary effort is normal.      Breath sounds: Normal breath sounds.   Abdominal:      General: Bowel sounds are normal. There is no distension.      Palpations: Abdomen is soft.      Tenderness: There is abdominal tenderness. There is left CVA tenderness.   Musculoskeletal: Normal range of motion.   Skin:     General: Skin is warm and dry.      Capillary Refill: Capillary refill takes less than 2 seconds.   Neurological:      General: No focal deficit present.      Mental Status: She is alert and oriented to person, place, and time. Mental status is at baseline.   Psychiatric:         Mood and Affect: Mood normal.         Behavior: Behavior normal.         Fluids    Intake/Output Summary (Last 24 hours) at 12/27/2020 1351  Last data filed at 12/27/2020 1022  Gross per 24 hour   Intake 375 ml   Output 1155 ml   Net -780 ml       Laboratory  Recent Labs     12/26/20  1520 12/27/20  0641   WBC 18.7* 9.9   RBC 4.91 4.70   HEMOGLOBIN 14.4 13.5   HEMATOCRIT 42.6 42.0   MCV 86.8 89.4   MCH 29.3 28.7   MCHC 33.8 32.1*   RDW 41.8 44.1   PLATELETCT 357 324   MPV 9.2 9.1      Recent Labs     12/26/20  1520 12/27/20  0641   SODIUM 138 141   POTASSIUM 3.7 4.0   CHLORIDE 104 106   CO2 21 23   GLUCOSE 117* 109*   BUN 17 11   CREATININE 1.28 0.81   CALCIUM 9.5 9.3                   Imaging  [id number] VIEW   Final Result      Intraoperative fluoroscopy spot images as described above.      CT-ABDOMEN-PELVIS W/O   Final Result      1.  Mild to moderate left-sided hydronephrosis extending to a left lower ureteral stone present just above the ureterovesical junction. This measures 7 x 3 mm in size.      2.  Multiple left renal calyceal stones which measure up to 6 mm in size.      3.  Likely gallstones within the gallbladder.      DX-PORTABLE FLUOROSCOPY < 1 HOUR Is the patient pregnant? No    (Results Pending)        Assessment/Plan  * Renal colic  Assessment & Plan  Recurrent renal colic, recently underwent cystoscopy with left ureteral stent, returned with recurrent symptoms, N/V   Repeat scan showed left hydronephrosis with 7 x 3 mm stone  Urine negative for infection  Urology consulted, OR today to cystoscopy, appreciate help with management    Supportive care with pain control, anti-emetics and IV fluids    Leukocytosis  Assessment & Plan  Leukocytosis on admission, Likely reactive, now normalized  UA negative for infection  No indication for antibiotics at this time, will monitor for signs of infection        VTE prophylaxis: lovenox

## 2020-12-27 NOTE — ANESTHESIA PROCEDURE NOTES
Airway    Date/Time: 12/27/2020 10:00 AM  Performed by: Shahram Gomes M.D.  Authorized by: Shahram Gomes M.D.     Location:  OR  Urgency:  Elective  Indications for Airway Management:  Anesthesia      Spontaneous Ventilation: absent    Sedation Level:  Deep  Preoxygenated: Yes    Final Airway Type:  Supraglottic airway  Final Supraglottic Airway:  Standard LMA    SGA Size:  4  Number of Attempts at Approach:  1

## 2020-12-27 NOTE — PROGRESS NOTES
Med rec updated and complete  Allergies reviewed  Pt reports that she took some of her medications yesterday, but threw them all up.

## 2020-12-27 NOTE — ANESTHESIA PREPROCEDURE EVALUATION
Relevant Problems   NEURO   (+) History of vertigo      GI   (+) GERD (gastroesophageal reflux disease)         (+) Nephrolithiasis       Physical Exam    Airway   Mallampati: II  TM distance: >3 FB  Neck ROM: full       Cardiovascular - normal exam  Rhythm: regular  Rate: normal  (-) murmur     Dental - normal exam           Pulmonary - normal exam  Breath sounds clear to auscultation     Abdominal    Neurological - normal exam                 Anesthesia Plan    ASA 2- EMERGENT   ASA physical status emergent criteria: acute deteriorating condition due to infection and compromised vital organ, limb or tissue    Plan - general       Airway plan will be LMA        Induction: intravenous    Postoperative Plan: Postoperative administration of opioids is intended.    Pertinent diagnostic labs and testing reviewed    Informed Consent:    Anesthetic plan and risks discussed with patient.    Use of blood products discussed with: patient whom consented to blood products.

## 2020-12-27 NOTE — ASSESSMENT & PLAN NOTE
Leukocytosis on admission, Likely reactive, now normalized  UA negative for infection  No indication for antibiotics at this time, will monitor for signs of infection

## 2020-12-27 NOTE — OR NURSING
1100: Patient arrived from OR via bed. Oral airway in place with 5L simple mask. Patient sleeping. VSS. Stent in place. Stent string secured with steristrip.   1101: Oral airway d/c'd.   1110: Patient rouses, but drifts back to sleep. Denies pain and nausea at this time.   1120: Transitioned to room air at this time. Denies pain and nausea. Family updated via telephone.   1135: Placed on 2L NC for sat ranging from 89-91%  1140: Patient drowsy but rouses and communicates appropriately. VSS on 2L NC. Denies pain and nausea at this time. Appropriate for transfer back to floor.   1142: Report given to EDVIN Kapoor   1154: Patient leaving PACU with transport. No changes to surgical site. Patient resting comfortably, VSS. Denies pain and nausea.

## 2020-12-27 NOTE — ANESTHESIA TIME REPORT
Anesthesia Start and Stop Event Times     Date Time Event    12/27/2020 0945 Anesthesia Start     0952 Ready for Procedure     1113 Anesthesia Stop        Responsible Staff  12/27/20    Name Role Begin End    Shahram Gomes M.D. Anesth 0945 1113        Preop Diagnosis (Free Text):  Pre-op Diagnosis     Steinstrasse        Preop Diagnosis (Codes):    Post op Diagnosis  Obstructive uropathy      Premium Reason  E. Weekend    Comments: emergency

## 2020-12-27 NOTE — PROGRESS NOTES
Patient ambulated to the bathroom. Patient urinated 300 mls of yellow urine. No hematuria or pain with urination noted. Tiny fragments of stone noted at this time post void. Urinary strainer received and placed in bathroom at this time.

## 2020-12-27 NOTE — H&P
Hospital Medicine History & Physical Note    Date of Service  12/26/2020    Primary Care Physician  Sade Kiran D.O.    Consultants  Urology    Code Status  Full Code    Chief Complaint  Chief Complaint   Patient presents with   • Flank Pain     RT severe since 1000am Pt S/P laser litho for renal calc 12/24 by Dr Rosenthal No fever Home meds not working       History of Presenting Illness  50 y.o. female with past medical history of hypertension, marijuana use, UTI, left ureteral stent placement on 12/24 by Dr. Rosenthal, who presented 12/26/2020 with complaints of worsening of left flank pain since this morning, nausea, vomiting.  Her oxycodone that was prescribed is not working.  She was also prescribed ciprofloxacin.  Patient was evaluated in ER with CT without contrast.  There is mild to moderate left-sided hydronephrosis, multiple kidney stones up to 6 mm.  ERP consulted with urology, Dr. Santos.  Pending possible cystoscopy, n.p.o.  Review of Systems  Review of Systems   Constitutional: Negative for chills, fever and weight loss.   HENT: Negative for ear pain, hearing loss and tinnitus.    Eyes: Negative for blurred vision, double vision and photophobia.   Respiratory: Negative for cough, hemoptysis and sputum production.    Cardiovascular: Negative for chest pain, palpitations and orthopnea.   Gastrointestinal: Positive for abdominal pain, nausea and vomiting. Negative for constipation, diarrhea and heartburn.   Genitourinary: Positive for flank pain. Negative for dysuria, frequency and hematuria.   Musculoskeletal: Negative for back pain, joint pain and neck pain.   Skin: Negative for itching and rash.   Neurological: Negative for tremors, speech change, focal weakness and headaches.   Endo/Heme/Allergies: Negative for environmental allergies and polydipsia. Does not bruise/bleed easily.   Psychiatric/Behavioral: Negative for hallucinations and substance abuse. The patient is not nervous/anxious.        Past  Medical History   has a past medical history of Anesthesia, Bleeding from the nose, Bowel habit changes, Dental disorder, Hay fever, Heart burn, Hemorrhoids, Hypertension, Indigestion, Marijuana use (12/20/2020), Measles, Migraine, Pain, PONV (postoperative nausea and vomiting) (07/29/2019), Urinary tract infection (1994), and Venereal disease.    Surgical History   has a past surgical history that includes lumpectomy (Left, 02/1988); gyn surgery; laparoscopy (04/1987); hysteroscopy with video operative (N/A, 7/29/2019); dilation and curettage (N/A, 7/29/2019); pr cystoscopy,insert ureteral stent (Left, 12/11/2020); pr cystoscopy,insert ureteral stent (N/A, 12/20/2020); pr cystoscopy,insert ureteral stent (Left, 12/24/2020); pr cysto/uretero/pyeloscopy, dx (Left, 12/24/2020); and lasertripsy (Left, 12/24/2020).     Family History  family history includes Heart Disease in her maternal grandmother; Hypertension in her mother; Lung Disease in her maternal uncle and mother.     Social History   reports that she quit smoking about 8 years ago. She has a 31.00 pack-year smoking history. She has never used smokeless tobacco. She reports current alcohol use. She reports current drug use. Frequency: 7.00 times per week. Drugs: Marijuana and Inhaled.    Allergies  Allergies   Allergen Reactions   • Hydrocodone Nausea     Nightmares & Nausea     • Codeine Vomiting and Nausea     Vomiting    • Other Food Vomiting     Grape juice            • Penicillins Vomiting     Vomiting        Medications  Prior to Admission Medications   Prescriptions Last Dose Informant Patient Reported? Taking?   FIBER ADULT GUMMIES PO  Patient Yes No   Sig: Take 2 Tabs by mouth every morning.   HYDROcodone-acetaminophen (NORCO) 5-325 MG Tab per tablet   No No   Sig: Take 1 Tab by mouth every four hours as needed for up to 5 days.   Multiple Vitamins-Calcium (ONE-A-DAY WOMENS FORMULA PO)  Patient Yes No   Sig: Take 1 Tab by mouth every day. Gummy    Probiotic Product (PROBIOTIC-10 PO)  Patient Yes No   Sig: Take 1 Tab by mouth every morning. Gummy   acetaminophen (TYLENOL) 500 MG Tab   Yes No   Sig: Take 500 mg by mouth every 6 hours as needed.   ciprofloxacin (CIPRO) 500 MG Tab   No No   Sig: Take 1 Tab by mouth 2 times a day for 3 days.   famotidine (PEPCID) 20 MG Tab  Patient No No   Sig: Take 1 Tab by mouth 2 times a day as needed (Heartburn).   oxyCODONE immediate-release (ROXICODONE) 5 MG Tab  Patient No No   Sig: Take 1 Tab by mouth every four hours as needed for Severe Pain for up to 10 days.   oxybutynin (DITROPAN) 5 MG Tab  Patient No No   Sig: Take 1 Tab by mouth 3 times a day as needed (Bladder irritation).   Patient taking differently: Take 5 mg by mouth 3 times a day. Bladder irritation   phenazopyridine (PYRIDIUM) 200 MG Tab  Patient No No   Sig: Take 1 Tab by mouth 3 times a day as needed (bladder irritation).      Facility-Administered Medications: None       Physical Exam  Temp:  [36.5 °C (97.7 °F)] 36.5 °C (97.7 °F)  Pulse:  [77] 77  Resp:  [22] 22  BP: (138)/(82) 138/82  SpO2:  [96 %] 96 %    Physical Exam  Vitals signs and nursing note reviewed.   Constitutional:       General: She is not in acute distress.     Appearance: Normal appearance.   HENT:      Head: Normocephalic and atraumatic.      Nose: Nose normal.      Mouth/Throat:      Mouth: Mucous membranes are moist.   Eyes:      Extraocular Movements: Extraocular movements intact.      Pupils: Pupils are equal, round, and reactive to light.   Neck:      Musculoskeletal: Normal range of motion and neck supple.   Cardiovascular:      Rate and Rhythm: Normal rate and regular rhythm.   Pulmonary:      Effort: Pulmonary effort is normal.      Breath sounds: Normal breath sounds.   Abdominal:      General: Abdomen is flat. There is no distension.      Tenderness: There is no abdominal tenderness. There is left CVA tenderness.   Musculoskeletal: Normal range of motion.         General: No  swelling or deformity.   Skin:     General: Skin is warm and dry.   Neurological:      General: No focal deficit present.      Mental Status: She is alert and oriented to person, place, and time.   Psychiatric:         Mood and Affect: Mood normal.         Behavior: Behavior normal.         Laboratory:  Recent Labs     12/26/20  1520   WBC 18.7*   RBC 4.91   HEMOGLOBIN 14.4   HEMATOCRIT 42.6   MCV 86.8   MCH 29.3   MCHC 33.8   RDW 41.8   PLATELETCT 357   MPV 9.2     Recent Labs     12/26/20  1520   SODIUM 138   POTASSIUM 3.7   CHLORIDE 104   CO2 21   GLUCOSE 117*   BUN 17   CREATININE 1.28   CALCIUM 9.5     Recent Labs     12/26/20  1520   ALTSGPT 11   ASTSGOT 13   ALKPHOSPHAT 91   TBILIRUBIN 0.8   LIPASE 15   GLUCOSE 117*         No results for input(s): NTPROBNP in the last 72 hours.      No results for input(s): TROPONINT in the last 72 hours.    Imaging:  CT-ABDOMEN-PELVIS W/O   Final Result      1.  Mild to moderate left-sided hydronephrosis extending to a left lower ureteral stone present just above the ureterovesical junction. This measures 7 x 3 mm in size.      2.  Multiple left renal calyceal stones which measure up to 6 mm in size.      3.  Likely gallstones within the gallbladder.            Assessment/Plan:  I anticipate this patient is appropriate for observation status at this time.    Renal colic  Assessment & Plan  CT abdomen without contrast:   1.  Mild to moderate left-sided hydronephrosis extending to a left lower ureteral stone present just above the ureterovesical junction. This measures 7 x 3 mm in size.      2.  Multiple left renal calyceal stones which measure up to 6 mm in size.   Urine negative for infection  Urology consulted  Pain control, IV fluid, n.p.o. for cystoscopy    Pain control    Leukocytosis  Assessment & Plan  UA negative for infection  Possibly reactive  Repeat CBC tomorrow

## 2020-12-27 NOTE — ANESTHESIA POSTPROCEDURE EVALUATION
Patient: Michael Larsen    Procedure Summary     Date: 12/27/20 Room / Location:  OR  / SURGERY [facility]    Anesthesia Start: 0945 Anesthesia Stop: 1113    Procedures:       CYSTOSCOPY, WITH URETERAL STENT INSERTION (Bladder)      LEFT URETEROSCOPY WITH STONE BASKETING (Left Ureter) Diagnosis: (Steinstrasse)    Surgeons: Sky Santos M.D. Responsible Provider: Shahram Gomes M.D.    Anesthesia Type: general ASA Status: 2 - Emergent          Final Anesthesia Type: general  Last vitals  BP   Blood Pressure: 141/71    Temp   36 °C (96.8 °F)    Pulse   Pulse: 92   Resp   16    SpO2   100 %      Anesthesia Post Evaluation    Patient location during evaluation: PACU  Patient participation: complete - patient participated  Level of consciousness: awake and alert  Pain score: 1    Airway patency: patent  Anesthetic complications: no  Cardiovascular status: hemodynamically stable  Respiratory status: acceptable  Hydration status: euvolemic    PONV: none           Nurse Pain Score: 0 (NPRS)

## 2020-12-27 NOTE — PROGRESS NOTES
1950: Received report from ER RN. Patient to arrived to floor shortly.     2000: Patient arrived to floor via gurney with son and bedside. Patient able to scoot herself into bed. Patient is mildly groggy at this time. No reports of pain, N/V, chest pain or SOB. VSS and patient A&Ox4. IV patent and to run IV fluids. Plan of care reviewed with patient in regards to IV fluids, NPO status and possible procedure tomorrow with urology consult. Will continue to monitor patient hourly at this time. Bed locked and in lowest position with call light within reach.

## 2020-12-27 NOTE — CARE PLAN
Problem: Communication  Goal: The ability to communicate needs accurately and effectively will improve  Outcome: PROGRESSING AS EXPECTED     Problem: Safety  Goal: Will remain free from injury  Outcome: PROGRESSING AS EXPECTED     Problem: Knowledge Deficit  Goal: Knowledge of disease process/condition, treatment plan, diagnostic tests, and medications will improve  Outcome: PROGRESSING AS EXPECTED     Problem: Pain Management  Goal: Pain level will decrease to patient's comfort goal  Outcome: PROGRESSING SLOWER THAN EXPECTED

## 2020-12-27 NOTE — PROGRESS NOTES
1141: Report received from PACU RN.   1200: Pt back to floor with transport. Pt is resting in bed, VSS, 2L via NC 99%. Reports 3/10 but denies need for pain medication, just rest at this time. No further needs at this time. Rounding in place.

## 2020-12-28 LAB
ANION GAP SERPL CALC-SCNC: 11 MMOL/L (ref 7–16)
BUN SERPL-MCNC: 10 MG/DL (ref 8–22)
CALCIUM SERPL-MCNC: 9.2 MG/DL (ref 8.4–10.2)
CHLORIDE SERPL-SCNC: 104 MMOL/L (ref 96–112)
CO2 SERPL-SCNC: 24 MMOL/L (ref 20–33)
CREAT SERPL-MCNC: 0.65 MG/DL (ref 0.5–1.4)
GLUCOSE SERPL-MCNC: 95 MG/DL (ref 65–99)
POTASSIUM SERPL-SCNC: 3.7 MMOL/L (ref 3.6–5.5)
SODIUM SERPL-SCNC: 139 MMOL/L (ref 135–145)

## 2020-12-28 PROCEDURE — 36415 COLL VENOUS BLD VENIPUNCTURE: CPT

## 2020-12-28 PROCEDURE — 700101 HCHG RX REV CODE 250: Performed by: INTERNAL MEDICINE

## 2020-12-28 PROCEDURE — 700111 HCHG RX REV CODE 636 W/ 250 OVERRIDE (IP): Performed by: INTERNAL MEDICINE

## 2020-12-28 PROCEDURE — 700102 HCHG RX REV CODE 250 W/ 637 OVERRIDE(OP): Performed by: INTERNAL MEDICINE

## 2020-12-28 PROCEDURE — 96376 TX/PRO/DX INJ SAME DRUG ADON: CPT

## 2020-12-28 PROCEDURE — 99225 PR SUBSEQUENT OBSERVATION CARE,LEVEL II: CPT | Performed by: STUDENT IN AN ORGANIZED HEALTH CARE EDUCATION/TRAINING PROGRAM

## 2020-12-28 PROCEDURE — A9270 NON-COVERED ITEM OR SERVICE: HCPCS | Performed by: INTERNAL MEDICINE

## 2020-12-28 PROCEDURE — 96372 THER/PROPH/DIAG INJ SC/IM: CPT

## 2020-12-28 PROCEDURE — 80048 BASIC METABOLIC PNL TOTAL CA: CPT

## 2020-12-28 PROCEDURE — 700102 HCHG RX REV CODE 250 W/ 637 OVERRIDE(OP): Performed by: STUDENT IN AN ORGANIZED HEALTH CARE EDUCATION/TRAINING PROGRAM

## 2020-12-28 PROCEDURE — G0378 HOSPITAL OBSERVATION PER HR: HCPCS

## 2020-12-28 PROCEDURE — A9270 NON-COVERED ITEM OR SERVICE: HCPCS | Performed by: STUDENT IN AN ORGANIZED HEALTH CARE EDUCATION/TRAINING PROGRAM

## 2020-12-28 RX ORDER — OMEPRAZOLE 20 MG/1
20 CAPSULE, DELAYED RELEASE ORAL ONCE
Status: COMPLETED | OUTPATIENT
Start: 2020-12-28 | End: 2020-12-28

## 2020-12-28 RX ORDER — KETOROLAC TROMETHAMINE 30 MG/ML
30 INJECTION, SOLUTION INTRAMUSCULAR; INTRAVENOUS EVERY 6 HOURS PRN
Status: DISCONTINUED | OUTPATIENT
Start: 2020-12-28 | End: 2020-12-29 | Stop reason: HOSPADM

## 2020-12-28 RX ORDER — FAMOTIDINE 20 MG/1
20 TABLET, FILM COATED ORAL 2 TIMES DAILY
Status: DISCONTINUED | OUTPATIENT
Start: 2020-12-28 | End: 2020-12-29 | Stop reason: HOSPADM

## 2020-12-28 RX ORDER — LABETALOL HYDROCHLORIDE 5 MG/ML
10 INJECTION, SOLUTION INTRAVENOUS EVERY 4 HOURS PRN
Status: DISCONTINUED | OUTPATIENT
Start: 2020-12-28 | End: 2020-12-29 | Stop reason: HOSPADM

## 2020-12-28 RX ADMIN — ONDANSETRON 4 MG: 2 INJECTION INTRAMUSCULAR; INTRAVENOUS at 14:36

## 2020-12-28 RX ADMIN — FAMOTIDINE 20 MG: 20 TABLET, FILM COATED ORAL at 22:37

## 2020-12-28 RX ADMIN — OMEPRAZOLE 20 MG: 20 CAPSULE, DELAYED RELEASE ORAL at 22:37

## 2020-12-28 RX ADMIN — OXYCODONE HYDROCHLORIDE 10 MG: 10 TABLET ORAL at 05:22

## 2020-12-28 RX ADMIN — POTASSIUM CHLORIDE AND SODIUM CHLORIDE: 900; 150 INJECTION, SOLUTION INTRAVENOUS at 14:39

## 2020-12-28 RX ADMIN — SENNOSIDES-DOCUSATE SODIUM TAB 8.6-50 MG 2 TABLET: 8.6-5 TAB at 17:53

## 2020-12-28 RX ADMIN — ENOXAPARIN SODIUM 40 MG: 40 INJECTION SUBCUTANEOUS at 05:22

## 2020-12-28 RX ADMIN — POTASSIUM CHLORIDE AND SODIUM CHLORIDE: 900; 150 INJECTION, SOLUTION INTRAVENOUS at 05:21

## 2020-12-28 RX ADMIN — OXYCODONE HYDROCHLORIDE 5 MG: 5 TABLET ORAL at 17:53

## 2020-12-28 RX ADMIN — OXYCODONE HYDROCHLORIDE 10 MG: 10 TABLET ORAL at 14:37

## 2020-12-28 RX ADMIN — OXYCODONE HYDROCHLORIDE 10 MG: 10 TABLET ORAL at 01:22

## 2020-12-28 RX ADMIN — ONDANSETRON 4 MG: 2 INJECTION INTRAMUSCULAR; INTRAVENOUS at 20:08

## 2020-12-28 RX ADMIN — OXYCODONE HYDROCHLORIDE 10 MG: 10 TABLET ORAL at 10:23

## 2020-12-28 RX ADMIN — ONDANSETRON 4 MG: 2 INJECTION INTRAMUSCULAR; INTRAVENOUS at 08:02

## 2020-12-28 RX ADMIN — SENNOSIDES-DOCUSATE SODIUM TAB 8.6-50 MG 2 TABLET: 8.6-5 TAB at 05:22

## 2020-12-28 ASSESSMENT — ENCOUNTER SYMPTOMS
DIZZINESS: 0
MYALGIAS: 0
NERVOUS/ANXIOUS: 0
NAUSEA: 1
VOMITING: 0
FLANK PAIN: 1
SHORTNESS OF BREATH: 0
FEVER: 0
DEPRESSION: 0
COUGH: 0
BLURRED VISION: 0
SORE THROAT: 0
PALPITATIONS: 0
ABDOMINAL PAIN: 0

## 2020-12-28 ASSESSMENT — PAIN DESCRIPTION - PAIN TYPE
TYPE: SURGICAL PAIN
TYPE: SURGICAL PAIN;ACUTE PAIN
TYPE: SURGICAL PAIN
TYPE: ACUTE PAIN;SURGICAL PAIN
TYPE: SURGICAL PAIN
TYPE: SURGICAL PAIN;ACUTE PAIN
TYPE: SURGICAL PAIN

## 2020-12-28 NOTE — PROGRESS NOTES
Paged Dr. Fox, pt is requesting a diet order, pt cystoscopy complete, new orders received for a regular diet.

## 2020-12-28 NOTE — PROGRESS NOTES
"[facility] Progress Note    Service: [facility] [place]  Patient's Name: Michael Larsen  MRN: [id number]  Admit Date:12/26/2020  Today's Date: 12/28/2020   Room #: 2224/01      Identification:  50 y.o. female who is 1 Day Post-Op s/p L. CULTs with Dr. tristan following CULTs on 12/24 with post-op steinstrasse. Patient with left stent in place.     Subjective/ROS:   Patient reports that she is tolerating the stent well at this time. She is having L. Flank pain with urination and urgency likely related to stent irritation. Her Cr is improved at 0.81 (1.28). She is on a regular diet. She denies fever, chills, nausea, vomiting.     Physical Exam:  Current Vitals:   /85   Pulse 81   Temp 36.8 °C (98.2 °F) (Oral)   Resp 18   Ht 1.651 m (5' 5\")   Wt 81.6 kg (179 lb 14.3 oz)   LMP  (LMP Unknown)   SpO2 100%   BMI 29.94 kg/m²     12/26 1900 - 12/28 0659  In: 375 [I.V.:375]  Out: 3155 [Urine:3150]    GEN : NAD, A&O X4   RES:  no acute respiratory distress  ABD: Soft, non-distended, mild suprapubic TTP  :   No CVA TTP bilaterally.     Labs:   Recent Labs     12/26/20  1520 12/27/20  0641   SODIUM 138 141   POTASSIUM 3.7 4.0   CHLORIDE 104 106   CO2 21 23   GLUCOSE 117* 109*   BUN 17 11   CREATININE 1.28 0.81   CALCIUM 9.5 9.3     Recent Labs     12/26/20  1520 12/27/20  0641   WBC 18.7* 9.9   RBC 4.91 4.70   HEMOGLOBIN 14.4 13.5   HEMATOCRIT 42.6 42.0   MCV 86.8 89.4   MCH 29.3 28.7   MCHC 33.8 32.1*   RDW 41.8 44.1   PLATELETCT 357 324   MPV 9.2 9.1     Lab Results   Component Value Date/Time    GLUCOSE 109 (H) 12/27/2020 06:41 AM    GLUCOSE 117 (H) 12/26/2020 03:20 PM    GLUCOSE 105 (H) 12/19/2020 10:30 PM    GLUCOSE 79 12/14/2020 03:44 AM     Assessment/Plan  Michael Larsen is a 50 y.o. female 1 Day Post-Op  s/p L. CULTs with Dr. tristan following CULTs on 12/24 with post-op steinstrasse. Patient with left stent in place.     -Tolerating stent okay, patient with left flank pain with urination and urgency " likely related to stent irritation. Will plan for stent removal tomorrow 12/29 inpatient with likely sign off tomorrow.     -Pain control per medicine, no need for any anti-bladder spasmodics at this time given doing okay with stent.     -Patient will need outpatient follow up with [facility] in 2-3 weeks.     -Urology to see tomorrow to ensure patient is doing well with no new problems given re-hospitilization after last stone and frustration. I will pull stent tomorrow.     -No acute urologic intervention at this time.        Jem Chandler, P.A.-C.   [address] [facility] [address]  [place] [address]   [phone]

## 2020-12-28 NOTE — PROGRESS NOTES
0730: Received report from NOC RN. Pt is resting in bed, A+OX4 , showing no signs of distress.  Pt c/o nausea, see MAR. VSS. CMS intact. Call light and belongings at bedside and within reach. All questions answered at this time.

## 2020-12-28 NOTE — PROGRESS NOTES
Hospital Medicine Daily Progress Note    Date of Service  12/28/2020    Chief Complaint  50 y.o. female with recent left renal stone s/p stenting admitted 12/26/2020 with recurrent left flank     Hospital Course  Mrs. Larsen is a very pleasant 51 yo female with no significant past medical history who was recently admitted with left flank pain found to have a 9 mm stone that required cystoscopy with uretural stent placement. She represents 12/26 with recurrent symptoms associated with nausea and vomiting. CT was done and showed mild to moderate left sided hydronephrosis with multiple kidney stones measuring up to 6 mm.   Her vitals were stable. Labs were significant for WBC 18.7 and CHRISTINE with BUN/Cr 17/1.28. UA revealed small occult blood with Neg LE and nitrites.   Urology was consulted and patient underwent cystoscopy with stone removal and left ureteral stent insertion.      Interval Problem Update  Patient seen and examined at bedside, emotional this AM, very concerned about being discharged again as this is her 3rd visit.   - still with left flank pain, nausea and urinary urgency and frequency, likely from stent irritation  - afebrile, labs stable    - plan to remove stent tomorrow prior to dc home   - supportive care with pain control, anti-emetics, IV Toradol ordered to help with pain/inflammation     Consultants/Specialty  Urology    Code Status  Full Code    Disposition  Home tomorrow 12/29 after stent removal     Review of Systems  Review of Systems   Constitutional: Negative for fever.   HENT: Negative for congestion and sore throat.    Eyes: Negative for blurred vision.   Respiratory: Negative for cough and shortness of breath.    Cardiovascular: Negative for chest pain, palpitations and leg swelling.   Gastrointestinal: Positive for nausea. Negative for abdominal pain and vomiting.   Genitourinary: Positive for flank pain and urgency. Negative for dysuria.   Musculoskeletal: Negative for myalgias.    Neurological: Negative for dizziness.   Psychiatric/Behavioral: Negative for depression. The patient is not nervous/anxious.         Physical Exam  Temp:  [36.4 °C (97.5 °F)-37.1 °C (98.8 °F)] 36.8 °C (98.2 °F)  Pulse:  [] 81  Resp:  [15-20] 18  BP: (120-143)/(70-90) 129/85  SpO2:  [91 %-100 %] 100 %    Physical Exam  Constitutional:       General: She is not in acute distress.     Appearance: Normal appearance. She is normal weight.   HENT:      Head: Normocephalic and atraumatic.      Mouth/Throat:      Mouth: Mucous membranes are moist.      Pharynx: Oropharynx is clear.   Eyes:      Extraocular Movements: Extraocular movements intact.      Pupils: Pupils are equal, round, and reactive to light.   Neck:      Musculoskeletal: Normal range of motion.   Cardiovascular:      Rate and Rhythm: Normal rate and regular rhythm.      Heart sounds: No murmur.   Pulmonary:      Effort: Pulmonary effort is normal.      Breath sounds: Normal breath sounds.   Abdominal:      General: Bowel sounds are normal. There is no distension.      Palpations: Abdomen is soft.      Tenderness: There is abdominal tenderness. There is left CVA tenderness.   Musculoskeletal: Normal range of motion.   Skin:     General: Skin is warm and dry.      Capillary Refill: Capillary refill takes less than 2 seconds.   Neurological:      General: No focal deficit present.      Mental Status: She is alert and oriented to person, place, and time. Mental status is at baseline.   Psychiatric:         Mood and Affect: Mood normal.         Behavior: Behavior normal.         Fluids    Intake/Output Summary (Last 24 hours) at 12/28/2020 1113  Last data filed at 12/27/2020 2201  Gross per 24 hour   Intake --   Output 2000 ml   Net -2000 ml       Laboratory  Recent Labs     12/26/20  1520 12/27/20  0641   WBC 18.7* 9.9   RBC 4.91 4.70   HEMOGLOBIN 14.4 13.5   HEMATOCRIT 42.6 42.0   MCV 86.8 89.4   MCH 29.3 28.7   MCHC 33.8 32.1*   RDW 41.8 44.1    PLATELETCT 357 324   MPV 9.2 9.1     Recent Labs     12/26/20  1520 12/27/20  0641   SODIUM 138 141   POTASSIUM 3.7 4.0   CHLORIDE 104 106   CO2 21 23   GLUCOSE 117* 109*   BUN 17 11   CREATININE 1.28 0.81   CALCIUM 9.5 9.3                   Imaging  [id number] [id number]   Final Result      Intraoperative fluoroscopy spot images as described above.      DX-PORTABLE FLUOROSCOPY < 1 HOUR Is the patient pregnant? No   Final Result      Portable fluoroscopy utilized for 6.2 seconds.         INTERPRETING LOCATION: [address] [place] [address], [place], [address]      CT-ABDOMEN-PELVIS W/O   Final Result      1.  Mild to moderate left-sided hydronephrosis extending to a left lower ureteral stone present just above the ureterovesical junction. This measures 7 x 3 mm in size.      2.  Multiple left renal calyceal stones which measure up to 6 mm in size.      3.  Likely gallstones within the gallbladder.           Assessment/Plan  * Renal colic  Assessment & Plan  Recurrent renal colic, recently underwent cystoscopy with left ureteral stent, returned with recurrent symptoms, N/V   Repeat scan showed left hydronephrosis with 7 x 3 mm stone  S/p repeat cystoscopy, stone removal and stent placement   Urine negative for infection  Urology following, appreciate help with management, plan for stent removal prior to dc home tomorrow     Supportive care with pain control, anti-emetics   Start IV Toradol to help with discomfort and inflammation     Leukocytosis  Assessment & Plan  Leukocytosis on admission, Likely reactive, now normalized  UA negative for infection  No indication for antibiotics at this time, will monitor for signs of infection        VTE prophylaxis: lovenox

## 2020-12-29 VITALS
BODY MASS INDEX: 29.97 KG/M2 | TEMPERATURE: 97.6 F | HEIGHT: 65 IN | DIASTOLIC BLOOD PRESSURE: 90 MMHG | OXYGEN SATURATION: 97 % | RESPIRATION RATE: 18 BRPM | HEART RATE: 75 BPM | SYSTOLIC BLOOD PRESSURE: 136 MMHG | WEIGHT: 179.9 LBS

## 2020-12-29 LAB
APPEARANCE STONE: NORMAL
COMPN STONE: NORMAL
NUMBER STONE: 3
SIZE STONE: NORMAL MM
SPECIMEN WT: 10 MG

## 2020-12-29 PROCEDURE — 96375 TX/PRO/DX INJ NEW DRUG ADDON: CPT

## 2020-12-29 PROCEDURE — 700102 HCHG RX REV CODE 250 W/ 637 OVERRIDE(OP): Performed by: INTERNAL MEDICINE

## 2020-12-29 PROCEDURE — A9270 NON-COVERED ITEM OR SERVICE: HCPCS | Performed by: INTERNAL MEDICINE

## 2020-12-29 PROCEDURE — 700111 HCHG RX REV CODE 636 W/ 250 OVERRIDE (IP): Performed by: INTERNAL MEDICINE

## 2020-12-29 PROCEDURE — 700111 HCHG RX REV CODE 636 W/ 250 OVERRIDE (IP): Performed by: STUDENT IN AN ORGANIZED HEALTH CARE EDUCATION/TRAINING PROGRAM

## 2020-12-29 PROCEDURE — G0378 HOSPITAL OBSERVATION PER HR: HCPCS

## 2020-12-29 PROCEDURE — 96376 TX/PRO/DX INJ SAME DRUG ADON: CPT

## 2020-12-29 PROCEDURE — A9270 NON-COVERED ITEM OR SERVICE: HCPCS | Performed by: STUDENT IN AN ORGANIZED HEALTH CARE EDUCATION/TRAINING PROGRAM

## 2020-12-29 PROCEDURE — 700102 HCHG RX REV CODE 250 W/ 637 OVERRIDE(OP): Performed by: STUDENT IN AN ORGANIZED HEALTH CARE EDUCATION/TRAINING PROGRAM

## 2020-12-29 PROCEDURE — 99217 PR OBSERVATION CARE DISCHARGE: CPT | Performed by: HOSPITALIST

## 2020-12-29 PROCEDURE — 96372 THER/PROPH/DIAG INJ SC/IM: CPT

## 2020-12-29 PROCEDURE — 700101 HCHG RX REV CODE 250: Performed by: INTERNAL MEDICINE

## 2020-12-29 RX ORDER — KETOROLAC TROMETHAMINE 10 MG/1
10 TABLET, FILM COATED ORAL EVERY 6 HOURS PRN
Qty: 30 TAB | Refills: 0 | Status: SHIPPED | OUTPATIENT
Start: 2020-12-29 | End: 2021-01-03

## 2020-12-29 RX ADMIN — POTASSIUM CHLORIDE AND SODIUM CHLORIDE: 900; 150 INJECTION, SOLUTION INTRAVENOUS at 00:45

## 2020-12-29 RX ADMIN — OXYCODONE HYDROCHLORIDE 10 MG: 10 TABLET ORAL at 11:56

## 2020-12-29 RX ADMIN — ENOXAPARIN SODIUM 40 MG: 40 INJECTION SUBCUTANEOUS at 04:54

## 2020-12-29 RX ADMIN — KETOROLAC TROMETHAMINE 30 MG: 30 INJECTION, SOLUTION INTRAMUSCULAR; INTRAVENOUS at 12:10

## 2020-12-29 RX ADMIN — HYDROMORPHONE HYDROCHLORIDE 0.5 MG: 1 INJECTION, SOLUTION INTRAMUSCULAR; INTRAVENOUS; SUBCUTANEOUS at 08:14

## 2020-12-29 RX ADMIN — FAMOTIDINE 20 MG: 20 TABLET, FILM COATED ORAL at 04:54

## 2020-12-29 RX ADMIN — OXYCODONE HYDROCHLORIDE 5 MG: 5 TABLET ORAL at 07:54

## 2020-12-29 RX ADMIN — ONDANSETRON 4 MG: 2 INJECTION INTRAMUSCULAR; INTRAVENOUS at 05:45

## 2020-12-29 ASSESSMENT — PAIN DESCRIPTION - PAIN TYPE
TYPE: ACUTE PAIN
TYPE: ACUTE PAIN
TYPE: SURGICAL PAIN;DEEP SOMATIC PAIN

## 2020-12-29 NOTE — DISCHARGE SUMMARY
Discharge Summary    CHIEF COMPLAINT ON ADMISSION  Chief Complaint   Patient presents with   • Flank Pain     RT severe since 1000am Pt S/P laser litho for renal calc 12/24 by Dr Rosenthal No fever Home meds not working       Reason for Admission  Flank Pain     Admission Date  12/26/2020    CODE STATUS  Full Code    HPI & HOSPITAL COURSE  This is a 50 y.o. female here with left-sided severe flank pain.  The patient is found to have a recurrent left-sided nephrolithiasis.  The patient underwent stent placement after cystoscopy.  Patient continued to have severe pain.  This morning the patient's stent was removed by urology.  Since removal the patient's pain has become tolerable.  Patient will be able to go home with outpatient follow-ups and monitoring.  We will give her Toradol for pain management.  She is only to use the Toradol for 5 days and not longer than that.  Her chronic medical management at this point is stabilized and optimized.  Continue follow-ups with the primary care physician as an outpatient.    Therefore, she is discharged in good and stable condition to home with close outpatient follow-up.    The patient met 2-midnight criteria for an inpatient stay at the time of discharge.    Discharge Date  12/29/2020    FOLLOW UP ITEMS POST DISCHARGE  Follow-up with urology in 7 to 10 days  Follow-up with primary care physician in 1 to 2 weeks    DISCHARGE DIAGNOSES  Principal Problem:    Renal colic POA: Unknown  Active Problems:    Leukocytosis POA: Unknown  Resolved Problems:    * No resolved hospital problems. *      FOLLOW UP  No future appointments.  No follow-up provider specified.    MEDICATIONS ON DISCHARGE     Medication List      START taking these medications      Instructions   ketorolac 10 MG Tabs  Commonly known as: TORADOL   Take 1 Tab by mouth every 6 hours as needed for Moderate Pain or Severe Pain for up to 5 days.  Dose: 10 mg        CHANGE how you take these medications      Instructions    oxybutynin 5 MG Tabs  What changed:   · when to take this  · additional instructions  Commonly known as: DITROPAN   Take 1 Tab by mouth 3 times a day as needed (Bladder irritation).  Dose: 5 mg        CONTINUE taking these medications      Instructions   acetaminophen 500 MG Tabs  Commonly known as: TYLENOL   Take 500 mg by mouth every 6 hours as needed for Moderate Pain.  Dose: 500 mg     famotidine 20 MG Tabs  Commonly known as: PEPCID   Take 1 Tab by mouth 2 times a day as needed (Heartburn).  Dose: 20 mg     FIBER ADULT GUMMIES PO   Take 2 Tabs by mouth every morning.  Dose: 2 Tab     ONE-A-DAY WOMENS FORMULA PO   Take 1 Tab by mouth every day. Gummy  Dose: 1 Tab     oxyCODONE immediate-release 5 MG Tabs  Commonly known as: ROXICODONE   Take 1 Tab by mouth every four hours as needed for Severe Pain for up to 10 days.  Dose: 5 mg     phenazopyridine 200 MG Tabs  Commonly known as: PYRIDIUM   Take 1 Tab by mouth 3 times a day as needed (bladder irritation).  Dose: 200 mg     PROBIOTIC-10 PO   Take 1 Tab by mouth every morning. Gummy  Dose: 1 Tab        STOP taking these medications    ciprofloxacin 500 MG Tabs  Commonly known as: CIPRO            Allergies  Allergies   Allergen Reactions   • Hydrocodone Nausea     Nightmares & Nausea     • Codeine Vomiting and Nausea     Vomiting    • Other Food Vomiting     Grape juice            • Penicillins Vomiting     Vomiting        DIET  Orders Placed This Encounter   Procedures   • Diet Order Diet: Regular     Standing Status:   Standing     Number of Occurrences:   1     Order Specific Question:   Diet:     Answer:   Regular [1]       ACTIVITY  As tolerated.  Weight bearing as tolerated    CONSULTATIONS  Urology    PROCEDURES  Rigid cystourethroscopy done on 12/27/2020  Semirigid and flexible ureteroscopy with stone basketing and removal of intrarenal blood clots and stone fragments done on 12/27/2020  Left 6 French by 24 cm French stent placement on a string  12/27/2020  Stent removal 12/29/2020    LABORATORY  Lab Results   Component Value Date    SODIUM 139 12/28/2020    POTASSIUM 3.7 12/28/2020    CHLORIDE 104 12/28/2020    CO2 24 12/28/2020    GLUCOSE 95 12/28/2020    BUN 10 12/28/2020    CREATININE 0.65 12/28/2020        Lab Results   Component Value Date    WBC 9.9 12/27/2020    HEMOGLOBIN 13.5 12/27/2020    HEMATOCRIT 42.0 12/27/2020    PLATELETCT 324 12/27/2020        Total time of the discharge process exceeds 35 minutes.

## 2020-12-29 NOTE — PROGRESS NOTES
"[facility] Progress Note    Service: [facility] [place]  Patient's Name: Michael Larsen  MRN: [id number]  Admit Date:12/26/2020  Today's Date: 12/28/2020   Room #: 2224/01      Identification:  50 y.o. female who is 1 Day Post-Op s/p L. CULTs with Dr. tristan following CULTs on 12/24 with post-op steinstrasse. Patient with left stent in place.     Subjective/ROS:   Patient reports that she is tolerating the stent well at this time. Her Left flank pain with urination has mostly resoled. She has minor bladder pressure at times. She urinated well overnight without dysuria. Her stent was removed bedside today without issue and patient with minor left abdominal irritation. She denies fever, chills, dysuria.     Physical Exam:  Current Vitals:   /89   Pulse 83   Temp 36.3 °C (97.4 °F) (Temporal)   Resp 16   Ht 1.651 m (5' 5\")   Wt 81.6 kg (179 lb 14.3 oz)   LMP  (LMP Unknown)   SpO2 96%   BMI 29.94 kg/m²     12/27 1900 - 12/29 0659  In: 360 [P.O.:360]  Out: 1000 [Urine:1000]    GEN : NAD, A&O X4   RES:  no acute respiratory distress  ABD: Soft, non-distended, mild suprapubic TTP  :   No CVA TTP bilaterally.     Labs:   Recent Labs     12/26/20  1520 12/27/20  0641 12/28/20  1034   SODIUM 138 141 139   POTASSIUM 3.7 4.0 3.7   CHLORIDE 104 106 104   CO2 21 23 24   GLUCOSE 117* 109* 95   BUN 17 11 10   CREATININE 1.28 0.81 0.65   CALCIUM 9.5 9.3 9.2     Recent Labs     12/26/20  1520 12/27/20  0641   WBC 18.7* 9.9   RBC 4.91 4.70   HEMOGLOBIN 14.4 13.5   HEMATOCRIT 42.6 42.0   MCV 86.8 89.4   MCH 29.3 28.7   MCHC 33.8 32.1*   RDW 41.8 44.1   PLATELETCT 357 324   MPV 9.2 9.1     Lab Results   Component Value Date/Time    GLUCOSE 95 12/28/2020 10:34 AM    GLUCOSE 109 (H) 12/27/2020 06:41 AM    GLUCOSE 117 (H) 12/26/2020 03:20 PM    GLUCOSE 105 (H) 12/19/2020 10:30 PM     Assessment/Plan  Michael Larsen is a 50 y.o. female 2 Day Post-Op  s/p L. CULTs with Dr. tristan following CULTs on 12/24 with post-op " steinstrasse. Patient with left stent in place.     -Stent on string removed bedside without issue. Patient tolerated well.  She has minor left abdominal discomfort after removal which is normal.     -Pain control per medicine    -Patient will need outpatient follow up with [facility] in 2 weeks. We will contact patient regarding this outpatient follow up.     -No acute urologic intervention at this time. No acute urologic intervention at this time. Urology signing off, okay to discharge from [facility].        Jem Chandler, P.A.-C.   [address] [place] [id number]   [phone]

## 2020-12-29 NOTE — DISCHARGE INSTRUCTIONS
Discharge Instructions    Discharged to home by car with relative. Discharged via wheelchair, hospital escort: Yes.  Special equipment needed: Walker    Be sure to schedule a follow-up appointment with your primary care doctor or any specialists as instructed.     Discharge Plan:   Diet Plan: Discussed  Activity Level: Discussed  Confirmed Follow up Appointment: Appointment Scheduled  Confirmed Symptoms Management: Discussed  Medication Reconciliation Updated: Yes  Influenza Vaccine Indication: Patient Refuses    I understand that a diet low in cholesterol, fat, and sodium is recommended for good health. Unless I have been given specific instructions below for another diet, I accept this instruction as my diet prescription.   Other diet: As tolerated    Special Instructions: None    · Is patient discharged on Warfarin / Coumadin?   No     Depression / Suicide Risk    As you are discharged from this [facility] facility, it is important to learn how to keep safe from harming yourself.    Recognize the warning signs:  · Abrupt changes in personality, positive or negative- including increase in energy   · Giving away possessions  · Change in eating patterns- significant weight changes-  positive or negative  · Change in sleeping patterns- unable to sleep or sleeping all the time   · Unwillingness or inability to communicate  · Depression  · Unusual sadness, discouragement and loneliness  · Talk of wanting to die  · Neglect of personal appearance   · Rebelliousness- reckless behavior  · Withdrawal from people/activities they love  · Confusion- inability to concentrate     If you or a loved one observes any of these behaviors or has concerns about self-harm, here's what you can do:  · Talk about it- your feelings and reasons for harming yourself  · Remove any means that you might use to hurt yourself (examples: pills, rope, extension cords, firearm)  · Get professional help from the community (Mental Health, Substance  Abuse, psychological counseling)  · Do not be alone:Call your Safe Contact- someone whom you trust who will be there for you.  · Call your local CRISIS HOTLINE 446-8508 or 679-124-2674  · Call your local Children's Mobile Crisis Response Team [place] (817) 087-1639 or www.Digital H2O  · Call the toll free National Suicide Prevention Hotlines   · National Suicide Prevention Lifeline 432-424-IOXV (0677)  · National Hope Line Network 800-SUICIDE (990-4864)

## 2020-12-30 LAB
APPEARANCE STONE: NORMAL
COMPN STONE: NORMAL
NUMBER STONE: 3
SIZE STONE: NORMAL MM
SPECIMEN WT: 20 MG

## 2020-12-30 NOTE — PROGRESS NOTES
Discharge paperwork reviewed with patient and son at bedside. Copy given. Questions encouraged and answered. Emphasized pain control techniques to both patient and son. IV removed. Pain controlled, VSS. Patient escorted to ED entrance via wheelchair. Patient discharged to home.

## 2021-01-15 LAB
FUNGUS SPEC CULT: NORMAL
SIGNIFICANT IND 70042: NORMAL
SITE SITE: NORMAL
SOURCE SOURCE: NORMAL

## 2021-02-06 ENCOUNTER — HOSPITAL ENCOUNTER (OUTPATIENT)
Dept: LAB | Facility: MEDICAL CENTER | Age: 51
End: 2021-02-06
Attending: PHYSICIAN ASSISTANT
Payer: COMMERCIAL

## 2021-02-06 LAB
25(OH)D3 SERPL-MCNC: 43 NG/ML (ref 30–100)
ALBUMIN SERPL BCP-MCNC: 4.2 G/DL (ref 3.2–4.9)
ALBUMIN/GLOB SERPL: 1.4 G/DL
ALP SERPL-CCNC: 105 U/L (ref 30–99)
ALT SERPL-CCNC: 11 U/L (ref 2–50)
ANION GAP SERPL CALC-SCNC: 7 MMOL/L (ref 7–16)
AST SERPL-CCNC: 15 U/L (ref 12–45)
BILIRUB SERPL-MCNC: 0.7 MG/DL (ref 0.1–1.5)
BUN SERPL-MCNC: 12 MG/DL (ref 8–22)
CALCIUM SERPL-MCNC: 10 MG/DL (ref 8.5–10.5)
CHLORIDE SERPL-SCNC: 101 MMOL/L (ref 96–112)
CO2 SERPL-SCNC: 26 MMOL/L (ref 20–33)
CREAT SERPL-MCNC: 0.71 MG/DL (ref 0.5–1.4)
GLOBULIN SER CALC-MCNC: 2.9 G/DL (ref 1.9–3.5)
GLUCOSE SERPL-MCNC: 97 MG/DL (ref 65–99)
POTASSIUM SERPL-SCNC: 5 MMOL/L (ref 3.6–5.5)
PROT SERPL-MCNC: 7.1 G/DL (ref 6–8.2)
PTH-INTACT SERPL-MCNC: 25.2 PG/ML (ref 14–72)
SODIUM SERPL-SCNC: 134 MMOL/L (ref 135–145)
URATE SERPL-MCNC: 4.9 MG/DL (ref 1.9–8.2)

## 2021-02-06 PROCEDURE — 80053 COMPREHEN METABOLIC PANEL: CPT

## 2021-02-06 PROCEDURE — 36415 COLL VENOUS BLD VENIPUNCTURE: CPT

## 2021-02-06 PROCEDURE — 84550 ASSAY OF BLOOD/URIC ACID: CPT

## 2021-02-06 PROCEDURE — 82306 VITAMIN D 25 HYDROXY: CPT

## 2021-02-06 PROCEDURE — 83970 ASSAY OF PARATHORMONE: CPT

## 2021-02-08 ENCOUNTER — HOSPITAL ENCOUNTER (OUTPATIENT)
Facility: MEDICAL CENTER | Age: 51
End: 2021-02-08
Attending: PHYSICIAN ASSISTANT
Payer: COMMERCIAL

## 2021-02-08 ENCOUNTER — HOSPITAL ENCOUNTER (OUTPATIENT)
Facility: MEDICAL CENTER | Age: 51
End: 2021-02-08
Attending: FAMILY MEDICINE
Payer: COMMERCIAL

## 2021-02-08 DIAGNOSIS — K62.5 RECTAL BLEEDING: ICD-10-CM

## 2021-02-08 DIAGNOSIS — K21.9 GASTROESOPHAGEAL REFLUX DISEASE WITHOUT ESOPHAGITIS: ICD-10-CM

## 2021-02-08 DIAGNOSIS — R10.84 GENERALIZED ABDOMINAL PAIN: ICD-10-CM

## 2021-02-08 LAB
CREAT 24H UR-MSRATE: 813 MG/24 HR (ref 800–1800)
CREAT UR-MCNC: 116.14 MG/DL
PROT 24H UR-MCNC: 42 MG/24 HR (ref 30–150)
PROT 24H UR-MRATE: 6 MG/DL (ref 0–15)
SPECIMEN VOL UR: 700 ML
SPECIMEN VOL UR: 700 ML

## 2021-02-08 PROCEDURE — 84133 ASSAY OF URINE POTASSIUM: CPT

## 2021-02-08 PROCEDURE — 87338 HPYLORI STOOL AG IA: CPT

## 2021-02-08 PROCEDURE — 83735 ASSAY OF MAGNESIUM: CPT

## 2021-02-08 PROCEDURE — 84300 ASSAY OF URINE SODIUM: CPT

## 2021-02-08 PROCEDURE — 82436 ASSAY OF URINE CHLORIDE: CPT

## 2021-02-08 PROCEDURE — 82570 ASSAY OF URINE CREATININE: CPT

## 2021-02-08 PROCEDURE — 82340 ASSAY OF CALCIUM IN URINE: CPT

## 2021-02-08 PROCEDURE — 82131 AMINO ACIDS SINGLE QUANT: CPT

## 2021-02-08 PROCEDURE — 82507 ASSAY OF CITRATE: CPT

## 2021-02-08 PROCEDURE — 83945 ASSAY OF OXALATE: CPT

## 2021-02-08 PROCEDURE — 84105 ASSAY OF URINE PHOSPHORUS: CPT

## 2021-02-08 PROCEDURE — 81050 URINALYSIS VOLUME MEASURE: CPT

## 2021-02-08 PROCEDURE — 84560 ASSAY OF URINE/URIC ACID: CPT

## 2021-02-08 PROCEDURE — 84156 ASSAY OF PROTEIN URINE: CPT

## 2021-02-08 PROCEDURE — 36415 COLL VENOUS BLD VENIPUNCTURE: CPT

## 2021-02-08 PROCEDURE — 84392 ASSAY OF URINE SULFATE: CPT

## 2021-02-09 LAB — H PYLORI AG STL QL IA: NOT DETECTED

## 2021-03-16 NOTE — PROGRESS NOTES
"Subjective:     CC: follow-up hernia and renal stones    HPI:   Michael presents today with     Pt states that she has been doing well except that she had severe vomiting Beatty's day weekend.  The next day she had diarrhea, took pepto bismal and then had severe vomiting.  She took pedialyte and her symptoms improved by that evening.  She has mild upper abdominal discomfort today, but no nausea or vomiting.  No diarrhea or constipation.  She reports overeating at times.  She sometimes feels nausea in the morning when at work but this happens when around cleaning chemicals and improves with eating.  She is very sedentary.    Left heel pain- 2020 she was outside doing yardwork and dropped the bottom of the metal rake on the back of her right heel and she has had swelling at the back of her heel since that time.  Sometimes it is inflammed and makes it hard for her to walk.  It is getting progressively more swollen.  She takes tylenol when pain is severe and this improves symptoms well.  Aleve causes her GI tract to spasm, so she is no longer taking aleve.      Past Medical History:   Diagnosis Date   • Anesthesia     Severe PONV   • Bleeding from the nose     hx of last one 10 years ago    • Bowel habit changes     constipation    • Dental disorder     dentures full   • Hay fever    • Heart burn    • Hemorrhoids    • Hypertension     borderline, never been on medication    • Indigestion    • Marijuana use 2020    \"7 times a week\"   • Measles     as a child    • Migraine    • Pain     lower back pain   • PONV (postoperative nausea and vomiting) 2019    severe PONV   • Urinary tract infection    • Venereal disease        Social History     Tobacco Use   • Smoking status: Former Smoker     Packs/day: 1.00     Years: 31.00     Pack years: 31.00     Quit date: 2012     Years since quittin.9   • Smokeless tobacco: Never Used   Substance Use Topics   • Alcohol use: Yes     Comment: rarely   • " "Drug use: Yes     Frequency: 7.0 times per week     Types: Marijuana, Inhaled     Comment: marijuanna occ, last 3 days ago       Current Outpatient Medications Ordered in Epic   Medication Sig Dispense Refill   • acetaminophen (TYLENOL) 500 MG Tab Take 500 mg by mouth every 6 hours as needed for Moderate Pain.     • famotidine (PEPCID) 20 MG Tab Take 1 Tab by mouth 2 times a day as needed (Heartburn). 60 Tab 0   • oxybutynin (DITROPAN) 5 MG Tab Take 1 Tab by mouth 3 times a day as needed (Bladder irritation). (Patient taking differently: Take 5 mg by mouth 3 times a day. Bladder irritation) 60 Tab 0   • FIBER ADULT GUMMIES PO Take 2 Tabs by mouth every morning.     • Multiple Vitamins-Calcium (ONE-A-DAY WOMENS FORMULA PO) Take 1 Tab by mouth every day. Gummy     • Probiotic Product (PROBIOTIC-10 PO) Take 1 Tab by mouth every morning. Gummy       No current Epic-ordered facility-administered medications on file.       Allergies:  Hydrocodone, Codeine, Other food, and Penicillins      ROS:  Gen: no fevers/chills, no changes in weight  Eyes: no changes in vision  ENT: no sore throat, no hearing loss, no bloody nose  Pulm: no sob, no cough  CV: no chest pain, no palpitations  GI: per hpi  : no dysuria  MSk: per hpi  Skin: no rash  Neuro: no headaches, no numbness/tingling  Heme/Lymph: no easy bruising      Objective:       Exam:  /80 (BP Location: Left arm, Patient Position: Sitting, BP Cuff Size: Adult)   Pulse 72   Temp 37.1 °C (98.7 °F) (Temporal)   Resp 12   Ht 1.651 m (5' 5\")   Wt 86.6 kg (191 lb)   LMP  (LMP Unknown)   SpO2 96%   BMI 31.78 kg/m²  Body mass index is 31.78 kg/m².    Gen: Alert and oriented, No apparent distress.  Neck: Neck is supple without lymphadenopathy.  Lungs: Normal effort, CTA bilaterally, no wheezes, rhonchi, or rales  Abd:  Soft, mild RUQ tenderness without rebound or guarding.  Normoactive bowel sounds  CV: Regular rate and rhythm. No murmurs, rubs, or gallops.  Ext: No " clubbing, cyanosis, edema. +Left posterior heel pain with mild swelling. No bruising or redness.  No increased warmth.  Full ROM of her foot.    Assessment & Plan:     51 y.o. female with the following -     1. RUQ pain  Chronic issue over the last few months with intermittent flares resulting in nausea, vomiting and abdominal pain.  Previous CT scans reviewed with patient which showed gallstones which are likely the cause of her symptoms.  Pt was seen by GI 7/16/2020 for generalized abdominal pain with workup revealing only elevated alkaline phosphates levels which they recommended be monitored.  Will recheck labs and RUQ ultrasound.  Plan to refer to general surgery for cholecystectomy if gall stones persist and treat further based on lab results.  Low fat diet and GERD prevention diet recommended.  Follow-up and ER precautions given. Patient expressed understanding and agreement with plan.   - Comp Metabolic Panel; Future  - CBC WITH DIFFERENTIAL; Future  - LIPASE; Future  - US-RUQ; Future    2. Abnormal mammogram  Chronic issue with abnormal mammogram 7 /2020 with biopsy revealing a non-cancerous fibroadenoma of the right breast.  Repeat US was recommended and ordered, but patient has not done the US yet.  Reminded her to get the repeat US as previously ordered and follow-up with me re: results.    3. Umbilical hernia without obstruction and without gangrene  Newly noted issue noted on CT scan 12/11/2020 and located at the site of her previous laproscopic surgery port site.  Pt has no tenderness of this area.  Pt does not have tenderness at this time in this location.  Surgery referral offered for correction, but pt would prefer to defer surgical correction at this time.  Follow-up and ER precautions given. Patient expressed understanding and agreement with plan.    4. Nephrolithiasis  Chronic issue, improved symptomatically s/p stent which has been removed.  No current pain with urination, blood reported in  urine, fevers, chills.  Stone prevention diet recommended but she expresses that this is difficult for her to maintain. Recommended she follow-up with her urologist for continued monitoring and treatment.    5. Chronic heel pain, left  Newly noted issue s/p trauma with residual swelling and tenderness.  Will check imaging for possible fracture.  Encouraged warm vs cold packs for 20 mintues prn pain, OTC tylenol prn pain, avoiding shoes that rub on this area and known triggering position.  Will refer to podiatry prn based on imaging results. Patient expressed understanding and agreement with plan.  - DX-OS CALCIS (HEEL) 2+ LEFT; Future      Return in about 3 months (around 6/17/2021) for Medication review.    Please note that this dictation was created using voice recognition software. I have made every reasonable attempt to correct obvious errors, but I expect that there are errors of grammar and possibly content that I did not discover before finalizing the note.

## 2021-03-17 ENCOUNTER — OFFICE VISIT (OUTPATIENT)
Dept: MEDICAL GROUP | Facility: MEDICAL CENTER | Age: 51
End: 2021-03-17
Payer: COMMERCIAL

## 2021-03-17 VITALS
DIASTOLIC BLOOD PRESSURE: 80 MMHG | BODY MASS INDEX: 31.82 KG/M2 | RESPIRATION RATE: 12 BRPM | TEMPERATURE: 98.7 F | HEART RATE: 72 BPM | WEIGHT: 191 LBS | HEIGHT: 65 IN | SYSTOLIC BLOOD PRESSURE: 118 MMHG | OXYGEN SATURATION: 96 %

## 2021-03-17 DIAGNOSIS — N20.0 NEPHROLITHIASIS: ICD-10-CM

## 2021-03-17 DIAGNOSIS — M79.672 CHRONIC HEEL PAIN, LEFT: ICD-10-CM

## 2021-03-17 DIAGNOSIS — R92.8 ABNORMAL MAMMOGRAM: ICD-10-CM

## 2021-03-17 DIAGNOSIS — G89.29 CHRONIC HEEL PAIN, LEFT: ICD-10-CM

## 2021-03-17 DIAGNOSIS — R10.11 RUQ PAIN: ICD-10-CM

## 2021-03-17 DIAGNOSIS — K42.9 UMBILICAL HERNIA WITHOUT OBSTRUCTION AND WITHOUT GANGRENE: ICD-10-CM

## 2021-03-17 PROBLEM — Z20.822 SUSPECTED COVID-19 VIRUS INFECTION: Status: RESOLVED | Noted: 2020-10-21 | Resolved: 2021-03-17

## 2021-03-17 PROCEDURE — 99214 OFFICE O/P EST MOD 30 MIN: CPT | Performed by: FAMILY MEDICINE

## 2021-03-17 ASSESSMENT — FIBROSIS 4 INDEX: FIB4 SCORE: 0.74

## 2021-03-17 ASSESSMENT — PATIENT HEALTH QUESTIONNAIRE - PHQ9: CLINICAL INTERPRETATION OF PHQ2 SCORE: 0

## 2021-04-07 ENCOUNTER — HOSPITAL ENCOUNTER (OUTPATIENT)
Dept: RADIOLOGY | Facility: MEDICAL CENTER | Age: 51
End: 2021-04-07
Attending: FAMILY MEDICINE
Payer: COMMERCIAL

## 2021-04-07 ENCOUNTER — HOSPITAL ENCOUNTER (OUTPATIENT)
Dept: LAB | Facility: MEDICAL CENTER | Age: 51
End: 2021-04-07
Attending: FAMILY MEDICINE
Payer: COMMERCIAL

## 2021-04-07 DIAGNOSIS — R10.11 RUQ PAIN: ICD-10-CM

## 2021-04-07 DIAGNOSIS — G89.29 CHRONIC HEEL PAIN, LEFT: ICD-10-CM

## 2021-04-07 DIAGNOSIS — M79.672 CHRONIC HEEL PAIN, LEFT: ICD-10-CM

## 2021-04-07 LAB
ALBUMIN SERPL BCP-MCNC: 4.1 G/DL (ref 3.2–4.9)
ALBUMIN/GLOB SERPL: 1.4 G/DL
ALP SERPL-CCNC: 115 U/L (ref 30–99)
ALT SERPL-CCNC: 14 U/L (ref 2–50)
ANION GAP SERPL CALC-SCNC: 4 MMOL/L (ref 7–16)
AST SERPL-CCNC: 19 U/L (ref 12–45)
BASOPHILS # BLD AUTO: 0.4 % (ref 0–1.8)
BASOPHILS # BLD: 0.04 K/UL (ref 0–0.12)
BILIRUB SERPL-MCNC: 0.4 MG/DL (ref 0.1–1.5)
BUN SERPL-MCNC: 12 MG/DL (ref 8–22)
CALCIUM SERPL-MCNC: 9.3 MG/DL (ref 8.5–10.5)
CHLORIDE SERPL-SCNC: 105 MMOL/L (ref 96–112)
CO2 SERPL-SCNC: 30 MMOL/L (ref 20–33)
CREAT SERPL-MCNC: 0.65 MG/DL (ref 0.5–1.4)
EOSINOPHIL # BLD AUTO: 0.23 K/UL (ref 0–0.51)
EOSINOPHIL NFR BLD: 2.5 % (ref 0–6.9)
ERYTHROCYTE [DISTWIDTH] IN BLOOD BY AUTOMATED COUNT: 46.2 FL (ref 35.9–50)
GLOBULIN SER CALC-MCNC: 2.9 G/DL (ref 1.9–3.5)
GLUCOSE SERPL-MCNC: 99 MG/DL (ref 65–99)
HCT VFR BLD AUTO: 45.8 % (ref 37–47)
HGB BLD-MCNC: 14.5 G/DL (ref 12–16)
IMM GRANULOCYTES # BLD AUTO: 0.02 K/UL (ref 0–0.11)
IMM GRANULOCYTES NFR BLD AUTO: 0.2 % (ref 0–0.9)
LIPASE SERPL-CCNC: 35 U/L (ref 11–82)
LYMPHOCYTES # BLD AUTO: 3.28 K/UL (ref 1–4.8)
LYMPHOCYTES NFR BLD: 35.4 % (ref 22–41)
MCH RBC QN AUTO: 29.7 PG (ref 27–33)
MCHC RBC AUTO-ENTMCNC: 31.7 G/DL (ref 33.6–35)
MCV RBC AUTO: 93.7 FL (ref 81.4–97.8)
MONOCYTES # BLD AUTO: 0.55 K/UL (ref 0–0.85)
MONOCYTES NFR BLD AUTO: 5.9 % (ref 0–13.4)
NEUTROPHILS # BLD AUTO: 5.14 K/UL (ref 2–7.15)
NEUTROPHILS NFR BLD: 55.6 % (ref 44–72)
NRBC # BLD AUTO: 0 K/UL
NRBC BLD-RTO: 0 /100 WBC
PLATELET # BLD AUTO: 336 K/UL (ref 164–446)
PMV BLD AUTO: 10.2 FL (ref 9–12.9)
POTASSIUM SERPL-SCNC: 4 MMOL/L (ref 3.6–5.5)
PROT SERPL-MCNC: 7 G/DL (ref 6–8.2)
RBC # BLD AUTO: 4.89 M/UL (ref 4.2–5.4)
SODIUM SERPL-SCNC: 139 MMOL/L (ref 135–145)
WBC # BLD AUTO: 9.3 K/UL (ref 4.8–10.8)

## 2021-04-07 PROCEDURE — 80053 COMPREHEN METABOLIC PANEL: CPT

## 2021-04-07 PROCEDURE — 83690 ASSAY OF LIPASE: CPT

## 2021-04-07 PROCEDURE — 36415 COLL VENOUS BLD VENIPUNCTURE: CPT

## 2021-04-07 PROCEDURE — 85025 COMPLETE CBC W/AUTO DIFF WBC: CPT

## 2021-04-07 PROCEDURE — 73650 X-RAY EXAM OF HEEL: CPT | Mod: LT

## 2021-04-07 PROCEDURE — 76705 ECHO EXAM OF ABDOMEN: CPT

## 2021-06-20 NOTE — PROGRESS NOTES
"Subjective:     CC: follow-up imaging    HPI:   Michael presents today with    RUQ pain- intermittent for about 6 months.  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  She is very dissatisfied with previous work-up and states she does not want further evaluation in our office at this time and will transfer care.  She has constipation which she is treating with fiber and declines further assistance.  She has nausea but declines further work-up.  States she came in today to tell me that she is transferring care and desires no medical evaluation today.    Hx of renal stones and hematuria- pt reports no recurrence of blood in her urine or pain with urination.  She is very dissatisfied with her previous urologist and states that she is self treating and that she declines further assistance at this time.    Pt states she has mychart access but has chosen not to utilize it and has not read my comments regarding her imaging or recent labs and is upset that she is not aware of the results, yet.      Past Medical History:   Diagnosis Date   • Anesthesia     Severe PONV   • Bleeding from the nose     hx of last one 10 years ago    • Bowel habit changes     constipation    • Dental disorder     dentures full   • Hay fever    • Heart burn    • Hemorrhoids    • Hypertension     borderline, never been on medication    • Indigestion    • Marijuana use 2020    \"7 times a week\"   • Measles     as a child    • Migraine    • Pain     lower back pain   • PONV (postoperative nausea and vomiting) 2019    severe PONV   • Urinary tract infection    • Venereal disease        Social History     Tobacco Use   • Smoking status: Former Smoker     Packs/day: 1.00     Years: 31.00     Pack years: 31.00     Quit date: 2012     Years since quittin.1   • Smokeless tobacco: Never Used   Vaping Use   • Vaping Use: Never used   Substance Use Topics   • " "Alcohol use: Yes     Comment: rarely   • Drug use: Yes     Frequency: 7.0 times per week     Types: Marijuana, Inhaled     Comment: jorge occ, last 3 days ago       Current Outpatient Medications Ordered in Epic   Medication Sig Dispense Refill   • acetaminophen (TYLENOL) 500 MG Tab Take 500 mg by mouth every 6 hours as needed for Moderate Pain.     • famotidine (PEPCID) 20 MG Tab Take 1 Tab by mouth 2 times a day as needed (Heartburn). 60 Tab 0   • FIBER ADULT GUMMIES PO Take 2 Tabs by mouth every morning.     • Multiple Vitamins-Calcium (ONE-A-DAY WOMENS FORMULA PO) Take 1 Tab by mouth every day. Gummy     • Probiotic Product (PROBIOTIC-10 PO) Take 1 Tab by mouth every morning. Gummy     • methylPREDNISolone (MEDROL DOSEPAK) 4 MG Tablet Therapy Pack Take 4 mg by mouth every day.     • oxybutynin (DITROPAN) 5 MG Tab Take 1 Tab by mouth 3 times a day as needed (Bladder irritation). (Patient taking differently: Take 5 mg by mouth 3 times a day. Bladder irritation) 60 Tab 0     No current Epic-ordered facility-administered medications on file.       Allergies:  Hydrocodone, Codeine, Other food, and Penicillins      Objective:       Exam:  /80 (BP Location: Right arm, Patient Position: Sitting, BP Cuff Size: Adult)   Pulse 77   Temp 36.9 °C (98.5 °F) (Temporal)   Resp 12   Ht 1.651 m (5' 5\")   Wt 85.7 kg (189 lb)   LMP  (LMP Unknown)   SpO2 99%   BMI 31.45 kg/m²  Body mass index is 31.45 kg/m².    Constitutional: Alert, no distress, well-groomed.  Skin: Warm, dry, good turgor, no rashes in visible areas.  Eye: Equal, round and reactive, conjunctiva clear, lids normal.  ENMT: Lips without lesions, good dentition, moist mucous membranes.  Neck: Trachea midline, no masses, no thyromegaly.  Respiratory: Unlabored respiratory effort, no cough.  MSK: Normal gait, moves all extremities.  Neuro: Grossly non-focal.   Psych: Alert and oriented x3, frustrated and angry affect and mood.    Labs: 4/7/21 labs " reviewed with patient.  4/7/21 RUQ us reviewed with patient    Assessment & Plan:     51 y.o. female with the following -     1. RUQ pain  Chronic issue, pt declines further work-up by me at this time.  Discussed lab and US results including elevated alk phos but pt declines further work-up.  Provider her with the phone number for scheduling department with [facility] ([phone]) if she decides to establish with a new [facility] PCP.  If she establishes outside [facility], recommended she have her records requested so her new PCP can review them and provide appropriate follow-up. Patient expressed understanding and agreement with plan.    2. Abnormal mammogram  Pt had biopsy of right breast mass on 7/10/2020 with fibroadenoma noted with plan for repeat ultrasound in 6 months per biopsy protocol.  This US has been ordered.  Pt states she does not wish to follow-up or do further work-up in our office at this time.  Recommended she have her new provider obtain her records for continuity of care. If she desires to do the follow-up imaging with me, then the order is in Epic for her to schedule the US.      Pt did not want to discuss any other medical issue today and stated she wanted to leave the office and walked out.    Return if symptoms worsen or fail to improve.    Please note that this dictation was created using voice recognition software. I have made every reasonable attempt to correct obvious errors, but I expect that there are errors of grammar and possibly content that I did not discover before finalizing the note.

## 2021-06-21 ENCOUNTER — TELEPHONE (OUTPATIENT)
Dept: MEDICAL GROUP | Facility: MEDICAL CENTER | Age: 51
End: 2021-06-21

## 2021-06-21 ENCOUNTER — OFFICE VISIT (OUTPATIENT)
Dept: MEDICAL GROUP | Facility: MEDICAL CENTER | Age: 51
End: 2021-06-21
Payer: COMMERCIAL

## 2021-06-21 VITALS
RESPIRATION RATE: 12 BRPM | WEIGHT: 189 LBS | SYSTOLIC BLOOD PRESSURE: 124 MMHG | HEART RATE: 77 BPM | TEMPERATURE: 98.5 F | DIASTOLIC BLOOD PRESSURE: 80 MMHG | HEIGHT: 65 IN | OXYGEN SATURATION: 99 % | BODY MASS INDEX: 31.49 KG/M2

## 2021-06-21 DIAGNOSIS — R10.11 RUQ PAIN: ICD-10-CM

## 2021-06-21 DIAGNOSIS — R92.8 ABNORMAL MAMMOGRAM: ICD-10-CM

## 2021-06-21 PROCEDURE — 99999 PR NO CHARGE: CPT | Performed by: FAMILY MEDICINE

## 2021-06-21 RX ORDER — METHYLPREDNISOLONE 4 MG/1
4 TABLET ORAL DAILY
COMMUNITY
Start: 2021-06-15 | End: 2021-07-20

## 2021-06-21 ASSESSMENT — FIBROSIS 4 INDEX: FIB4 SCORE: 0.8

## 2021-06-21 NOTE — TELEPHONE ENCOUNTER
Phone Number Called: [phone] (home)     Call outcome: Spoke to patient regarding message below.    Message: Informed pt in regards to Dr. Kiran's comment on her breast ultrasound. Pt declined and will wait to have it done.

## 2021-07-20 ENCOUNTER — OFFICE VISIT (OUTPATIENT)
Dept: MEDICAL GROUP | Facility: PHYSICIAN GROUP | Age: 51
End: 2021-07-20
Payer: COMMERCIAL

## 2021-07-20 VITALS
BODY MASS INDEX: 31.49 KG/M2 | WEIGHT: 189 LBS | DIASTOLIC BLOOD PRESSURE: 82 MMHG | OXYGEN SATURATION: 97 % | SYSTOLIC BLOOD PRESSURE: 116 MMHG | HEIGHT: 65 IN | HEART RATE: 67 BPM | RESPIRATION RATE: 14 BRPM | TEMPERATURE: 98 F

## 2021-07-20 DIAGNOSIS — E66.9 OBESITY (BMI 30-39.9): ICD-10-CM

## 2021-07-20 DIAGNOSIS — R74.8 ELEVATED ALKALINE PHOSPHATASE LEVEL: ICD-10-CM

## 2021-07-20 DIAGNOSIS — M79.642 HAND PAIN, LEFT: ICD-10-CM

## 2021-07-20 DIAGNOSIS — R92.8 ABNORMAL MAMMOGRAM: ICD-10-CM

## 2021-07-20 DIAGNOSIS — Z12.31 ENCOUNTER FOR SCREENING MAMMOGRAM FOR BREAST CANCER: ICD-10-CM

## 2021-07-20 DIAGNOSIS — M79.642 PAIN OF LEFT HAND: ICD-10-CM

## 2021-07-20 DIAGNOSIS — Z76.89 ENCOUNTER TO ESTABLISH CARE: ICD-10-CM

## 2021-07-20 PROCEDURE — 99214 OFFICE O/P EST MOD 30 MIN: CPT | Performed by: NURSE PRACTITIONER

## 2021-07-20 ASSESSMENT — FIBROSIS 4 INDEX: FIB4 SCORE: 0.8

## 2021-07-20 NOTE — PROGRESS NOTES
Chief Complaint   Patient presents with   • Establish Care     prior Magno       Subjective:     HPI:   Michael Larsen is a 51 y.o. female here to discuss the evaluation and management of:      Hand pain  Patient reports chronic history of left hand pain mainly in her thumb joint.  She has been taking Tylenol to help alleviate with pain which does help some.  She is not currently using any cryotherapy.  She denies any numbness or tingling in her left hand or thumb.  She does indicate decreased  strength due to pain.  Reviewed previous x-rays indicating no osteoarthritis.  Patient was agreeable to physical therapy for 6 weeks and possibility of referral if she fails physical therapy.   patient is unable to tolerate NSAIDs due to them causing inflammation in her colon per her GI doctor.  Patient will use Tylenol 1000 mg 2-3 times a day to help with pain as well as cryotherapy in the evenings for at least 20 minutes.      Abnormal mammogram  Patient reports that she had abnormal mammogram and ultrasound screening done last year.  She does states she was post have follow-up in 6 months however due to bills she was unable to afford getting the follow-up.  Today she is agreeable for mammogram and ultrasound order being placed.  Patient denies any change in breast contour, nipple discharge, or new lumps in her breast.    Elevated alkaline phosphatase level  Component      Latest Ref Rng & Units 12/26/2020 12/27/2020 2/6/2021 4/7/2021   Alkaline Phosphatase      30 - 99 U/L 91 85 105 (H) 115 (H)     This is a chronic condition that continues to increase.  Discussed patient multiple causes of elevated alkaline phos levels.  We will continue to monitor at future appointments.  Patient denies any symptoms of right upper quadrant pain during exam today.  She does report having chronic nausea.  She also reports having history of gallstones.  We will continue to monitor labs as indicated.      ROS:  Gen: no  fevers/chills, no changes in weight  Eyes: no changes in vision  ENT: no sore throat, no hearing loss, no bloody nose  Pulm: no sob, no cough  CV: no chest pain, no palpitations  GI: Positive per HPI  : no dysuria  MSk: Positive per HPI  Skin: no rash  Neuro: no headaches, no numbness/tingling  Heme/Lymph: no easy bruising    Allergies   Allergen Reactions   • Hydrocodone Nausea     Nightmares & Nausea     • Codeine Vomiting and Nausea     Vomiting    • Other Food Vomiting     Grape juice            • Penicillins Vomiting     Vomiting        Current medicines (including changes today)  Current Outpatient Medications   Medication Sig Dispense Refill   • acetaminophen (TYLENOL) 500 MG Tab Take 500 mg by mouth every 6 hours as needed for Moderate Pain.     • famotidine (PEPCID) 20 MG Tab Take 1 Tab by mouth 2 times a day as needed (Heartburn). 60 Tab 0   • FIBER ADULT GUMMIES PO Take 2 Tabs by mouth every morning.     • Multiple Vitamins-Calcium (ONE-A-DAY WOMENS FORMULA PO) Take 1 Tab by mouth every day. Gummy     • Probiotic Product (PROBIOTIC-10 PO) Take 1 Tab by mouth every morning. Gummy       No current facility-administered medications for this visit.       Social History     Tobacco Use   • Smoking status: Former Smoker     Packs/day: 1.00     Years: 31.00     Pack years: 31.00     Quit date: 2012     Years since quittin.2   • Smokeless tobacco: Never Used   Vaping Use   • Vaping Use: Never used   Substance Use Topics   • Alcohol use: Never   • Drug use: Yes     Frequency: 7.0 times per week     Types: Marijuana, Inhaled     Comment: marijuanna occ, last 3 days ago       Patient Active Problem List    Diagnosis Date Noted   • Elevated alkaline phosphatase level 2021   • RUQ pain 2021   • Umbilical hernia 2021   • Chronic heel pain, left 2021   • Renal colic 2020   • Complicated UTI (urinary tract infection) 2020   • Marijuana use 2020   • Status post  cystoscopy with ureteral stent placement 12/14/2020   • Leukocytosis 12/11/2020   • Rectal bleeding 11/18/2020   • Generalized abdominal pain 11/18/2020   • Nephrolithiasis 10/21/2020   • Chronic bilateral low back pain without sciatica 10/21/2020   • Hematuria 08/26/2020   • Irritability 08/26/2020   • Pelvic pain 08/26/2020   • Abnormal mammogram 07/03/2020   • Hand numbness 06/08/2020   • Abdominal pain 06/08/2020   • History of vertigo 06/08/2020   • GERD (gastroesophageal reflux disease) 06/08/2020   • Hand pain 06/08/2020   • Obesity (BMI 30-39.9) 06/08/2020   • Ear congestion, bilateral 06/08/2020       Family History   Problem Relation Age of Onset   • Hypertension Mother    • Lung Disease Mother         COPD   • Heart Disease Maternal Grandmother    • Lung Disease Maternal Uncle         lung cancer          Objective:     Hospital Outpatient Visit on 04/07/2021   Component Date Value Ref Range Status   • Lipase 04/07/2021 35  11 - 82 U/L Final   • WBC 04/07/2021 9.3  4.8 - 10.8 K/uL Final   • RBC 04/07/2021 4.89  4.20 - 5.40 M/uL Final   • Hemoglobin 04/07/2021 14.5  12.0 - 16.0 g/dL Final   • Hematocrit 04/07/2021 45.8  37.0 - 47.0 % Final   • MCV 04/07/2021 93.7  81.4 - 97.8 fL Final   • MCH 04/07/2021 29.7  27.0 - 33.0 pg Final   • MCHC 04/07/2021 31.7* 33.6 - 35.0 g/dL Final   • RDW 04/07/2021 46.2  35.9 - 50.0 fL Final   • Platelet Count 04/07/2021 336  164 - 446 K/uL Final   • MPV 04/07/2021 10.2  9.0 - 12.9 fL Final   • Neutrophils-Polys 04/07/2021 55.60  44.00 - 72.00 % Final   • Lymphocytes 04/07/2021 35.40  22.00 - 41.00 % Final   • Monocytes 04/07/2021 5.90  0.00 - 13.40 % Final   • Eosinophils 04/07/2021 2.50  0.00 - 6.90 % Final   • Basophils 04/07/2021 0.40  0.00 - 1.80 % Final   • Immature Granulocytes 04/07/2021 0.20  0.00 - 0.90 % Final   • Nucleated RBC 04/07/2021 0.00  /100 WBC Final   • Neutrophils (Absolute) 04/07/2021 5.14  2.00 - 7.15 K/uL Final    Includes immature neutrophils, if  present.   • Lymphs (Absolute) 04/07/2021 3.28  1.00 - 4.80 K/uL Final   • Monos (Absolute) 04/07/2021 0.55  0.00 - 0.85 K/uL Final   • Eos (Absolute) 04/07/2021 0.23  0.00 - 0.51 K/uL Final   • Baso (Absolute) 04/07/2021 0.04  0.00 - 0.12 K/uL Final   • Immature Granulocytes (abs) 04/07/2021 0.02  0.00 - 0.11 K/uL Final   • NRBC (Absolute) 04/07/2021 0.00  K/uL Final   • Sodium 04/07/2021 139  135 - 145 mmol/L Final   • Potassium 04/07/2021 4.0  3.6 - 5.5 mmol/L Final   • Chloride 04/07/2021 105  96 - 112 mmol/L Final   • Co2 04/07/2021 30  20 - 33 mmol/L Final   • Anion Gap 04/07/2021 4.0* 7.0 - 16.0 Final   • Glucose 04/07/2021 99  65 - 99 mg/dL Final   • Bun 04/07/2021 12  8 - 22 mg/dL Final   • Creatinine 04/07/2021 0.65  0.50 - 1.40 mg/dL Final   • Calcium 04/07/2021 9.3  8.5 - 10.5 mg/dL Final   • AST(SGOT) 04/07/2021 19  12 - 45 U/L Final   • ALT(SGPT) 04/07/2021 14  2 - 50 U/L Final   • Alkaline Phosphatase 04/07/2021 115* 30 - 99 U/L Final   • Total Bilirubin 04/07/2021 0.4  0.1 - 1.5 mg/dL Final   • Albumin 04/07/2021 4.1  3.2 - 4.9 g/dL Final   • Total Protein 04/07/2021 7.0  6.0 - 8.2 g/dL Final   • Globulin 04/07/2021 2.9  1.9 - 3.5 g/dL Final   • A-G Ratio 04/07/2021 1.4  g/dL Final   • GFR If  04/07/2021 >60  >60 mL/min/1.73 m 2 Final   • GFR If Non  04/07/2021 >60  >60 mL/min/1.73 m 2 Final   Hospital Outpatient Visit on 02/08/2021   Component Date Value Ref Range Status   • Total Volume, Urine 02/08/2021 700  mL Final   • Creatinine 24 Hr Urine 02/08/2021 813  800 - 1800 mg/24 Hr Final   • Creatinine, Urine 02/08/2021 116.14  mg/dL Final   • Total Volume, Urine 02/08/2021 700  mL Final   • Total Protein, Urine 02/08/2021 6.0  0.0 - 15.0 mg/dL Final   • Total Protein, 24 Hour Urine 02/08/2021 42.0  30.0 - 150.0 mg/24 Hr Final   Hospital Outpatient Visit on 02/08/2021   Component Date Value Ref Range Status   • H Pylori Ag Stool E 02/08/2021 Not detected  Not Detected  "Final   Hospital Outpatient Visit on 02/06/2021   Component Date Value Ref Range Status   • Pth, Intact 02/06/2021 25.2  14.0 - 72.0 pg/mL Final   • Calcium 02/06/2021 10.0  8.5 - 10.5 mg/dL Final   • Uric Acid 02/06/2021 4.9  1.9 - 8.2 mg/dL Final   • Sodium 02/06/2021 134* 135 - 145 mmol/L Final   • Potassium 02/06/2021 5.0  3.6 - 5.5 mmol/L Final   • Chloride 02/06/2021 101  96 - 112 mmol/L Final   • Co2 02/06/2021 26  20 - 33 mmol/L Final   • Anion Gap 02/06/2021 7.0  7.0 - 16.0 Final   • Glucose 02/06/2021 97  65 - 99 mg/dL Final   • Bun 02/06/2021 12  8 - 22 mg/dL Final   • Creatinine 02/06/2021 0.71  0.50 - 1.40 mg/dL Final   • AST(SGOT) 02/06/2021 15  12 - 45 U/L Final   • ALT(SGPT) 02/06/2021 11  2 - 50 U/L Final   • Alkaline Phosphatase 02/06/2021 105* 30 - 99 U/L Final   • Total Bilirubin 02/06/2021 0.7  0.1 - 1.5 mg/dL Final   • Albumin 02/06/2021 4.2  3.2 - 4.9 g/dL Final   • Total Protein 02/06/2021 7.1  6.0 - 8.2 g/dL Final   • Globulin 02/06/2021 2.9  1.9 - 3.5 g/dL Final   • A-G Ratio 02/06/2021 1.4  g/dL Final   • 25-Hydroxy   Vitamin D 25 02/06/2021 43  30 - 100 ng/mL Final    Comment: Adult Ranges:   <20 ng/mL - Deficiency  20-29 ng/mL - Insufficiency   ng/mL - Sufficiency  Effective 3/18/2020, this electrochemiluminescence binding assay is  performed using Roche dipti e immunoassay analyzer.  The [employer] Vitamin D  total II assay is intended for the quantitative determination of total 25  hydroxyvitamin D in human serum and plasma. This assay is to be used as an  aid in the assessment of vitamin D sufficiency in adults.     • GFR If  02/06/2021 >60  >60 mL/min/1.73 m 2 Final   • GFR If Non  02/06/2021 >60  >60 mL/min/1.73 m 2 Final       /82 (BP Location: Right arm, Patient Position: Sitting)   Pulse 67   Temp 36.7 °C (98 °F) (Temporal)   Resp 14   Ht 1.651 m (5' 5\")   Wt 85.7 kg (189 lb)   SpO2 97%  Body mass index is 31.45 kg/m².    Physical " Exam:  Constitutional: Well-developed and well-nourished female in NAD. Not diaphoretic. No distress.   Skin: warm, dry, intact, no evidence of rash or concerning lesions  Head: Atraumatic without lesions.  Eyes: Conjunctivae and extraocular motions are normal. Pupils are equal, round, and reactive to light. No scleral icterus.   Ears:  External ears unremarkable. TMs normal; bilaterally  Mouth/Throat: Tongue normal. Oropharynx is clear and moist. Posterior pharynx without erythema or exudates.  Neck: Supple, trachea midline. No thyromegaly present. No cervical or supraclavicular lymphadenopathy.  Cardiovascular: Regular rate and rhythm without murmur. Radial pulses are intact and equal bilaterally.  Pulmonary: Clear to ausculation. Normal effort. No rales, ronchi, or wheezing.  Abdomen: Soft, non tender, and without distention. Active bowel sounds in all four quadrants. No rebound, guarding, masses   Extremities: No cyanosis, clubbing, erythema, nor edema.   Left wrist/Hand: No deformity, swelling or bruising noted. Tenderness to palpation metacarpalpharyngeal joint of left thumb. No tenderness at snuffbox. Range of motion intact.  Strength 3/5 and sensation intact.  2+ radial pulse.  Tinel's sign Neg , Phalen's test Neg,  finkelstein test Neg  Neurological: Alert and oriented x 3. No cranial nerve deficit. 5/5 myotomes. Sensation intact.   Psychiatric:  Behavior, mood, and affect are appropriate.       Assessment and Plan:     The following treatment plan was discussed:    1. Encounter to establish care  Very pleasant 51-year-old female presents today to establish care.  Her last primary care provider was Sade Kiran.  I have reviewed and updated her medical history, surgical history, allergies, family history, social determinants of health, and medications.  Reviewed most recent labs with patient and she is up-to-date.  Reviewed most recent office visit.  Patient is due for her mammogram and ultrasound on  right breast.  Patient is also due for vaccines however she declined at this time.    2. Obesity (BMI 30-39.9)  - Patient identified as having weight management issue.  Appropriate orders and counseling given.  Discussed diet, exercise, weight loss, behavioral modification, portion size management.      3. Elevated alkaline phosphatase level  Discussed multiple differential diagnoses of elevated alk phos levels.  Labs orders as indicated.  We will continue to monitor future appointments.  - Comp Metabolic Panel; Future  - Lipid Profile; Future  - GAMMA GT (GGT); Future  - AFP SERUM TUMOR MARKER; Future    4. Pain of left hand  Discussed multiple causes of her pain including but not limited to tendinitis, gout, or  Arthritis.  Patient declined x-rays at this time.  Reviewed most recent x-rays in chart.  Patient was agreeable to 6 weeks of physical therapy.    5. Hand pain, left  - REFERRAL TO PHYSICAL THERAPY    6. Abnormal mammogram  Orders as indicated per last ultrasound and mammogram results.  - MA DIAGNOSTIC MAMMO BILAT W/CAD; Future  - US-BREAST LIMITED-RIGHT; Future    7. Encounter for screening mammogram for breast cancer      Any change or worsening of signs or symptoms, patient encouraged to follow-up or report to emergency room for further evaluation. Patient verbalizes understanding and agrees.    Follow-Up: Return in about 7 weeks (around 9/6/2021) for Follow up labs.      PLEASE NOTE: This dictation was created using voice recognition software. I have made every reasonable attempt to correct obvious errors, but I expect that there are errors of grammar and possibly content that I did not discover before finalizing the note.

## 2021-07-20 NOTE — ASSESSMENT & PLAN NOTE
Patient reports that she had abnormal mammogram and ultrasound screening done last year.  She does states she was post have follow-up in 6 months however due to bills she was unable to afford getting the follow-up.  Today she is agreeable for mammogram and ultrasound order being placed.  Patient denies any change in breast contour, nipple discharge, or new lumps in her breast.

## 2021-07-20 NOTE — ASSESSMENT & PLAN NOTE
Patient reports chronic history of left hand pain mainly in her thumb joint.  She has been taking Tylenol to help alleviate with pain which does help some.  She is not currently using any cryotherapy.  She denies any numbness or tingling in her left hand or thumb.  She does indicate decreased  strength due to pain.  Reviewed previous x-rays indicating no osteoarthritis.  Patient was agreeable to physical therapy for 6 weeks and possibility of referral if she fails physical therapy.   patient is unable to tolerate NSAIDs due to them causing inflammation in her colon per her GI doctor.  Patient will use Tylenol 1000 mg 2-3 times a day to help with pain as well as cryotherapy in the evenings for at least 20 minutes.

## 2021-07-20 NOTE — ASSESSMENT & PLAN NOTE
Component      Latest Ref Rng & Units 12/26/2020 12/27/2020 2/6/2021 4/7/2021   Alkaline Phosphatase      30 - 99 U/L 91 85 105 (H) 115 (H)     This is a chronic condition that continues to increase.  Discussed patient multiple causes of elevated alkaline phos levels.  We will continue to monitor at future appointments.  Patient denies any symptoms of right upper quadrant pain during exam today.  She does report having chronic nausea.  She also reports having history of gallstones.  We will continue to monitor labs as indicated.

## 2021-07-20 NOTE — PATIENT INSTRUCTIONS
MyPlate from USDA    MyPlate is an outline of a general healthy diet based on the 2010 Dietary Guidelines for Americans, from the U.S. Department of Agriculture (USDA). It sets guidelines for how much food you should eat from each food group based on your age, sex, and level of physical activity.  What are tips for following MyPlate?  To follow MyPlate recommendations:  · Eat a wide variety of fruits and vegetables, grains, and protein foods.  · Serve smaller portions and eat less food throughout the day.  · Limit portion sizes to avoid overeating.  · Enjoy your food.  · Get at least 150 minutes of exercise every week. This is about 30 minutes each day, 5 or more days per week.  It can be difficult to have every meal look like MyPlate. Think about MyPlate as eating guidelines for an entire day, rather than each individual meal.  Fruits and vegetables  · Make half of your plate fruits and vegetables.  · Eat many different colors of fruits and vegetables each day.  · For a 2,000 calorie daily food plan, eat:  ? 2½ cups of vegetables every day.  ? 2 cups of fruit every day.  · 1 cup is equal to:  ? 1 cup raw or cooked vegetables.  ? 1 cup raw fruit.  ? 1 medium-sized orange, apple, or banana.  ? 1 cup 100% fruit or vegetable juice.  ? 2 cups raw leafy greens, such as lettuce, spinach, or kale.  ? ½ cup dried fruit.  Grains  · One fourth of your plate should be grains.  · Make at least half of the grains you eat each day whole grains.  · For a 2,000 calorie daily food plan, eat 6 oz of grains every day.  · 1 oz is equal to:  ? 1 slice bread.  ? 1 cup cereal.  ? ½ cup cooked rice, cereal, or pasta.  Protein  · One fourth of your plate should be protein.  · Eat a wide variety of protein foods, including meat, poultry, fish, eggs, beans, nuts, and tofu.  · For a 2,000 calorie daily food plan, eat 5½ oz of protein every day.  · 1 oz is equal to:  ? 1 oz meat, poultry, or fish.  ? ¼ cup cooked beans.  ? 1 egg.  ? ½ oz nuts  or seeds.  ? 1 Tbsp peanut butter.  Dairy  · Drink fat-free or low-fat (1%) milk.  · Eat or drink dairy as a side to meals.  · For a 2,000 calorie daily food plan, eat or drink 3 cups of dairy every day.  · 1 cup is equal to:  ? 1 cup milk, yogurt, cottage cheese, or soy milk (soy beverage).  ? 2 oz processed cheese.  ? 1½ oz natural cheese.  Fats, oils, salt, and sugars  · Only small amounts of oils are recommended.  · Avoid foods that are high in calories and low in nutritional value (empty calories), like foods high in fat or added sugars.  · Choose foods that are low in salt (sodium). Choose foods that have less than 140 milligrams (mg) of sodium per serving.  · Drink water instead of sugary drinks. Drink enough water each day to keep your urine pale yellow.  Where to find support  · Work with your health care provider or a nutrition specialist (dietitian) to develop a customized eating plan that is right for you.  · Download an roxana (mobile application) to help you track your daily food intake.  Where to find more information  · Go to ChooseMyPlate.gov for more information.  · Learn more and log your daily food intake according to MyPlate using USDA's [employer]fr[employer]: www.Fair[employer]er.usda.gov  Summary  · MyPlate is a general guideline for healthy eating from the USDA. It is based on the 2010 Dietary Guidelines for Americans.  · In general, fruits and vegetables should take up ½ of your plate, grains should take up ¼ of your plate, and protein should take up ¼ of your plate.  This information is not intended to replace advice given to you by your health care provider. Make sure you discuss any questions you have with your health care provider.  Document Released: 01/06/2009 Document Revised: 01/19/2019 Document Reviewed: 03/19/2018  [employer] Patient Education © 2020 [employer]    How to Use Cold Therapy  Cold therapy, or cryotherapy, is a treatment that uses cold temperatures to treat an injury or medical  condition. It includes using cold packs or ice packs to reduce pain and swelling. Only use cold therapy if your doctor says it is okay.  What are the risks?  Generally, cold therapy is a safe treatment. However, it is not safe for:  · People who are not able to say they are in pain. These include small children and people who have memory problems.  · People who have certain conditions, such as:  ? A problem in the vessels that slows blood flow to the fingers and toes (Raynaud's syndrome).  ? Feeling very cold easily (cold hypersensitivity).  ? Lack of feeling in the area being iced.  Cold therapy may not be safe for people who have other conditions. Do not use it without talking to your doctor if you have:  · A heart condition.  · High blood pressure.  · Open or healing wounds.  · An infection.  · Pain and swelling in your joints (rheumatoid arthritis).  · Poor blood flow in the body.  · Diabetes.  · Certain skin conditions.  How can I make a cold pack?  When using a cold pack at home to reduce pain and swelling, you can use:  · A silica gel cold pack that has been left in the freezer. You can buy this online or in stores.  · A sealable plastic bag that has been filled with crushed ice.  · A washcloth or paper towels soaked in cold (or ice) water.  · A plastic bag of frozen vegetables. Throw them away when you are finished using them as a cold pack.  Supplies needed:  · A cold pack.  · A towel. This can be dry or damp, based on what you like.  How to use cold therapy    1. Have your cold pack ready.  2. Place a towel between the cold pack and your skin. You may also wrap the cold pack in a towel.  3. Put the cold pack on the affected area. Keep it on for no more than 20 minutes at a time.  4. Check your skin after 5 minutes to make sure that there is no damage to the area. Check for:  ? White spots on your skin. Your skin may look blotchy or mottled.  ? Skin that looks blue or pale.  ? Skin that feels waxy or  hard.  5. Repeat these steps as many times each day as told by your doctor.  Always use a towel to avoid direct contact with your skin.  Contact a doctor if:  · You start to have white spots on your skin. This may give your skin a blotchy or mottled look.  · Your skin turns blue or pale.  · Your skin becomes waxy or hard.  · Your swelling gets worse.  Summary  · Cold therapy, or cryotherapy, is used to treat an injury or other conditions. It includes using cold packs or ice packs to reduce pain and swelling.  · Cold therapy is not safe for people who are not able to say they are in pain.  · When using cold packs or ice packs, always place a towel between the cold source and your skin.  · Check your skin after 5 minutes of icing it. This is to make sure that there is no skin damage.  · Contact your doctor if you notice changes in your skin or your swelling gets worse.  This information is not intended to replace advice given to you by your health care provider. Make sure you discuss any questions you have with your health care provider.  Document Released: 06/05/2009 Document Revised: 09/16/2019 Document Reviewed: 09/16/2019  [employer] Patient Education © 2020 [employer]

## 2021-08-28 ENCOUNTER — HOSPITAL ENCOUNTER (OUTPATIENT)
Dept: LAB | Facility: MEDICAL CENTER | Age: 51
End: 2021-08-28
Attending: NURSE PRACTITIONER
Payer: COMMERCIAL

## 2021-08-28 DIAGNOSIS — R74.8 ELEVATED ALKALINE PHOSPHATASE LEVEL: ICD-10-CM

## 2021-08-28 LAB
ALBUMIN SERPL BCP-MCNC: 4.5 G/DL (ref 3.2–4.9)
ALBUMIN/GLOB SERPL: 1.6 G/DL
ALP SERPL-CCNC: 112 U/L (ref 30–99)
ALT SERPL-CCNC: 13 U/L (ref 2–50)
ANION GAP SERPL CALC-SCNC: 11 MMOL/L (ref 7–16)
AST SERPL-CCNC: 18 U/L (ref 12–45)
BILIRUB SERPL-MCNC: 0.7 MG/DL (ref 0.1–1.5)
BUN SERPL-MCNC: 11 MG/DL (ref 8–22)
CALCIUM SERPL-MCNC: 9.9 MG/DL (ref 8.5–10.5)
CHLORIDE SERPL-SCNC: 104 MMOL/L (ref 96–112)
CHOLEST SERPL-MCNC: 199 MG/DL (ref 100–199)
CO2 SERPL-SCNC: 27 MMOL/L (ref 20–33)
CREAT SERPL-MCNC: 0.66 MG/DL (ref 0.5–1.4)
GGT SERPL-CCNC: 13 U/L (ref 7–34)
GLOBULIN SER CALC-MCNC: 2.8 G/DL (ref 1.9–3.5)
GLUCOSE SERPL-MCNC: 101 MG/DL (ref 65–99)
HDLC SERPL-MCNC: 46 MG/DL
LDLC SERPL CALC-MCNC: 134 MG/DL
POTASSIUM SERPL-SCNC: 4.9 MMOL/L (ref 3.6–5.5)
PROT SERPL-MCNC: 7.3 G/DL (ref 6–8.2)
SODIUM SERPL-SCNC: 142 MMOL/L (ref 135–145)
TRIGL SERPL-MCNC: 97 MG/DL (ref 0–149)

## 2021-08-28 PROCEDURE — 80053 COMPREHEN METABOLIC PANEL: CPT

## 2021-08-28 PROCEDURE — 80061 LIPID PANEL: CPT

## 2021-08-28 PROCEDURE — 82977 ASSAY OF GGT: CPT

## 2021-08-28 PROCEDURE — 82105 ALPHA-FETOPROTEIN SERUM: CPT

## 2021-08-28 PROCEDURE — 36415 COLL VENOUS BLD VENIPUNCTURE: CPT

## 2021-08-30 LAB — AFP-TM SERPL-MCNC: 2 NG/ML (ref 0–9)

## 2021-09-08 ENCOUNTER — OFFICE VISIT (OUTPATIENT)
Dept: MEDICAL GROUP | Facility: PHYSICIAN GROUP | Age: 51
End: 2021-09-08
Payer: COMMERCIAL

## 2021-09-08 VITALS
HEART RATE: 82 BPM | SYSTOLIC BLOOD PRESSURE: 124 MMHG | TEMPERATURE: 98.1 F | HEIGHT: 66 IN | OXYGEN SATURATION: 96 % | WEIGHT: 188 LBS | RESPIRATION RATE: 14 BRPM | DIASTOLIC BLOOD PRESSURE: 80 MMHG | BODY MASS INDEX: 30.22 KG/M2

## 2021-09-08 DIAGNOSIS — R74.8 ELEVATED ALKALINE PHOSPHATASE LEVEL: ICD-10-CM

## 2021-09-08 DIAGNOSIS — R92.8 ABNORMAL MAMMOGRAM: ICD-10-CM

## 2021-09-08 DIAGNOSIS — M79.642 PAIN OF LEFT HAND: ICD-10-CM

## 2021-09-08 PROCEDURE — 99213 OFFICE O/P EST LOW 20 MIN: CPT | Performed by: NURSE PRACTITIONER

## 2021-09-08 ASSESSMENT — FIBROSIS 4 INDEX: FIB4 SCORE: 0.79

## 2021-09-08 NOTE — ASSESSMENT & PLAN NOTE
This continues to be a ongoing condition.  Reviewed labs with patient.  Alkaline phosphate continues to be mildly elevated at 112.  GGT and alpha-fetoprotein were within normal limits.  Discussed multiple causes and differential diagnosis with patient.  We will continue to monitor future appointments.  Patient denies any right upper quadrant pain, jaundice, or vomiting.

## 2021-09-08 NOTE — ASSESSMENT & PLAN NOTE
This is a chronic condition.  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  Patient continues to wear left wrist brace which she states does help with her pain.  She does use 500- 1,000 mg of Tylenol occasionally to help with her pain.  She is unable to use NSAIDs due to GI side effects.  She has not been using cryotherapy as discussed at last appointment.  She starts physical therapy next week.    If symptoms do not improve with physical, cryotherapy and Tylenol, discussed possible referral to orthopedics.

## 2021-09-08 NOTE — PROGRESS NOTES
Chief Complaint   Patient presents with   • Follow-Up       Subjective:     HPI:   Michael Larsen is a 51 y.o. female here to discuss the evaluation and management of:      Assessment and Plan:     Hand pain  This is a chronic condition.  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  Patient continues to wear left wrist brace which she states does help with her pain.  She does use 500- 1,000 mg of Tylenol occasionally to help with her pain.  She is unable to use NSAIDs due to GI side effects.  She has not been using cryotherapy as discussed at last appointment.  She starts physical therapy next week.    If symptoms do not improve with physical, cryotherapy and Tylenol, discussed possible referral to orthopedics.        Elevated alkaline phosphatase level  This continues to be a ongoing condition.  Reviewed labs with patient.  Alkaline phosphate continues to be mildly elevated at 112.  GGT and alpha-fetoprotein were within normal limits.  Discussed multiple causes and differential diagnosis with patient.  We will continue to monitor future appointments.  Patient denies any right upper quadrant pain, jaundice, or vomiting.        Abnormal mammogram  Patient reports that she has a mammogram scheduled for 9-.          ROS:  Gen: no fevers/chills, no changes in weight  Eyes: no changes in vision  ENT: no sore throat, no hearing loss, no bloody nose  Pulm: no cough  CV: no chest pain, no palpitations  GI: no nausea/vomiting, no diarrhea  : no dysuria  MSk: Positive per HPI  Skin: no rash  Neuro: no headaches, no numbness/tingling  Heme/Lymph: no easy bruising    Allergies   Allergen Reactions   • Hydrocodone Nausea     Nightmares & Nausea     • Codeine Vomiting and Nausea     Vomiting    • Other Food Vomiting     Grape juice            • Penicillins Vomiting     Vomiting        Current medicines (including changes today)  Current Outpatient Medications   Medication Sig  Dispense Refill   • acetaminophen (TYLENOL) 500 MG Tab Take 500 mg by mouth every 6 hours as needed for Moderate Pain.     • famotidine (PEPCID) 20 MG Tab Take 1 Tab by mouth 2 times a day as needed (Heartburn). 60 Tab 0   • FIBER ADULT GUMMIES PO Take 2 Tabs by mouth every morning.     • Multiple Vitamins-Calcium (ONE-A-DAY WOMENS FORMULA PO) Take 1 Tab by mouth every day. Gummy     • Probiotic Product (PROBIOTIC-10 PO) Take 1 Tab by mouth every morning. Gummy       No current facility-administered medications for this visit.       Social History     Tobacco Use   • Smoking status: Former Smoker     Packs/day: 1.00     Years: 31.00     Pack years: 31.00     Quit date: 2012     Years since quittin.3   • Smokeless tobacco: Never Used   Vaping Use   • Vaping Use: Never used   Substance Use Topics   • Alcohol use: Never   • Drug use: Yes     Frequency: 7.0 times per week     Types: Marijuana, Inhaled     Comment: marijuanna occ, last 3 days ago       Patient Active Problem List    Diagnosis Date Noted   • Elevated alkaline phosphatase level 2021   • RUQ pain 2021   • Umbilical hernia 2021   • Chronic heel pain, left 2021   • Renal colic 2020   • Complicated UTI (urinary tract infection) 2020   • Marijuana use 2020   • Status post cystoscopy with ureteral stent placement 2020   • Leukocytosis 2020   • Rectal bleeding 2020   • Generalized abdominal pain 2020   • Nephrolithiasis 10/21/2020   • Chronic bilateral low back pain without sciatica 10/21/2020   • Hematuria 2020   • Irritability 2020   • Pelvic pain 2020   • Abnormal mammogram 2020   • Hand numbness 2020   • Abdominal pain 2020   • History of vertigo 2020   • GERD (gastroesophageal reflux disease) 2020   • Hand pain 2020   • Obesity (BMI 30-39.9) 2020   • Ear congestion, bilateral 2020       Family History   Problem Relation  Age of Onset   • Hypertension Mother    • Lung Disease Mother         COPD   • Heart Disease Maternal Grandmother    • Lung Disease Maternal Uncle         lung cancer          Objective:     Hospital Outpatient Visit on 08/28/2021   Component Date Value Ref Range Status   • Alpha Fetoprotein 08/28/2021 2  0 - 9 ng/mL Final    Comment: INTERPRETIVE INFORMATION: Alpha Fetoprotein Tumor Marker  The Kaye Jean-[employer] Access DxI AFP method is used. Results  obtained with different assay methods or kits cannot be used  interchangeably. AFP is a valuable aid in the management of  nonseminomatous testicular cancer patients when used in  conjunction with information available from the clinical  evaluation and other diagnostic procedures. Increased AFP  concentrations have also been observed in ataxia telangiectasia,  hereditary tyrosinemia, primary hepatocellular carcinoma,  teratocarcinoma, gastrointestinal tract cancers with and without  liver metastases, and in benign hepatic conditions such as acute  viral hepatitis, chronic active hepatitis, and cirrhosis. The  result cannot be interpreted as absolute evidence of the presence  or absence of malignant disease. The result is not interpretable  as a tumor marker in pregnant females.  Access complete set of age- and/or gender-specific reference  intervals for this test in the                            [facility] Test Directory  ([facility]).  Performed By: [facility]  [address] [place] [id number]  : Kacey Henderson MD     • Gamma Gt 08/28/2021 13  7 - 34 U/L Final   • Cholesterol,Tot 08/28/2021 199  100 - 199 mg/dL Final   • Triglycerides 08/28/2021 97  0 - 149 mg/dL Final   • HDL 08/28/2021 46  >=40 mg/dL Final   • LDL 08/28/2021 134* <100 mg/dL Final   • Sodium 08/28/2021 142  135 - 145 mmol/L Final   • Potassium 08/28/2021 4.9  3.6 - 5.5 mmol/L Final   • Chloride 08/28/2021 104  96 - 112 mmol/L Final   • Co2 08/28/2021 27  20  "- 33 mmol/L Final   • Anion Gap 08/28/2021 11.0  7.0 - 16.0 Final   • Glucose 08/28/2021 101* 65 - 99 mg/dL Final   • Bun 08/28/2021 11  8 - 22 mg/dL Final   • Creatinine 08/28/2021 0.66  0.50 - 1.40 mg/dL Final   • Calcium 08/28/2021 9.9  8.5 - 10.5 mg/dL Final   • AST(SGOT) 08/28/2021 18  12 - 45 U/L Final   • ALT(SGPT) 08/28/2021 13  2 - 50 U/L Final   • Alkaline Phosphatase 08/28/2021 112* 30 - 99 U/L Final   • Total Bilirubin 08/28/2021 0.7  0.1 - 1.5 mg/dL Final   • Albumin 08/28/2021 4.5  3.2 - 4.9 g/dL Final   • Total Protein 08/28/2021 7.3  6.0 - 8.2 g/dL Final   • Globulin 08/28/2021 2.8  1.9 - 3.5 g/dL Final   • A-G Ratio 08/28/2021 1.6  g/dL Final   • GFR If  08/28/2021 >60  >60 mL/min/1.73 m 2 Final   • GFR If Non  08/28/2021 >60  >60 mL/min/1.73 m 2 Final       /80   Pulse 82   Temp 36.7 °C (98.1 °F) (Temporal)   Resp 14   Ht 1.664 m (5' 5.5\")   Wt 85.3 kg (188 lb)   SpO2 96%  Body mass index is 30.81 kg/m².    Physical Exam:  Constitutional: Well-developed and well-nourished female in NAD. Not diaphoretic. No distress.   Skin: warm, dry, intact, no evidence of rash or concerning lesions  Head: Atraumatic without lesions.  Eyes: Conjunctivae and extraocular motions are normal. Pupils are equal, round, and reactive to light. No scleral icterus.   Cardiovascular: Regular rate and rhythm without murmur. Radial pulses are intact and equal bilaterally.  Pulmonary: Clear to ausculation. Normal effort. No rales, ronchi, or wheezing.  Abdomen: Soft, non tender, and without distention. Active bowel sounds in all four quadrants. No rebound, guarding, masses   Extremities: No cyanosis, clubbing, erythema, nor edema.  Wrist/Hand: No deformity, swelling or bruising noted. Tenderness to palpation Metacarpalphalange joint of left thumb.  Range of motion intact. sensation intact.  2+ radial pulse.finkelstein test Positive   Neurological: Alert and oriented x 3. No cranial " nerve deficit. 5/5 myotomes. Sensation intact.   Psychiatric:  Behavior, mood, and affect are appropriate.       The following treatment plan was discussed:    Reviewed labs with patient and answered all questions.    1. Pain of left hand  Continue with Tylenol cryotherapy for pain.  Start physical therapy next week.  Schedule appointment 6 weeks if symptoms have not decreased resolved.  Possible referral to orthopedics    2. Elevated alkaline phosphatase level  Chronic condition we will continue to monitor.  Labs ordered as indicated below.  - Comp Metabolic Panel; Future    3. Abnormal mammogram  Mammoscheduled for 9-.    Any change or worsening of signs or symptoms, patient encouraged to follow-up or report to emergency room for further evaluation. Patient verbalizes understanding and agrees.    Follow-Up: Return in about 6 months (around 3/8/2022), or if symptoms worsen or fail to improve, for elevated ALK phos.      PLEASE NOTE: This dictation was created using voice recognition software. I have made every reasonable attempt to correct obvious errors, but I expect that there are errors of grammar and possibly content that I did not discover before finalizing the note.

## 2021-09-14 ENCOUNTER — HOSPITAL ENCOUNTER (OUTPATIENT)
Dept: RADIOLOGY | Facility: MEDICAL CENTER | Age: 51
End: 2021-09-14
Attending: NURSE PRACTITIONER
Payer: COMMERCIAL

## 2021-09-14 DIAGNOSIS — R92.8 ABNORMAL MAMMOGRAM: ICD-10-CM

## 2021-09-14 PROCEDURE — G0279 TOMOSYNTHESIS, MAMMO: HCPCS

## 2022-01-10 ENCOUNTER — OFFICE VISIT (OUTPATIENT)
Dept: URGENT CARE | Facility: PHYSICIAN GROUP | Age: 52
End: 2022-01-10
Payer: COMMERCIAL

## 2022-01-10 ENCOUNTER — HOSPITAL ENCOUNTER (OUTPATIENT)
Facility: MEDICAL CENTER | Age: 52
End: 2022-01-10
Attending: PHYSICIAN ASSISTANT
Payer: COMMERCIAL

## 2022-01-10 VITALS
RESPIRATION RATE: 16 BRPM | SYSTOLIC BLOOD PRESSURE: 130 MMHG | TEMPERATURE: 98 F | BODY MASS INDEX: 32.3 KG/M2 | OXYGEN SATURATION: 97 % | HEIGHT: 66 IN | DIASTOLIC BLOOD PRESSURE: 80 MMHG | HEART RATE: 89 BPM | WEIGHT: 201 LBS

## 2022-01-10 DIAGNOSIS — J98.8 VIRAL RESPIRATORY ILLNESS: ICD-10-CM

## 2022-01-10 DIAGNOSIS — R53.83 FATIGUE, UNSPECIFIED TYPE: ICD-10-CM

## 2022-01-10 DIAGNOSIS — Z20.822 EXPOSURE TO COVID-19 VIRUS: ICD-10-CM

## 2022-01-10 DIAGNOSIS — B97.89 VIRAL RESPIRATORY ILLNESS: ICD-10-CM

## 2022-01-10 DIAGNOSIS — R05.9 COUGH: ICD-10-CM

## 2022-01-10 DIAGNOSIS — R52 BODY ACHES: ICD-10-CM

## 2022-01-10 PROCEDURE — U0005 INFEC AGEN DETEC AMPLI PROBE: HCPCS

## 2022-01-10 PROCEDURE — U0003 INFECTIOUS AGENT DETECTION BY NUCLEIC ACID (DNA OR RNA); SEVERE ACUTE RESPIRATORY SYNDROME CORONAVIRUS 2 (SARS-COV-2) (CORONAVIRUS DISEASE [COVID-19]), AMPLIFIED PROBE TECHNIQUE, MAKING USE OF HIGH THROUGHPUT TECHNOLOGIES AS DESCRIBED BY CMS-2020-01-R: HCPCS

## 2022-01-10 PROCEDURE — 99214 OFFICE O/P EST MOD 30 MIN: CPT | Mod: CS | Performed by: PHYSICIAN ASSISTANT

## 2022-01-10 ASSESSMENT — ENCOUNTER SYMPTOMS
DIARRHEA: 0
EYE REDNESS: 0
SHORTNESS OF BREATH: 1
COUGH: 1
HEADACHES: 1
NAUSEA: 0
MYALGIAS: 1
FEVER: 1
EYE DISCHARGE: 0
VOMITING: 0
SORE THROAT: 1

## 2022-01-10 ASSESSMENT — FIBROSIS 4 INDEX: FIB4 SCORE: 0.79

## 2022-01-10 NOTE — LETTER
January 10, 2022         Patient: Michael Larsen   YOB: 1970   Date of Visit: 1/10/2022       To Whom it May Concern,     Your employee was seen in our clinic today.  A concern for COVID-19 has been identified and testing is in progress.      We are asking you to excuse absences while following self-isolation protocol per Center for Disease Control (CDC) guidelines.  Your employee will be able to access test results through our electronic delivery system called [employer].      If the results of testing are negative, and once there has been no fever (temperature >100.4 F) for at least 72 hours without treatment, and no vomiting or diarrhea for at least 48 hours, then return to work is approved.       If the results of testing are positive then your employee will be contacted by the [facility] or Critical access hospital department for further instructions on duration of self-isolation and return to work protocol. In general, this will also follow the CDC guidelines with a minimum of 10 days from the onset of symptoms and without fever, vomiting, or diarrhea as above.      In general, repeat testing is not necessary and not offered through our [facility].      This is the only note that will be provided from [facility] for this visit.  Your employee will require an appointment with a primary care provider if FMLA or disability forms are required.       Sincerely,      Alyce Krueger P.A.-C.  Electronically Signed

## 2022-01-11 DIAGNOSIS — Z20.822 EXPOSURE TO COVID-19 VIRUS: ICD-10-CM

## 2022-01-11 DIAGNOSIS — B97.89 VIRAL RESPIRATORY ILLNESS: ICD-10-CM

## 2022-01-11 DIAGNOSIS — J98.8 VIRAL RESPIRATORY ILLNESS: ICD-10-CM

## 2022-01-11 NOTE — PROGRESS NOTES
Subjective     Michael Larsen is a 51 y.o. female who presents with Cough (exposed to covid), Headache, Fever, and Body Aches        URI   This is a new problem. Episode onset: x 2-3 days ago. The problem has been unchanged. Maximum temperature: The patient reports an intermittent subjective fever. Associated symptoms include congestion, coughing, headaches and a sore throat. Pertinent negatives include no chest pain, diarrhea, ear pain, nausea, rash or vomiting. She has tried acetaminophen for the symptoms.     The patient reports positive exposure to COVID-19, stating her son recently tested positive.  The patient has been fully vaccinated against COVID-19, including her COVID-19 booster.    PMH:  has a past medical history of Anesthesia, Anxiety, Bleeding from the nose, Bowel habit changes, Cancer ([facility]), Dental disorder, Hay fever, Heart burn, Hemorrhoids, Hypertension, Indigestion, Kidney disease, Marijuana use (12/20/2020), Measles, Migraine, Pain, PONV (postoperative nausea and vomiting) (07/29/2019), Urinary tract infection (1994), and Venereal disease. She also has no past medical history of Anemia, Arrhythmia, Arthritis, Asthma, Blood transfusion without reported diagnosis, CHF (congestive heart failure) ([facility]), Clotting disorder ([facility]), COPD (chronic obstructive pulmonary disease) ([facility]), Depression, Diabetes ([facility]), Glaucoma, Heart attack ([facility]), Heart murmur, HIV (human immunodeficiency virus infection) ([facility]), Hyperlipidemia, Seizure ([facility]), Stroke ([facility]), Substance abuse ([facility]), Thyroid disease, or Tuberculosis.  MEDS:   Current Outpatient Medications:   •  acetaminophen (TYLENOL) 500 MG Tab, Take 500 mg by mouth every 6 hours as needed for Moderate Pain., Disp: , Rfl:   •  famotidine (PEPCID) 20 MG Tab, Take 1 Tab by mouth 2 times a day as needed (Heartburn). (Patient not taking: Reported on 1/10/2022), Disp: 60 Tab, Rfl: 0  •  FIBER ADULT GUMMIES PO, Take 2 Tabs by mouth every morning. (Patient not  taking: Reported on 1/10/2022), Disp: , Rfl:   •  Multiple Vitamins-Calcium (ONE-A-DAY WOMENS FORMULA PO), Take 1 Tab by mouth every day. Gummy (Patient not taking: Reported on 1/10/2022), Disp: , Rfl:   •  Probiotic Product (PROBIOTIC-10 PO), Take 1 Tab by mouth every morning. Gummy (Patient not taking: Reported on 1/10/2022), Disp: , Rfl:   ALLERGIES:   Allergies   Allergen Reactions   • Hydrocodone Nausea     Nightmares & Nausea     • Codeine Vomiting and Nausea     Vomiting    • Other Food Vomiting     Grape juice            • Penicillins Vomiting     Vomiting      SURGHX:   Past Surgical History:   Procedure Laterality Date   • [place] CYSTOSCOPY,INSERT URETERAL STENT  12/27/2020    Procedure: CYSTOSCOPY, WITH URETERAL STENT INSERTION;  Surgeon: Sky Santos M.D.;  Location: [facility];  Service: Urology   • KY CYSTO/URETERO/PYELOSCOPY, DX Left 12/27/2020    Procedure: LEFT URETEROSCOPY WITH STONE BASKETING;  Surgeon: Sky Santos M.D.;  Location: [facility];  Service: Urology   • KY CYSTOSCOPY,INSERT URETERAL STENT Left 12/24/2020    Procedure: CYSTOSCOPY;  Surgeon: Ed Rosenthal M.D.;  Location: [facility];  Service: Urology   • KY CYSTO/URETERO/PYELOSCOPY, DX Left 12/24/2020    Procedure: URETEROSCOPY;  Surgeon: Ed Rosenthal M.D.;  Location: [facility];  Service: Urology   • LASERTRIPSY Left 12/24/2020    Procedure: LITHOTRIPSY, USING LASER .. .;  Surgeon: Ed Rosenthal M.D.;  Location: [facility];  Service: Urology   • KY CYSTOSCOPY,INSERT URETERAL STENT N/A 12/20/2020    Procedure: CYSTOSCOPY, WITH URETERAL STENT removal;  Surgeon: Ed Rosenthal M.D.;  Location: [facility];  Service: Urology   • KY CYSTOSCOPY,INSERT URETERAL STENT Left 12/11/2020    Procedure: CYSTOSCOPY, WITH URETERAL STENT INSERTION;  Surgeon: Daniel Paulson M.D.;  Location: [facility];  Service: Urology   • HYSTEROSCOPY WITH VIDEO OPERATIVE N/A 7/29/2019     "Procedure: HYSTEROSCOPY, WITH VIDEO IMAGING -WITH MYOSURE;  Surgeon: Prieto Santa M.D.;  Location: SURGERY SAME DAY [facility];  Service: Obstetrics   • DILATION AND CURETTAGE N/A 7/29/2019    Procedure: DILATION AND CURETTAGE;  Surgeon: Prieto Santa M.D.;  Location: SURGERY SAME DAY [facility];  Service: Obstetrics   • LUMPECTOMY Left 02/1988   • LAPAROSCOPY  04/1987   • GYN SURGERY       SOCHX:  reports that she quit smoking about 9 years ago. She has a 31.00 pack-year smoking history. She has never used smokeless tobacco. She reports current drug use. Frequency: 7.00 times per week. Drugs: Marijuana and Inhaled. She reports that she does not drink alcohol.  FH: Family history was reviewed, no pertinent findings to report    Review of Systems   Constitutional: Positive for fever (The patient reports an intermittent subjective fever.) and malaise/fatigue.   HENT: Positive for congestion and sore throat. Negative for ear pain.    Eyes: Negative for discharge and redness.   Respiratory: Positive for cough and shortness of breath (The patient reports intermittent shortness of breath, stating she feels \"winded\".).    Cardiovascular: Negative for chest pain and leg swelling.   Gastrointestinal: Negative for diarrhea, nausea and vomiting.   Musculoskeletal: Positive for myalgias.   Skin: Negative for rash.   Neurological: Positive for headaches.          Objective   /80   Pulse 89   Temp 36.7 °C (98 °F) (Temporal)   Resp 16   Ht 1.664 m (5' 5.5\")   Wt 91.2 kg (201 lb)   LMP  (LMP Unknown)   SpO2 97%   BMI 32.94 kg/m²      Physical Exam  Constitutional:       General: She is not in acute distress.     Appearance: Normal appearance. She is not ill-appearing.   HENT:      Head: Normocephalic and atraumatic.      Right Ear: External ear normal.      Left Ear: External ear normal.      Nose: Nose normal.      Mouth/Throat:      Mouth: Mucous membranes are moist.      Pharynx: Oropharynx is " clear. No posterior oropharyngeal erythema.   Eyes:      Extraocular Movements: Extraocular movements intact.      Conjunctiva/sclera: Conjunctivae normal.   Cardiovascular:      Rate and Rhythm: Normal rate and regular rhythm.      Heart sounds: Normal heart sounds.   Pulmonary:      Effort: Pulmonary effort is normal. No respiratory distress.      Breath sounds: Normal breath sounds. No wheezing.   Musculoskeletal:         General: Normal range of motion.      Cervical back: Normal range of motion and neck supple.   Skin:     General: Skin is warm and dry.   Neurological:      Mental Status: She is alert and oriented to person, place, and time.               Progress:  COVID-19 PCR - pending             Assessment & Plan      1. Viral respiratory illness  - SARS-CoV-2 PCR (24 hour In-House): Collect NP swab in VTM; Future    2. Cough    3. Body aches    4. Fatigue, unspecified type    5. Exposure to COVID-19 virus  - SARS-CoV-2 PCR (24 hour In-House): Collect NP swab in VTM; Future    The patient's presenting symptoms and physical exam legs are consistent with a viral respiratory illness with associated cough, body aches, and fatigue.  The patient reports positive exposure to COVID-19.  The patient's physical exam today in clinic was normal.  The patient appears in no acute distress.  The patient's vital signs are stable and within normal limits.  She is afebrile today in clinic.  Discussed likely viral etiology with the patient.  Based on the patient's presenting symptoms, will test the patient for COVID-19.  Advised the patient stay at home under self quarantine per CDC guidelines.  Provided the patient with a work note.  Recommend OTC medications and supportive care for symptomatic management.  Recommend the patient follow-up with her PCP as needed.  Discussed return precautions with the patient, and she verbalized understanding.    Differential diagnoses, supportive care, and indications for immediate  follow-up discussed with patient.   Instructed to return to clinic or nearest emergency department for any change in condition, further concerns, or worsening of symptoms.    OTC Tylenol or Motrin for fever/discomfort.  OTC cough/cold medication for symptomatic relief  OTC Supportive Care for Congestion - saline nasal spray or neti pot  Drink plenty of fluids  Advised the patient to stay at home under self-isolation until symptoms have been present for at least 10 days and are improved, and there has been no fever for at least 72 hours without the use of medications and/or no vomiting or diarrhea for 48 hours.  Work note provided  Follow-up with PCP  Return to clinic or go to the ED if symptoms worsen or fail to improve, or if the patient should develop worsening/increasing cough, congestion, ear pain, sore throat, shortness of breath, wheezing, chest pain, fever/chills, and/or any concerning symptoms.    Discussed plan with the patient, and she agrees to the above.     I personally reviewed prior external notes and test results pertinent to today's visit.  I have independently reviewed and interpreted all diagnostics ordered during this urgent care visit.     Time spent evaluating this patient was at least 30 minutes and includes preparing for visit, obtaining history, exam and evaluation, ordering labs/tests/procedures/medications, independent interpretation, and counseling/education.    Please note that this dictation was created using voice recognition software. I have made every reasonable attempt to correct obvious errors, but I expect that there may be errors of grammar and possibly content that I did not discover before finalizing the note.     This note was electronically signed by Alyce Krueger PA-C

## 2022-01-12 LAB
COVID ORDER STATUS COVID19: NORMAL
SARS-COV-2 RNA RESP QL NAA+PROBE: NOTDETECTED
SPECIMEN SOURCE: NORMAL

## 2022-01-22 ENCOUNTER — HOSPITAL ENCOUNTER (OUTPATIENT)
Dept: RADIOLOGY | Facility: MEDICAL CENTER | Age: 52
End: 2022-01-22
Attending: PHYSICIAN ASSISTANT
Payer: COMMERCIAL

## 2022-01-22 DIAGNOSIS — N20.0 CALCULUS OF KIDNEY: ICD-10-CM

## 2022-01-22 PROCEDURE — 74018 RADEX ABDOMEN 1 VIEW: CPT

## 2022-02-07 ENCOUNTER — APPOINTMENT (OUTPATIENT)
Dept: URGENT CARE | Facility: PHYSICIAN GROUP | Age: 52
End: 2022-02-07
Payer: COMMERCIAL

## 2022-09-20 ENCOUNTER — RESEARCH ENCOUNTER (OUTPATIENT)
Dept: VASCULAR LAB | Facility: MEDICAL CENTER | Age: 52
End: 2022-09-20
Payer: COMMERCIAL

## 2022-09-20 DIAGNOSIS — Z00.6 RESEARCH STUDY PATIENT: ICD-10-CM

## 2022-10-03 ENCOUNTER — HOSPITAL ENCOUNTER (OUTPATIENT)
Facility: MEDICAL CENTER | Age: 52
End: 2022-10-03
Attending: PATHOLOGY
Payer: COMMERCIAL

## 2022-10-05 DIAGNOSIS — Z00.6 RESEARCH STUDY PATIENT: ICD-10-CM

## 2022-10-11 LAB
ELF SCORE: 8.9
RELATIVE RISK: NORMAL
RISK GROUP: NORMAL
RISK: 3.3 %

## 2022-10-20 LAB
APOB+LDLR+PCSK9 GENE MUT ANL BLD/T: NOT DETECTED
BRCA1+BRCA2 DEL+DUP + FULL MUT ANL BLD/T: NOT DETECTED
MLH1+MSH2+MSH6+PMS2 GN DEL+DUP+FUL M: NOT DETECTED

## 2022-10-24 ENCOUNTER — DOCUMENTATION (OUTPATIENT)
Dept: VASCULAR LAB | Facility: MEDICAL CENTER | Age: 52
End: 2022-10-24
Payer: COMMERCIAL

## 2022-10-24 NOTE — PROGRESS NOTES
I have reviewed your [employer] [place] [employer] results. They are NEGATIVE for variants associated with hereditary breast and ovarian cancer, hampton syndrome, and familial hypercholesterolemia. This means you are at average risk for these conditions. I recommend we continue routine screening as recommended by the [employer]PSTF.      HENRIETTA Anthony  [facility]

## 2023-05-25 ENCOUNTER — OFFICE VISIT (OUTPATIENT)
Dept: MEDICAL GROUP | Facility: PHYSICIAN GROUP | Age: 53
End: 2023-05-25

## 2023-05-25 VITALS
DIASTOLIC BLOOD PRESSURE: 70 MMHG | OXYGEN SATURATION: 94 % | BODY MASS INDEX: 30.52 KG/M2 | WEIGHT: 183.2 LBS | HEIGHT: 65 IN | HEART RATE: 88 BPM | RESPIRATION RATE: 12 BRPM | TEMPERATURE: 96.9 F | SYSTOLIC BLOOD PRESSURE: 114 MMHG

## 2023-05-25 DIAGNOSIS — G89.29 CHRONIC RUQ PAIN: ICD-10-CM

## 2023-05-25 DIAGNOSIS — R11.2 NAUSEA AND VOMITING, UNSPECIFIED VOMITING TYPE: ICD-10-CM

## 2023-05-25 DIAGNOSIS — R80.9 PROTEINURIA, UNSPECIFIED TYPE: ICD-10-CM

## 2023-05-25 DIAGNOSIS — R10.11 CHRONIC RUQ PAIN: ICD-10-CM

## 2023-05-25 DIAGNOSIS — Z12.31 VISIT FOR SCREENING MAMMOGRAM: ICD-10-CM

## 2023-05-25 DIAGNOSIS — E78.00 PURE HYPERCHOLESTEROLEMIA: ICD-10-CM

## 2023-05-25 DIAGNOSIS — B35.1 ONYCHOMYCOSIS: ICD-10-CM

## 2023-05-25 DIAGNOSIS — R30.0 DYSURIA: ICD-10-CM

## 2023-05-25 PROBLEM — R10.84 GENERALIZED ABDOMINAL PAIN: Status: RESOLVED | Noted: 2020-11-18 | Resolved: 2023-05-25

## 2023-05-25 PROBLEM — D72.829 LEUKOCYTOSIS: Status: RESOLVED | Noted: 2020-12-11 | Resolved: 2023-05-25

## 2023-05-25 PROBLEM — K62.5 RECTAL BLEEDING: Status: RESOLVED | Noted: 2020-11-18 | Resolved: 2023-05-25

## 2023-05-25 LAB
APPEARANCE UR: NORMAL
BILIRUB UR STRIP-MCNC: NORMAL MG/DL
COLOR UR AUTO: NORMAL
GLUCOSE UR STRIP.AUTO-MCNC: NEGATIVE MG/DL
KETONES UR STRIP.AUTO-MCNC: 15 MG/DL
LEUKOCYTE ESTERASE UR QL STRIP.AUTO: NEGATIVE
NITRITE UR QL STRIP.AUTO: NEGATIVE
PH UR STRIP.AUTO: 6 [PH] (ref 5–8)
PROT UR QL STRIP: 100 MG/DL
RBC UR QL AUTO: NEGATIVE
SP GR UR STRIP.AUTO: 1.02
UROBILINOGEN UR STRIP-MCNC: 1 MG/DL

## 2023-05-25 PROCEDURE — 99214 OFFICE O/P EST MOD 30 MIN: CPT | Performed by: STUDENT IN AN ORGANIZED HEALTH CARE EDUCATION/TRAINING PROGRAM

## 2023-05-25 PROCEDURE — 81002 URINALYSIS NONAUTO W/O SCOPE: CPT | Performed by: STUDENT IN AN ORGANIZED HEALTH CARE EDUCATION/TRAINING PROGRAM

## 2023-05-25 PROCEDURE — 3078F DIAST BP <80 MM HG: CPT | Performed by: STUDENT IN AN ORGANIZED HEALTH CARE EDUCATION/TRAINING PROGRAM

## 2023-05-25 PROCEDURE — 3074F SYST BP LT 130 MM HG: CPT | Performed by: STUDENT IN AN ORGANIZED HEALTH CARE EDUCATION/TRAINING PROGRAM

## 2023-05-25 RX ORDER — CICLOPIROX 80 MG/ML
SOLUTION TOPICAL
Qty: 12 ML | Refills: 3 | Status: SHIPPED | OUTPATIENT
Start: 2023-05-25 | End: 2024-01-26

## 2023-05-25 ASSESSMENT — ENCOUNTER SYMPTOMS
WEIGHT LOSS: 0
CHILLS: 0
DIZZINESS: 0
NAUSEA: 1
HEADACHES: 0
VOMITING: 1
SHORTNESS OF BREATH: 0
ABDOMINAL PAIN: 1
PALPITATIONS: 0
FEVER: 0

## 2023-05-25 ASSESSMENT — PATIENT HEALTH QUESTIONNAIRE - PHQ9
5. POOR APPETITE OR OVEREATING: 2 - MORE THAN HALF THE DAYS
CLINICAL INTERPRETATION OF PHQ2 SCORE: 2
SUM OF ALL RESPONSES TO PHQ QUESTIONS 1-9: 9

## 2023-05-25 NOTE — PROGRESS NOTES
"Subjective:   HISTORY OF THE PRESENT ILLNESS: Patient is a 53 y.o. female here today to establish care.    Problem   Nausea and Vomiting    1 day of vomiting and RUQ pain - mid epigastric.   Has had this intermittently for the past few years. Has had work up in the past with no definitive answers. Had normal RUQ, elevated alk phos.     Reports to dysuria chronic, some chest pain with episodes  Denies fevers         Protein in Urine   Leukocytosis (Resolved)   Rectal Bleeding (Resolved)   Generalized Abdominal Pain (Resolved)        Current Outpatient Medications Ordered in Epic   Medication Sig Dispense Refill    ciclopirox (PENLAC) 8 % solution Applied nightly for 48 weeks to affected toe nail 12 mL 3    acetaminophen (TYLENOL) 500 MG Tab Take 500 mg by mouth every 6 hours as needed for Moderate Pain.      famotidine (PEPCID) 20 MG Tab Take 1 Tab by mouth 2 times a day as needed (Heartburn). 60 Tab 0    FIBER ADULT GUMMIES PO Take 2 Tablets by mouth every morning.      Multiple Vitamins-Calcium (ONE-A-DAY WOMENS FORMULA PO) Take 1 Tablet by mouth every day. Gummy      Probiotic Product (PROBIOTIC-10 PO) Take 1 Tablet by mouth every morning. Gummy       No current Epic-ordered facility-administered medications on file.       Review of systems:  Review of Systems   Constitutional:  Negative for chills, fever, malaise/fatigue and weight loss.   Respiratory:  Negative for shortness of breath.    Cardiovascular:  Positive for chest pain. Negative for palpitations and leg swelling.   Gastrointestinal:  Positive for abdominal pain, nausea and vomiting. Negative for melena.   Genitourinary:  Positive for dysuria and urgency.   Neurological:  Negative for dizziness and headaches.         Past Medical History:   Diagnosis Date    Anxiety     Bowel habit changes     constipation     Indigestion     Kidney disease     Marijuana use 12/20/2020    \"7 times a week\"     Past Surgical History:   Procedure Laterality Date    [place] " Addended by: Jayant Casey on: 11/5/2021 07:11 AM     Modules accepted: Orders CYSTOSCOPY,INSERT URETERAL STENT  2020    Procedure: CYSTOSCOPY, WITH URETERAL STENT INSERTION;  Surgeon: Sky Santos M.D.;  Location: [facility];  Service: Urology    MA CYSTO/URETERO/PYELOSCOPY, DX Left 2020    Procedure: LEFT URETEROSCOPY WITH STONE BASKETING;  Surgeon: Sky Santos M.D.;  Location: [facility];  Service: Urology    MA CYSTOSCOPY,INSERT URETERAL STENT Left 2020    Procedure: CYSTOSCOPY;  Surgeon: Ed Rosenthal M.D.;  Location: [facility];  Service: Urology    MA CYSTO/URETERO/PYELOSCOPY, DX Left 2020    Procedure: URETEROSCOPY;  Surgeon: Ed Rosenthal M.D.;  Location: [facility];  Service: Urology    LASERTRIPSY Left 2020    Procedure: LITHOTRIPSY, USING LASER .. .;  Surgeon: Ed Rosenthal M.D.;  Location: [facility];  Service: Urology    MA CYSTOSCOPY,INSERT URETERAL STENT N/A 2020    Procedure: CYSTOSCOPY, WITH URETERAL STENT removal;  Surgeon: Ed Rosenthal M.D.;  Location: [facility];  Service: Urology    MA CYSTOSCOPY,INSERT URETERAL STENT Left 2020    Procedure: CYSTOSCOPY, WITH URETERAL STENT INSERTION;  Surgeon: Daniel Paulson M.D.;  Location: [facility];  Service: Urology    HYSTEROSCOPY WITH VIDEO OPERATIVE N/A 2019    Procedure: HYSTEROSCOPY, WITH VIDEO IMAGING -WITH MYOSURE;  Surgeon: Prieto Santa M.D.;  Location: SURGERY SAME DAY [facility];  Service: Obstetrics    DILATION AND CURETTAGE N/A 2019    Procedure: DILATION AND CURETTAGE;  Surgeon: Prieto Santa M.D.;  Location: SURGERY SAME DAY [facility];  Service: Obstetrics    LUMPECTOMY Left 1988    LAPAROSCOPY  1987    GYN SURGERY       Social History     Tobacco Use    Smoking status: Former     Packs/day: 1.00     Years: 31.00     Pack years: 31.00     Types: Cigarettes     Quit date: 2012     Years since quittin.0    Smokeless tobacco: Never   Vaping Use    Vaping Use:  "Never used   Substance Use Topics    Alcohol use: Not Currently    Drug use: Yes     Frequency: 7.0 times per week     Types: Marijuana, Inhaled     Comment: marijuanna occ, last 3 days ago      Family History   Problem Relation Age of Onset    Hypertension Mother     Lung Disease Mother         COPD    Heart Disease Maternal Grandmother     Lung Disease Maternal Uncle         lung cancer     Current Outpatient Medications   Medication Sig Dispense Refill    ciclopirox (PENLAC) 8 % solution Applied nightly for 48 weeks to affected toe nail 12 mL 3    acetaminophen (TYLENOL) 500 MG Tab Take 500 mg by mouth every 6 hours as needed for Moderate Pain.      famotidine (PEPCID) 20 MG Tab Take 1 Tab by mouth 2 times a day as needed (Heartburn). 60 Tab 0    FIBER ADULT GUMMIES PO Take 2 Tablets by mouth every morning.      Multiple Vitamins-Calcium (ONE-A-DAY WOMENS FORMULA PO) Take 1 Tablet by mouth every day. Gummy      Probiotic Product (PROBIOTIC-10 PO) Take 1 Tablet by mouth every morning. Gummy       No current facility-administered medications for this visit.       Allergies:  Allergies   Allergen Reactions    Hydrocodone Nausea     Nightmares & Nausea      Codeine Vomiting and Nausea     Vomiting     Other Food Vomiting     Grape juice             Penicillins Vomiting     Vomiting        Allergies, past medical history, past surgical history, family history, social history reviewed and updated.    Objective:    /70 (BP Location: Left arm, Patient Position: Sitting, BP Cuff Size: Adult)   Pulse 88   Temp 36.1 °C (96.9 °F) (Temporal)   Resp 12   Ht 1.651 m (5' 5\")   Wt 83.1 kg (183 lb 3.2 oz)   LMP  (LMP Unknown)   SpO2 94%   BMI 30.49 kg/m²    Body mass index is 30.49 kg/m².    Physical exam:  General: Normal appearance, no acute distress, not ill-appearing  HEENT: EOM intact, conjunctiva normal limits, negative right/left eye discharge.  Sclera anicteric  Cardiovascular: Normal rate and rhythm, no " murmurs  Pulmonary: No respiratory distress, no wheezing, no rales, breath sounds normal.  Abdomen: Bowel sounds normal, flat, soft. Neg murphys sign, no tenderness, rebound or guarding  Musculoskeletal: No edema bilaterally  Skin: Warm, dry, no lesions  Neurological: No focal deficits, normal gait  Psychiatric: Mood within normal limits    Assessment/Plan:    Patient here for a preventive medicine visit today and to establish care.  -Reviewed all past medical history, family history, social history    - Diet and exercise appropriate counseling given  - Social determinants of health reviewed  - Tobacco, alcohol, recreational drug use: no concerns + marijuana  - Cholesterol screening: ordered    - Diabetes screening: fasting glucose ordered    Immunizations  Immunizations: advised on shingles    Cancer screenings:  Colorectal cancer screening: done in 2019  Cervical Cancer Screening: records requested   Breast Cancer Screening: ordered      Ob-Gyn/ History:   /Para:  vaginal  Gyn Surgery: D&C  Current Contraceptive Method: post menopausal    Problem List Items Addressed This Visit       Nausea and vomiting    Relevant Orders    CBC WITH DIFFERENTIAL    Comp Metabolic Panel    LIPASE    US-RUQ    Referral to Gastroenterology    Protein in urine     100md/dl pro on urine dipstick repeat next appt                Other Visit Diagnoses       Pure hypercholesterolemia        Relevant Orders    Lipid Profile    Chronic RUQ pain        Relevant Orders    Referral to Gastroenterology    Dysuria        Relevant Orders    POCT Urinalysis (Completed)    Visit for screening mammogram        Relevant Orders    MA-SCREENING MAMMO BILAT W/TOMOSYNTHESIS W/CAD    Onychomycosis        Relevant Medications    ciclopirox (PENLAC) 8 % solution             Return in about 2 months (around 2023).

## 2023-06-21 ENCOUNTER — APPOINTMENT (OUTPATIENT)
Dept: RADIOLOGY | Facility: MEDICAL CENTER | Age: 53
End: 2023-06-21
Attending: STUDENT IN AN ORGANIZED HEALTH CARE EDUCATION/TRAINING PROGRAM

## 2023-07-10 ENCOUNTER — HOSPITAL ENCOUNTER (OUTPATIENT)
Dept: RADIOLOGY | Facility: MEDICAL CENTER | Age: 53
End: 2023-07-10
Attending: STUDENT IN AN ORGANIZED HEALTH CARE EDUCATION/TRAINING PROGRAM

## 2023-07-10 DIAGNOSIS — R11.2 NAUSEA AND VOMITING, UNSPECIFIED VOMITING TYPE: ICD-10-CM

## 2024-01-26 ENCOUNTER — APPOINTMENT (OUTPATIENT)
Dept: RADIOLOGY | Facility: MEDICAL CENTER | Age: 54
End: 2024-01-26
Attending: EMERGENCY MEDICINE
Payer: COMMERCIAL

## 2024-01-26 ENCOUNTER — APPOINTMENT (OUTPATIENT)
Dept: RADIOLOGY | Facility: MEDICAL CENTER | Age: 54
End: 2024-01-26
Payer: COMMERCIAL

## 2024-01-26 ENCOUNTER — HOSPITAL ENCOUNTER (EMERGENCY)
Facility: MEDICAL CENTER | Age: 54
End: 2024-01-26
Attending: EMERGENCY MEDICINE
Payer: COMMERCIAL

## 2024-01-26 VITALS
HEART RATE: 89 BPM | DIASTOLIC BLOOD PRESSURE: 73 MMHG | OXYGEN SATURATION: 93 % | TEMPERATURE: 97.5 F | BODY MASS INDEX: 31.15 KG/M2 | SYSTOLIC BLOOD PRESSURE: 139 MMHG | WEIGHT: 186.95 LBS | RESPIRATION RATE: 20 BRPM | HEIGHT: 65 IN

## 2024-01-26 DIAGNOSIS — R10.9 ABDOMINAL PAIN, UNSPECIFIED ABDOMINAL LOCATION: ICD-10-CM

## 2024-01-26 DIAGNOSIS — R11.2 NAUSEA AND VOMITING, UNSPECIFIED VOMITING TYPE: ICD-10-CM

## 2024-01-26 DIAGNOSIS — R07.9 ACUTE CHEST PAIN: ICD-10-CM

## 2024-01-26 LAB
ALBUMIN SERPL BCP-MCNC: 4.7 G/DL (ref 3.2–4.9)
ALBUMIN/GLOB SERPL: 1.5 G/DL
ALP SERPL-CCNC: 118 U/L (ref 30–99)
ALT SERPL-CCNC: 11 U/L (ref 2–50)
ANION GAP SERPL CALC-SCNC: 14 MMOL/L (ref 7–16)
APPEARANCE UR: CLEAR
AST SERPL-CCNC: 14 U/L (ref 12–45)
BACTERIA #/AREA URNS HPF: NEGATIVE /HPF
BASOPHILS # BLD AUTO: 0.2 % (ref 0–1.8)
BASOPHILS # BLD: 0.03 K/UL (ref 0–0.12)
BILIRUB SERPL-MCNC: 0.9 MG/DL (ref 0.1–1.5)
BILIRUB UR QL STRIP.AUTO: NEGATIVE
BUN SERPL-MCNC: 12 MG/DL (ref 8–22)
CALCIUM ALBUM COR SERPL-MCNC: 9.2 MG/DL (ref 8.5–10.5)
CALCIUM SERPL-MCNC: 9.8 MG/DL (ref 8.4–10.2)
CHLORIDE SERPL-SCNC: 104 MMOL/L (ref 96–112)
CO2 SERPL-SCNC: 25 MMOL/L (ref 20–33)
COLOR UR: YELLOW
CREAT SERPL-MCNC: 0.58 MG/DL (ref 0.5–1.4)
EKG IMPRESSION: NORMAL
EOSINOPHIL # BLD AUTO: 0 K/UL (ref 0–0.51)
EOSINOPHIL NFR BLD: 0 % (ref 0–6.9)
EPI CELLS #/AREA URNS HPF: ABNORMAL /HPF
ERYTHROCYTE [DISTWIDTH] IN BLOOD BY AUTOMATED COUNT: 42.6 FL (ref 35.9–50)
GFR SERPLBLD CREATININE-BSD FMLA CKD-EPI: 106 ML/MIN/1.73 M 2
GLOBULIN SER CALC-MCNC: 3.1 G/DL (ref 1.9–3.5)
GLUCOSE SERPL-MCNC: 159 MG/DL (ref 65–99)
GLUCOSE UR STRIP.AUTO-MCNC: NEGATIVE MG/DL
HCG SERPL QL: NEGATIVE
HCT VFR BLD AUTO: 45.4 % (ref 37–47)
HGB BLD-MCNC: 15 G/DL (ref 12–16)
IMM GRANULOCYTES # BLD AUTO: 0.07 K/UL (ref 0–0.11)
IMM GRANULOCYTES NFR BLD AUTO: 0.4 % (ref 0–0.9)
KETONES UR STRIP.AUTO-MCNC: 15 MG/DL
LACTATE SERPL-SCNC: 2.2 MMOL/L (ref 0.5–2)
LEUKOCYTE ESTERASE UR QL STRIP.AUTO: NEGATIVE
LIPASE SERPL-CCNC: 15 U/L (ref 11–82)
LYMPHOCYTES # BLD AUTO: 0.97 K/UL (ref 1–4.8)
LYMPHOCYTES NFR BLD: 5.6 % (ref 22–41)
MCH RBC QN AUTO: 29.1 PG (ref 27–33)
MCHC RBC AUTO-ENTMCNC: 33 G/DL (ref 32.2–35.5)
MCV RBC AUTO: 88.2 FL (ref 81.4–97.8)
MICRO URNS: ABNORMAL
MONOCYTES # BLD AUTO: 0.32 K/UL (ref 0–0.85)
MONOCYTES NFR BLD AUTO: 1.9 % (ref 0–13.4)
MUCOUS THREADS #/AREA URNS HPF: ABNORMAL /HPF
NEUTROPHILS # BLD AUTO: 15.81 K/UL (ref 1.82–7.42)
NEUTROPHILS NFR BLD: 91.9 % (ref 44–72)
NITRITE UR QL STRIP.AUTO: NEGATIVE
NRBC # BLD AUTO: 0 K/UL
NRBC BLD-RTO: 0 /100 WBC (ref 0–0.2)
PH UR STRIP.AUTO: 8 [PH] (ref 5–8)
PLATELET # BLD AUTO: 366 K/UL (ref 164–446)
PMV BLD AUTO: 9.4 FL (ref 9–12.9)
POTASSIUM SERPL-SCNC: 3.9 MMOL/L (ref 3.6–5.5)
PROT SERPL-MCNC: 7.8 G/DL (ref 6–8.2)
PROT UR QL STRIP: 30 MG/DL
RBC # BLD AUTO: 5.15 M/UL (ref 4.2–5.4)
RBC # URNS HPF: ABNORMAL /HPF
RBC UR QL AUTO: ABNORMAL
SODIUM SERPL-SCNC: 143 MMOL/L (ref 135–145)
SP GR UR STRIP.AUTO: 1.01
TROPONIN T SERPL-MCNC: <6 NG/L (ref 6–19)
TROPONIN T SERPL-MCNC: <6 NG/L (ref 6–19)
UNIDENT CRYS URNS QL MICRO: ABNORMAL /HPF
WBC # BLD AUTO: 17.2 K/UL (ref 4.8–10.8)
WBC #/AREA URNS HPF: ABNORMAL /HPF

## 2024-01-26 PROCEDURE — 84484 ASSAY OF TROPONIN QUANT: CPT

## 2024-01-26 PROCEDURE — 85025 COMPLETE CBC W/AUTO DIFF WBC: CPT

## 2024-01-26 PROCEDURE — 700117 HCHG RX CONTRAST REV CODE 255: Performed by: EMERGENCY MEDICINE

## 2024-01-26 PROCEDURE — 83690 ASSAY OF LIPASE: CPT

## 2024-01-26 PROCEDURE — 80053 COMPREHEN METABOLIC PANEL: CPT

## 2024-01-26 PROCEDURE — 81001 URINALYSIS AUTO W/SCOPE: CPT

## 2024-01-26 PROCEDURE — 84703 CHORIONIC GONADOTROPIN ASSAY: CPT

## 2024-01-26 PROCEDURE — 83605 ASSAY OF LACTIC ACID: CPT

## 2024-01-26 PROCEDURE — 99285 EMERGENCY DEPT VISIT HI MDM: CPT

## 2024-01-26 PROCEDURE — 93005 ELECTROCARDIOGRAM TRACING: CPT

## 2024-01-26 PROCEDURE — 700111 HCHG RX REV CODE 636 W/ 250 OVERRIDE (IP): Performed by: EMERGENCY MEDICINE

## 2024-01-26 PROCEDURE — 36415 COLL VENOUS BLD VENIPUNCTURE: CPT

## 2024-01-26 PROCEDURE — 93005 ELECTROCARDIOGRAM TRACING: CPT | Performed by: EMERGENCY MEDICINE

## 2024-01-26 PROCEDURE — 96374 THER/PROPH/DIAG INJ IV PUSH: CPT

## 2024-01-26 PROCEDURE — 74177 CT ABD & PELVIS W/CONTRAST: CPT

## 2024-01-26 PROCEDURE — 96375 TX/PRO/DX INJ NEW DRUG ADDON: CPT

## 2024-01-26 PROCEDURE — 700105 HCHG RX REV CODE 258: Performed by: EMERGENCY MEDICINE

## 2024-01-26 PROCEDURE — 71045 X-RAY EXAM CHEST 1 VIEW: CPT

## 2024-01-26 RX ORDER — SODIUM CHLORIDE 9 MG/ML
1000 INJECTION, SOLUTION INTRAVENOUS ONCE
Status: COMPLETED | OUTPATIENT
Start: 2024-01-26 | End: 2024-01-26

## 2024-01-26 RX ORDER — ONDANSETRON 2 MG/ML
4 INJECTION INTRAMUSCULAR; INTRAVENOUS ONCE
Status: COMPLETED | OUTPATIENT
Start: 2024-01-26 | End: 2024-01-26

## 2024-01-26 RX ORDER — PROCHLORPERAZINE MALEATE 10 MG
10 TABLET ORAL EVERY 6 HOURS PRN
Qty: 16 TABLET | Refills: 0 | Status: SHIPPED | OUTPATIENT
Start: 2024-01-26 | End: 2024-01-30

## 2024-01-26 RX ORDER — POLYETHYLENE GLYCOL 3350 17 G/17G
17 POWDER, FOR SOLUTION ORAL DAILY
COMMUNITY

## 2024-01-26 RX ORDER — ONDANSETRON 4 MG/1
4 TABLET, ORALLY DISINTEGRATING ORAL EVERY 8 HOURS PRN
Qty: 10 TABLET | Refills: 0 | Status: SHIPPED | OUTPATIENT
Start: 2024-01-26

## 2024-01-26 RX ORDER — PROCHLORPERAZINE EDISYLATE 5 MG/ML
10 INJECTION INTRAMUSCULAR; INTRAVENOUS ONCE
Status: COMPLETED | OUTPATIENT
Start: 2024-01-26 | End: 2024-01-26

## 2024-01-26 RX ADMIN — ONDANSETRON 4 MG: 2 INJECTION INTRAMUSCULAR; INTRAVENOUS at 18:17

## 2024-01-26 RX ADMIN — IOHEXOL 100 ML: 350 INJECTION, SOLUTION INTRAVENOUS at 18:26

## 2024-01-26 RX ADMIN — SODIUM CHLORIDE 1000 ML: 9 INJECTION, SOLUTION INTRAVENOUS at 17:35

## 2024-01-26 RX ADMIN — SODIUM CHLORIDE 1000 ML: 9 INJECTION, SOLUTION INTRAVENOUS at 19:15

## 2024-01-26 RX ADMIN — PROCHLORPERAZINE EDISYLATE 10 MG: 5 INJECTION INTRAMUSCULAR; INTRAVENOUS at 19:15

## 2024-01-26 ASSESSMENT — HEART SCORE
HISTORY: SLIGHTLY SUSPICIOUS
RISK FACTORS: 1-2 RISK FACTORS
TROPONIN: LESS THAN OR EQUAL TO NORMAL LIMIT
HEART SCORE: 2
ECG: NORMAL
AGE: 45-64

## 2024-01-27 NOTE — ED PROVIDER NOTES
"ED Provider Note    CHIEF COMPLAINT  Chief Complaint   Patient presents with    Nausea/Vomiting/Diarrhea     Acute onset 0200  Emesis x 15 BM x 5 Loose stool No blood    Chills     Poss fever but non documented    Chest Pain     Retrosternal CP Onset 1300  Pressure type \" can't catch my breath \"       HPI  Michael Larsen is a 53 y.o. female who presents for evaluation of nausea, vomiting, diarrhea which started around 2 AM this morning.  Patient states she has had too numerous to count episodes of vomiting and this afternoon she was having episodes of chest pain with vomiting as well as extreme pain in her epigastrium.  She has lingering pain in her right lower quadrant when she is not vomiting.  She currently has no pain, pressure, tightness, or shortness of breath in the chest but still has right lower quadrant pain.  She does not states she is nauseous but does note that her mouth feels very dry.  She notes she has had  EXTERNAL RECORDS REVIEWED  Reviewed last office visit to PCP May 2023.  Patient has history of hypercholesterolemia chronic right upper quadrant pain.  She has had intermittent vomiting and nausea with the pain as well.  ROS  Constitutional: No fevers or chills  Skin: No rashes  HEENT: No sore throat, runny nose  Neck: No neck pain  Chest: No current chest pain.  Chest pain with vomiting noted earlier.  Pulm: No shortness of breath, cough, wheezing, stridor, or pain with inspiration/expiration  Gastrointestinal: No diarrhea, constipation, bloating, melena, or hematochezia   Genitourinary: No dysuria or hematuria  Musculoskeletal: No pain, swelling, or weakness  Neurologic: No sensory or focal motor changes to extremities. No confusion or disorientation.  Heme: No bleeding or bruising problems.   Immuno: No hx of recurrent infections        LIMITATION TO HISTORY   none  OUTSIDE HISTORIAN(S):  none        PAST FAM HISTORY  Family History   Problem Relation Age of Onset    Hypertension Mother  "    Lung Disease Mother         COPD    Heart Disease Maternal Grandmother     Lung Disease Maternal Uncle         lung cancer       PAST MEDICAL HISTORY   has a past medical history of Anxiety, Bowel habit changes, GERD (gastroesophageal reflux disease), Indigestion, Kidney disease, and Marijuana use (2020).    SOCIAL HISTORY  Social History     Tobacco Use    Smoking status: Former     Current packs/day: 0.00     Average packs/day: 1 pack/day for 31.0 years (31.0 ttl pk-yrs)     Types: Cigarettes     Start date: 1981     Quit date: 2012     Years since quittin.7    Smokeless tobacco: Never   Vaping Use    Vaping Use: Never used   Substance and Sexual Activity    Alcohol use: Not Currently    Drug use: Yes     Frequency: 7.0 times per week     Types: Marijuana, Inhaled     Comment: marijuanna occ, last 3 days ago    Sexual activity: Yes     Partners: Male       SURGICAL HISTORY   has a past surgical history that includes lumpectomy (Left, 1988); gyn surgery; laparoscopy (1987); hysteroscopy with video operative (N/A, 2019); dilation and curettage (N/A, 2019); cystoscopy,insert ureteral stent (Left, 2020); cystoscopy,insert ureteral stent (N/A, 2020); cystoscopy,insert ureteral stent (Left, 2020); cysto/uretero/pyeloscopy, dx (Left, 2020); lasertripsy (Left, 2020); cystoscopy,insert ureteral stent (2020); and cysto/uretero/pyeloscopy, dx (Left, 2020).    CURRENT MEDICATIONS  Home Medications       Reviewed by Yareli Gross (Pharmacy Tech) on 24 at 1716  Med List Status: Complete     Medication Last Dose Status   acetaminophen (TYLENOL) 500 MG Tab 2024 Active   Ijtujjkjz-Tdhrz-TY-Aspirin (CARO-SELTZER PLUS NIGHT COLD PO) 2024 Active   polyethylene glycol/lytes (MIRALAX) Pack 2024 Active   Probiotic Product (PROBIOTIC-10 PO) 2024 Active   Psyllium (METAMUCIL PO) 2024 Active                  "    ALLERGIES  Allergies   Allergen Reactions    Hydrocodone Nausea     Nightmares & Nausea      Codeine Vomiting and Nausea     Vomiting     Other Food Vomiting     Grape juice             Penicillins Vomiting     Vomiting        PHYSICAL EXAM  VITAL SIGNS: /73   Pulse 89   Temp 36.4 °C (97.5 °F) (Temporal)   Resp 20   Ht 1.651 m (5' 5\")   Wt 84.8 kg (186 lb 15.2 oz)   LMP  (LMP Unknown)   SpO2 93%   BMI 31.11 kg/m²    Gen: Alert in no apparent distress.  HEENT: No signs of trauma, Bilateral external ears normal, Nose normal. Conjunctiva normal, Non-icteric.   Neck:  No tenderness, Supple, No masses  Lymphatic: No cervical lymphadenopathy noted.   Cardiovascular: Regular rate and rhythm, no murmurs.  Capillary refill less than 3 seconds to all extremities, 2+ distal pulses.  Thorax & Lungs: Normal breath sounds, No respiratory distress, No wheezing bilateral chest rise  Abdomen: Bowel sounds normal, Soft, diffuse tenderness, worse in the right lower quadrant, No masses, No pulsatile masses. No Guarding or rebound  Skin: Warm, Dry, No erythema, No rash noted to exposed areas.   Extremities: Intact distal pulses, No edema  Neurologic: Alert , no facial droop, grossly normal coordination and strength  Psychiatric: Affect pleasant    INITIAL IMPRESSION  Patient arrives for evaluation of symptoms that are highly suggestive of gastritis.  She notes that she has been having chest pain with vomiting and the more episode she is having, the titer her upper abdomen and lower chest feel.  Currently she states she has very little nausea and no pain.  She notes no tightness or pressure in the chest and does not feel short of breath.  She has no cardiac history.  She does have a history of elevated cholesterol but given her heart score of 2, provided her troponin is reassuring, she will not require any further inpatient observation or provocative testing out of concern for ACS.  We will treat the patient empirically " with IV fluids and Zofran and will likely need to perform CT imaging of the abdomen and pelvis to ensure she does not have appendicitis.  She does not have any pelvic pain and has no vaginal bleeding or discharge.    ED observation? No    LABS  Results for orders placed or performed during the hospital encounter of 01/26/24   CBC with Differential   Result Value Ref Range    WBC 17.2 (H) 4.8 - 10.8 K/uL    RBC 5.15 4.20 - 5.40 M/uL    Hemoglobin 15.0 12.0 - 16.0 g/dL    Hematocrit 45.4 37.0 - 47.0 %    MCV 88.2 81.4 - 97.8 fL    MCH 29.1 27.0 - 33.0 pg    MCHC 33.0 32.2 - 35.5 g/dL    RDW 42.6 35.9 - 50.0 fL    Platelet Count 366 164 - 446 K/uL    MPV 9.4 9.0 - 12.9 fL    Neutrophils-Polys 91.90 (H) 44.00 - 72.00 %    Lymphocytes 5.60 (L) 22.00 - 41.00 %    Monocytes 1.90 0.00 - 13.40 %    Eosinophils 0.00 0.00 - 6.90 %    Basophils 0.20 0.00 - 1.80 %    Immature Granulocytes 0.40 0.00 - 0.90 %    Nucleated RBC 0.00 0.00 - 0.20 /100 WBC    Neutrophils (Absolute) 15.81 (H) 1.82 - 7.42 K/uL    Lymphs (Absolute) 0.97 (L) 1.00 - 4.80 K/uL    Monos (Absolute) 0.32 0.00 - 0.85 K/uL    Eos (Absolute) 0.00 0.00 - 0.51 K/uL    Baso (Absolute) 0.03 0.00 - 0.12 K/uL    Immature Granulocytes (abs) 0.07 0.00 - 0.11 K/uL    NRBC (Absolute) 0.00 K/uL   Complete Metabolic Panel (CMP)   Result Value Ref Range    Sodium 143 135 - 145 mmol/L    Potassium 3.9 3.6 - 5.5 mmol/L    Chloride 104 96 - 112 mmol/L    Co2 25 20 - 33 mmol/L    Anion Gap 14.0 7.0 - 16.0    Glucose 159 (H) 65 - 99 mg/dL    Bun 12 8 - 22 mg/dL    Creatinine 0.58 0.50 - 1.40 mg/dL    Calcium 9.8 8.4 - 10.2 mg/dL    Correct Calcium 9.2 8.5 - 10.5 mg/dL    AST(SGOT) 14 12 - 45 U/L    ALT(SGPT) 11 2 - 50 U/L    Alkaline Phosphatase 118 (H) 30 - 99 U/L    Total Bilirubin 0.9 0.1 - 1.5 mg/dL    Albumin 4.7 3.2 - 4.9 g/dL    Total Protein 7.8 6.0 - 8.2 g/dL    Globulin 3.1 1.9 - 3.5 g/dL    A-G Ratio 1.5 g/dL   Troponins NOW   Result Value Ref Range    Troponin T <6 6 -  19 ng/L   Troponins in two (2) hours   Result Value Ref Range    Troponin T <6 6 - 19 ng/L   LIPASE   Result Value Ref Range    Lipase 15 11 - 82 U/L   HCG Qual Serum   Result Value Ref Range    Beta-Hcg Qualitative Serum Negative Negative   ESTIMATED GFR   Result Value Ref Range    GFR (CKD-EPI) 106 >60 mL/min/1.73 m 2   LACTIC ACID   Result Value Ref Range    Lactic Acid 2.2 (H) 0.5 - 2.0 mmol/L   URINALYSIS (UA)    Specimen: Urine   Result Value Ref Range    Color Yellow     Character Clear     Specific Gravity 1.010 <1.035    Ph 8.0 5.0 - 8.0    Glucose Negative Negative mg/dL    Ketones 15 (A) Negative mg/dL    Protein 30 (A) Negative mg/dL    Bilirubin Negative Negative    Nitrite Negative Negative    Leukocyte Esterase Negative Negative    Occult Blood Trace (A) Negative    Micro Urine Req Microscopic    URINE MICROSCOPIC (W/UA)   Result Value Ref Range    WBC 0-2 /hpf    RBC 2-5 (A) /hpf    Bacteria Negative None /hpf    Epithelial Cells Rare Few /hpf    Mucous Threads Few /hpf    Urine Crystals Rare Amorphous /hpf   EKG   Result Value Ref Range    Report       [facility] Emergency Dept.    Test Date:  2024  Pt Name:    ZACHARY JOHNSON                 Department: [facility]  MRN:        [id number]                      Room:  Gender:     Female                       Technician: [id number]  :        1970                   Requested By:ER TRIAGE PROTOCOL  Order #:    [id number]                    Reading MD:    Measurements  Intervals                                Axis  Rate:       71                           P:          47  ID:         [id number]                          QRS:        29  QRSD:       96                           T:          21  QT:         413  QTc:        449    Interpretive Statements  Sinus rhythm  No previous ECG available for comparison       I have independently interpreted this EKG  Sinus rhythm rate of 71, intervals appeared within normal limits, there are no ST or T  wave changes to suggest ischemia, there is no ectopy.  There is no old to compare.  I have independently interpreted the diagnostic imaging associated with this visit and am waiting the final reading from the radiologist.   My preliminary interpretation is a follows: Single view chest x-ray: No pulmonary opacities to suggest infiltrates or effusions.  Cardiomediastinal silhouette appears to be within normal limits.  RADIOLOGY  CT-ABDOMEN-PELVIS WITH   Final Result      1.  No acute abnormalities are identified in the abdomen or pelvis.      DX-CHEST-PORTABLE (1 VIEW)   Final Result         1. No acute cardiopulmonary abnormalities are identified.            HYDRATION: Based on the patient's presentation of Acute Vomiting, Dehydration, and Inability to take oral fluids the patient was given IV fluids. IV Hydration was used because oral hydration was not adequate alone. Upon recheck following hydration, the patient was improving.        COURSE & MEDICAL DECISION MAKING  Pertinent Labs & Imaging studies reviewed. (See chart for details)  6:54 PM  Patient CT imaging is returning reassuring with no evidence for a surgical issue or significant phlegmon.  Patient does have a leukocytosis however, given the circumstances and the lack of fever, I do not feel this is related to a bacterial infectious process and is most likely stress demargination.  At this point it seems likely that she has gastritis of some kind, possibly viral.  Foodborne illness is also possible but, either way, she will likely be dischargeable for control of vomiting.  Reevaluation at bedside demonstrated patient who is currently vomiting despite a dose of Zofran.  We will attempt Compazine at this point.    8:05 PM  Patient states she is feeling much better and actually got some sleep.  She states the Compazine worked very well and she would like a prescription for home.  She is able to tolerate p.o. fluids at this time and does not have any abdominal  or chest pain.  Once again, I do not feel she needs to be admitted for evaluation of chest pain as I do not suspect cardiac or mediastinal etiology for her symptoms.  She would likely was having gastric or esophageal pain during the events.  Is very likely this is a viral gastritis but it is notable that she has a leukocytosis.  This is most likely stress demargination based on the fact that there are no other infectious issues or phlegmon visible on CT.  Patient is fine with the plan for discharge this and will continue clear fluids until she can advance her diet as tolerated.  She will return if her symptoms worsen or change in any way and otherwise follow-up with her primary care physician.        I have discussed management of the patient with the following physicians and ANA's: None     Escalation of care considered, and ultimately not performed:acute inpatient care management, however at this time, the patient is most appropriate for outpatient management    Barriers to care at this time, including but not limited to: None.     Decision tools and Rx drugs considered including, but not limited to : Compazine, Zofran    Discussion of management with other [facility] or appropriate source(s): None    The patient will not drink alcohol nor drive with prescribed medications. The patient will return for worsening symptoms and is stable at the time of discharge. The patient verbalizes understanding and will comply.    FINAL IMPRESSION  1. Nausea and vomiting, unspecified vomiting type    2. Acute chest pain    3. Abdominal pain, unspecified abdominal location        Electronically signed by: Dickson Demarco M.D., 1/26/2024 5:34 PM

## 2024-02-12 ENCOUNTER — GYNECOLOGY VISIT (OUTPATIENT)
Dept: OBGYN | Facility: CLINIC | Age: 54
End: 2024-02-12
Payer: COMMERCIAL

## 2024-02-12 ENCOUNTER — HOSPITAL ENCOUNTER (OUTPATIENT)
Facility: MEDICAL CENTER | Age: 54
End: 2024-02-12
Attending: OBSTETRICS & GYNECOLOGY
Payer: COMMERCIAL

## 2024-02-12 VITALS — SYSTOLIC BLOOD PRESSURE: 120 MMHG | WEIGHT: 192 LBS | BODY MASS INDEX: 31.95 KG/M2 | DIASTOLIC BLOOD PRESSURE: 79 MMHG

## 2024-02-12 DIAGNOSIS — R10.31 RIGHT LOWER QUADRANT PAIN: ICD-10-CM

## 2024-02-12 DIAGNOSIS — R10.31 RIGHT LOWER QUADRANT PAIN: Primary | ICD-10-CM

## 2024-02-12 LAB
APPEARANCE UR: NORMAL
BILIRUB UR STRIP-MCNC: NEGATIVE MG/DL
COLOR UR AUTO: YELLOW
GLUCOSE UR STRIP.AUTO-MCNC: NEGATIVE MG/DL
KETONES UR STRIP.AUTO-MCNC: NEGATIVE MG/DL
LEUKOCYTE ESTERASE UR QL STRIP.AUTO: NEGATIVE
NITRITE UR QL STRIP.AUTO: NEGATIVE
PH UR STRIP.AUTO: 7 [PH] (ref 5–8)
PROT UR QL STRIP: NEGATIVE MG/DL
RBC UR QL AUTO: NEGATIVE
SP GR UR STRIP.AUTO: 1.02
UROBILINOGEN UR STRIP-MCNC: 0.2 MG/DL

## 2024-02-12 PROCEDURE — 3078F DIAST BP <80 MM HG: CPT | Performed by: OBSTETRICS & GYNECOLOGY

## 2024-02-12 PROCEDURE — 88175 CYTOPATH C/V AUTO FLUID REDO: CPT

## 2024-02-12 PROCEDURE — 99459 PELVIC EXAMINATION: CPT | Performed by: OBSTETRICS & GYNECOLOGY

## 2024-02-12 PROCEDURE — 99203 OFFICE O/P NEW LOW 30 MIN: CPT | Performed by: OBSTETRICS & GYNECOLOGY

## 2024-02-12 PROCEDURE — 81002 URINALYSIS NONAUTO W/O SCOPE: CPT | Performed by: OBSTETRICS & GYNECOLOGY

## 2024-02-12 PROCEDURE — 87624 HPV HI-RISK TYP POOLED RSLT: CPT

## 2024-02-12 PROCEDURE — 3074F SYST BP LT 130 MM HG: CPT | Performed by: OBSTETRICS & GYNECOLOGY

## 2024-02-12 ASSESSMENT — FIBROSIS 4 INDEX: FIB4 SCORE: 0.63

## 2024-02-12 NOTE — PROGRESS NOTES
"Chief complaint; new patient    Michael Larsen is a 53 y.o.  who presents complaining of right-sided chest wall pain underneath her breast.  Patient also complains of right lower quadrant discomfort intermittently.  She states she has had a lot of constipation and has only small bowel movements these bowel movements are very hard and seem like small pellets per her description.  Patient was seen in emergency room for right upper quadrant pain vomiting and diarrhea CT scan of the abdomen and pelvis were ordered and came back normal showing no abnormalities of her pelvis (2024).  Last mammogram-2023 [facility] results in media-normal  Last Pap smear 4 years ago    Review of systems; denies fever chills abdominal pain, denies chest pain shortness of breath or urinary symptoms  Past medical history-  Past Medical History:   Diagnosis Date    Anxiety     Bowel habit changes     constipation     GERD (gastroesophageal reflux disease)     Indigestion     Kidney disease     Marijuana use 2020    \"7 times a week\"     Past surgical history-  Past Surgical History:   Procedure Laterality Date    MA CYSTOSCOPY,INSERT URETERAL STENT  2020    Procedure: CYSTOSCOPY, WITH URETERAL STENT INSERTION;  Surgeon: Sky Santos M.D.;  Location: [facility];  Service: Urology    MA CYSTO/URETERO/PYELOSCOPY, DX Left 2020    Procedure: LEFT URETEROSCOPY WITH STONE BASKETING;  Surgeon: Sky Santos M.D.;  Location: [facility];  Service: Urology    MA CYSTOSCOPY,INSERT URETERAL STENT Left 2020    Procedure: CYSTOSCOPY;  Surgeon: Ed Rosenthal M.D.;  Location: [facility];  Service: Urology    MA CYSTO/URETERO/PYELOSCOPY, DX Left 2020    Procedure: URETEROSCOPY;  Surgeon: Ed Rosenthal M.D.;  Location: [facility];  Service: Urology    LASERTRIPSY Left 2020    Procedure: LITHOTRIPSY, USING LASER .. .;  Surgeon: Ed Rosenthal, " M.D.;  Location: SURGERY [facility];  Service: Urology    MO CYSTOSCOPY,INSERT URETERAL STENT N/A 12/20/2020    Procedure: CYSTOSCOPY, WITH URETERAL STENT removal;  Surgeon: Ed Rosenthal M.D.;  Location: SURGERY [facility];  Service: Urology    MO CYSTOSCOPY,INSERT URETERAL STENT Left 12/11/2020    Procedure: CYSTOSCOPY, WITH URETERAL STENT INSERTION;  Surgeon: Daniel Paulson M.D.;  Location: SURGERY [facility];  Service: Urology    HYSTEROSCOPY WITH VIDEO OPERATIVE N/A 7/29/2019    Procedure: HYSTEROSCOPY, WITH VIDEO IMAGING -WITH MYOSURE;  Surgeon: Prieto Santa M.D.;  Location: SURGERY SAME DAY [facility];  Service: Obstetrics    DILATION AND CURETTAGE N/A 7/29/2019    Procedure: DILATION AND CURETTAGE;  Surgeon: Prieto Santa M.D.;  Location: SURGERY SAME DAY [facility];  Service: Obstetrics    LUMPECTOMY Left 02/1988    LAPAROSCOPY  04/1987    GYN SURGERY       Allergies-Hydrocodone, Codeine, Other food, and Penicillins  Medications-  Current Outpatient Medications on File Prior to Visit   Medication Sig Dispense Refill    Psyllium (METAMUCIL PO) Take 1 Dose by mouth every day.      polyethylene glycol/lytes (MIRALAX) Pack Take 17 g by mouth every day.      Ykijrtzbj-Xfdxe-QW-Aspirin (CARO-SELTZER PLUS NIGHT COLD PO) Take 1 Dose by mouth at bedtime as needed (Sleep).      ondansetron (ZOFRAN ODT) 4 MG TABLET DISPERSIBLE Take 1 Tablet by mouth every 8 hours as needed for Nausea/Vomiting. 10 Tablet 0    acetaminophen (TYLENOL) 500 MG Tab Take 500 mg by mouth every 6 hours as needed. Indications: Pain      Probiotic Product (PROBIOTIC-10 PO) Take 2 Tablets by mouth every morning.       No current facility-administered medications on file prior to visit.     Social history-  Social History     Socioeconomic History    Marital status:      Spouse name: Not on file    Number of children: Not on file    Years of education: Not on file    Highest education level: Some college, no  degree   Occupational History    Not on file   Tobacco Use    Smoking status: Former     Current packs/day: 0.00     Average packs/day: 1 pack/day for 31.0 years (31.0 ttl pk-yrs)     Types: Cigarettes     Start date: 1981     Quit date: 2012     Years since quittin.8    Smokeless tobacco: Never   Vaping Use    Vaping Use: Never used   Substance and Sexual Activity    Alcohol use: Not Currently    Drug use: Yes     Frequency: 7.0 times per week     Types: Marijuana, Inhaled     Comment: marijuanna occ, last 3 days ago    Sexual activity: Yes     Partners: Male     Birth control/protection: None   Other Topics Concern    Not on file   Social History Narrative    Not on file     Social Determinants of Health     Financial Resource Strain: Low Risk  (6/3/2020)    Overall Financial Resource Strain (CARDIA)     Difficulty of Paying Living Expenses: Not very hard   Food Insecurity: No Food Insecurity (6/3/2020)    Hunger Vital Sign     Worried About Running Out of Food in the Last Year: Never true     Ran Out of Food in the Last Year: Never true   Transportation Needs: No Transportation Needs (6/3/2020)    PRAPARE - Transportation     Lack of Transportation (Medical): No     Lack of Transportation (Non-Medical): No   Physical Activity: Insufficiently Active (6/3/2020)    Exercise Vital Sign     Days of Exercise per Week: 3 days     Minutes of Exercise per Session: 30 min   Stress: No Stress Concern Present (6/3/2020)    [employer] [place] [employer] - Occupational Stress Questionnaire     Feeling of Stress : Not at all   Social Connections: Socially Integrated (6/3/2020)    Social Connection and Isolation Panel [NHANES]     Frequency of Communication with Friends and Family: Three times a week     Frequency of Social Gatherings with Friends and Family: Twice a week     Attends [employer] Services: 1 to 4 times per year     Active Member of Clubs or Organizations: Yes     Attends Club or  Organization Meetings: 1 to 4 times per year     Marital Status:    Intimate Partner Violence: Not on file   Housing Stability: Low Risk  (6/3/2020)    Housing Stability Vital Sign     Unable to Pay for Housing in the Last Year: No     Number of Places Lived in the Last Year: 1     Unstable Housing in the Last Year: No     Past Family History-no history of breast or ovarian cancer    Physical examination;  Alert and oriented x3  General a thin well-developed well-nourished female in no apparent distress  Vitals:    02/12/24 1022   BP: 120/79   BP Location: Right arm   Patient Position: Sitting   BP Cuff Size: Small adult   Weight: 192 lb     Skin is warm and dry  Neck-supple  HEENT-head-atraumatic, normocephalic, EOMI, PERRLA  Cardiovascular-regular rate and rhythm, normal S1-S2, no murmurs or gallops  Lungs-clear to auscultation bilaterally  Breast exam-no skin retraction no axillary adenopathy no dominant masses patient is tender over pectoralis major and minor muscles  Back-negative CVA tenderness  Abdomen-nondistended positive bowel sounds soft nontender no masses or hepatosplenomegaly  Pelvic examination;  External genitalia-no visible lesions   Vagina-no blood or discharge  Cervix-no gross lesions, Pap smear taken  Uterus-normal size and shape, nontender  Adnexa without mass or tenderness  Extremities without cyanosis clubbing or edema  Neurologic exam grossly intact    Impression;  Right lower quadrant pain-intermittent mild-likely due to obstipation/constipation  Right chest wall discomfort-musculoskeletal in origin    Plan;  Patient's pain likely secondary to GI etiology and recommend GI consult/workup to her primary care provider  Physical therapy for her right arm and chest wall muscles  Check Pap smear    30  Minutes spent with the patient in face-to-face contact, greater than 50% of the time spent on counseling and coordination of care. All questions answered in detail.    Female chaperone  present for entire examination and history

## 2024-02-17 LAB
CYTOLOGIST CVX/VAG CYTO: NORMAL
CYTOLOGY CVX/VAG DOC CYTO: NORMAL
CYTOLOGY CVX/VAG DOC THIN PREP: NORMAL
HPV I/H RISK 4 DNA CVX QL PROBE+SIG AMP: NEGATIVE
NOTE NL11727A: NORMAL
OTHER STN SPEC: NORMAL
STAT OF ADQ CVX/VAG CYTO-IMP: NORMAL

## 2024-05-20 ENCOUNTER — OFFICE VISIT (OUTPATIENT)
Dept: MEDICAL GROUP | Facility: MEDICAL CENTER | Age: 54
End: 2024-05-20
Payer: COMMERCIAL

## 2024-05-20 VITALS
TEMPERATURE: 97 F | DIASTOLIC BLOOD PRESSURE: 80 MMHG | BODY MASS INDEX: 32.65 KG/M2 | HEART RATE: 69 BPM | SYSTOLIC BLOOD PRESSURE: 130 MMHG | WEIGHT: 196 LBS | OXYGEN SATURATION: 96 % | HEIGHT: 65 IN

## 2024-05-20 DIAGNOSIS — D17.21 LIPOMA OF RIGHT UPPER EXTREMITY: ICD-10-CM

## 2024-05-20 DIAGNOSIS — M19.049 HAND ARTHRITIS: ICD-10-CM

## 2024-05-20 DIAGNOSIS — M79.641 BILATERAL HAND PAIN: ICD-10-CM

## 2024-05-20 DIAGNOSIS — M79.642 BILATERAL HAND PAIN: ICD-10-CM

## 2024-05-20 DIAGNOSIS — E78.00 PURE HYPERCHOLESTEROLEMIA: ICD-10-CM

## 2024-05-20 DIAGNOSIS — F41.1 GAD (GENERALIZED ANXIETY DISORDER): ICD-10-CM

## 2024-05-20 DIAGNOSIS — R73.09 ELEVATED GLUCOSE: ICD-10-CM

## 2024-05-20 DIAGNOSIS — B35.1 ONYCHOMYCOSIS: ICD-10-CM

## 2024-05-20 DIAGNOSIS — Z00.00 ENCOUNTER FOR MEDICAL EXAMINATION TO ESTABLISH CARE: ICD-10-CM

## 2024-05-20 DIAGNOSIS — R53.83 OTHER FATIGUE: ICD-10-CM

## 2024-05-20 DIAGNOSIS — G89.4 CHRONIC PAIN SYNDROME: ICD-10-CM

## 2024-05-20 PROCEDURE — 99215 OFFICE O/P EST HI 40 MIN: CPT | Performed by: PHYSICIAN ASSISTANT

## 2024-05-20 PROCEDURE — 3079F DIAST BP 80-89 MM HG: CPT | Performed by: PHYSICIAN ASSISTANT

## 2024-05-20 PROCEDURE — 3075F SYST BP GE 130 - 139MM HG: CPT | Performed by: PHYSICIAN ASSISTANT

## 2024-05-20 RX ORDER — DULOXETIN HYDROCHLORIDE 20 MG/1
20 CAPSULE, DELAYED RELEASE ORAL DAILY
Qty: 30 CAPSULE | Refills: 3 | Status: SHIPPED | OUTPATIENT
Start: 2024-05-20

## 2024-05-20 ASSESSMENT — FIBROSIS 4 INDEX: FIB4 SCORE: 0.65

## 2024-05-20 NOTE — PROGRESS NOTES
Subjective:     History of Present Illness  The patient is a 54-year-old female who is here to establish care. She is accompanied by an adult male.    The patient has been experiencing hand issues for several years, which necessitated consultation with Dr. Jeong, a hand specialist at [facility] last year. An x-ray of her left hand revealed a lack of cartilage between the first and the second digits, suggesting a potential bone removal. Currently, she experiences similar symptoms in her right hand, characterized by stiffness and inability to straighten her hand. This issue has led to a decline in her hand usage. She has attempted to alleviate her symptoms with steroid injections, the first administered two years ago in her thumb, and the second in 2023, both of which proved ineffective. It was determined that she is not a suitable candidate for these injections. A referral to a pain specialist was previously made, but she has yet to consult with them.    The patient suspects a pinched nerve in the posterior aspect of her shoulder, extending to her neck, and speculates that this could be contributing to her hand issues.    The patient, nearly 50 pounds overweight, reports difficulty in adhering to a diet or maintaining an active lifestyle due to her residence on [address] [place] [address]. Her last laboratory tests were conducted a year ago, during which she was not diagnosed with diabetes. She admits to a high sugar intake. Over the past one to two years, she has experienced elevated cholesterol levels, which she attributes to dietary modifications. She also suffers from gastrointestinal issues, which she attributes to her overweight status.    The patient experiences severe anxiety, which was more pronounced a year ago when she experienced depression. However, she has managed to manage this by changing her perspective and outlook. She does not take any mood-related medications. She has experimented with over-the-counter  marijuana use but does not consume alcohol.    The patient reports a small lump under her arm, which was previously suspected to be a fatty tumor by another physician.    The patient has a worsening black spot in her eye and is considering scheduling an appointment with an optometrist.    The patient continues to struggle with toenail fungus, which was diagnosed a year ago. Despite being prescribed a medication, which resulted in a yellow discoloration of her toenail, leading her to discontinue its use.    Supplemental Information  She has kidney issues. She had a large kidney stone removed and has another kidney stone on the same place. She had a mammogram done in 10/2023 at [facility]. She had a Pap smear recently.   She used to smoke a pack a day and quit 12 years ago. She has 2 sons.      Current medicines (including changes today)  Current Outpatient Medications   Medication Sig Dispense Refill    diclofenac sodium (VOLTAREN) 1 % Gel Apply sparingly to affected area 4 times daily as needed 50 g 4    DULoxetine (CYMBALTA) 20 MG Cap DR Particles Take 1 Capsule by mouth every day. 30 Capsule 3     No current facility-administered medications for this visit.     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    She has no past medical history of Anemia, Arrhythmia, Asthma, Blood transfusion without reported diagnosis, CHF (congestive heart failure) ([facility]), Clotting disorder ([facility]), COPD (chronic obstructive pulmonary disease) ([facility]), Depression, Diabetes ([facility]), Glaucoma, Heart attack ([facility]), Heart murmur, HIV (human immunodeficiency virus infection) ([facility]), Hyperlipidemia, Seizure ([facility]), Stroke ([facility]), Substance abuse ([facility]), Thyroid disease, or Tuberculosis.    ROS   No chest pain, no shortness of breath, no abdominal pain  Positive ROS as per HPI.  All other  "systems reviewed and are negative.     Objective:     /80   Pulse 69   Temp 36.1 °C (97 °F) (Temporal)   Ht 1.651 m (5' 5\")   Wt 88.9 kg (196 lb)   SpO2 96%  Body mass index is 32.62 kg/m².   Physical Exam  Lipoma noted on the right upper extremity. Onychomycosis noted on the right great toe.  Constitutional: Alert, no distress.  Skin: Warm, dry, good turgor, no rashes in visible areas.  Eye: Equal, round and reactive, conjunctiva clear, lids normal.  ENMT: Lips without lesions, good dentition, oropharynx clear.  Neck: Trachea midline, no masses, no thyromegaly. No cervical or supraclavicular lymphadenopathy  Respiratory: Unlabored respiratory effort, lungs clear to auscultation, no wheezes, no ronchi.  Cardiovascular: Normal S1, S2, no murmur, no edema.  Abdomen: Soft, non-tender, no masses, no hepatosplenomegaly.  Psych: Alert and oriented x3, normal affect and mood.      Results  Laboratory Studies  Glucose was in the 150s in January 2024.    Imaging  X-ray of left hand showed no cartilage in between the first and the second digits.        Assessment and Plan:   The following treatment plan was discussed    Assessment & Plan  1. Encounter for medical examination to establish care.  Upon reviewing her lab results, it was noted that her glucose levels were in the 150s, suggesting a potential diagnosis of diabetes, a condition that was diagnosed in 01/2025. However, the patient expressed no recollection of this diagnosis. Health maintenance labs have been ordered to better assess the patient's care.    2. Chronic hand pain and hand arthritis.  The patient has previously consulted with Dr. Jeong at the [facility] and has received multiple injections that have not provided relief. At present, she is no longer a suitable candidate for this treatment. She has expressed a desire to pursue pain management.    3. Chronic pain.  A referral to our pain management clinic has been initiated. Given her " history of anxiety and chronic pain, I am initiating her on duloxetine in hopes of symptom relief. Additionally, Voltaren gel has been prescribed for use as needed.    4. Chronic pain syndrome.  As mentioned above, a referral to our pain management clinic has been initiated.    5. Lipoma of the right upper extremity.  A referral to a dermatologist has been made.    6. Onychomycosis of the right great toe.  This condition is noted, however, it is crucial to obtain normal liver function first. At the next visit, we can discuss the use of terbinafine concurrently.    Follow-up  The patient is scheduled for a follow-up visit in 4 to 6 weeks to review her progress.      ORDERS:  1. Encounter for medical examination to establish care      2. Bilateral hand pain    - Referral to Pain Clinic  - diclofenac sodium (VOLTAREN) 1 % Gel; Apply sparingly to affected area 4 times daily as needed  Dispense: 50 g; Refill: 4    3. Hand arthritis    - Referral to Pain Clinic    4. Pure hypercholesterolemia    - Lipid Profile; Future    5. Elevated glucose    - Comp Metabolic Panel; Future  - HEMOGLOBIN A1C; Future    6. Other fatigue    - CBC WITH DIFFERENTIAL; Future  - TSH WITH REFLEX TO FT4; Future    7. Chronic pain syndrome    - DULoxetine (CYMBALTA) 20 MG Cap DR Particles; Take 1 Capsule by mouth every day.  Dispense: 30 Capsule; Refill: 3    8. KAILEE (generalized anxiety disorder)    - DULoxetine (CYMBALTA) 20 MG Cap DR Particles; Take 1 Capsule by mouth every day.  Dispense: 30 Capsule; Refill: 3    9. Lipoma of right upper extremity    - Referral to Dermatology    10. Onychomycosis      Obtained and reviewed patient utilization report from [facility] database on 5/20/2024 12:58 PM  prior to writing prescription for controlled substance II, III or IV per [place] bill . Based on assessment of the report,my physical exam if necessary and the patient's health problem, the prescription is medically necessary.          Follow Up:4 weeks to go over labs     Please note that this dictation was created using voice recognition software. I have made every reasonable attempt to correct obvious errors, but I expect that there are errors of grammar and possibly content that I did not discover before finalizing the note.     My total time spent caring for the patient on the day of the encounter was 42 minutes.   This does not include time spent on separately billable procedures/tests.    Attestation      Verbal consent was acquired by the patient to use [employer] ambient listening note generation during this visit Yes

## 2024-05-22 ENCOUNTER — TELEPHONE (OUTPATIENT)
Dept: MEDICAL GROUP | Facility: MEDICAL CENTER | Age: 54
End: 2024-05-22
Payer: COMMERCIAL

## 2024-05-22 NOTE — TELEPHONE ENCOUNTER
[facility] pharmacy needs new Rx sent with clarification for     diclofenac sodium (VOLTAREN) 1 % Gel    Directions need to state how many grams patient will be using for application

## 2024-05-23 DIAGNOSIS — M79.641 BILATERAL HAND PAIN: ICD-10-CM

## 2024-05-23 DIAGNOSIS — M79.642 BILATERAL HAND PAIN: ICD-10-CM

## 2024-05-29 ENCOUNTER — HOSPITAL ENCOUNTER (OUTPATIENT)
Dept: LAB | Facility: MEDICAL CENTER | Age: 54
End: 2024-05-29
Attending: PHYSICIAN ASSISTANT
Payer: COMMERCIAL

## 2024-05-29 DIAGNOSIS — E78.00 PURE HYPERCHOLESTEROLEMIA: ICD-10-CM

## 2024-05-29 DIAGNOSIS — R53.83 OTHER FATIGUE: ICD-10-CM

## 2024-05-29 DIAGNOSIS — R73.09 ELEVATED GLUCOSE: ICD-10-CM

## 2024-05-29 LAB
ALBUMIN SERPL BCP-MCNC: 3.9 G/DL (ref 3.2–4.9)
ALBUMIN/GLOB SERPL: 1.4 G/DL
ALP SERPL-CCNC: 96 U/L (ref 30–99)
ALT SERPL-CCNC: 15 U/L (ref 2–50)
ANION GAP SERPL CALC-SCNC: 10 MMOL/L (ref 7–16)
AST SERPL-CCNC: 17 U/L (ref 12–45)
BASOPHILS # BLD AUTO: 0.2 % (ref 0–1.8)
BASOPHILS # BLD: 0.02 K/UL (ref 0–0.12)
BILIRUB SERPL-MCNC: 0.7 MG/DL (ref 0.1–1.5)
BUN SERPL-MCNC: 10 MG/DL (ref 8–22)
CALCIUM ALBUM COR SERPL-MCNC: 9.2 MG/DL (ref 8.5–10.5)
CALCIUM SERPL-MCNC: 9.1 MG/DL (ref 8.5–10.5)
CHLORIDE SERPL-SCNC: 107 MMOL/L (ref 96–112)
CHOLEST SERPL-MCNC: 164 MG/DL (ref 100–199)
CO2 SERPL-SCNC: 25 MMOL/L (ref 20–33)
CREAT SERPL-MCNC: 0.59 MG/DL (ref 0.5–1.4)
EOSINOPHIL # BLD AUTO: 0.19 K/UL (ref 0–0.51)
EOSINOPHIL NFR BLD: 2.4 % (ref 0–6.9)
ERYTHROCYTE [DISTWIDTH] IN BLOOD BY AUTOMATED COUNT: 44 FL (ref 35.9–50)
EST. AVERAGE GLUCOSE BLD GHB EST-MCNC: 114 MG/DL
FASTING STATUS PATIENT QL REPORTED: NORMAL
GFR SERPLBLD CREATININE-BSD FMLA CKD-EPI: 106 ML/MIN/1.73 M 2
GLOBULIN SER CALC-MCNC: 2.8 G/DL (ref 1.9–3.5)
GLUCOSE SERPL-MCNC: 99 MG/DL (ref 65–99)
HBA1C MFR BLD: 5.6 % (ref 4–5.6)
HCT VFR BLD AUTO: 45.5 % (ref 37–47)
HDLC SERPL-MCNC: 40 MG/DL
HGB BLD-MCNC: 14.6 G/DL (ref 12–16)
IMM GRANULOCYTES # BLD AUTO: 0.02 K/UL (ref 0–0.11)
IMM GRANULOCYTES NFR BLD AUTO: 0.2 % (ref 0–0.9)
LDLC SERPL CALC-MCNC: 109 MG/DL
LYMPHOCYTES # BLD AUTO: 2.79 K/UL (ref 1–4.8)
LYMPHOCYTES NFR BLD: 34.6 % (ref 22–41)
MCH RBC QN AUTO: 29.3 PG (ref 27–33)
MCHC RBC AUTO-ENTMCNC: 32.1 G/DL (ref 32.2–35.5)
MCV RBC AUTO: 91.2 FL (ref 81.4–97.8)
MONOCYTES # BLD AUTO: 0.44 K/UL (ref 0–0.85)
MONOCYTES NFR BLD AUTO: 5.5 % (ref 0–13.4)
NEUTROPHILS # BLD AUTO: 4.61 K/UL (ref 1.82–7.42)
NEUTROPHILS NFR BLD: 57.1 % (ref 44–72)
NRBC # BLD AUTO: 0 K/UL
NRBC BLD-RTO: 0 /100 WBC (ref 0–0.2)
PLATELET # BLD AUTO: 319 K/UL (ref 164–446)
PMV BLD AUTO: 10.3 FL (ref 9–12.9)
POTASSIUM SERPL-SCNC: 4.5 MMOL/L (ref 3.6–5.5)
PROT SERPL-MCNC: 6.7 G/DL (ref 6–8.2)
RBC # BLD AUTO: 4.99 M/UL (ref 4.2–5.4)
SODIUM SERPL-SCNC: 142 MMOL/L (ref 135–145)
TRIGL SERPL-MCNC: 73 MG/DL (ref 0–149)
TSH SERPL DL<=0.005 MIU/L-ACNC: 1.96 UIU/ML (ref 0.38–5.33)
WBC # BLD AUTO: 8.1 K/UL (ref 4.8–10.8)

## 2024-06-04 ENCOUNTER — APPOINTMENT (OUTPATIENT)
Dept: PHYSICAL MEDICINE AND REHAB | Facility: MEDICAL CENTER | Age: 54
End: 2024-06-04
Payer: COMMERCIAL

## 2024-06-04 ENCOUNTER — HOSPITAL ENCOUNTER (OUTPATIENT)
Dept: RADIOLOGY | Facility: MEDICAL CENTER | Age: 54
End: 2024-06-04
Attending: GENERAL PRACTICE
Payer: COMMERCIAL

## 2024-06-04 VITALS
TEMPERATURE: 98.1 F | DIASTOLIC BLOOD PRESSURE: 68 MMHG | WEIGHT: 190 LBS | SYSTOLIC BLOOD PRESSURE: 130 MMHG | HEIGHT: 65 IN | BODY MASS INDEX: 31.65 KG/M2 | HEART RATE: 81 BPM | OXYGEN SATURATION: 96 %

## 2024-06-04 DIAGNOSIS — F12.90 MARIJUANA USE: ICD-10-CM

## 2024-06-04 DIAGNOSIS — Z71.82 EXERCISE COUNSELING: ICD-10-CM

## 2024-06-04 DIAGNOSIS — F41.1 GAD (GENERALIZED ANXIETY DISORDER): ICD-10-CM

## 2024-06-04 DIAGNOSIS — M79.641 BILATERAL HAND PAIN: ICD-10-CM

## 2024-06-04 DIAGNOSIS — M54.2 NECK PAIN: ICD-10-CM

## 2024-06-04 DIAGNOSIS — G56.01 THENAR ATROPHY, RIGHT: ICD-10-CM

## 2024-06-04 DIAGNOSIS — G56.02 THENAR ATROPHY, LEFT: ICD-10-CM

## 2024-06-04 DIAGNOSIS — R29.2 HOFFMAN SIGN PRESENT: ICD-10-CM

## 2024-06-04 DIAGNOSIS — G89.4 CHRONIC PAIN SYNDROME: ICD-10-CM

## 2024-06-04 DIAGNOSIS — Z71.3 DIETARY COUNSELING: ICD-10-CM

## 2024-06-04 DIAGNOSIS — M79.642 BILATERAL HAND PAIN: ICD-10-CM

## 2024-06-04 DIAGNOSIS — E66.9 OBESITY (BMI 30-39.9): ICD-10-CM

## 2024-06-04 DIAGNOSIS — M19.049 HAND ARTHRITIS: ICD-10-CM

## 2024-06-04 PROCEDURE — 3078F DIAST BP <80 MM HG: CPT | Performed by: GENERAL PRACTICE

## 2024-06-04 PROCEDURE — G2211 COMPLEX E/M VISIT ADD ON: HCPCS | Performed by: GENERAL PRACTICE

## 2024-06-04 PROCEDURE — 76882 US LMTD JT/FCL EVL NVASC XTR: CPT | Performed by: GENERAL PRACTICE

## 2024-06-04 PROCEDURE — 99204 OFFICE O/P NEW MOD 45 MIN: CPT | Performed by: GENERAL PRACTICE

## 2024-06-04 PROCEDURE — 1125F AMNT PAIN NOTED PAIN PRSNT: CPT | Performed by: GENERAL PRACTICE

## 2024-06-04 PROCEDURE — 3075F SYST BP GE 130 - 139MM HG: CPT | Performed by: GENERAL PRACTICE

## 2024-06-04 PROCEDURE — 72040 X-RAY EXAM NECK SPINE 2-3 VW: CPT

## 2024-06-04 ASSESSMENT — PAIN SCALES - GENERAL: PAINLEVEL: 8=MODERATE-SEVERE PAIN

## 2024-06-04 ASSESSMENT — PATIENT HEALTH QUESTIONNAIRE - PHQ9
CLINICAL INTERPRETATION OF PHQ2 SCORE: 2
5. POOR APPETITE OR OVEREATING: 2 - MORE THAN HALF THE DAYS
SUM OF ALL RESPONSES TO PHQ QUESTIONS 1-9: 8

## 2024-06-04 ASSESSMENT — FIBROSIS 4 INDEX: FIB4 SCORE: 0.77

## 2024-06-04 NOTE — Clinical Note
Chiquis Infante   Michael Larsen was evaluated for bilateral hand pain.  Suspect that there may be cervical pathology and starting with an EMG and x-rays and ordered an MRI.  In discussion, she told me that Cymbalta is not effective but she is willing to go talk to a psychiatrist and I placed a referral..   Thank you for the referral.  Please contact if further questions.     Best Regards,   Frederic Haile, DO   Physical Medicine and Rehabilitation

## 2024-06-04 NOTE — PROGRESS NOTES
Physiatry (Physical Medicine and  Rehabilitation)       Patient Name: Michael Larsen   Patient : 1970  PCP: Chiquis Infante P.A.-C.  MRN: [id number]     Date of service: See epic    Chief complaint:   Chief Complaint   Patient presents with    [facility]     Bi-lat Hand Pain        Referring provider: Chiquis Infante P.A.-C.    HISTORY    Michael Larsen is a RIGHT hand dominant 54 y.o. very pleasant female  who presents with a referral for   Chief Complaint   Patient presents with    [facility]     Bi-lat Hand Pain          Medical records review:  I reviewed the note from the referring provider Chiquis Infante P.A.-C. including the note dated 24.  Duloxetine and Voltaren gel      Prior Procedures:    CSI thumb ineffective   CSI thumb ineffective    HPI:    2024 chronic pain for 2 years.  Reported slammed hand in car door at the age of 11.  Bilateral hands.  Reported diagnosed with deQuervain tenosynovitis bilateral.  Reported no cartilage between her thumb bones.  Pain level 8/10 and started at 3/10.  Stiffness.  Chronic pain. Difficulty with household chores and holding objects.  No activities relieve or worsen the pain.   Evaluation for BILATERAL carpal tunnel syndrome for the past 24 months.  Occupation as .  Noticed tingling of the first 3 digits and worse at night.  Also describes aching, pins-and-needles, numbness, and burning.  shaking hand(s) help(s) to relieve the symptoms.  Has had difficulty with holding a  writing, phone, and/or opening jars.   Hx of tobacco or alcohol use.  No sensory disturbances of upper extremity, face or lower extremities.  associated neck pain.  Affecting ADLs.     Prior evaluations with [facility] with Dr Adenike Chung.  Not interested in injections          ROS:   Fever, Chills, Sweats: Denies  Involuntary Weight Loss: Denies  See HPI.   All other systems reviewed and negative.     OCCUPATIONAL history: former  "      GOALS OF TREATMENT: Symptom relief. improve function.        PMHx:   Past Medical History:   Diagnosis Date    Anxiety     Bowel habit changes     constipation     GERD (gastroesophageal reflux disease)     Hand arthritis 5/20/2024    Indigestion     Kidney disease     Marijuana use 12/20/2020    \"7 times a week\"       PSHx:   Past Surgical History:   Procedure Laterality Date    MT CYSTOSCOPY,INSERT URETERAL STENT  12/27/2020    Procedure: CYSTOSCOPY, WITH URETERAL STENT INSERTION;  Surgeon: Sky Santos M.D.;  Location: [facility];  Service: Urology    MT CYSTO/URETERO/PYELOSCOPY, DX Left 12/27/2020    Procedure: LEFT URETEROSCOPY WITH STONE BASKETING;  Surgeon: Sky Santos M.D.;  Location: [facility];  Service: Urology    MT CYSTOSCOPY,INSERT URETERAL STENT Left 12/24/2020    Procedure: CYSTOSCOPY;  Surgeon: Ed Rosenthal M.D.;  Location: [facility];  Service: Urology    MT CYSTO/URETERO/PYELOSCOPY, DX Left 12/24/2020    Procedure: URETEROSCOPY;  Surgeon: Ed Rosenthal M.D.;  Location: [facility];  Service: Urology    LASERTRIPSY Left 12/24/2020    Procedure: LITHOTRIPSY, USING LASER .. .;  Surgeon: Ed Rosenthal M.D.;  Location: [facility];  Service: Urology    MT CYSTOSCOPY,INSERT URETERAL STENT N/A 12/20/2020    Procedure: CYSTOSCOPY, WITH URETERAL STENT removal;  Surgeon: Ed Rosenthal M.D.;  Location: [facility];  Service: Urology    MT CYSTOSCOPY,INSERT URETERAL STENT Left 12/11/2020    Procedure: CYSTOSCOPY, WITH URETERAL STENT INSERTION;  Surgeon: Daniel Paulson M.D.;  Location: SURGERY [facility];  Service: Urology    HYSTEROSCOPY WITH VIDEO OPERATIVE N/A 7/29/2019    Procedure: HYSTEROSCOPY, WITH VIDEO IMAGING -WITH MYOSURE;  Surgeon: Prieto Santa M.D.;  Location: SURGERY SAME DAY [facility];  Service: Obstetrics    DILATION AND CURETTAGE N/A 7/29/2019    Procedure: DILATION AND CURETTAGE;  Surgeon: Prieto ARREOLA" ELOY Santa;  Location: SURGERY SAME DAY [facility];  Service: Obstetrics    LUMPECTOMY Left 1988    LAPAROSCOPY  1987    GYN SURGERY         Family history   Family History   Problem Relation Age of Onset    Hypertension Mother     Lung Disease Mother         COPD    Heart Disease Maternal Grandmother     Lung Disease Maternal Uncle         lung cancer       Medications: reviewed on epic.   Outpatient Medications Marked as Taking for the 24 encounter (Office Visit) with Frederic Haile D.O.   Medication Sig Dispense Refill    diclofenac sodium (VOLTAREN) 1 % Gel Apply 2 g sparingly to affected area 4 times daily as needed 50 g 4    DULoxetine (CYMBALTA) 20 MG Cap DR Particles Take 1 Capsule by mouth every day. 30 Capsule 3        Allergies:   Allergies   Allergen Reactions    Hydrocodone Nausea     Nightmares & Nausea      Iodine     Codeine Vomiting and Nausea     Vomiting     Other Food Vomiting     Grape juice             Penicillins Vomiting     Vomiting        Social Hx:   Social History     Socioeconomic History    Marital status:      Spouse name: Not on file    Number of children: Not on file    Years of education: Not on file    Highest education level: Some college, no degree   Occupational History    Not on file   Tobacco Use    Smoking status: Former     Current packs/day: 0.00     Average packs/day: 1 pack/day for 31.0 years (31.0 ttl pk-yrs)     Types: Cigarettes     Start date: 1981     Quit date: 2012     Years since quittin.1    Smokeless tobacco: Never   Vaping Use    Vaping status: Never Used   Substance and Sexual Activity    Alcohol use: Not Currently    Drug use: Yes     Frequency: 7.0 times per week     Types: Marijuana, Inhaled     Comment: marijuanna occ, last 3 days ago    Sexual activity: Yes     Partners: Male     Birth control/protection: None   Other Topics Concern    Not on file   Social History Narrative    Not on file     Social Determinants of  "Health     Financial Resource Strain: Low Risk  (6/3/2020)    Overall Financial Resource Strain (CARDIA)     Difficulty of Paying Living Expenses: Not very hard   Food Insecurity: No Food Insecurity (6/3/2020)    Hunger Vital Sign     Worried About Running Out of Food in the Last Year: Never true     Ran Out of Food in the Last Year: Never true   Transportation Needs: No Transportation Needs (6/3/2020)    PRAPARE - Transportation     Lack of Transportation (Medical): No     Lack of Transportation (Non-Medical): No   Physical Activity: Insufficiently Active (6/3/2020)    Exercise Vital Sign     Days of Exercise per Week: 3 days     Minutes of Exercise per Session: 30 min   Stress: No Stress Concern Present (6/3/2020)    [employer] - Occupational Stress Questionnaire     Feeling of Stress : Not at all   Social Connections: Socially Integrated (6/3/2020)    Social Connection and Isolation Panel [NHANES]     Frequency of Communication with Friends and Family: Three times a week     Frequency of Social Gatherings with Friends and Family: Twice a week     Attends Congregational Services: 1 to 4 times per year     Active Member of Clubs or Organizations: Yes     Attends Club or Organization Meetings: 1 to 4 times per year     Marital Status:    Intimate Partner Violence: Not on file   Housing Stability: Low Risk  (6/3/2020)    Housing Stability Vital Sign     Unable to Pay for Housing in the Last Year: No     Number of Places Lived in the Last Year: 1     Unstable Housing in the Last Year: No         EXAMINATION   Vitals: /68 (BP Location: Left arm, Patient Position: Sitting, BP Cuff Size: Large adult)   Pulse 81   Temp 36.7 °C (98.1 °F) (Temporal)   Ht 1.651 m (5' 5\")   Wt 86.2 kg (190 lb)   SpO2 96%   Physical Exam:     Body Habitus: Body mass index is 31.62 kg/m².  Appearance: Well-groomed, well-nourished, not disheveled  Eyes: No scleral icterus to suggest severe liver " disease, no proptosis to suggest severe hyperthyroid  ENT -no obvious auditory deficits, no external lesions, moist mucus membranes   Skin -no rashes or lesions noted. No appreciable skin breakdown on exposed skin areas.    Respiratory-  breathing comfortably on room air, no audible wheezing, full sentences  Cardiovascular- No lower extremity edema noted.   Psychiatric- alert and oriented, calm, comfortable, cooperative     Musculoskeletal and Neuro:  change in patient's demeanor with exam.    Grossly normal cranial nerve exam  Coordination grossly intact     bilateral wrist (compared w/ unaffected side)    Inspection: Posturing of right 5th digit. No swelling, deformity, normal use of hand/wrist.    Palpation: Radial- No TTP on medial styloid, 1st CMC, 1st dorsal compartment. Dorsal- No TTP on Lunate, Radius, Capitate. Chey's Tubercle. Ulnar- No TTP on Ulnar styloid, ECU, TFCC. Volar- No TTP on carpal tunnel, cubital tunnel, guyon's canal, Pisiform, Hamate    ROM: Active range of motion normal  Special Tests:   Nerve entrapment- Negative Tinels, Negative Phalens test, Negative reverse Phalens test, negative Ulnar nerve tinel test. DeQuirvains Tenosynovitis- Negative Finklesteins test.        Cervical spine   Inspection: No deformities of the skin over the cervical spine. No rashes or lesions.  full   active range of motion in all directions, with  pain    Spurling’s sign: equivocal pain to axial pain right  signs of muscular atrophy in bilateral upper extremities   No tenderness to palpation of the cervical spine  No tenderness to palpation on the BILATERAL side in the cervical facets.   No tenderness of paraspinal muscles  Key points for the international standards for neurological classification of spinal cord injury (ISNCSCI) to light touch.   Dermatome R L   C4 2 2   C5 2 2   C6 2 2   C7 2 2   C8 2 2   T1 2 2   T2 2 2     Nerve: neurovascularly intact radial, median, ulnar, and AIN     Motor Exam Upper  Extremities   ? Myotome R L   Shoulder flexion C5 5 5   Elbow flexion C5 5 5   Wrist extension C6 5 5   Elbow extension C7 5 5   Finger flexion C8 5 5   Finger abduction T1 5 5     Reflexes  ?  R L   Biceps  2+ 2+   Brachioradialis  2+ 2+   Aguila’s sign positive bilaterally        MEDICAL DECISION MAKING    Medical records review: see under HPI section.     DATA    Labs:   Lab Results   Component Value Date/Time    SODIUM 142 05/29/2024 07:52 AM    POTASSIUM 4.5 05/29/2024 07:52 AM    CHLORIDE 107 05/29/2024 07:52 AM    CO2 25 05/29/2024 07:52 AM    ANION 10.0 05/29/2024 07:52 AM    GLUCOSE 99 05/29/2024 07:52 AM    BUN 10 05/29/2024 07:52 AM    CREATININE 0.59 05/29/2024 07:52 AM    CALCIUM 9.1 05/29/2024 07:52 AM    ASTSGOT 17 05/29/2024 07:52 AM    ALTSGPT 15 05/29/2024 07:52 AM    TBILIRUBIN 0.7 05/29/2024 07:52 AM    ALBUMIN 3.9 05/29/2024 07:52 AM    TOTPROTEIN 6.7 05/29/2024 07:52 AM    GLOBULIN 2.8 05/29/2024 07:52 AM    AGRATIO 1.4 05/29/2024 07:52 AM   ]    Lab Results   Component Value Date/Time    PROTHROMBTM 12.5 12/19/2020 10:30 PM    INR 0.96 12/19/2020 10:30 PM        Lab Results   Component Value Date/Time    WBC 8.1 05/29/2024 07:52 AM    RBC 4.99 05/29/2024 07:52 AM    HEMOGLOBIN 14.6 05/29/2024 07:52 AM    HEMATOCRIT 45.5 05/29/2024 07:52 AM    MCV 91.2 05/29/2024 07:52 AM    MCH 29.3 05/29/2024 07:52 AM    MCHC 32.1 (L) 05/29/2024 07:52 AM    MPV 10.3 05/29/2024 07:52 AM    NEUTSPOLYS 57.10 05/29/2024 07:52 AM    LYMPHOCYTES 34.60 05/29/2024 07:52 AM    MONOCYTES 5.50 05/29/2024 07:52 AM    EOSINOPHILS 2.40 05/29/2024 07:52 AM    BASOPHILS 0.20 05/29/2024 07:52 AM        Lab Results   Component Value Date/Time    HBA1C 5.6 05/29/2024 07:52 AM        6/4/2024 I performed a limited diagnostic ultrasound for the patient's neuralgia ICD M79.2. I performed a limited diagnostic ultrasound of the RIGHT wrist including the median nerve to evaluate the cause of the patient's neuralgia. The median nerve  which shows a cross-sectional area of 16 mm² at the level of the carpal tunnel outlet and 10 mm² at the level of the pronator quadratus. This is consistent with carpal tunnel syndrome.  The images were uploaded to the medical record.       6/4/2024 I performed a limited diagnostic ultrasound for the patient's neuralgia ICD M79.2. I performed a limited diagnostic ultrasound of the LEFT wrist including the median nerve to evaluate the cause of the patient's neuralgia. The median nerve which shows a cross-sectional area of 13 mm² at the level of the carpal tunnel outlet and 13 mm² at the level of the pronator quadratus. This is NOT consistent with carpal tunnel syndrome.  The images were uploaded to the medical record.       Imaging:   I personally reviewed following images, these are my reads  10/5/2023 right hand x-ray: Mild degenerative changes       IMAGING radiology reads. I reviewed the following radiology reads                                                   3/23/23 left hand XR: Three-view x-ray of the left hand and wrist reviewed including PA,   lateral, oblique.  No obvious acute fracture.  Joint space narrowing and   osteophyte formation of the first CMC joint.                           Results for orders placed during the hospital encounter of 07/01/20    DX-HAND 2- RIGHT  Three-view x-ray of the right hand and wrist reviewed including PA,   lateral, oblique.  Ulnar negative variance.  No obvious acute fracture or   malalignment.  Mild diffuse degenerative changes, greatest at the first   carpometacarpal joint.   Impression  Negative RIGHT hand series.   Results for orders placed in visit on 10/05/23    DX-HAND 3+ RIGHT   Results for orders placed during the hospital encounter of 04/07/21    DX-OS CALCIS (HEEL) 2+ LEFT    Impression  1.  No left calcaneal fracture.    2.  Minor calcaneal plantar and Achilles spurring.    3.  Soft tissue swelling posterior and inferior to the posterior calcaneus.                                     ASSESSMENT AND PLAN:  Michael Larsen   : 1970   Past medical history of onychomycosis, BMI 31, elevated glucose, chronic pain syndrome    Diagnosis   Visit Diagnoses     ICD-10-CM   1. Neck pain  M54.2   2. 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 Thenar atrophy, right  G56.01   4. Aguila sign present  R29.2   5. Bilateral hand pain  M79.641    M79.642   6. Obesity (BMI 30-39.9)  E66.9   7. Marijuana use  F12.90   8. Hand arthritis  M19.049   9. Exercise counseling  Z71.82   10. Dietary counseling  Z71.3   11. KAILEE (generalized anxiety disorder)  F41.1   12. Chronic pain syndrome  G89.4       Patient with historical and clinical evidence consistent with chronic bilateral hand pain with the suspicion of bilateral carpal tunnel syndrome with a few present bilaterally positive findings on ultrasound of the right hand.  Negative special test bilaterally but was reviewed with EMG.  In addition concerned about neck pathology such as myelopathy and/or radiculopathy radiculopathy as the patient bilateral Tarsha test positive. Trailed PT for her hands in the past.    Chronic pain syndrome: Cymbalta initiated 2024 but stopped.  Will inform PCP  KAILEE: PHQ9 negative; can affect motivation for exercise and activity and therapy; agreeable to referral to   Extensive discussion regarding treatment options, at this time recommending the following:    Orders Placed This Encounter    DX-CERVICAL SPINE-2 OR 3 VIEWS    MR-CERVICAL SPINE-W/O    Referral to Pain Clinic    Referral to Behavioral Health         PLAN  I did have an extensive discussion regarding pathology and treatment options \:  -Medications/Modalities:   No changes in medications  -Limitations:    Activity as tolerated  -Brace/orthotic/assistive devices: Consider x-ray  -Therapy (PT/OT/Aquatherapy): will order depending on diagnostic workup   -Home exercise program: encouraged activity  -Office Injections: not indicated at this  time  -Diagnostic workup: reviewed today as above; X-rays, EMG, MRI  -Interventional program: I would  consider the patient for Carpal tunnel injection depending on the results of the above   -Referrals:   none required at this time  -Outside records requested: The patient signed Outside Records Request Form for her outside records including images. This includes the records from [facility]    Follow-up: EMG after XR   Patient expressed understanding of the management plan. Patient (and Family Members) was/were encouraged to call if any worries, issues, problems or concerns prior to the next visit     Please note that this dictation was created using voice recognition software. I have made every reasonable attempt to correct obvious errors but there may be errors of grammar and content that I may have overlooked prior to finalization of this note.      Frederic Haile,   Physical Medicine and Rehabilitation  [facility]         CC Chiquis Infante P.A.-C.   Chiquis Harrison P.A.-C.

## 2024-06-05 NOTE — RESULT ENCOUNTER NOTE
Dear Michael Larsen    I reviewed the results of your test.  There are no urgent findings that require urgent intervention.  We will discuss the results in detail at the follow-up visit.    Frederic Haile DO

## 2024-06-10 ENCOUNTER — OFFICE VISIT (OUTPATIENT)
Dept: PHYSICAL MEDICINE AND REHAB | Facility: MEDICAL CENTER | Age: 54
End: 2024-06-10
Payer: COMMERCIAL

## 2024-06-10 VITALS
HEIGHT: 65 IN | HEART RATE: 85 BPM | SYSTOLIC BLOOD PRESSURE: 138 MMHG | BODY MASS INDEX: 31.65 KG/M2 | WEIGHT: 190 LBS | OXYGEN SATURATION: 96 % | TEMPERATURE: 98.2 F | DIASTOLIC BLOOD PRESSURE: 82 MMHG

## 2024-06-10 DIAGNOSIS — M54.12 CERVICAL RADICULOPATHY: ICD-10-CM

## 2024-06-10 PROCEDURE — 3075F SYST BP GE 130 - 139MM HG: CPT | Performed by: GENERAL PRACTICE

## 2024-06-10 PROCEDURE — 95886 MUSC TEST DONE W/N TEST COMP: CPT | Performed by: GENERAL PRACTICE

## 2024-06-10 PROCEDURE — 1125F AMNT PAIN NOTED PAIN PRSNT: CPT | Performed by: GENERAL PRACTICE

## 2024-06-10 PROCEDURE — 95911 NRV CNDJ TEST 9-10 STUDIES: CPT | Performed by: GENERAL PRACTICE

## 2024-06-10 PROCEDURE — 3079F DIAST BP 80-89 MM HG: CPT | Performed by: GENERAL PRACTICE

## 2024-06-10 ASSESSMENT — FIBROSIS 4 INDEX: FIB4 SCORE: 0.77

## 2024-06-10 ASSESSMENT — PAIN SCALES - GENERAL: PAINLEVEL: 6=MODERATE PAIN

## 2024-06-10 NOTE — PROCEDURES
"Electromyography Report          Name: Michael Larsen    MRN: [id number]   YOB: 1970 (54 y.o.)   Sex: female   Height:   Vitals:    06/10/24 0822   BP: 138/82   Weight: 86.2 kg (190 lb)   Height: 1.651 m (5' 5\")       Examining Physician: Frederic Haile D.O.     EMG Examination Date: 6/10/2024     Visit Diagnoses     ICD-10-CM   1. Cervical radiculopathy  M54.12       Impression:      This is a normal study.   There is no electrodiagnostic evidence of right median neuropathy at the wrist.   With the limited study, there is no electrodiagnostic evidence of left median neuropathy at the wrist  There is no electrodiagnostic evidence of RIGHT upper extremity large fiber neuropathy, plexopathy or radiculopathy in segments tested.     Recommendations: Follow up appointment after obtaining MRI.  May repeat EMG NCS in 3 to 6 months.  Discussed findings on XR.     History: Continued numbness and tingling sensation as sharp sensation of bilateral hands with the right worse than the left.  Patient still suspects left hand is more arthritic pathology    Physical exam:  neg spurling test, no hand weakness    Nerve Conduction Studies and Electromyography  Examination Findings:      Nerve Conduction Studies Examination Findings:      BILATERAL median sensory nerve action potential (SNAP) amplitude, peak latency and conduction velocity are normal.   RIGHT  ulnar sensory nerve action potential (SNAP) amplitude, peak latency and conduction velocity are normal.   RIGHT radial sensory nerve action potential (SNAP) amplitude, peak latency and conduction velocity are normal.   RIGHT medial antibrachial cutaneous sensory nerve action potential (SNAP) amplitude, peak latency and conduction velocity are normal.     BILATERAL  median compound muscle action potential (CMAP) amplitude, distal latency, conduction velocity  are normal.   RIGHT  ulnar compound muscle action potential (CMAP) amplitude, distal latency, conduction " velocity are normal.   RIGHT median/ulnar palmar comparison at the wrist is normal.     Needle EMG of muscles of RIGHT upper extremity is normal.  Unable to obtain FCR and/or pronator teres due to intolerance of needle.  However, performed deltoid, biceps, triceps, first dorsal interosseous, FCU.    Nerve conduction studies (NCS) and electromyography (EMG) are utilized to evaluate direct or indirect damage to the peripheral nervous system. NCS are performed to measure the nerve(s) response(s) to electrostimulation across a given nerve segment. EMG evaluates the passive and active electrical activity of the muscle(s) in question.  Muscles are innervated by specific peripheral nerves and roots. Often times, several nerves the muscle to be examined in order to determine the presence or absence of the disease process. Furthermore, nerves and muscles may need to be tested in a rowt-xh-feee comparison, as well as in additional extremities, as this may be crucial in characterizing the extent of the disease process, which may be diffuse or isolated and of varying degree of severity. The extent of the neurodiagnostic exam is justified as it may help arrive to a proper diagnosis, which ultimately may contribute to better management of the patient. Therefore, the nerves to muscles examined during the study were medically necessary.    Unless otherwise noted, temperature of the extremity(s) study was monitored before and during the examination and remained between 32 and 36 degrees C for the upper extremities, and between 30 and 36 degrees C for the lower extremities. The patient tolerated testing well, without any complications. The study was done with a  concentric needle examination.   Reference values are from the [facility] normative data guidelines and [employer] related to age guidelines.   Please see the attached document for raw data or the scanned document in EPIC.        cpt 65134. Nerve conduction studies, 9-10  studies  CPT 53035. needle electromyography, complete, each extremity.

## 2024-07-02 ENCOUNTER — APPOINTMENT (OUTPATIENT)
Dept: URGENT CARE | Facility: CLINIC | Age: 54
End: 2024-07-02
Payer: COMMERCIAL

## 2024-07-11 DIAGNOSIS — S92.309A CLOSED NONDISPLACED FRACTURE OF METATARSAL BONE, UNSPECIFIED LATERALITY, UNSPECIFIED METATARSAL, INITIAL ENCOUNTER: ICD-10-CM

## 2024-07-16 ENCOUNTER — APPOINTMENT (OUTPATIENT)
Dept: RADIOLOGY | Facility: MEDICAL CENTER | Age: 54
End: 2024-07-16
Attending: GENERAL PRACTICE
Payer: COMMERCIAL

## 2024-07-16 DIAGNOSIS — G56.02 THENAR ATROPHY, LEFT: ICD-10-CM

## 2024-07-16 DIAGNOSIS — G56.01 THENAR ATROPHY, RIGHT: ICD-10-CM

## 2024-07-16 DIAGNOSIS — M54.2 NECK PAIN: ICD-10-CM

## 2024-07-16 DIAGNOSIS — R29.2 HOFFMAN SIGN PRESENT: ICD-10-CM

## 2024-07-16 PROCEDURE — 72141 MRI NECK SPINE W/O DYE: CPT

## 2024-07-23 ENCOUNTER — OFFICE VISIT (OUTPATIENT)
Dept: PHYSICAL MEDICINE AND REHAB | Facility: MEDICAL CENTER | Age: 54
End: 2024-07-23
Payer: COMMERCIAL

## 2024-07-23 VITALS
DIASTOLIC BLOOD PRESSURE: 97 MMHG | HEIGHT: 65 IN | BODY MASS INDEX: 31.88 KG/M2 | SYSTOLIC BLOOD PRESSURE: 162 MMHG | TEMPERATURE: 98.3 F | HEART RATE: 85 BPM | WEIGHT: 191.36 LBS | OXYGEN SATURATION: 97 %

## 2024-07-23 DIAGNOSIS — M54.12 CERVICAL RADICULOPATHY: ICD-10-CM

## 2024-07-23 DIAGNOSIS — M79.10 MYALGIA: ICD-10-CM

## 2024-07-23 DIAGNOSIS — M54.2 NECK PAIN: ICD-10-CM

## 2024-07-23 DIAGNOSIS — M79.642 BILATERAL HAND PAIN: ICD-10-CM

## 2024-07-23 DIAGNOSIS — M79.641 BILATERAL HAND PAIN: ICD-10-CM

## 2024-07-23 DIAGNOSIS — E66.9 OBESITY (BMI 30-39.9): ICD-10-CM

## 2024-07-23 DIAGNOSIS — M19.049 HAND ARTHRITIS: ICD-10-CM

## 2024-07-23 DIAGNOSIS — Z71.82 EXERCISE COUNSELING: ICD-10-CM

## 2024-07-23 DIAGNOSIS — G95.9 MYELOPATHY (HCC): ICD-10-CM

## 2024-07-23 PROCEDURE — 99214 OFFICE O/P EST MOD 30 MIN: CPT | Performed by: GENERAL PRACTICE

## 2024-07-23 PROCEDURE — 1125F AMNT PAIN NOTED PAIN PRSNT: CPT | Performed by: GENERAL PRACTICE

## 2024-07-23 PROCEDURE — 3080F DIAST BP >= 90 MM HG: CPT | Performed by: GENERAL PRACTICE

## 2024-07-23 PROCEDURE — 3077F SYST BP >= 140 MM HG: CPT | Performed by: GENERAL PRACTICE

## 2024-07-23 RX ORDER — IBUPROFEN 200 MG
200 TABLET ORAL EVERY 6 HOURS PRN
COMMUNITY

## 2024-07-23 ASSESSMENT — PATIENT HEALTH QUESTIONNAIRE - PHQ9
5. POOR APPETITE OR OVEREATING: 1 - SEVERAL DAYS
SUM OF ALL RESPONSES TO PHQ QUESTIONS 1-9: 7
CLINICAL INTERPRETATION OF PHQ2 SCORE: 2

## 2024-07-23 ASSESSMENT — PAIN SCALES - GENERAL: PAINLEVEL: 8=MODERATE-SEVERE PAIN

## 2024-07-23 ASSESSMENT — FIBROSIS 4 INDEX: FIB4 SCORE: 0.77

## 2024-07-25 ENCOUNTER — APPOINTMENT (OUTPATIENT)
Dept: MEDICAL GROUP | Facility: MEDICAL CENTER | Age: 54
End: 2024-07-25
Payer: COMMERCIAL

## 2024-07-25 VITALS
HEIGHT: 65 IN | OXYGEN SATURATION: 95 % | DIASTOLIC BLOOD PRESSURE: 80 MMHG | BODY MASS INDEX: 31.32 KG/M2 | HEART RATE: 85 BPM | SYSTOLIC BLOOD PRESSURE: 114 MMHG | WEIGHT: 188 LBS | TEMPERATURE: 97 F

## 2024-07-25 DIAGNOSIS — S92.309A CLOSED NONDISPLACED FRACTURE OF METATARSAL BONE, UNSPECIFIED LATERALITY, UNSPECIFIED METATARSAL, INITIAL ENCOUNTER: ICD-10-CM

## 2024-07-25 DIAGNOSIS — D17.21 LIPOMA OF RIGHT UPPER EXTREMITY: ICD-10-CM

## 2024-07-25 DIAGNOSIS — G89.4 CHRONIC PAIN SYNDROME: ICD-10-CM

## 2024-07-25 DIAGNOSIS — M79.641 BILATERAL HAND PAIN: ICD-10-CM

## 2024-07-25 DIAGNOSIS — E78.00 HYPERCHOLESTEREMIA: ICD-10-CM

## 2024-07-25 DIAGNOSIS — M79.642 BILATERAL HAND PAIN: ICD-10-CM

## 2024-07-25 ASSESSMENT — FIBROSIS 4 INDEX: FIB4 SCORE: 0.77

## 2024-08-27 ENCOUNTER — APPOINTMENT (OUTPATIENT)
Dept: ADMISSIONS | Facility: MEDICAL CENTER | Age: 54
End: 2024-08-27
Attending: NEUROLOGICAL SURGERY
Payer: COMMERCIAL

## 2024-09-10 ENCOUNTER — PRE-ADMISSION TESTING (OUTPATIENT)
Dept: ADMISSIONS | Facility: MEDICAL CENTER | Age: 54
End: 2024-09-10
Attending: NEUROLOGICAL SURGERY
Payer: COMMERCIAL

## 2024-09-10 DIAGNOSIS — Z01.810 PRE-OPERATIVE CARDIOVASCULAR EXAMINATION: ICD-10-CM

## 2024-09-10 DIAGNOSIS — Z01.812 PRE-OPERATIVE LABORATORY EXAMINATION: ICD-10-CM

## 2024-09-10 LAB
ABO GROUP BLD: NORMAL
ANION GAP SERPL CALC-SCNC: 9 MMOL/L (ref 7–16)
APTT PPP: 32.4 SEC (ref 24.7–36)
BASOPHILS # BLD AUTO: 0.4 % (ref 0–1.8)
BASOPHILS # BLD: 0.04 K/UL (ref 0–0.12)
BLD GP AB SCN SERPL QL: NORMAL
BUN SERPL-MCNC: 14 MG/DL (ref 8–22)
CALCIUM SERPL-MCNC: 9 MG/DL (ref 8.5–10.5)
CHLORIDE SERPL-SCNC: 105 MMOL/L (ref 96–112)
CO2 SERPL-SCNC: 27 MMOL/L (ref 20–33)
CREAT SERPL-MCNC: 0.7 MG/DL (ref 0.5–1.4)
EKG IMPRESSION: NORMAL
EOSINOPHIL # BLD AUTO: 0.37 K/UL (ref 0–0.51)
EOSINOPHIL NFR BLD: 3.7 % (ref 0–6.9)
ERYTHROCYTE [DISTWIDTH] IN BLOOD BY AUTOMATED COUNT: 46.9 FL (ref 35.9–50)
GFR SERPLBLD CREATININE-BSD FMLA CKD-EPI: 101 ML/MIN/1.73 M 2
GLUCOSE SERPL-MCNC: 98 MG/DL (ref 65–99)
HCT VFR BLD AUTO: 45.1 % (ref 37–47)
HGB BLD-MCNC: 14.3 G/DL (ref 12–16)
IMM GRANULOCYTES # BLD AUTO: 0.03 K/UL (ref 0–0.11)
IMM GRANULOCYTES NFR BLD AUTO: 0.3 % (ref 0–0.9)
INR PPP: 0.98 (ref 0.87–1.13)
LYMPHOCYTES # BLD AUTO: 3.6 K/UL (ref 1–4.8)
LYMPHOCYTES NFR BLD: 36.3 % (ref 22–41)
MCH RBC QN AUTO: 28.9 PG (ref 27–33)
MCHC RBC AUTO-ENTMCNC: 31.7 G/DL (ref 32.2–35.5)
MCV RBC AUTO: 91.3 FL (ref 81.4–97.8)
MONOCYTES # BLD AUTO: 0.62 K/UL (ref 0–0.85)
MONOCYTES NFR BLD AUTO: 6.2 % (ref 0–13.4)
NEUTROPHILS # BLD AUTO: 5.27 K/UL (ref 1.82–7.42)
NEUTROPHILS NFR BLD: 53.1 % (ref 44–72)
NRBC # BLD AUTO: 0 K/UL
NRBC BLD-RTO: 0 /100 WBC (ref 0–0.2)
PLATELET # BLD AUTO: 332 K/UL (ref 164–446)
PMV BLD AUTO: 9.8 FL (ref 9–12.9)
POTASSIUM SERPL-SCNC: 4.1 MMOL/L (ref 3.6–5.5)
PROTHROMBIN TIME: 13.1 SEC (ref 12–14.6)
RBC # BLD AUTO: 4.94 M/UL (ref 4.2–5.4)
RH BLD: NORMAL
SODIUM SERPL-SCNC: 141 MMOL/L (ref 135–145)
WBC # BLD AUTO: 9.9 K/UL (ref 4.8–10.8)

## 2024-09-10 PROCEDURE — 86900 BLOOD TYPING SEROLOGIC ABO: CPT

## 2024-09-10 PROCEDURE — 36415 COLL VENOUS BLD VENIPUNCTURE: CPT

## 2024-09-10 PROCEDURE — 93010 ELECTROCARDIOGRAM REPORT: CPT | Performed by: INTERNAL MEDICINE

## 2024-09-10 PROCEDURE — 93005 ELECTROCARDIOGRAM TRACING: CPT

## 2024-09-10 PROCEDURE — 86850 RBC ANTIBODY SCREEN: CPT

## 2024-09-10 PROCEDURE — 80048 BASIC METABOLIC PNL TOTAL CA: CPT

## 2024-09-10 PROCEDURE — 86901 BLOOD TYPING SEROLOGIC RH(D): CPT

## 2024-09-10 PROCEDURE — 85025 COMPLETE CBC W/AUTO DIFF WBC: CPT

## 2024-09-10 PROCEDURE — 85610 PROTHROMBIN TIME: CPT

## 2024-09-10 PROCEDURE — 85730 THROMBOPLASTIN TIME PARTIAL: CPT

## 2024-09-10 RX ORDER — M-VIT,TX,IRON,MINS/CALC/FOLIC 27MG-0.4MG
1 TABLET ORAL DAILY
Status: ON HOLD | COMMUNITY
End: 2024-09-27

## 2024-09-10 RX ORDER — ACETAMINOPHEN 500 MG
500-1000 TABLET ORAL EVERY 6 HOURS PRN
COMMUNITY

## 2024-09-27 ENCOUNTER — APPOINTMENT (OUTPATIENT)
Dept: RADIOLOGY | Facility: MEDICAL CENTER | Age: 54
End: 2024-09-27
Attending: NEUROLOGICAL SURGERY
Payer: COMMERCIAL

## 2024-09-27 ENCOUNTER — ANESTHESIA EVENT (OUTPATIENT)
Dept: SURGERY | Facility: MEDICAL CENTER | Age: 54
End: 2024-09-27
Payer: COMMERCIAL

## 2024-09-27 ENCOUNTER — HOSPITAL ENCOUNTER (OUTPATIENT)
Facility: MEDICAL CENTER | Age: 54
End: 2024-09-28
Attending: NEUROLOGICAL SURGERY | Admitting: NEUROLOGICAL SURGERY
Payer: COMMERCIAL

## 2024-09-27 ENCOUNTER — ANESTHESIA (OUTPATIENT)
Dept: SURGERY | Facility: MEDICAL CENTER | Age: 54
End: 2024-09-27
Payer: COMMERCIAL

## 2024-09-27 DIAGNOSIS — R26.9 GAIT DISTURBANCE: ICD-10-CM

## 2024-09-27 DIAGNOSIS — G89.18 POSTOPERATIVE PAIN: ICD-10-CM

## 2024-09-27 PROBLEM — M48.02 CERVICAL STENOSIS OF SPINAL CANAL: Status: ACTIVE | Noted: 2024-09-27

## 2024-09-27 LAB — ABO + RH BLD: NORMAL

## 2024-09-27 PROCEDURE — 700105 HCHG RX REV CODE 258: Performed by: NURSE PRACTITIONER

## 2024-09-27 PROCEDURE — 700102 HCHG RX REV CODE 250 W/ 637 OVERRIDE(OP): Performed by: NURSE PRACTITIONER

## 2024-09-27 PROCEDURE — 36415 COLL VENOUS BLD VENIPUNCTURE: CPT

## 2024-09-27 PROCEDURE — 160029 HCHG SURGERY MINUTES - 1ST 30 MINS LEVEL 4: Performed by: NEUROLOGICAL SURGERY

## 2024-09-27 PROCEDURE — 700111 HCHG RX REV CODE 636 W/ 250 OVERRIDE (IP): Mod: JZ | Performed by: ANESTHESIOLOGY

## 2024-09-27 PROCEDURE — A9270 NON-COVERED ITEM OR SERVICE: HCPCS | Performed by: ANESTHESIOLOGY

## 2024-09-27 PROCEDURE — 86901 BLOOD TYPING SEROLOGIC RH(D): CPT

## 2024-09-27 PROCEDURE — G0378 HOSPITAL OBSERVATION PER HR: HCPCS

## 2024-09-27 PROCEDURE — 160002 HCHG RECOVERY MINUTES (STAT): Performed by: NEUROLOGICAL SURGERY

## 2024-09-27 PROCEDURE — 160035 HCHG PACU - 1ST 60 MINS PHASE I: Performed by: NEUROLOGICAL SURGERY

## 2024-09-27 PROCEDURE — 72040 X-RAY EXAM NECK SPINE 2-3 VW: CPT

## 2024-09-27 PROCEDURE — 160041 HCHG SURGERY MINUTES - EA ADDL 1 MIN LEVEL 4: Performed by: NEUROLOGICAL SURGERY

## 2024-09-27 PROCEDURE — 700111 HCHG RX REV CODE 636 W/ 250 OVERRIDE (IP): Performed by: NURSE PRACTITIONER

## 2024-09-27 PROCEDURE — C1713 ANCHOR/SCREW BN/BN,TIS/BN: HCPCS | Performed by: NEUROLOGICAL SURGERY

## 2024-09-27 PROCEDURE — 160048 HCHG OR STATISTICAL LEVEL 1-5: Performed by: NEUROLOGICAL SURGERY

## 2024-09-27 PROCEDURE — A9270 NON-COVERED ITEM OR SERVICE: HCPCS | Performed by: NURSE PRACTITIONER

## 2024-09-27 PROCEDURE — 700102 HCHG RX REV CODE 250 W/ 637 OVERRIDE(OP): Performed by: ANESTHESIOLOGY

## 2024-09-27 PROCEDURE — 700101 HCHG RX REV CODE 250: Performed by: ANESTHESIOLOGY

## 2024-09-27 PROCEDURE — 700111 HCHG RX REV CODE 636 W/ 250 OVERRIDE (IP): Performed by: ANESTHESIOLOGY

## 2024-09-27 PROCEDURE — 700111 HCHG RX REV CODE 636 W/ 250 OVERRIDE (IP): Performed by: NEUROLOGICAL SURGERY

## 2024-09-27 PROCEDURE — 160036 HCHG PACU - EA ADDL 30 MINS PHASE I: Performed by: NEUROLOGICAL SURGERY

## 2024-09-27 PROCEDURE — 110454 HCHG SHELL REV 250: Performed by: NEUROLOGICAL SURGERY

## 2024-09-27 PROCEDURE — 160009 HCHG ANES TIME/MIN: Performed by: NEUROLOGICAL SURGERY

## 2024-09-27 PROCEDURE — 700101 HCHG RX REV CODE 250: Performed by: NEUROLOGICAL SURGERY

## 2024-09-27 PROCEDURE — 96375 TX/PRO/DX INJ NEW DRUG ADDON: CPT

## 2024-09-27 PROCEDURE — 110371 HCHG SHELL REV 272: Performed by: NEUROLOGICAL SURGERY

## 2024-09-27 PROCEDURE — 700105 HCHG RX REV CODE 258: Performed by: NEUROLOGICAL SURGERY

## 2024-09-27 PROCEDURE — 96365 THER/PROPH/DIAG IV INF INIT: CPT

## 2024-09-27 PROCEDURE — 700101 HCHG RX REV CODE 250: Performed by: NURSE PRACTITIONER

## 2024-09-27 PROCEDURE — 86900 BLOOD TYPING SEROLOGIC ABO: CPT

## 2024-09-27 DEVICE — IMPLANTABLE DEVICE: Type: IMPLANTABLE DEVICE | Site: SPINE CERVICAL | Status: FUNCTIONAL

## 2024-09-27 RX ORDER — HALOPERIDOL 5 MG/ML
1 INJECTION INTRAMUSCULAR
Status: DISCONTINUED | OUTPATIENT
Start: 2024-09-27 | End: 2024-09-27 | Stop reason: HOSPADM

## 2024-09-27 RX ORDER — CEFAZOLIN SODIUM 1 G/3ML
INJECTION, POWDER, FOR SOLUTION INTRAMUSCULAR; INTRAVENOUS PRN
Status: DISCONTINUED | OUTPATIENT
Start: 2024-09-27 | End: 2024-09-27 | Stop reason: SURG

## 2024-09-27 RX ORDER — HYDROMORPHONE HYDROCHLORIDE 1 MG/ML
0.5 INJECTION, SOLUTION INTRAMUSCULAR; INTRAVENOUS; SUBCUTANEOUS
Status: DISCONTINUED | OUTPATIENT
Start: 2024-09-27 | End: 2024-09-28 | Stop reason: HOSPADM

## 2024-09-27 RX ORDER — OXYCODONE HCL 5 MG/5 ML
10 SOLUTION, ORAL ORAL
Status: COMPLETED | OUTPATIENT
Start: 2024-09-27 | End: 2024-09-27

## 2024-09-27 RX ORDER — SODIUM CHLORIDE, SODIUM LACTATE, POTASSIUM CHLORIDE, CALCIUM CHLORIDE 600; 310; 30; 20 MG/100ML; MG/100ML; MG/100ML; MG/100ML
INJECTION, SOLUTION INTRAVENOUS CONTINUOUS
Status: ACTIVE | OUTPATIENT
Start: 2024-09-27 | End: 2024-09-27

## 2024-09-27 RX ORDER — HYDROMORPHONE HYDROCHLORIDE 1 MG/ML
0.1 INJECTION, SOLUTION INTRAMUSCULAR; INTRAVENOUS; SUBCUTANEOUS
Status: DISCONTINUED | OUTPATIENT
Start: 2024-09-27 | End: 2024-09-27 | Stop reason: HOSPADM

## 2024-09-27 RX ORDER — EPHEDRINE SULFATE 50 MG/ML
5 INJECTION, SOLUTION INTRAVENOUS
Status: DISCONTINUED | OUTPATIENT
Start: 2024-09-27 | End: 2024-09-27 | Stop reason: HOSPADM

## 2024-09-27 RX ORDER — LIDOCAINE HYDROCHLORIDE 20 MG/ML
INJECTION, SOLUTION EPIDURAL; INFILTRATION; INTRACAUDAL; PERINEURAL PRN
Status: DISCONTINUED | OUTPATIENT
Start: 2024-09-27 | End: 2024-09-27 | Stop reason: SURG

## 2024-09-27 RX ORDER — SODIUM PHOSPHATE,MONO-DIBASIC 19G-7G/118
1 ENEMA (ML) RECTAL
Status: DISCONTINUED | OUTPATIENT
Start: 2024-09-27 | End: 2024-09-28 | Stop reason: HOSPADM

## 2024-09-27 RX ORDER — HYDROMORPHONE HYDROCHLORIDE 1 MG/ML
0.2 INJECTION, SOLUTION INTRAMUSCULAR; INTRAVENOUS; SUBCUTANEOUS
Status: DISCONTINUED | OUTPATIENT
Start: 2024-09-27 | End: 2024-09-27 | Stop reason: HOSPADM

## 2024-09-27 RX ORDER — DIPHENHYDRAMINE HCL 25 MG
25 TABLET ORAL EVERY 6 HOURS PRN
Status: DISCONTINUED | OUTPATIENT
Start: 2024-09-27 | End: 2024-09-28 | Stop reason: HOSPADM

## 2024-09-27 RX ORDER — HYDROMORPHONE HYDROCHLORIDE 1 MG/ML
0.4 INJECTION, SOLUTION INTRAMUSCULAR; INTRAVENOUS; SUBCUTANEOUS
Status: DISCONTINUED | OUTPATIENT
Start: 2024-09-27 | End: 2024-09-27 | Stop reason: HOSPADM

## 2024-09-27 RX ORDER — OXYCODONE AND ACETAMINOPHEN 5; 325 MG/1; MG/1
1 TABLET ORAL EVERY 4 HOURS PRN
Status: ON HOLD | COMMUNITY
End: 2024-09-27

## 2024-09-27 RX ORDER — LABETALOL HYDROCHLORIDE 5 MG/ML
5 INJECTION, SOLUTION INTRAVENOUS
Status: DISCONTINUED | OUTPATIENT
Start: 2024-09-27 | End: 2024-09-27 | Stop reason: HOSPADM

## 2024-09-27 RX ORDER — LABETALOL HYDROCHLORIDE 5 MG/ML
10 INJECTION, SOLUTION INTRAVENOUS
Status: DISCONTINUED | OUTPATIENT
Start: 2024-09-27 | End: 2024-09-28 | Stop reason: HOSPADM

## 2024-09-27 RX ORDER — ONDANSETRON 4 MG/1
4 TABLET, ORALLY DISINTEGRATING ORAL EVERY 4 HOURS PRN
Status: DISCONTINUED | OUTPATIENT
Start: 2024-09-27 | End: 2024-09-28 | Stop reason: HOSPADM

## 2024-09-27 RX ORDER — DIPHENHYDRAMINE HYDROCHLORIDE 50 MG/ML
12.5 INJECTION INTRAMUSCULAR; INTRAVENOUS
Status: DISCONTINUED | OUTPATIENT
Start: 2024-09-27 | End: 2024-09-27 | Stop reason: HOSPADM

## 2024-09-27 RX ORDER — SODIUM CHLORIDE AND POTASSIUM CHLORIDE 150; 900 MG/100ML; MG/100ML
INJECTION, SOLUTION INTRAVENOUS CONTINUOUS
Status: DISCONTINUED | OUTPATIENT
Start: 2024-09-27 | End: 2024-09-28 | Stop reason: HOSPADM

## 2024-09-27 RX ORDER — BISACODYL 10 MG
10 SUPPOSITORY, RECTAL RECTAL
Status: DISCONTINUED | OUTPATIENT
Start: 2024-09-27 | End: 2024-09-28 | Stop reason: HOSPADM

## 2024-09-27 RX ORDER — ALBUTEROL SULFATE 5 MG/ML
2.5 SOLUTION RESPIRATORY (INHALATION)
Status: DISCONTINUED | OUTPATIENT
Start: 2024-09-27 | End: 2024-09-27 | Stop reason: HOSPADM

## 2024-09-27 RX ORDER — ROCURONIUM BROMIDE 10 MG/ML
INJECTION, SOLUTION INTRAVENOUS PRN
Status: DISCONTINUED | OUTPATIENT
Start: 2024-09-27 | End: 2024-09-27 | Stop reason: SURG

## 2024-09-27 RX ORDER — DIPHENHYDRAMINE HYDROCHLORIDE 50 MG/ML
25 INJECTION INTRAMUSCULAR; INTRAVENOUS EVERY 6 HOURS PRN
Status: DISCONTINUED | OUTPATIENT
Start: 2024-09-27 | End: 2024-09-28 | Stop reason: HOSPADM

## 2024-09-27 RX ORDER — ACETAMINOPHEN 500 MG
1000 TABLET ORAL EVERY 6 HOURS PRN
Status: DISCONTINUED | OUTPATIENT
Start: 2024-09-27 | End: 2024-09-28 | Stop reason: HOSPADM

## 2024-09-27 RX ORDER — LACTOBACILLUS RHAMNOSUS GG 10B CELL
1 CAPSULE ORAL DAILY
Status: DISCONTINUED | OUTPATIENT
Start: 2024-09-27 | End: 2024-09-28 | Stop reason: HOSPADM

## 2024-09-27 RX ORDER — DULOXETIN HYDROCHLORIDE 20 MG/1
20 CAPSULE, DELAYED RELEASE ORAL DAILY
Status: DISCONTINUED | OUTPATIENT
Start: 2024-09-27 | End: 2024-09-28 | Stop reason: HOSPADM

## 2024-09-27 RX ORDER — ONDANSETRON 2 MG/ML
4 INJECTION INTRAMUSCULAR; INTRAVENOUS
Status: COMPLETED | OUTPATIENT
Start: 2024-09-27 | End: 2024-09-27

## 2024-09-27 RX ORDER — OXYCODONE HYDROCHLORIDE 5 MG/1
5 TABLET ORAL
Status: DISCONTINUED | OUTPATIENT
Start: 2024-09-27 | End: 2024-09-28 | Stop reason: HOSPADM

## 2024-09-27 RX ORDER — MIDAZOLAM HYDROCHLORIDE 1 MG/ML
INJECTION INTRAMUSCULAR; INTRAVENOUS PRN
Status: DISCONTINUED | OUTPATIENT
Start: 2024-09-27 | End: 2024-09-27 | Stop reason: SURG

## 2024-09-27 RX ORDER — LIDOCAINE HYDROCHLORIDE 40 MG/ML
SOLUTION TOPICAL PRN
Status: DISCONTINUED | OUTPATIENT
Start: 2024-09-27 | End: 2024-09-27 | Stop reason: SURG

## 2024-09-27 RX ORDER — HYDRALAZINE HYDROCHLORIDE 20 MG/ML
10 INJECTION INTRAMUSCULAR; INTRAVENOUS
Status: DISCONTINUED | OUTPATIENT
Start: 2024-09-27 | End: 2024-09-28 | Stop reason: HOSPADM

## 2024-09-27 RX ORDER — AMOXICILLIN 250 MG
1 CAPSULE ORAL
Status: DISCONTINUED | OUTPATIENT
Start: 2024-09-27 | End: 2024-09-28 | Stop reason: HOSPADM

## 2024-09-27 RX ORDER — PROCHLORPERAZINE EDISYLATE 5 MG/ML
5-10 INJECTION INTRAMUSCULAR; INTRAVENOUS EVERY 4 HOURS PRN
Status: DISCONTINUED | OUTPATIENT
Start: 2024-09-27 | End: 2024-09-28 | Stop reason: HOSPADM

## 2024-09-27 RX ORDER — PROMETHAZINE HYDROCHLORIDE 25 MG/1
12.5-25 TABLET ORAL EVERY 4 HOURS PRN
Status: DISCONTINUED | OUTPATIENT
Start: 2024-09-27 | End: 2024-09-28 | Stop reason: HOSPADM

## 2024-09-27 RX ORDER — ONDANSETRON 2 MG/ML
4 INJECTION INTRAMUSCULAR; INTRAVENOUS EVERY 4 HOURS PRN
Status: DISCONTINUED | OUTPATIENT
Start: 2024-09-27 | End: 2024-09-28 | Stop reason: HOSPADM

## 2024-09-27 RX ORDER — OXYCODONE HCL 5 MG/5 ML
5 SOLUTION, ORAL ORAL
Status: COMPLETED | OUTPATIENT
Start: 2024-09-27 | End: 2024-09-27

## 2024-09-27 RX ORDER — DEXAMETHASONE SODIUM PHOSPHATE 4 MG/ML
INJECTION, SOLUTION INTRA-ARTICULAR; INTRALESIONAL; INTRAMUSCULAR; INTRAVENOUS; SOFT TISSUE PRN
Status: DISCONTINUED | OUTPATIENT
Start: 2024-09-27 | End: 2024-09-27 | Stop reason: SURG

## 2024-09-27 RX ORDER — PHENYLEPHRINE HCL IN 0.9% NACL 1 MG/10 ML
SYRINGE (ML) INTRAVENOUS PRN
Status: DISCONTINUED | OUTPATIENT
Start: 2024-09-27 | End: 2024-09-27 | Stop reason: SURG

## 2024-09-27 RX ORDER — TIZANIDINE 2 MG/1
2 TABLET ORAL EVERY 8 HOURS PRN
Status: ON HOLD | COMMUNITY
End: 2024-09-27

## 2024-09-27 RX ORDER — OXYCODONE HYDROCHLORIDE 10 MG/1
10 TABLET ORAL
Status: DISCONTINUED | OUTPATIENT
Start: 2024-09-27 | End: 2024-09-28 | Stop reason: HOSPADM

## 2024-09-27 RX ORDER — DOCUSATE SODIUM 100 MG/1
100 CAPSULE, LIQUID FILLED ORAL 2 TIMES DAILY
Status: DISCONTINUED | OUTPATIENT
Start: 2024-09-27 | End: 2024-09-28 | Stop reason: HOSPADM

## 2024-09-27 RX ORDER — HYDRALAZINE HYDROCHLORIDE 20 MG/ML
5 INJECTION INTRAMUSCULAR; INTRAVENOUS
Status: DISCONTINUED | OUTPATIENT
Start: 2024-09-27 | End: 2024-09-27 | Stop reason: HOSPADM

## 2024-09-27 RX ORDER — PROMETHAZINE HYDROCHLORIDE 25 MG/1
12.5-25 SUPPOSITORY RECTAL EVERY 4 HOURS PRN
Status: DISCONTINUED | OUTPATIENT
Start: 2024-09-27 | End: 2024-09-28 | Stop reason: HOSPADM

## 2024-09-27 RX ORDER — SODIUM CHLORIDE, SODIUM LACTATE, POTASSIUM CHLORIDE, CALCIUM CHLORIDE 600; 310; 30; 20 MG/100ML; MG/100ML; MG/100ML; MG/100ML
INJECTION, SOLUTION INTRAVENOUS CONTINUOUS
Status: DISCONTINUED | OUTPATIENT
Start: 2024-09-27 | End: 2024-09-27 | Stop reason: HOSPADM

## 2024-09-27 RX ORDER — ACETAMINOPHEN 500 MG
1000 TABLET ORAL ONCE
Status: COMPLETED | OUTPATIENT
Start: 2024-09-27 | End: 2024-09-27

## 2024-09-27 RX ORDER — CEFAZOLIN SODIUM 1 G/3ML
INJECTION, POWDER, FOR SOLUTION INTRAMUSCULAR; INTRAVENOUS
Status: DISCONTINUED | OUTPATIENT
Start: 2024-09-27 | End: 2024-09-27 | Stop reason: HOSPADM

## 2024-09-27 RX ORDER — ONDANSETRON 2 MG/ML
INJECTION INTRAMUSCULAR; INTRAVENOUS PRN
Status: DISCONTINUED | OUTPATIENT
Start: 2024-09-27 | End: 2024-09-27 | Stop reason: SURG

## 2024-09-27 RX ORDER — IBUPROFEN 200 MG
200 TABLET ORAL EVERY 6 HOURS PRN
Status: ON HOLD | COMMUNITY
End: 2024-09-27

## 2024-09-27 RX ORDER — POLYETHYLENE GLYCOL 3350 17 G/17G
1 POWDER, FOR SOLUTION ORAL 2 TIMES DAILY PRN
Status: DISCONTINUED | OUTPATIENT
Start: 2024-09-27 | End: 2024-09-28 | Stop reason: HOSPADM

## 2024-09-27 RX ORDER — SCOLOPAMINE TRANSDERMAL SYSTEM 1 MG/1
1 PATCH, EXTENDED RELEASE TRANSDERMAL
Status: DISCONTINUED | OUTPATIENT
Start: 2024-09-27 | End: 2024-09-28 | Stop reason: HOSPADM

## 2024-09-27 RX ORDER — BUPIVACAINE HYDROCHLORIDE AND EPINEPHRINE 5; 5 MG/ML; UG/ML
INJECTION, SOLUTION PERINEURAL
Status: DISCONTINUED | OUTPATIENT
Start: 2024-09-27 | End: 2024-09-27 | Stop reason: HOSPADM

## 2024-09-27 RX ORDER — AMOXICILLIN 250 MG
1 CAPSULE ORAL NIGHTLY
Status: DISCONTINUED | OUTPATIENT
Start: 2024-09-27 | End: 2024-09-28 | Stop reason: HOSPADM

## 2024-09-27 RX ADMIN — DEXAMETHASONE SODIUM PHOSPHATE 10 MG: 4 INJECTION INTRA-ARTICULAR; INTRALESIONAL; INTRAMUSCULAR; INTRAVENOUS; SOFT TISSUE at 11:01

## 2024-09-27 RX ADMIN — OXYCODONE HYDROCHLORIDE 10 MG: 10 TABLET ORAL at 19:54

## 2024-09-27 RX ADMIN — PROPOFOL 200 MG: 10 INJECTION, EMULSION INTRAVENOUS at 10:52

## 2024-09-27 RX ADMIN — HYDRALAZINE HYDROCHLORIDE 5 MG: 20 INJECTION, SOLUTION INTRAMUSCULAR; INTRAVENOUS at 13:40

## 2024-09-27 RX ADMIN — FENTANYL CITRATE 50 MCG: 50 INJECTION, SOLUTION INTRAMUSCULAR; INTRAVENOUS at 13:19

## 2024-09-27 RX ADMIN — DOCUSATE SODIUM 100 MG: 100 CAPSULE, LIQUID FILLED ORAL at 16:34

## 2024-09-27 RX ADMIN — OXYCODONE HYDROCHLORIDE 10 MG: 5 SOLUTION ORAL at 12:49

## 2024-09-27 RX ADMIN — CEFAZOLIN 2 G: 1 INJECTION, POWDER, FOR SOLUTION INTRAMUSCULAR; INTRAVENOUS at 10:49

## 2024-09-27 RX ADMIN — FENTANYL CITRATE 100 MCG: 50 INJECTION, SOLUTION INTRAMUSCULAR; INTRAVENOUS at 12:15

## 2024-09-27 RX ADMIN — SCOPOLAMINE 1 PATCH: 1.5 PATCH, EXTENDED RELEASE TRANSDERMAL at 07:36

## 2024-09-27 RX ADMIN — FENTANYL CITRATE 50 MCG: 50 INJECTION, SOLUTION INTRAMUSCULAR; INTRAVENOUS at 12:53

## 2024-09-27 RX ADMIN — ONDANSETRON 4 MG: 2 INJECTION INTRAMUSCULAR; INTRAVENOUS at 14:28

## 2024-09-27 RX ADMIN — SENNOSIDES AND DOCUSATE SODIUM 1 TABLET: 50; 8.6 TABLET ORAL at 20:56

## 2024-09-27 RX ADMIN — FENTANYL CITRATE 100 MCG: 50 INJECTION, SOLUTION INTRAMUSCULAR; INTRAVENOUS at 10:47

## 2024-09-27 RX ADMIN — ACETAMINOPHEN 1000 MG: 500 TABLET ORAL at 07:36

## 2024-09-27 RX ADMIN — SUGAMMADEX 200 MG: 100 INJECTION, SOLUTION INTRAVENOUS at 12:15

## 2024-09-27 RX ADMIN — Medication 100 MCG: at 11:02

## 2024-09-27 RX ADMIN — LIDOCAINE HYDROCHLORIDE 4 ML: 40 SOLUTION TOPICAL at 10:52

## 2024-09-27 RX ADMIN — PROCHLORPERAZINE EDISYLATE 10 MG: 5 INJECTION INTRAMUSCULAR; INTRAVENOUS at 15:08

## 2024-09-27 RX ADMIN — SODIUM CHLORIDE, POTASSIUM CHLORIDE, SODIUM LACTATE AND CALCIUM CHLORIDE: 600; 310; 30; 20 INJECTION, SOLUTION INTRAVENOUS at 07:30

## 2024-09-27 RX ADMIN — FENTANYL CITRATE 50 MCG: 50 INJECTION, SOLUTION INTRAMUSCULAR; INTRAVENOUS at 11:31

## 2024-09-27 RX ADMIN — LIDOCAINE HYDROCHLORIDE 3 ML: 20 INJECTION, SOLUTION EPIDURAL; INFILTRATION; INTRACAUDAL at 10:52

## 2024-09-27 RX ADMIN — LABETALOL HYDROCHLORIDE 5 MG: 5 INJECTION INTRAVENOUS at 13:18

## 2024-09-27 RX ADMIN — LABETALOL HYDROCHLORIDE 5 MG: 5 INJECTION INTRAVENOUS at 13:05

## 2024-09-27 RX ADMIN — ONDANSETRON 4 MG: 2 INJECTION INTRAMUSCULAR; INTRAVENOUS at 12:15

## 2024-09-27 RX ADMIN — Medication 100 MCG: at 11:07

## 2024-09-27 RX ADMIN — ROCURONIUM BROMIDE 50 MG: 50 INJECTION, SOLUTION INTRAVENOUS at 10:52

## 2024-09-27 RX ADMIN — FENTANYL CITRATE 100 MCG: 50 INJECTION, SOLUTION INTRAMUSCULAR; INTRAVENOUS at 10:52

## 2024-09-27 RX ADMIN — PROPOFOL 50 MG: 10 INJECTION, EMULSION INTRAVENOUS at 11:17

## 2024-09-27 RX ADMIN — OXYCODONE HYDROCHLORIDE 10 MG: 10 TABLET ORAL at 15:13

## 2024-09-27 RX ADMIN — POTASSIUM CHLORIDE AND SODIUM CHLORIDE: 900; 150 INJECTION, SOLUTION INTRAVENOUS at 16:36

## 2024-09-27 RX ADMIN — FENTANYL CITRATE 100 MCG: 50 INJECTION, SOLUTION INTRAMUSCULAR; INTRAVENOUS at 11:44

## 2024-09-27 RX ADMIN — Medication 100 MCG: at 11:12

## 2024-09-27 RX ADMIN — ROCURONIUM BROMIDE 20 MG: 50 INJECTION, SOLUTION INTRAVENOUS at 11:46

## 2024-09-27 RX ADMIN — CEFAZOLIN 2 G: 2 INJECTION, POWDER, FOR SOLUTION INTRAMUSCULAR; INTRAVENOUS at 20:06

## 2024-09-27 RX ADMIN — FENTANYL CITRATE 50 MCG: 50 INJECTION, SOLUTION INTRAMUSCULAR; INTRAVENOUS at 11:17

## 2024-09-27 RX ADMIN — MIDAZOLAM HYDROCHLORIDE 2 MG: 2 INJECTION, SOLUTION INTRAMUSCULAR; INTRAVENOUS at 10:47

## 2024-09-27 ASSESSMENT — COGNITIVE AND FUNCTIONAL STATUS - GENERAL
HELP NEEDED FOR BATHING: A LITTLE
DAILY ACTIVITIY SCORE: 21
DRESSING REGULAR UPPER BODY CLOTHING: A LITTLE
DRESSING REGULAR LOWER BODY CLOTHING: A LITTLE
MOBILITY SCORE: 23
SUGGESTED CMS G CODE MODIFIER MOBILITY: CI
WALKING IN HOSPITAL ROOM: A LITTLE
SUGGESTED CMS G CODE MODIFIER DAILY ACTIVITY: CJ

## 2024-09-27 ASSESSMENT — SOCIAL DETERMINANTS OF HEALTH (SDOH)
WITHIN THE LAST YEAR, HAVE TO BEEN RAPED OR FORCED TO HAVE ANY KIND OF SEXUAL ACTIVITY BY YOUR PARTNER OR EX-PARTNER?: NO
WITHIN THE PAST 12 MONTHS, YOU WORRIED THAT YOUR FOOD WOULD RUN OUT BEFORE YOU GOT THE MONEY TO BUY MORE: NEVER TRUE
WITHIN THE LAST YEAR, HAVE YOU BEEN AFRAID OF YOUR PARTNER OR EX-PARTNER?: NO
WITHIN THE LAST YEAR, HAVE YOU BEEN HUMILIATED OR EMOTIONALLY ABUSED IN OTHER WAYS BY YOUR PARTNER OR EX-PARTNER?: NO
IN THE PAST 12 MONTHS, HAS THE ELECTRIC, GAS, OIL, OR WATER COMPANY THREATENED TO SHUT OFF SERVICE IN YOUR HOME?: NO
WITHIN THE PAST 12 MONTHS, THE FOOD YOU BOUGHT JUST DIDN'T LAST AND YOU DIDN'T HAVE MONEY TO GET MORE: NEVER TRUE
WITHIN THE LAST YEAR, HAVE YOU BEEN KICKED, HIT, SLAPPED, OR OTHERWISE PHYSICALLY HURT BY YOUR PARTNER OR EX-PARTNER?: NO

## 2024-09-27 ASSESSMENT — LIFESTYLE VARIABLES
TOTAL SCORE: 0
TOTAL SCORE: 0
ALCOHOL_USE: NO
TOTAL SCORE: 0
AVERAGE NUMBER OF DAYS PER WEEK YOU HAVE A DRINK CONTAINING ALCOHOL: 0
EVER FELT BAD OR GUILTY ABOUT YOUR DRINKING: NO
HAVE PEOPLE ANNOYED YOU BY CRITICIZING YOUR DRINKING: NO
HAVE YOU EVER FELT YOU SHOULD CUT DOWN ON YOUR DRINKING: NO
ON A TYPICAL DAY WHEN YOU DRINK ALCOHOL HOW MANY DRINKS DO YOU HAVE: 0
CONSUMPTION TOTAL: NEGATIVE
EVER HAD A DRINK FIRST THING IN THE MORNING TO STEADY YOUR NERVES TO GET RID OF A HANGOVER: NO
HOW MANY TIMES IN THE PAST YEAR HAVE YOU HAD 5 OR MORE DRINKS IN A DAY: 0

## 2024-09-27 ASSESSMENT — PAIN DESCRIPTION - PAIN TYPE
TYPE: ACUTE PAIN;SURGICAL PAIN
TYPE: SURGICAL PAIN
TYPE: ACUTE PAIN;SURGICAL PAIN
TYPE: CHRONIC PAIN
TYPE: ACUTE PAIN;SURGICAL PAIN
TYPE: ACUTE PAIN
TYPE: ACUTE PAIN;SURGICAL PAIN
TYPE: ACUTE PAIN
TYPE: ACUTE PAIN
TYPE: ACUTE PAIN;SURGICAL PAIN
TYPE: ACUTE PAIN;SURGICAL PAIN
TYPE: ACUTE PAIN

## 2024-09-27 ASSESSMENT — FIBROSIS 4 INDEX: FIB4 SCORE: 0.74

## 2024-09-27 NOTE — PROGRESS NOTES
4 Eyes Skin Assessment Completed by EDVIN Everett and Ramesh RN.    Head WDL  Ears WDL  Nose WDL  Mouth WDL  Neck Incision  Breast/Chest WDL  Shoulder Blades WDL  Spine WDL  (R) Arm/Elbow/Hand WDL  (L) Arm/Elbow/Hand WDL  Abdomen WDL  Groin WDL  Scrotum/Coccyx/Buttocks WDL  (R) Leg WDL  (L) Leg WDL  (R) Heel/Foot/Toe WDL  (L) Heel/Foot/Toe WDL          Devices In Places Blood Pressure Cuff, Pulse Ox, SCD's, and ALL's      Interventions In Place Pillows    Possible Skin Injury No    Pictures Uploaded Into Epic N/A  Wound Consult Placed N/A  RN Wound Prevention Protocol Ordered No

## 2024-09-27 NOTE — OR NURSING
1414 Handoff report completed with receiving RN Marguerite.  1432 Patient transported off unit with transport tech.  All personal belongings sent with patient, VSS, no apparent distress, pain well managed, surgical sites clean dry and intact.   1439 Notified emergency contact Dowain of patient's room location.

## 2024-09-27 NOTE — PROGRESS NOTES
Pharmacy Medication Reconciliation      ~Medication reconciliation updated and complete per patient   ~Allergies have been verified and updated   ~No oral ABX within the last 30 days  ~Patient home pharmacy :  [facility] [phone]      ~Anticoagulants (rivaroxaban, apixaban, edoxaban, dabigatran, warfarin, enoxaparin) taken in the last 14 days? No  ~

## 2024-09-27 NOTE — ANESTHESIA PROCEDURE NOTES
Airway    Date/Time: 9/27/2024 10:55 AM    Performed by: Gucci Malagon M.D.  Authorized by: Gucci Malagon M.D.    Location:  OR  Urgency:  Elective  Difficult Airway: No    Indications for Airway Management:  Anesthesia      Spontaneous Ventilation: absent    Sedation Level:  Deep  Preoxygenated: Yes    Patient Position:  Sniffing  Mask Difficulty Assessment:  2 - vent by mask + OA or adjuvant +/- NMBA  Final Airway Type:  Endotracheal airway  Final Endotracheal Airway:  ETT  Cuffed: Yes    Technique Used for Successful ETT Placement:  Direct laryngoscopy    Insertion Site:  Oral  Blade Type:  Laurel  Laryngoscope Blade/Videolaryngoscope Blade Size:  3  ETT Size (mm):  7.0  Measured from:  Teeth  ETT to Teeth (cm):  21  Placement Verified by: auscultation and capnometry    Cormack-Lehane Classification:  Grade I - full view of glottis  Number of Attempts at Approach:  1   Atraumatic DLx1

## 2024-09-27 NOTE — ANESTHESIA PREPROCEDURE EVALUATION
Case: [id number] Date/Time: 09/27/24 0915    Procedure: C4-6 ANTERIOR PLACEMENT OF ARTIFICIAL DISC (Neck)    Anesthesia type: General    Pre-op diagnosis: SPINAL STENOSIS CERVICAL REGION    Location: [place] [address] / SURGERY [facility]    Surgeons: Néstor Moreno M.D.            Relevant Problems   GI   (positive) GERD (gastroesophageal reflux disease)         (positive) Nephrolithiasis      Other   (positive) Hand arthritis   (positive) Marijuana use   (positive) Obesity (BMI 30-39.9)   PONV-scope patch preop  +marijuana    Physical Exam    Airway   Mallampati: II  TM distance: >3 FB  Neck ROM: full       Cardiovascular - normal exam  Rhythm: regular  Rate: normal  (-) murmur     Dental - normal exam  (+) upper dentures, lower dentures           Pulmonary - normal exam  Breath sounds clear to auscultation     Abdominal    Neurological - normal exam                   Anesthesia Plan    ASA 3   ASA physical status 3 criteria: alcohol and/or substance dependence or abuse    Plan - general       Airway plan will be ETT          Induction: intravenous    Postoperative Plan: Postoperative administration of opioids is intended.    Pertinent diagnostic labs and testing reviewed    Informed Consent:    Anesthetic plan and risks discussed with patient.    Use of blood products discussed with: patient whom consented to blood products.

## 2024-09-27 NOTE — OR NURSING
Patient arrived to PACU with anesthesia and RN, VSS, no apparent distress, oral airway in place. Surgical site and drain assessed with off going team. Orders reviewed and initiated.

## 2024-09-27 NOTE — ANESTHESIA TIME REPORT
Anesthesia Start and Stop Event Times       Date Time Event    9/27/2024 1017 Ready for Procedure     1044 Anesthesia Start     1233 Anesthesia Stop          Responsible Staff  09/27/24      Name Role Begin End    Gucci Malagon M.D. Anesth 1044 1233          Overtime Reason:  no overtime (within assigned shift)    Comments:

## 2024-09-27 NOTE — ANESTHESIA POSTPROCEDURE EVALUATION
Patient: Michael Larsen    Procedure Summary       Date: 09/27/24 Room / Location: [facility] 05 / SURGERY [facility]    Anesthesia Start: 1044 Anesthesia Stop: 1233    Procedure: C4-6 ANTERIOR PLACEMENT OF ARTIFICIAL DISC (Neck) Diagnosis: (SPINAL STENOSIS CERVICAL REGION)    Surgeons: Néstor Moreno M.D. Responsible Provider: Gucci Malagon M.D.    Anesthesia Type: general ASA Status: 3            Final Anesthesia Type: general  Last vitals  BP   Blood Pressure: (!) 163/94    Temp   36.1 °C (96.9 °F)    Pulse   76   Resp   15    SpO2   97 %      Anesthesia Post Evaluation    Patient location during evaluation: PACU  Patient participation: complete - patient participated  Level of consciousness: awake and alert    Airway patency: patent  Anesthetic complications: no  Cardiovascular status: hemodynamically stable  Respiratory status: acceptable  Hydration status: euvolemic    PONV: none          No notable events documented.     Nurse Pain Score: 4 (NPRS)

## 2024-09-27 NOTE — CARE PLAN
The patient is Stable - Low risk of patient condition declining or worsening    Shift Goals  Clinical Goals: q4 neuro, postop vitals, mobility  Patient Goals: nausea, pain, comfort  Family Goals: na    Progress made toward(s) clinical / shift goals:    Problem: Knowledge Deficit - Standard  Goal: Patient and family/care givers will demonstrate understanding of plan of care, disease process/condition, diagnostic tests and medications  Outcome: Progressing  Note: Patient updated on POC, including PT/OT tomorrow and monitoring drain output. No concerns at this time. Personal belongings and call light within reach.        Problem: Pain - Standard  Goal: Alleviation of pain or a reduction in pain to the patient’s comfort goal  Outcome: Progressing  Note: Patient educated on 0-10 pain scale, available pain medications, and non-pharmacological methods of pain management. Verbalizes understanding. See MAR.       Problem: Fall Risk  Goal: Patient will remain free from falls  Outcome: Progressing  Note: Fall risk interventions in place for moderate fall risk patient, including: bed alarm, 2 bed rails up, DME out of room, hourly rounding, room free of clutter, patient close to nursing station. Patient educated on using call light and to wait for staff to be in the room before attempting to mobilize. Verbalizes understanding        Problem: Urinary Elimination  Goal: Establish and maintain regular urinary output  Outcome: Progressing  Note: Patient successfully voided in toilet.      Problem: Mobility  Goal: Patient's capacity to carry out activities will improve  Outcome: Progressing  Flowsheets (Taken 9/27/2024 1523)  Mobility:   Encouraged mobilization per interdisciplinary team recommendations   Monitored for signs of activity intolerance   Provided assistive devices   Provided rest periods between activities   Administered pain management to allow progressive mobilization  Note: Patient ambulated 50 feet on POD#0.  Tolerating well.        Patient is not progressing towards the following goals:

## 2024-09-27 NOTE — OR SURGEON
Immediate Post OP Note    PreOp Diagnosis: cervical stenosis with myelopathy       PostOp Diagnosis: same       Procedure(s):  C4-6 ANTERIOR PLACEMENT OF ARTIFICIAL DISC - Wound Class: Clean with Drain    Surgeon(s):  Néstor Moreno M.D.    Anesthesiologist/Type of Anesthesia:  Anesthesiologist: Gucci Malagon M.D./General    Surgical Staff:  Assistant: FEDERICO Driscoll  Circulator: Yola Branham R.N.  Relief Circulator: Ashley Chu R.N.  Scrub Person: Ed Valentin  Radiology Technologist: Denise Herrera    Specimens removed if any:  * No specimens in log *    Estimated Blood Loss: <20 cc     Findings: good decompression, good hardware placement     Complications: none         9/27/2024 12:23 PM FEDERICO Driscoll

## 2024-09-28 ENCOUNTER — PHARMACY VISIT (OUTPATIENT)
Dept: PHARMACY | Facility: MEDICAL CENTER | Age: 54
End: 2024-09-28
Payer: COMMERCIAL

## 2024-09-28 VITALS
DIASTOLIC BLOOD PRESSURE: 95 MMHG | SYSTOLIC BLOOD PRESSURE: 155 MMHG | HEIGHT: 65 IN | TEMPERATURE: 97.4 F | BODY MASS INDEX: 30.93 KG/M2 | WEIGHT: 185.63 LBS | HEART RATE: 81 BPM | RESPIRATION RATE: 15 BRPM | OXYGEN SATURATION: 91 %

## 2024-09-28 PROCEDURE — 96375 TX/PRO/DX INJ NEW DRUG ADDON: CPT

## 2024-09-28 PROCEDURE — 96376 TX/PRO/DX INJ SAME DRUG ADON: CPT

## 2024-09-28 PROCEDURE — 97166 OT EVAL MOD COMPLEX 45 MIN: CPT

## 2024-09-28 PROCEDURE — 700102 HCHG RX REV CODE 250 W/ 637 OVERRIDE(OP): Performed by: NURSE PRACTITIONER

## 2024-09-28 PROCEDURE — A9270 NON-COVERED ITEM OR SERVICE: HCPCS | Performed by: NURSE PRACTITIONER

## 2024-09-28 PROCEDURE — 700101 HCHG RX REV CODE 250: Performed by: NURSE PRACTITIONER

## 2024-09-28 PROCEDURE — 700105 HCHG RX REV CODE 258: Performed by: NURSE PRACTITIONER

## 2024-09-28 PROCEDURE — 700111 HCHG RX REV CODE 636 W/ 250 OVERRIDE (IP): Mod: JZ | Performed by: NURSE PRACTITIONER

## 2024-09-28 PROCEDURE — RXMED WILLOW AMBULATORY MEDICATION CHARGE: Performed by: NURSE PRACTITIONER

## 2024-09-28 PROCEDURE — 97535 SELF CARE MNGMENT TRAINING: CPT

## 2024-09-28 PROCEDURE — 97162 PT EVAL MOD COMPLEX 30 MIN: CPT

## 2024-09-28 PROCEDURE — G0378 HOSPITAL OBSERVATION PER HR: HCPCS

## 2024-09-28 RX ORDER — TIZANIDINE 2 MG/1
2 TABLET ORAL 3 TIMES DAILY PRN
Qty: 30 TABLET | Refills: 0
Start: 2024-09-28

## 2024-09-28 RX ORDER — OXYCODONE AND ACETAMINOPHEN 5; 325 MG/1; MG/1
1 TABLET ORAL EVERY 4 HOURS PRN
Qty: 42 TABLET | Refills: 0
Start: 2024-09-28 | End: 2024-10-05

## 2024-09-28 RX ORDER — ONDANSETRON 4 MG/1
TABLET, ORALLY DISINTEGRATING ORAL
Qty: 30 TABLET | Refills: 0 | OUTPATIENT
Start: 2024-09-28

## 2024-09-28 RX ADMIN — DULOXETINE HYDROCHLORIDE 20 MG: 20 CAPSULE, DELAYED RELEASE ORAL at 04:04

## 2024-09-28 RX ADMIN — OXYCODONE HYDROCHLORIDE 10 MG: 10 TABLET ORAL at 02:45

## 2024-09-28 RX ADMIN — CEFAZOLIN 2 G: 2 INJECTION, POWDER, FOR SOLUTION INTRAMUSCULAR; INTRAVENOUS at 04:07

## 2024-09-28 RX ADMIN — ONDANSETRON 4 MG: 2 INJECTION INTRAMUSCULAR; INTRAVENOUS at 07:29

## 2024-09-28 RX ADMIN — Medication 1 CAPSULE: at 04:04

## 2024-09-28 RX ADMIN — PROCHLORPERAZINE EDISYLATE 10 MG: 5 INJECTION INTRAMUSCULAR; INTRAVENOUS at 10:04

## 2024-09-28 RX ADMIN — POTASSIUM CHLORIDE AND SODIUM CHLORIDE: 900; 150 INJECTION, SOLUTION INTRAVENOUS at 07:25

## 2024-09-28 ASSESSMENT — COGNITIVE AND FUNCTIONAL STATUS - GENERAL
SUGGESTED CMS G CODE MODIFIER DAILY ACTIVITY: CK
EATING MEALS: A LITTLE
WALKING IN HOSPITAL ROOM: A LITTLE
CLIMB 3 TO 5 STEPS WITH RAILING: A LITTLE
MOBILITY SCORE: 22
TOILETING: A LITTLE
SUGGESTED CMS G CODE MODIFIER MOBILITY: CJ
HELP NEEDED FOR BATHING: A LOT
DAILY ACTIVITIY SCORE: 15
DRESSING REGULAR UPPER BODY CLOTHING: A LOT
PERSONAL GROOMING: A LITTLE
DRESSING REGULAR LOWER BODY CLOTHING: A LOT

## 2024-09-28 ASSESSMENT — GAIT ASSESSMENTS
DEVIATION: NO DEVIATION
ASSISTIVE DEVICE: FRONT WHEEL WALKER
DISTANCE (FEET): 120
GAIT LEVEL OF ASSIST: SUPERVISED

## 2024-09-28 ASSESSMENT — PAIN DESCRIPTION - PAIN TYPE
TYPE: ACUTE PAIN

## 2024-09-28 ASSESSMENT — ACTIVITIES OF DAILY LIVING (ADL): TOILETING: INDEPENDENT

## 2024-09-28 NOTE — PROGRESS NOTES
Neurosurgery Progress Note    Subjective:  Right arm much better than preop  Some nausea    Exam:    A&O x3, GCS 15  PERRL, EOMI  Face symm, tongue midline  CAIN with FS, no drift  Drain out        BP  Min: 132/86  Max: 192/107  Pulse  Av.6  Min: 65  Max: 102  Resp  Av.3  Min: 13  Max: 25  Temp  Av.3 °C (97.4 °F)  Min: 36 °C (96.8 °F)  Max: 36.7 °C (98.1 °F)  SpO2  Av.3 %  Min: 92 %  Max: 98 %    No data recorded                      Intake/Output                         24 - 24 - 24 Total  Total                 Intake    I.V.  200  -- 200  --  -- --    Volume (mL) (lactated ringers infusion) 200 -- 200 -- -- --    Total Intake 200 -- 200 -- -- --       Output    Urine  --  -- --  --  -- --    Number of Times Voided 1 x 2 x 3 x -- -- --    Drains  0  0 0  --  -- --    Output (mL) ([REMOVED] Closed/Suction Drain 1 Anterior Neck Hemovac 24) 0 0 0 -- -- --    Blood  50  -- 50  --  -- --    Est. Blood Loss 50 -- 50 -- -- --    Total Output 50 0 50 -- -- --       Net I/O     150 0 150 -- -- --              Intake/Output Summary (Last 24 hours) at 2024 0759  Last data filed at 2024 0400  Gross per 24 hour   Intake 200 ml   Output 50 ml   Net 150 ml             scopolamine  1 Patch Q72HRS    acetaminophen  1,000 mg Q6HRS PRN    DULoxetine  20 mg DAILY    lactobacillus rhamnosus  1 Capsule DAILY    Pharmacy Consult Request  1 Each PHARMACY TO DOSE    MD ALERT...DO NOT ADMINISTER NSAIDS or ASPIRIN unless ORDERED By Neurosurgery  1 Each PRN    docusate sodium  100 mg BID    senna-docusate  1 Tablet Nightly    senna-docusate  1 Tablet Q24HRS PRN    polyethylene glycol/lytes  1 Packet BID PRN    magnesium hydroxide  30 mL QDAY PRN    bisacodyl  10 mg Q24HRS PRN    sodium phosphate  1 Each Once PRN    0.9 % NaCl with KCl 20 mEq 1,000 mL   Continuous    oxyCODONE immediate-release  5 mg Q3HRS PRN    Or     oxyCODONE immediate-release  10 mg Q3HRS PRN    Or    HYDROmorphone  0.5 mg Q3HRS PRN    diphenhydrAMINE  25 mg Q6HRS PRN    Or    diphenhydrAMINE  25 mg Q6HRS PRN    ondansetron  4 mg Q4HRS PRN    ondansetron  4 mg Q4HRS PRN    promethazine  12.5-25 mg Q4HRS PRN    promethazine  12.5-25 mg Q4HRS PRN    prochlorperazine  5-10 mg Q4HRS PRN    tizanidine  2 mg TID PRN    labetalol  10 mg Q HOUR PRN    hydrALAZINE  10 mg Q HOUR PRN    benzocaine-menthol  1 Lozenge Q2HRS PRN       Assessment and Plan:  Hospital day #2  POD# 1 C4-6 art disc  Nausea control  Pain control  D/c home today

## 2024-09-28 NOTE — DISCHARGE PLANNING
Care Transition Team Assessment  PCP: Chiquis Infante PA-C  LMSW met with pt at bedside to complete assessment. Pt A&Ox4 and able to verify the information on the face sheet. Pt lives with her spouse in a single-story house at [address] [place] [address], [place] [address] that has two steps to enter. Prior to this hospitalization, pt reports being independent at home with ADLs and IADLs however, requested assistance from her spouse for cooking and cleaning. Pt denies any DME at baseline. Pt reports her spouse as good support for her. Pt is retired and receives SSI monthly deposits. Pt denies any substance use or mental health concerns. Pt has ACP docs on file, DPOA is her spouse, Jazmine Larsen [phone]. Pt confirmed that she has [facility] insurance. Pt confirmed he will have transportation home upon DC. Pt has DME walker order, LMSW obtained choice for 1) [facility]. Choice form was faxed to DPA. Per ortho tech notes, FWW has been fitted and left at bedside. Anticipating no further CM needs at this time.   Information Source  Orientation Level: Oriented X4  Information Given By: Patient  Informant's Name: Michael  Who is responsible for making decisions for patient? : Patient    Readmission Evaluation  Is this a readmission?: No    Elopement Risk  Legal Hold: No  Ambulatory or Self Mobile in Wheelchair: Yes  Disoriented: No  Psychiatric Symptoms: None  History of Wandering: No  Elopement this Admit: No  Vocalizing Wanting to Leave: No  Displays Behaviors, Body Language Wanting to Leave: No-Not at Risk for Elopement  Elopement Risk: Not at Risk for Elopement    Interdisciplinary Discharge Planning  Lives with - Patient's Self Care Capacity: Significant Other  Patient or legal guardian wants to designate a caregiver: Yes  Caregiver name: Jazmine Larsen  Caregiver contact info: [phone]  Support Systems: Spouse / Significant Other  Housing / Facility: 1 Story House  Prior Services: None    Discharge Preparedness  What is your plan  after discharge?: Home with help  What are your discharge supports?: Spouse, Child  Prior Functional Level: Ambulatory, Drives Self, Independent with Activities of Daily Living, Independent with Medication Management  Difficulity with ADLs: None  Difficulity with IADLs: Cooking, Laundry    Functional Assesment  Prior Functional Level: Ambulatory, Drives Self, Independent with Activities of Daily Living, Independent with Medication Management    Finances  Financial Barriers to Discharge: No  Prescription Coverage: Yes    Vision / Hearing Impairment  Right Eye Vision: Impaired, Wears Glasses  Left Eye Vision: Impaired, Wears Glasses    Advance Directive  Advance Directive?: DPOA for Health Care  Durable Power of  Name and Contact : Jazmine Larsen [phone]    Domestic Abuse  Physical Abuse or Sexual Abuse: No  Verbal Abuse or Emotional Abuse: No  Possible Abuse/Neglect Reported to:: Not Applicable    Psychological Assessment  History of Substance Abuse: None  History of Psychiatric Problems: No  Non-compliant with Treatment: No  Newly Diagnosed Illness: No    Discharge Risks or Barriers  Discharge risks or barriers?: No    Anticipated Discharge Information  Discharge Disposition: Discharged to home/self care (01)

## 2024-09-28 NOTE — DISCHARGE INSTRUCTIONS
Ambulate as tolerated  Ok to shower 9/29, pat area dry  Remove dressing 9/29, then leave open to air- no dressing   No Aspirin or NSAIDs for one week after surgery  No driving for 2 weeks/ No driving while on narcotic medication  Over the counter stool softeners daily while on narcotics  No lifting greater than 10 pounds, no repetitive motion above shoulder level  Follow up at [facility] 2 weeks after surgery

## 2024-09-28 NOTE — OP REPORT
DATE OF SERVICE:  09/27/2024     PREOPERATIVE DIAGNOSIS:  Cervical spondylitic myelopathy.     POSTOPERATIVE DIAGNOSIS:  Cervical spondylitic myelopathy.     PROCEDURES:  1.  Anterior cervical 4-5, 5-6 diskectomy with interbody arthrodesis.  2.  Bilateral neural foraminotomies, cervical 5 and 6 roots.  3.  Implantation of Medtronic procedure LP disk arthroplasty at cervical 4-5,   5-6.  4.  Use of operative microscope for microdissection.     SURGEON:  Néstor Moreno MD     ASSISTANT:  MARLENI Driscoll     ANESTHESIA:  General.     COMPLICATIONS:  None.     ESTIMATED BLOOD LOSS:  50 mL.     DESCRIPTION OF PROCEDURE:  The patient was brought to the operating room,   identified in the usual fashion.  General endotracheal anesthesia was induced   by the anesthesia team.  The patient was then placed supine on the operating   room table.  All pressure points meticulously padded.  Transverse incision was   marked in the left anterior neck using fluoroscopic guidance.  She was then   prepped and draped in the usual sterile fashion.  Local anesthesia was   infiltrated in subcutaneous tissue.  A 15 blade was used to incise the skin.    Dissection was carried down through platysma using Bovie electrocautery.    Retractor put in place.  We then undermined platysma and identified the medial   border of the sternocleidomastoid muscle sharply.  We then used blunt   dissection using a peanut and a lipless retractor to access the anterior   spine.  Spinal needle was placed at cervical 4-5.  Film was taken confirming   that we were at the correct level.  This is marked with a Bovie.  We then used   a Bovie to expose all the anterior osteophytes and cleaned off the spine and   then also to reflect longus colli muscles laterally bilaterally.  We first   started with cervical 4-5.   retractors were placed underneath   longus colli bilaterally, then superiorly and inferiorly, and we brought in   the operative  microscope for microdissection for the diskectomy. [place] pins   were placed at cervical 4 and 5 for gentle disk space distraction.  A 15 blade   was used to incise the disk space.  We then removed disk contents using an   alley pituitary and upgoing curette.  A high-speed air drill was used to drill   off the anterior and posterior osteophytes flushed to vertebral body,   [place] 1 and 2 punches were used to remove the posterior longitudinal   ligament and the posterior osteophytes.  Microsect curettes were used to   prepare the endplates and to confirm we had excellent decompression of the   central canal and bilateral neural foramina.  FloSeal gentle tamponade was   used for hemostasis.  Copious amounts of antibiotic irrigation were used to   wash out the disk space.  We then brought in fluoroscopic guidance and found a   6 x 16 mm disk arthroplasty trial was found to be appropriate size.  The   troughs for the disk arthroplasty were then cut as required.  Trial was then   removed.  Bone was removed from the troughs using a micronerve hook.  Copious   amounts of antibiotic irrigation were used to wash out the wound.  Disk   arthroplasty was then inserted to the appropriate depth.  Film was taken   confirming that we were at the correct level, then it looked to be in   excellent position.  We then removed all the retractors and replaced them at   cervical 5-6 where we performed the same procedure in the same fashion with   the same size disk arthroplasty.  Once both disk arthroplasties were placed,   all retractors were removed.  We took final films, AP and lateral, showing all   the hardware looked to be in excellent position.  We then used a bipolar   electrocautery for hemostasis in longus colli. FloSeal gentle tamponade was   used for hemostasis in the [place] pin holes.  We left Hemovac drain and closed   the incision in layers and topped with Dermabond.  All sponge and needle   counts were correct x2 at the  end the case.  I was present and scrubbed for   the entire procedure.  The patient awakened and was transferred to recovery   room in stable condition.     Use of assist was required for retraction during exposure to protect the   damage to the trachea and to the esophagus and to the carotid and was also   required for suction and retraction and irrigation during the case help with   decompression of the central canal and placement of disk arthroplasties.        ______________________________  MD APOORVA Lincoln/AZJAIME    DD:  09/28/2024 07:31  DT:  09/28/2024 09:03    Job#:  [id number]

## 2024-09-28 NOTE — PROGRESS NOTES
Breakthrough nausea despite zofran, declined promethazine po/suppository, given prochlorperazine for nausea will monitor for relief.

## 2024-09-28 NOTE — PROGRESS NOTES
"BP (!) 155/95   Pulse 81   Temp 36.3 °C (97.4 °F) (Temporal)   Resp 15   Ht 1.651 m (5' 5\")   Wt 84.2 kg (185 lb 10 oz)   SpO2 91%   Await walker from traction then DC lounge.  Patient tolerating po.  Saline locked PIV.  Nausea improved has prn medication for Meds 2 bed per pharmacy about 10 minutes till ready.  Has Tizanadine and Percocet already picked up prior to surgery.  Spoke with PT and OT cleared for discharge.  Spouse at bedside.  Further care as an outpatient once completes discharge lounge process.   "

## 2024-09-28 NOTE — PROGRESS NOTES
Patient nauseous in bedside report vomited x1 about 150ml of clear emesis.  Restarted IV fluids and medicated with zofran with relief.

## 2024-09-28 NOTE — THERAPY
Physical Therapy   Initial Evaluation     Patient Name: Michael Larsen  Age:  54 y.o., Sex:  female  Medical Record #: [id number]  Today's Date: 9/28/2024     Precautions  Precautions: Fall Risk;Spinal / Back Precautions   Comments: No c/s brace.    Assessment  Patient is 54 y.o. female with a diagnosis of cervical spinal stenosis with myelopathy. Pt presents to PT post op day 1 following a C4-6 anterior placement of artificial disc procedure. Pt reports PMH includes vertigo, dizziness, falls, and R foot fx. During session pt was receptive to therapy session. PT gave pt and family education on cervical spine precautions. Pt does report improvement in bilateral UE symptoms post sx. Pt presented very impulsive getting out of bed and PT gave verbal cues to slow down movement during positional changes and sequencing directions for log roll. Additionally, PT instructed and recommends pt  use FWW for additional stability and gave tactile and sequencing cues for pt to negotiate stairs post dc. At this time, pt no longer benefit from acute therapy services.     Plan    Physical Therapy Initial Treatment Plan   Duration: Evaluation only    DC Equipment Recommendations: Front-Wheel Walker  Discharge Recommendations: Anticipate that the patient will have no further physical therapy needs after discharge from the hospital          09/28/24 0915    Services   Is patient using  services for this encounter? No   Initial Contact Note    Initial Contact Note Order Received and Verified, Evaluation Only - Patient Does Not Require Further Acute Physical Therapy at this Time.  However, May Benefit from Post Acute Therapy for Higher Level Functional Deficits.   Precautions   Precautions Fall Risk;Spinal / Back Precautions    Comments No c/s brace.   Vitals   O2 (LPM) 0   O2 Delivery Device None - Room Air   Pain 0 - 10 Group   Therapist Pain Assessment Prior to Activity;During Activity;Post Activity;Nurse  Notified  (c/s surgical site moderate pain)   Prior Living Situation   Prior Services None   Housing / Facility 1 Story House   Steps Into Home 2   Steps In Home 0   Rail None   Equipment Owned Other (Comments)  (Walking stick)   Lives with - Patient's Self Care Capacity Significant Other   Comments S/o helped with most ADL's such as dressing, cooking, etc. due to bilateral UE weakness, pain, and numbness   Prior Level of Functional Mobility   Bed Mobility Independent   Transfer Status Independent   Ambulation Independent   Ambulation Distance community distances   Assistive Devices Used   (Walking stick)   Stairs Independent   Comments Walking stick used for ambulation   History of Falls   History of Falls Yes   Cognition    Cognition / Consciousness WDL   Level of Consciousness Alert   Comments Impulsive getting out of bed   Strength Upper Body   Upper Body Strength  X   Comments Pt reporting weakness prior to sx. Now reports imporvement post op.   Active ROM Lower Body    Active ROM Lower Body  WDL   Strength Lower Body   Lower Body Strength  WDL   Sensation Lower Body   Lower Extremity Sensation   WDL   Coordination Lower Body    Coordination Lower Body  WDL   Other Treatments   Other Treatments Provided Pt and family education on c/s precautions. PT recommends FWW for dc with pt compliance.   Balance Assessment   Sitting Balance (Static) Fair   Sitting Balance (Dynamic) Fair   Standing Balance (Static) Fair   Standing Balance (Dynamic) Fair   Weight Shift Sitting Good   Weight Shift Standing Good   Comments FWW   Bed Mobility    Supine to Sit Contact Guard Assist   Scooting Supervised   Rolling Supervised   Comments When lowering HOB pt felt dizzy and had LBP. Pt tried to impulsively get out of bed. PT gave verbal cues to slow down movement and perform log roll transfer. Pt plans to sleep in recliner.   Gait Analysis   Gait Level Of Assist Supervised   Assistive Device Front Wheel Walker   Distance (Feet) 120    # of Times Distance was Traveled 1   Deviation No deviation   # of Stairs Climbed 1   Level of Assist with Stairs Contact Guard Assist   Comments Spouse provides assistance at baseline.   Functional Mobility   Sit to Stand Supervised   Bed, Chair, Wheelchair Transfer Supervised   Transfer Method Stand Step   Mobility EOB > standing > walking   Comments Pt dizzy with change of positions but subsided in 1 min. Verbal cues required for sequencing and slowing down movements transferring.   6 Clicks Assessment - How much HELP from from another person do you currently need... (If the patient hasn't done an activity recently, how much help from another person do you think he/she would need if he/she tried?)   Turning from your back to your side while in a flat bed without using bedrails? 4   Moving from lying on your back to sitting on the side of a flat bed without using bedrails? 4   Moving to and from a bed to a chair (including a wheelchair)? 4   Standing up from a chair using your arms (e.g., wheelchair, or bedside chair)? 4   Walking in hospital room? 3   Climbing 3-5 steps with a railing? 3   6 clicks Mobility Score 22   Patient / Family Goals    Patient / Family Goal #1 Return home.   Education Group   Education Provided Role of Physical Therapist;Gait Training;Stair Training;Use of Assistive Device;Cervical Precautions   Cervical Precautions Patient Response Patient;Acceptance;Explanation;Verbal Demonstration   Role of Physical Therapist Patient Response Patient;Acceptance;Explanation;Verbal Demonstration   Gait Training Patient Response Patient;Acceptance;Explanation;Verbal Demonstration   Stair Training Patient Response Patient;Acceptance;Explanation;Verbal Demonstration   Use of Assistive Device Patient Response Patient;Acceptance;Explanation;Verbal Demonstration   Physical Therapy Initial Treatment Plan    Duration Evaluation only   Anticipated Discharge Equipment and Recommendations   DC Equipment  Recommendations Front-Wheel Walker   Discharge Recommendations Anticipate that the patient will have no further physical therapy needs after discharge from the hospital   Interdisciplinary Plan of Care Collaboration   IDT Collaboration with  Nursing;Occupational Therapist;Family / Caregiver   Patient Position at End of Therapy Seated;Call Light within Reach;Tray Table within Reach;Phone within Reach;Family / Friend in Room   Collaboration Comments RN updated   Session Information   Date / Session Number  9/28-1x eval only

## 2024-09-28 NOTE — PROGRESS NOTES
FWW fit to pt and left at bedside. Pt and spouse instructed on use and adjustments if necessary after dc. All relevant questions answered at this time.    Contact traction for any questions or concerns regarding this DME.

## 2024-09-28 NOTE — CARE PLAN
The patient is Stable - Low risk of patient condition declining or worsening    Shift Goals  Clinical Goals: pain control, HV output, Q4 neuro  Patient Goals: pain control, comfort  Family Goals: not present    Progress made toward(s) clinical / shift goals:    Problem: Pain - Standard  Goal: Alleviation of pain or a reduction in pain to the patient’s comfort goal  Outcome: Progressing  Flowsheets  Taken 9/27/2024 2202  Non Verbal Scale:   Calm   Unlabored Breathing  Taken 9/27/2024 1954  Pain Rating Scale (NPRS): 8     Problem: Fall Risk  Goal: Patient will remain free from falls  Outcome: Progressing     Problem: Mobility  Goal: Patient's capacity to carry out activities will improve  Outcome: Progressing  Flowsheets  Taken 9/27/2024 2202  Mobility:   Encouraged mobilization per interdisciplinary team recommendations   Provided assistive devices  Taken 9/27/2024 2000  Level of Mobility: Level IV  Activity Performed: Sitting up in bed  Time Activity Tolerated: 30 min       Patient is not progressing towards the following goals: n/a

## 2024-09-28 NOTE — THERAPY
"Occupational Therapy   Initial Evaluation     Patient Name: Michael Larsen  Age:  54 y.o., Sex:  female  Medical Record #: [id number]  Today's Date: 9/28/2024     Precautions  Precautions: Fall Risk, Spinal / Back Precautions   Comments: no brace    Assessment  Patient is 54 y.o. female s/p C4-C6 ACDF w/ placement of artificial disc. She has a hx of includes dizziness, falls, and R foot fx. Patient and family educated on spinal precautions, appropriate AE/DME to utilize during ADL routine, and compensatory strategies during ADLs to maximize independence and safety. AE/DME education included information on use of reacher and sock-aid and shower chair. Patient and family  verbalized understanding.      Plan    Occupational Therapy Initial Treatment Plan   Duration: Discharge Needs Only    DC Equipment Recommendations: Tub / Shower Seat  Discharge Recommendations: Anticipate that the patient will have no further occupational therapy needs after discharge from the hospital     Subjective    \"I'm sorry I'm just so tired.\"     Objective       09/28/24 1120   Prior Living Situation   Prior Services Home-Independent   Housing / Facility 1 [facility]   Steps Into Home 2   Bathroom Set up Bathtub / Shower Combination;Shower Curtain   Equipment Owned None   Lives with - Patient's Self Care Capacity Significant Other   Comments  will assist with anything the patient needs. Over the past year she has had a decline in ability to perform IADLs. She also reports a hard time with buttons and zippers.   Prior Level of ADL Function   Self Feeding Independent   Grooming / Hygiene Independent   Bathing Independent   Dressing Independent   Toileting Independent   Prior Level of IADL Function   Medication Management Independent   Laundry Requires Assist   Kitchen Mobility Requires Assist   Finances Requires Assist   Home Management Requires Assist   Shopping Requires Assist   Prior Level Of Mobility Independent Without Device " in Community;Independent Without Device in Home   Occupation (Pre-Hospital Vocational) Not Employed   Precautions   Precautions Fall Risk;Spinal / Back Precautions    Comments no brace   Vitals   Vitals Comments VSS   Pain 0 - 10 Group   Therapist Pain Assessment Nurse Notified;During Activity  (min c/o pain)   Cognition    Cognition / Consciousness X   Level of Consciousness Responds to voice   Comments pleasant and cooperative however she was feeling lethargic, having a hard time keeping her eyes open   Strength Upper Body   Comments reports improvement post op   Sensation Upper Body   Comments reports improvement post op, pre op she had numbness   Balance Assessment   Sitting Balance (Static) Fair   Sitting Balance (Dynamic) Fair   Weight Shift Sitting Fair   Comments pt declined standing, feeling too lethargic and fatigued; did not want to go to the bathroom or trial ADLs   Bed Mobility    Supine to Sit Contact Guard Assist   Sit to Supine Contact Guard Assist   ADL Assessment   Comments Pt was able to demo figure 4 for LB dressing but declined participating in any further ADLs. She and her  were open to education. She did not feel up to doing any ADLs due to lethargy and fatigue.  at bedside reports that he can assist with any ADLs and mobility she needs. Discussed appropriate AE/DME. After education they both stated no more concerns.   How much help from another person does the patient currently need...   Putting on and taking off regular lower body clothing? 2   Bathing (including washing, rinsing, and drying)? 2   Toileting, which includes using a toilet, bedpan, or urinal? 3   Putting on and taking off regular upper body clothing? 2   Taking care of personal grooming such as brushing teeth? 3   Eating meals? 3   6 Clicks Daily Activity Score 15   Functional Mobility   Mobility EOB only

## 2024-09-28 NOTE — CARE PLAN
The patient is Stable - Low risk of patient condition declining or worsening    Shift Goals  Clinical Goals: pain control, nausea control, stable neuro exam  Patient Goals: pain control, nausea control  Family Goals: pain control, nausea control    Progress made toward(s) clinical / shift goals: Pain control, nausea control, stable neuro exam.     Patient is not progressing towards the following goals:

## 2024-11-13 DIAGNOSIS — F41.1 GAD (GENERALIZED ANXIETY DISORDER): ICD-10-CM

## 2024-11-13 DIAGNOSIS — G89.4 CHRONIC PAIN SYNDROME: ICD-10-CM

## 2024-11-14 RX ORDER — DULOXETIN HYDROCHLORIDE 20 MG/1
20 CAPSULE, DELAYED RELEASE ORAL DAILY
Qty: 90 CAPSULE | Refills: 2 | Status: SHIPPED | OUTPATIENT
Start: 2024-11-14

## 2024-11-14 NOTE — TELEPHONE ENCOUNTER
To be filled at: [facility] [id number] [facility], [place] - [address]          Received request via: Pharmacy    Was the patient seen in the last year in this department? Yes    Does the patient have an active prescription (recently filled or refills available) for medication(s) requested? yes    Pharmacy Name:   To be filled at: [facility] [id number] [facility], [place] - [address]              Does the patient have [facility] and need 100-day supply? (This applies to ALL medications) Patient does not have SCP  Requested Prescriptions     Pending Prescriptions Disp Refills    DULoxetine (CYMBALTA) 20 MG Cap DR Particles 30 Capsule 3     Sig: Take 1 Capsule by mouth every day.

## 2025-02-14 ENCOUNTER — HOSPITAL ENCOUNTER (OUTPATIENT)
Facility: MEDICAL CENTER | Age: 55
End: 2025-02-14
Attending: STUDENT IN AN ORGANIZED HEALTH CARE EDUCATION/TRAINING PROGRAM
Payer: COMMERCIAL

## 2025-02-14 ENCOUNTER — OFFICE VISIT (OUTPATIENT)
Dept: MEDICAL GROUP | Age: 55
End: 2025-02-14
Payer: COMMERCIAL

## 2025-02-14 ENCOUNTER — TELEPHONE (OUTPATIENT)
Dept: MEDICAL GROUP | Age: 55
End: 2025-02-14

## 2025-02-14 VITALS
HEIGHT: 65 IN | WEIGHT: 186 LBS | SYSTOLIC BLOOD PRESSURE: 134 MMHG | HEART RATE: 84 BPM | BODY MASS INDEX: 30.99 KG/M2 | TEMPERATURE: 97.4 F | DIASTOLIC BLOOD PRESSURE: 86 MMHG | OXYGEN SATURATION: 97 %

## 2025-02-14 DIAGNOSIS — Z87.442 HISTORY OF KIDNEY STONES: ICD-10-CM

## 2025-02-14 DIAGNOSIS — E66.9 OBESITY (BMI 30-39.9): ICD-10-CM

## 2025-02-14 DIAGNOSIS — R31.9 HEMATURIA, UNSPECIFIED TYPE: ICD-10-CM

## 2025-02-14 DIAGNOSIS — R10.9 BILATERAL FLANK PAIN: ICD-10-CM

## 2025-02-14 DIAGNOSIS — M48.02 CERVICAL STENOSIS OF SPINAL CANAL: ICD-10-CM

## 2025-02-14 LAB
APPEARANCE UR: CLEAR
BILIRUB UR STRIP-MCNC: NORMAL MG/DL
COLOR UR AUTO: NORMAL
GLUCOSE UR STRIP.AUTO-MCNC: NEGATIVE MG/DL
KETONES UR STRIP.AUTO-MCNC: NEGATIVE MG/DL
LEUKOCYTE ESTERASE UR QL STRIP.AUTO: NEGATIVE
NITRITE UR QL STRIP.AUTO: NEGATIVE
PH UR STRIP.AUTO: 6 [PH] (ref 5–8)
PROT UR QL STRIP: 30 MG/DL
RBC UR QL AUTO: NORMAL
SP GR UR STRIP.AUTO: 1.03
UROBILINOGEN UR STRIP-MCNC: 0.2 MG/DL

## 2025-02-14 PROCEDURE — 81002 URINALYSIS NONAUTO W/O SCOPE: CPT | Performed by: STUDENT IN AN ORGANIZED HEALTH CARE EDUCATION/TRAINING PROGRAM

## 2025-02-14 PROCEDURE — 36415 COLL VENOUS BLD VENIPUNCTURE: CPT

## 2025-02-14 PROCEDURE — 85025 COMPLETE CBC W/AUTO DIFF WBC: CPT

## 2025-02-14 PROCEDURE — 3075F SYST BP GE 130 - 139MM HG: CPT | Performed by: STUDENT IN AN ORGANIZED HEALTH CARE EDUCATION/TRAINING PROGRAM

## 2025-02-14 PROCEDURE — 99214 OFFICE O/P EST MOD 30 MIN: CPT | Performed by: STUDENT IN AN ORGANIZED HEALTH CARE EDUCATION/TRAINING PROGRAM

## 2025-02-14 PROCEDURE — 3079F DIAST BP 80-89 MM HG: CPT | Performed by: STUDENT IN AN ORGANIZED HEALTH CARE EDUCATION/TRAINING PROGRAM

## 2025-02-14 PROCEDURE — 84550 ASSAY OF BLOOD/URIC ACID: CPT

## 2025-02-14 PROCEDURE — 80053 COMPREHEN METABOLIC PANEL: CPT

## 2025-02-14 ASSESSMENT — ENCOUNTER SYMPTOMS
DEPRESSION: 0
PALPITATIONS: 0
CHILLS: 0
COUGH: 0
SHORTNESS OF BREATH: 0
BLURRED VISION: 0
FLANK PAIN: 1
BACK PAIN: 0
ABDOMINAL PAIN: 0
DOUBLE VISION: 0
DIZZINESS: 0
WHEEZING: 0
WEAKNESS: 0
HEADACHES: 0
FEVER: 0
BLOOD IN STOOL: 0

## 2025-02-14 ASSESSMENT — PATIENT HEALTH QUESTIONNAIRE - PHQ9: CLINICAL INTERPRETATION OF PHQ2 SCORE: 0

## 2025-02-14 ASSESSMENT — FIBROSIS 4 INDEX: FIB4 SCORE: 0.74

## 2025-02-14 NOTE — PATIENT INSTRUCTIONS
-US kidney today  -Blood tests today  -I will let you know about report once available  -Stay well hydrated  -Follow up with your PCP

## 2025-02-14 NOTE — PROGRESS NOTES
Subjective:     CC: Hematuria    HPI:   History of Present Illness  The patient is a 54-year-old female presenting for hematuria. She is accompanied by her .    She reports an episode of hematuria, which she attributes to the passage of a kidney stone. She has a history of nephrolithiasis, having previously passed a 1 cm stone that necessitated a 13-day hospital stay. She describes the recent stone as being fragmented, causing severe back pain, sleep disturbances, and systemic symptoms such as fever, chills, diaphoresis, nausea, vomiting, and diarrhea persisting for over a week. Her urine, typically clear and pale yellow, has been observed to be orange-red in color, indicative of potential hematuria. She also reports a sensation of dehydration. She experienced radiating pain from her back down her leg for 2 days, which has since resolved with Aleve. She avoids Tylenol due to perceived adverse effects. She has a history of urological evaluations in [place], including renal ultrasounds that were unremarkable. However, a CT scan conducted 1.5 years ago revealed another renal calculus. She expresses concern about potential renal failure and prefers not to consult with [facility] [place].     Supplemental Information  She has been suffering from a cold for 2-3 weeks, which she initially managed with Coricidin, an over-the-counter medication for hypertension. Despite temporary relief, the cold symptoms recurred, prompting her to switch to a liquid formulation of Coricidin, akin to NyQuil.    MEDICATIONS  Current: Coricidin, Aleve  Past: Tylenol       ROS:  Review of Systems   Constitutional:  Negative for chills, fever and malaise/fatigue.   HENT:  Negative for nosebleeds.    Eyes:  Negative for blurred vision and double vision.   Respiratory:  Negative for cough, shortness of breath and wheezing.    Cardiovascular:  Negative for chest pain and palpitations.   Gastrointestinal:  Negative for abdominal pain, blood in  "stool and melena.   Genitourinary:  Positive for flank pain and hematuria. Negative for dysuria, frequency and urgency.   Musculoskeletal:  Negative for back pain and joint pain.   Skin:  Negative for rash.   Neurological:  Negative for dizziness, weakness and headaches.   Psychiatric/Behavioral:  Negative for depression and suicidal ideas.        Objective:     Exam:  /86 (BP Location: Left arm, Patient Position: Sitting, BP Cuff Size: Large adult)   Pulse 84   Temp 36.3 °C (97.4 °F) (Temporal)   Ht 1.651 m (5' 5\")   Wt 84.4 kg (186 lb)   LMP  (LMP Unknown)   SpO2 97%   BMI 30.95 kg/m²  Body mass index is 30.95 kg/m².    Physical Exam  Vitals (Not Performed.  Declined) reviewed.       Labs: Reviewed    Assessment & Plan:     54 y.o. female with the following -     1. Hematuria, unspecified type  2. Bilateral flank pain  3. History of kidney stones  The clinical presentation suggests a possible renal calculus. Urinalysis results indicate the presence of trace amounts of blood and protein. Renal function, as assessed in November 2024, was within normal limits. A comprehensive urinalysis will be conducted. Additionally, an ultrasound of the kidneys will be ordered to evaluate for potential renal calculi. Blood work will also be performed to assess current renal function. If the ultrasound reveals significant stones or if there are concerns about kidney function, a referral to a specialist will be considered.  For now continue with symptomatic treatment for pain and make sure to stay well-hydrated.  There is no need for antibiotic therapy unless imaging and/or blood work concerning for infection.  Once imaging and blood work completed I will let you know if further testing, treatment or referrals are needed.    - POCT Urinalysis  - US-RENAL; Future  - CBC WITH DIFFERENTIAL; Future  - Comp Metabolic Panel; Future  - URIC ACID; Future    2. Bilateral flank pain  3. History of kidney stones    4. Obesity (BMI " 30-39.9)  - Patient identified as having weight management issue.  Appropriate orders and counseling given.    5. Cervical stenosis of spinal canal  Underwent following procedure in sept 2024 by Dr. Moreno.  -.  Anterior cervical 4-5, 5-6 diskectomy with interbody arthrodesis.  -.  Bilateral neural foraminotomies, cervical 5 and 6 roots.  -.  Implantation of Medtr[employer] procedure LP disk arthroplasty at cervical 4-5  -.  Use of operative microscope for microdissection.  -Also following with pain specialist Dr. Haile      Return if symptoms worsen or fail to improve.    Please note that this dictation was created using voice recognition software. I have made every reasonable attempt to correct obvious errors, but I expect that there are errors of grammar and possibly content that I did not discover before finalizing the note.

## 2025-02-14 NOTE — TELEPHONE ENCOUNTER
Caller Name: Michael Larsen  Call Back Number: [phone]    How would the patient prefer to be contacted with a response: Phone call OK to leave a detailed message    Pt called stating  has sent on order for an Ultrasound. She states she thought her matter was more urgent because she cannot get the ultrasound done, they are three weeks out at [facility]. She would like her ultrasound bumped up to the next level of urgency. Pt asked for a call back.     I called the pt back and she stated she got it handled, the imaging person did not see that the order said urgent. Her US is scheduled.

## 2025-02-15 ENCOUNTER — HOSPITAL ENCOUNTER (OUTPATIENT)
Dept: RADIOLOGY | Facility: MEDICAL CENTER | Age: 55
End: 2025-02-15
Attending: STUDENT IN AN ORGANIZED HEALTH CARE EDUCATION/TRAINING PROGRAM
Payer: COMMERCIAL

## 2025-02-15 DIAGNOSIS — Z87.442 HISTORY OF KIDNEY STONES: ICD-10-CM

## 2025-02-15 DIAGNOSIS — R31.9 HEMATURIA, UNSPECIFIED TYPE: ICD-10-CM

## 2025-02-15 LAB
ALBUMIN SERPL BCP-MCNC: 4.4 G/DL (ref 3.2–4.9)
ALBUMIN/GLOB SERPL: 1.3 G/DL
ALP SERPL-CCNC: 103 U/L (ref 30–99)
ALT SERPL-CCNC: 21 U/L (ref 2–50)
ANION GAP SERPL CALC-SCNC: 13 MMOL/L (ref 7–16)
AST SERPL-CCNC: 25 U/L (ref 12–45)
BASOPHILS # BLD AUTO: 0.5 % (ref 0–1.8)
BASOPHILS # BLD: 0.04 K/UL (ref 0–0.12)
BILIRUB SERPL-MCNC: 0.4 MG/DL (ref 0.1–1.5)
BUN SERPL-MCNC: 15 MG/DL (ref 8–22)
CALCIUM ALBUM COR SERPL-MCNC: 9.5 MG/DL (ref 8.5–10.5)
CALCIUM SERPL-MCNC: 9.8 MG/DL (ref 8.5–10.5)
CHLORIDE SERPL-SCNC: 105 MMOL/L (ref 96–112)
CO2 SERPL-SCNC: 25 MMOL/L (ref 20–33)
CREAT SERPL-MCNC: 0.8 MG/DL (ref 0.5–1.4)
EOSINOPHIL # BLD AUTO: 0.07 K/UL (ref 0–0.51)
EOSINOPHIL NFR BLD: 0.9 % (ref 0–6.9)
ERYTHROCYTE [DISTWIDTH] IN BLOOD BY AUTOMATED COUNT: 44.2 FL (ref 35.9–50)
FASTING STATUS PATIENT QL REPORTED: NORMAL
GFR SERPLBLD CREATININE-BSD FMLA CKD-EPI: 86 ML/MIN/1.73 M 2
GLOBULIN SER CALC-MCNC: 3.4 G/DL (ref 1.9–3.5)
GLUCOSE SERPL-MCNC: 95 MG/DL (ref 65–99)
HCT VFR BLD AUTO: 51.9 % (ref 37–47)
HGB BLD-MCNC: 16.5 G/DL (ref 12–16)
IMM GRANULOCYTES # BLD AUTO: 0.02 K/UL (ref 0–0.11)
IMM GRANULOCYTES NFR BLD AUTO: 0.3 % (ref 0–0.9)
LYMPHOCYTES # BLD AUTO: 2.39 K/UL (ref 1–4.8)
LYMPHOCYTES NFR BLD: 30.8 % (ref 22–41)
MCH RBC QN AUTO: 28.4 PG (ref 27–33)
MCHC RBC AUTO-ENTMCNC: 31.8 G/DL (ref 32.2–35.5)
MCV RBC AUTO: 89.3 FL (ref 81.4–97.8)
MONOCYTES # BLD AUTO: 0.63 K/UL (ref 0–0.85)
MONOCYTES NFR BLD AUTO: 8.1 % (ref 0–13.4)
NEUTROPHILS # BLD AUTO: 4.61 K/UL (ref 1.82–7.42)
NEUTROPHILS NFR BLD: 59.4 % (ref 44–72)
NRBC # BLD AUTO: 0 K/UL
NRBC BLD-RTO: 0 /100 WBC (ref 0–0.2)
PLATELET # BLD AUTO: 274 K/UL (ref 164–446)
PMV BLD AUTO: 10.2 FL (ref 9–12.9)
POTASSIUM SERPL-SCNC: 4.7 MMOL/L (ref 3.6–5.5)
PROT SERPL-MCNC: 7.8 G/DL (ref 6–8.2)
RBC # BLD AUTO: 5.81 M/UL (ref 4.2–5.4)
SODIUM SERPL-SCNC: 143 MMOL/L (ref 135–145)
URATE SERPL-MCNC: 5.4 MG/DL (ref 1.9–8.2)
WBC # BLD AUTO: 7.8 K/UL (ref 4.8–10.8)

## 2025-02-15 PROCEDURE — 76775 US EXAM ABDO BACK WALL LIM: CPT

## 2025-02-18 DIAGNOSIS — R10.9 BILATERAL FLANK PAIN: ICD-10-CM

## 2025-02-18 DIAGNOSIS — Z87.442 HISTORY OF KIDNEY STONES: ICD-10-CM

## 2025-02-18 DIAGNOSIS — R31.9 HEMATURIA, UNSPECIFIED TYPE: ICD-10-CM

## 2025-02-18 NOTE — PROGRESS NOTES
Patient reached out requesting urology referral.  I did not see her last visit but a colleague did.  Per her request I put in a referral to urologist

## 2025-02-24 ENCOUNTER — OFFICE VISIT (OUTPATIENT)
Dept: MEDICAL GROUP | Age: 55
End: 2025-02-24
Payer: COMMERCIAL

## 2025-02-24 VITALS
WEIGHT: 195 LBS | SYSTOLIC BLOOD PRESSURE: 122 MMHG | OXYGEN SATURATION: 96 % | HEART RATE: 82 BPM | TEMPERATURE: 97.6 F | DIASTOLIC BLOOD PRESSURE: 84 MMHG | HEIGHT: 65 IN | BODY MASS INDEX: 32.49 KG/M2

## 2025-02-24 DIAGNOSIS — Z12.31 ENCOUNTER FOR SCREENING MAMMOGRAM FOR BREAST CANCER: ICD-10-CM

## 2025-02-24 DIAGNOSIS — M25.541 ARTHRALGIA OF BOTH HANDS: ICD-10-CM

## 2025-02-24 DIAGNOSIS — N20.0 KIDNEY STONE ON LEFT SIDE: ICD-10-CM

## 2025-02-24 DIAGNOSIS — Z87.442 HISTORY OF KIDNEY STONES: ICD-10-CM

## 2025-02-24 DIAGNOSIS — G89.4 CHRONIC PAIN SYNDROME: ICD-10-CM

## 2025-02-24 DIAGNOSIS — R79.89 ABNORMAL CBC: ICD-10-CM

## 2025-02-24 DIAGNOSIS — M25.542 ARTHRALGIA OF BOTH HANDS: ICD-10-CM

## 2025-02-24 DIAGNOSIS — M48.02 CERVICAL STENOSIS OF SPINAL CANAL: ICD-10-CM

## 2025-02-24 PROCEDURE — 3079F DIAST BP 80-89 MM HG: CPT | Performed by: PHYSICIAN ASSISTANT

## 2025-02-24 PROCEDURE — 99214 OFFICE O/P EST MOD 30 MIN: CPT | Performed by: PHYSICIAN ASSISTANT

## 2025-02-24 PROCEDURE — 3074F SYST BP LT 130 MM HG: CPT | Performed by: PHYSICIAN ASSISTANT

## 2025-02-24 PROCEDURE — G2211 COMPLEX E/M VISIT ADD ON: HCPCS | Performed by: PHYSICIAN ASSISTANT

## 2025-02-24 RX ORDER — PREDNISONE 20 MG/1
TABLET ORAL
Qty: 10 TABLET | Refills: 0 | Status: SHIPPED | OUTPATIENT
Start: 2025-02-24

## 2025-02-24 RX ORDER — TAMSULOSIN HYDROCHLORIDE 0.4 MG/1
0.4 CAPSULE ORAL DAILY
Qty: 20 CAPSULE | Refills: 0 | Status: SHIPPED | OUTPATIENT
Start: 2025-02-24 | End: 2025-03-16

## 2025-02-24 RX ORDER — NITROFURANTOIN 25; 75 MG/1; MG/1
100 CAPSULE ORAL 2 TIMES DAILY
Qty: 14 CAPSULE | Refills: 0 | Status: SHIPPED | OUTPATIENT
Start: 2025-02-24 | End: 2025-03-03

## 2025-02-24 ASSESSMENT — FIBROSIS 4 INDEX: FIB4 SCORE: 1.11

## 2025-02-24 NOTE — PROGRESS NOTES
Subjective:     History of Present Illness    Patient is a 54-year-old here for follow-up.  Was seen approximately 2 weeks ago by another provider because of suspected blood in the urine.  Has a history of kidney stones and was complaining of dull pain in her back.  At that time she had labs drawn, urinalysis run and kidney ultrasound.  She states that she does not have current burning with urination increased frequency or urge but occasionally gets flank pain that she attributes to kidney stones.  She has had stones in the past largest being 1 cm    Patient also complains of joint pain in her hands most notably in and around her thumbs.  She has been very active.  She has friends who have rheumatoid arthritis and she is wondering if she could have this.  She denies redness, warmth or swelling in her MCP joints    Current medicines (including changes today)  Current Outpatient Medications   Medication Sig Dispense Refill    tamsulosin (FLOMAX) 0.4 MG capsule Take 1 Capsule by mouth every day for 20 days. 20 Capsule 0    predniSONE (DELTASONE) 20 MG Tab Take one pill twice a day for five days 10 Tablet 0    nitrofurantoin (MACROBID) 100 MG Cap Take 1 Capsule by mouth 2 times a day for 7 days. 14 Capsule 0    DULoxetine (CYMBALTA) 20 MG Cap DR Particles Take 1 Capsule by mouth every day. 90 Capsule 2    tizanidine (ZANAFLEX) 2 MG tablet Take 1 Tablet by mouth 3 times a day as needed (spasms). 30 Tablet 0    ondansetron (ZOFRAN ODT) 4 MG TABLET DISPERSIBLE Place 1 tablet every 6 hours by translingual route as needed. 30 Tablet 0    Probiotic Product (PROBIOTIC BLEND PO) Take 1 Tablet by mouth every day.     MEDICATION INSTRUCTIONS FOR SURGERY/PROCEDURE SCHEDULED FOR 09/23/2024      DO NOT TAKE 7 DAYS PRIOR TO SURGERY      acetaminophen (TYLENOL) 500 MG Tab Take 500-1,000 mg by mouth every 6 hours as needed for Moderate Pain.     MEDICATION INSTRUCTIONS FOR SURGERY/PROCEDURE SCHEDULED FOR 09/23/2024      CONTINUE TAKING  "MED PRIOR TO SURGERY       No current facility-administered medications for this visit.     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    She has no past medical history of Anemia, Arrhythmia, Asthma, Blood transfusion without reported diagnosis, CHF (congestive heart failure) ([facility]), Clotting disorder (HCC), COPD (chronic obstructive pulmonary disease) (HCC), Depression, Diabetes (HCC), Glaucoma, Heart attack (HCC), Heart murmur, HIV (human immunodeficiency virus infection) (HCC), Hyperlipidemia, Seizure (HCC), Stroke (HCC), Substance abuse (HCC), Thyroid disease, or Tuberculosis.    ROS   No chest pain, no shortness of breath, no abdominal pain  Positive ROS as per HPI.  All other systems reviewed and are negative.     Objective:     /84   Pulse 82   Temp 36.4 °C (97.6 °F) (Temporal)   Ht 1.651 m (5' 5\")   Wt 88.5 kg (195 lb)   SpO2 96%  Body mass index is 32.45 kg/m².   Physical Exam    Constitutional: Alert, no distress.  Skin: Warm, dry, good turgor, no rashes in visible areas.  Eye: Equal, round and reactive, conjunctiva clear, lids normal.  ENMT: Lips without lesions, good dentition, oropharynx clear.  Neck: Trachea midline, no masses, no thyromegaly. No cervical or supraclavicular lymphadenopathy  Respiratory: Unlabored respiratory effort, lungs clear to auscultation, no wheezes, no ronchi.  Cardiovascular: Normal S1, S2, no murmur, no edema.  Abdomen: Soft, non-tender, no masses, no hepatosplenomegaly.  Psych: Alert and oriented x3, normal affect and mood.      Results      IMPRESSION:     1.  Nonobstructing LEFT kidney stone.  2.  No hydronephrosis.  3.  Mild bladder wall thickening, likely due to nondistended state.      Assessment and Plan:   The following treatment plan was " discussed    Assessment & Plan    #1 history of kidney stones with new left-sided nonobstructing kidney stone that measures 5 mm in diameter: I will send her home with Macrobid, tamsulosin and Flomax.  5 mm is right on the cusp this may pass or may not.  I already put in a referral to [facility] of [place] as she may need lithotripsy.  If she develops fever, chills, nauseousness, vomiting, flank pain she needs to go to the ER.  I reviewed renal ultrasound, CBC, CMP and urinalysis from previous visit and no signs of infection noted    #2 joint pain this is likely osteoarthritis and wear-and-tear associated.  Recommend anti-inflammatories and/or Voltaren gel.  Will order some autoimmune markers per her request.  I offered to hand x-ray but she defers        () Today's E/M visit is associated with medical care services that serve as the continuing focal point for all needed health care services and/or with medical care services that are part of ongoing care related to a patient's single, serious condition, or a complex condition: This includes furnishing services to patients on an ongoing basis that result in care that is personalized to the patient. The services result in a comprehensive, longitudinal, and continuous relationship with the patient and involve delivery of team-based care that is accessible, coordinated with other practitioners and providers, and integrated with the broader health care landscape.     fuentes was seen today for follow-up.    Diagnoses and all orders for this visit:    History of kidney stones  -     tamsulosin (FLOMAX) 0.4 MG capsule; Take 1 Capsule by mouth every day for 20 days.  -     predniSONE (DELTASONE) 20 MG Tab; Take one pill twice a day for five days  -     nitrofurantoin (MACROBID) 100 MG Cap; Take 1 Capsule by mouth 2 times a day for 7 days.    Kidney stone on left side  -     tamsulosin (FLOMAX) 0.4 MG capsule; Take 1 Capsule by mouth every day for 20 days.  -     predniSONE  (DELTASONE) 20 MG Tab; Take one pill twice a day for five days  -     nitrofurantoin (MACROBID) 100 MG Cap; Take 1 Capsule by mouth 2 times a day for 7 days.    Cervical stenosis of spinal canal    Chronic pain syndrome    Arthralgia of both hands  -     RHEUMATOID ARTHRITIS FACTOR; Future  -     Sed Rate; Future    Encounter for screening mammogram for breast cancer  -     MA-SCREENING MAMMO BILAT W/TOMOSYNTHESIS W/CAD; Future    Abnormal CBC  -     CBC WITH DIFFERENTIAL; Future              Follow Up: 12 weeks for annual 40 mins    Please note that this dictation was created using voice recognition software. I have made every reasonable attempt to correct obvious errors, but I expect that there are errors of grammar and possibly content that I did not discover before finalizing the note.      Attestation      Verbal consent was acquired by the patient to use [employer] ambient listening note generation during this visit Yes

## 2025-03-03 ENCOUNTER — HOSPITAL ENCOUNTER (OUTPATIENT)
Dept: LAB | Facility: MEDICAL CENTER | Age: 55
End: 2025-03-03
Attending: PHYSICIAN ASSISTANT
Payer: COMMERCIAL

## 2025-03-03 DIAGNOSIS — R79.89 ABNORMAL CBC: ICD-10-CM

## 2025-03-03 DIAGNOSIS — M25.541 ARTHRALGIA OF BOTH HANDS: ICD-10-CM

## 2025-03-03 DIAGNOSIS — M25.542 ARTHRALGIA OF BOTH HANDS: ICD-10-CM

## 2025-03-03 LAB
BASOPHILS # BLD AUTO: 0 % (ref 0–1.8)
BASOPHILS # BLD: 0 K/UL (ref 0–0.12)
EOSINOPHIL # BLD AUTO: 0.24 K/UL (ref 0–0.51)
EOSINOPHIL NFR BLD: 1.8 % (ref 0–6.9)
ERYTHROCYTE [DISTWIDTH] IN BLOOD BY AUTOMATED COUNT: 47.9 FL (ref 35.9–50)
ERYTHROCYTE [SEDIMENTATION RATE] IN BLOOD BY WESTERGREN METHOD: 6 MM/HOUR (ref 0–25)
HCT VFR BLD AUTO: 44.3 % (ref 37–47)
HGB BLD-MCNC: 14.1 G/DL (ref 12–16)
LYMPHOCYTES # BLD AUTO: 3.93 K/UL (ref 1–4.8)
LYMPHOCYTES NFR BLD: 28.9 % (ref 22–41)
MANUAL DIFF BLD: NORMAL
MCH RBC QN AUTO: 28.8 PG (ref 27–33)
MCHC RBC AUTO-ENTMCNC: 31.8 G/DL (ref 32.2–35.5)
MCV RBC AUTO: 90.4 FL (ref 81.4–97.8)
MONOCYTES # BLD AUTO: 0.83 K/UL (ref 0–0.85)
MONOCYTES NFR BLD AUTO: 6.1 % (ref 0–13.4)
MORPHOLOGY BLD-IMP: NORMAL
NEUTROPHILS # BLD AUTO: 8.6 K/UL (ref 1.82–7.42)
NEUTROPHILS NFR BLD: 63.2 % (ref 44–72)
NRBC # BLD AUTO: 0 K/UL
NRBC BLD-RTO: 0 /100 WBC (ref 0–0.2)
PLATELET # BLD AUTO: 363 K/UL (ref 164–446)
PLATELET BLD QL SMEAR: NORMAL
PMV BLD AUTO: 10 FL (ref 9–12.9)
RBC # BLD AUTO: 4.9 M/UL (ref 4.2–5.4)
RBC BLD AUTO: PRESENT
RHEUMATOID FACT SER IA-ACNC: <10 IU/ML (ref 0–14)
VARIANT LYMPHS BLD QL SMEAR: NORMAL
WBC # BLD AUTO: 13.6 K/UL (ref 4.8–10.8)

## 2025-03-03 PROCEDURE — 85027 COMPLETE CBC AUTOMATED: CPT

## 2025-03-03 PROCEDURE — 86431 RHEUMATOID FACTOR QUANT: CPT

## 2025-03-03 PROCEDURE — 85652 RBC SED RATE AUTOMATED: CPT

## 2025-03-03 PROCEDURE — 36415 COLL VENOUS BLD VENIPUNCTURE: CPT

## 2025-03-03 PROCEDURE — 85007 BL SMEAR W/DIFF WBC COUNT: CPT

## 2025-03-06 ENCOUNTER — RESULTS FOLLOW-UP (OUTPATIENT)
Dept: MEDICAL GROUP | Age: 55
End: 2025-03-06
Payer: COMMERCIAL

## 2025-03-06 DIAGNOSIS — N64.4 BREAST PAIN: ICD-10-CM

## 2025-03-07 NOTE — PROGRESS NOTES
Ordered screening mammogram for patient.  Patient then reported to imaging that she was having breast pain.  Imaging reach out to me to request that I changed the order from screening to diagnostic with ultrasound.  Per their guidelines I did change the order from screening mammogram to diagnostic mammo with ultrasound

## 2025-03-12 ENCOUNTER — HOSPITAL ENCOUNTER (OUTPATIENT)
Facility: MEDICAL CENTER | Age: 55
End: 2025-03-12
Attending: PHYSICIAN ASSISTANT
Payer: COMMERCIAL

## 2025-03-12 ENCOUNTER — TELEPHONE (OUTPATIENT)
Dept: UROLOGY | Facility: MEDICAL CENTER | Age: 55
End: 2025-03-12

## 2025-03-12 ENCOUNTER — OFFICE VISIT (OUTPATIENT)
Dept: UROLOGY | Facility: MEDICAL CENTER | Age: 55
End: 2025-03-12
Payer: COMMERCIAL

## 2025-03-12 DIAGNOSIS — R31.0 GROSS HEMATURIA: ICD-10-CM

## 2025-03-12 DIAGNOSIS — N20.0 KIDNEY STONES: ICD-10-CM

## 2025-03-12 LAB
APPEARANCE UR: CLEAR
BILIRUB UR STRIP-MCNC: NORMAL MG/DL
COLOR UR AUTO: YELLOW
CYTOLOGY REG CYTOL: NORMAL
GLUCOSE UR STRIP.AUTO-MCNC: NORMAL MG/DL
KETONES UR STRIP.AUTO-MCNC: NORMAL MG/DL
LEUKOCYTE ESTERASE UR QL STRIP.AUTO: NORMAL
NITRITE UR QL STRIP.AUTO: NORMAL
PH UR STRIP.AUTO: 6.5 [PH] (ref 5–8)
POC POST-VOID: 20 ML
POC PRE-VOID: NORMAL
PROT UR QL STRIP: NORMAL MG/DL
RBC UR QL AUTO: NORMAL
SP GR UR STRIP.AUTO: 1.02
UROBILINOGEN UR STRIP-MCNC: 0.2 MG/DL

## 2025-03-12 PROCEDURE — 88112 CYTOPATH CELL ENHANCE TECH: CPT | Mod: 26 | Performed by: STUDENT IN AN ORGANIZED HEALTH CARE EDUCATION/TRAINING PROGRAM

## 2025-03-12 PROCEDURE — 88112 CYTOPATH CELL ENHANCE TECH: CPT | Performed by: STUDENT IN AN ORGANIZED HEALTH CARE EDUCATION/TRAINING PROGRAM

## 2025-03-12 PROCEDURE — 81003 URINALYSIS AUTO W/O SCOPE: CPT

## 2025-03-12 PROCEDURE — 99244 OFF/OP CNSLTJ NEW/EST MOD 40: CPT | Performed by: PHYSICIAN ASSISTANT

## 2025-03-12 PROCEDURE — 51798 US URINE CAPACITY MEASURE: CPT | Performed by: PHYSICIAN ASSISTANT

## 2025-03-12 PROCEDURE — 81002 URINALYSIS NONAUTO W/O SCOPE: CPT | Performed by: PHYSICIAN ASSISTANT

## 2025-03-12 NOTE — PROGRESS NOTES
Reason for visit:   Hematuria  History of kidney stones    HPI   Michael Larsen is a 55 y.o. female with PMH of kidney stones, presenting to establish care with [facility]. The patient was previously followed by [facility] and was last seen on 4/14/23.      At the beginning of February, she developed severe bilateral flank pain and gross hematuria. She presented to her PCP office for evaluation and they ordered an US kidney and urinalysis. POCT urinalysis showed trace blood and no evidence of infection. US kidneys and bladder showed a 5 mm upper pole renal stone.     She continues to have visible blood in the urine. She last noticed blood in the urine 3 days ago. She does have ongoing bilateral flank discomfort and just doesn't feel right.     She denies a history of recurrent UTIs. Her brother has a history of kidney stones, she denies any family history of  CA. She is a previous pack per day cigarette smoker since she was 12, but quit in 2012. She denies any gross hematuria today. She is having some dysuria today and this started a couple months ago.     PVR: 20 mL    4/14/23 OV with Henry Sanderson PA-C:  HPI Kidney/Ureteral stones Reported by patient. Chief Complaint: the patient presents today for a return visit regarding a history of kidney stones  Alleviating factors: in the past the patient has been treated with ureteroscopy and with last treatment 12/2020  Associated Symptoms current symptoms include; no flank pain; no lower back pain; no abdominal pain; no dysuria; no urgency; no hematuria; no fevers; frequency ; nocturia x2 at most ; occasional urethral pains as if she is peeing nails  Stone Prevention Treatment family history with their , ( brother ); prior stone composition is Calcium Oxalate  Context: since the last visit there has not been interval stone passage; the last stone event was 12/2020; the most recent imaging was completed 4/14/23; the most recent imaging revealed no  "stones  Modifying Factors: the patient is using the following for prevention; increased fluids; lemon therapy Notes: Urolithiasis Timeline  Imagin23 VALERIE=no stones 22 KUB=no stones 20 CT abd/pel without=7 mm left UVJ stone, left renal stones up to 6 mm 20 CT abd/pel without=left stent in place with 9 mm left renal stone 20 CT abd/pel without=9 mm left UPJ stone with mild hydro  Labs: 20 stone analysis=Ca Ox  Procedures: 2020 left CULTS     Past Medical History:   Diagnosis Date    Anxiety     Bowel habit changes     constipation     GERD (gastroesophageal reflux disease)     Hand arthritis 2024    Indigestion     Kidney disease     kidney stones    Marijuana use 2020    \"7 times a week\"    Pain     PONV (postoperative nausea and vomiting)     Psychiatric problem     depression            Past Surgical History:   Procedure Laterality Date    PB TOTAL DISC ARTHROPLASTY, CERVICAL, SINGLE  2024    Procedure: C4-6 ANTERIOR PLACEMENT OF ARTIFICIAL DISC;  Surgeon: Néstor Moreno M.D.;  Location: [facility];  Service: Neurosurgery    GA CYSTOSCOPY,INSERT URETERAL STENT  2020    Procedure: CYSTOSCOPY, WITH URETERAL STENT INSERTION;  Surgeon: Sky Santos M.D.;  Location: [facility];  Service: Urology    GA CYSTO/URETERO/PYELOSCOPY, DX Left 2020    Procedure: LEFT URETEROSCOPY WITH STONE BASKETING;  Surgeon: Sky Santos M.D.;  Location: [facility];  Service: Urology    GA CYSTOSCOPY,INSERT URETERAL STENT Left 2020    Procedure: CYSTOSCOPY;  Surgeon: Ed Rosenthal M.D.;  Location: [facility];  Service: Urology    GA CYSTO/URETERO/PYELOSCOPY, DX Left 2020    Procedure: URETEROSCOPY;  Surgeon: Ed Rosenthal M.D.;  Location: [facility];  Service: Urology    LASERTRIPSY Left 2020    Procedure: LITHOTRIPSY, USING LASER .. .;  Surgeon: Ed Rosenthal M.D.;  Location: [facility];  " Service: Urology    TX CYSTOSCOPY,INSERT URETERAL STENT N/A 2020    Procedure: CYSTOSCOPY, WITH URETERAL STENT removal;  Surgeon: Ed Rosenthal M.D.;  Location: SURGERY [facility];  Service: Urology    TX CYSTOSCOPY,INSERT URETERAL STENT Left 2020    Procedure: CYSTOSCOPY, WITH URETERAL STENT INSERTION;  Surgeon: Daniel Paulson M.D.;  Location: SURGERY [facility];  Service: Urology    HYSTEROSCOPY WITH VIDEO OPERATIVE N/A 2019    Procedure: HYSTEROSCOPY, WITH VIDEO IMAGING -WITH MYOSURE;  Surgeon: Prieto Santa M.D.;  Location: SURGERY SAME DAY [facility];  Service: Obstetrics    DILATION AND CURETTAGE N/A 2019    Procedure: DILATION AND CURETTAGE;  Surgeon: Prieto Santa M.D.;  Location: SURGERY SAME DAY [facility];  Service: Obstetrics    BREAST BIOPSY  2019    LUMPECTOMY Left 1988    LAPAROSCOPY  1987       Urologic PMH:    Kidney stones     Family History:   Brother -- kidney stones     Social History     Socioeconomic History    Marital status:      Spouse name: Not on file    Number of children: Not on file    Years of education: Not on file    Highest education level: Some college, no degree   Occupational History    Not on file   Tobacco Use    Smoking status: Former     Current packs/day: 0.00     Average packs/day: 1 pack/day for 31.0 years (31.0 ttl pk-yrs)     Types: Cigarettes     Start date: 1981     Quit date: 2012     Years since quittin.8    Smokeless tobacco: Never   Vaping Use    Vaping status: Never Used   Substance and Sexual Activity    Alcohol use: Not Currently    Drug use: Yes     Frequency: 7.0 times per week     Types: Marijuana, Inhaled     Comment: marijuanna occ, last 3 days ago    Sexual activity: Yes     Partners: Male     Birth control/protection: None   Other Topics Concern    Not on file   Social History Narrative    Not on file     Social Drivers of Health     Financial Resource Strain: Low Risk   (6/3/2020)    Overall Financial Resource Strain (CARDIA)     Difficulty of Paying Living Expenses: Not very hard   Food Insecurity: No Food Insecurity (9/27/2024)    Hunger Vital Sign     Worried About Running Out of Food in the Last Year: Never true     Ran Out of Food in the Last Year: Never true   Transportation Needs: No Transportation Needs (9/27/2024)    PRAPARE - Transportation     Lack of Transportation (Medical): No     Lack of Transportation (Non-Medical): No   Physical Activity: Insufficiently Active (6/3/2020)    Exercise Vital Sign     Days of Exercise per Week: 3 days     Minutes of Exercise per Session: 30 min   Stress: No Stress Concern Present (6/3/2020)    [place] [employer] - Occupational Stress Questionnaire     Feeling of Stress : Not at all   Social Connections: Socially Integrated (6/3/2020)    Social Connection and Isolation Panel [NHANES]     Frequency of Communication with Friends and Family: Three times a week     Frequency of Social Gatherings with Friends and Family: Twice a week     Attends Sabianist Services: 1 to 4 times per year     Active Member of Clubs or Organizations: Yes     Attends Club or Organization Meetings: 1 to 4 times per year     Marital Status:    Intimate Partner Violence: Not At Risk (9/27/2024)    Humiliation, Afraid, Rape, and Kick questionnaire     Fear of Current or Ex-Partner: No     Emotionally Abused: No     Physically Abused: No     Sexually Abused: No   Housing Stability: Low Risk  (9/27/2024)    Housing Stability Vital Sign     Unable to Pay for Housing in the Last Year: No     Number of Times Moved in the Last Year: 1     Homeless in the Last Year: No         Allergies   Allergen Reactions    Iodine Runny Nose     sneezing    Hydrocodone Nausea     Nightmares & Nausea      Ibuprofen Unspecified     Constipation, intestinal inflammation    [facility] Swelling     Tongue swelling    Codeine Vomiting and Nausea     Vomiting     Other  Food Vomiting     Grape juice             Penicillins Vomiting     Vomiting          Current Outpatient Medications   Medication Sig Dispense Refill    tamsulosin (FLOMAX) 0.4 MG capsule Take 1 Capsule by mouth every day for 20 days. 20 Capsule 0    predniSONE (DELTASONE) 20 MG Tab Take one pill twice a day for five days 10 Tablet 0    DULoxetine (CYMBALTA) 20 MG Cap DR Particles Take 1 Capsule by mouth every day. 90 Capsule 2    tizanidine (ZANAFLEX) 2 MG tablet Take 1 Tablet by mouth 3 times a day as needed (spasms). 30 Tablet 0    ondansetron (ZOFRAN ODT) 4 MG TABLET DISPERSIBLE Place 1 tablet every 6 hours by translingual route as needed. 30 Tablet 0    Probiotic Product (PROBIOTIC BLEND PO) Take 1 Tablet by mouth every day.     MEDICATION INSTRUCTIONS FOR SURGERY/PROCEDURE SCHEDULED FOR 09/23/2024      DO NOT TAKE 7 DAYS PRIOR TO SURGERY      acetaminophen (TYLENOL) 500 MG Tab Take 500-1,000 mg by mouth every 6 hours as needed for Moderate Pain.     MEDICATION INSTRUCTIONS FOR SURGERY/PROCEDURE SCHEDULED FOR 09/23/2024      CONTINUE TAKING MED PRIOR TO SURGERY       No current facility-administered medications for this visit.         Review of Systems:   GENERAL: No fever, chills, nausea, vomiting   RESPIRATORY: No shortness of breath, wheezing, or cough   CARDIAC: No chest pain, palpitations, irregular heart rhythm, or chest pressure   GASTROINTESTINAL: No abdominal pain or distention    GENITROURINARY: See HPI   NEUROLOGIC: No weakness, blackouts, seizure or stroke   HEMATOLOGIC: No history of bleeding disorders, hypercoagulability, DVT, or PE   ENDOCRINE: No history of diabetes, adrenal disorders, or thyroid disorders    All other review of systems was negative     There were no vitals filed for this visit.      Physical Exam:   General:  Alert & Oriented, NAD   Skin: Warm/Dry, no evidence of infection or rash   HEENT: normocephalic   Thorax: No CVA tenderness   Abdomen: Soft, nontender to superficial/deep  palpation, nondistended  Extremities: No distal edema      Assessment and plan:     Gross hematuria    The patient was informed of the updated AUA guidelines for evaluation of gross hematuria. I have reviewed the various testing and procedure options as well as surveillance without further evaluation, so that the patient may know the advantages and disadvantages to diagnostic testing options. ?     The patient is aware that diagnostic testing allows for screening for infection, stones, structural abnormalities of the urinary system and potential cancer of the urinary system. As per AUA guidelines, I have recommended CT Urogram and cystoscopy. We reviewed these investigations and the typical patient experience. The patient will follow-up for cystoscopy after first having CT U.    POCT urinalysis today just shows trace blood.    Urine sent for micro UA and cytology.    5 mm upper pole LEFT renal stone    -- I personally reviewed US kidneys -- we reviewed findings of 5 mm upper pole renal stone which is very unlikely to be the source of ongoing gross hematuria and bilateral flank pain  -- Recommend good daily water intake of at least 2 L  -- Stone dietary advice  -- Plan for ongoing surveillance to be discussed pending hematuria investigations    I personally reviewed the patient's pertinent medical records and labs/images available to me.     Aliya Arevalo PA-C

## 2025-03-12 NOTE — TELEPHONE ENCOUNTER
----- Message from MARIE FIORE sent at 3/12/2025 11:32 AM PDT -----  Regarding: Gato  4/4 @ 9:45 am yolanda/ Aliya HOLT

## 2025-03-13 LAB
APPEARANCE UR: CLEAR
BILIRUB UR QL STRIP.AUTO: NEGATIVE
COLOR UR: YELLOW
GLUCOSE UR STRIP.AUTO-MCNC: NEGATIVE MG/DL
KETONES UR STRIP.AUTO-MCNC: NEGATIVE MG/DL
LEUKOCYTE ESTERASE UR QL STRIP.AUTO: NEGATIVE
MICRO URNS: NORMAL
NITRITE UR QL STRIP.AUTO: NEGATIVE
PH UR STRIP.AUTO: 6.5 [PH] (ref 5–8)
PROT UR QL STRIP: NEGATIVE MG/DL
RBC UR QL AUTO: NEGATIVE
SP GR UR STRIP.AUTO: 1.02
UROBILINOGEN UR STRIP.AUTO-MCNC: 1 EU/DL

## 2025-03-14 ENCOUNTER — TELEPHONE (OUTPATIENT)
Dept: UROLOGY | Facility: MEDICAL CENTER | Age: 55
End: 2025-03-14
Payer: COMMERCIAL

## 2025-03-14 ENCOUNTER — HOSPITAL ENCOUNTER (OUTPATIENT)
Dept: RADIOLOGY | Facility: MEDICAL CENTER | Age: 55
End: 2025-03-14
Payer: COMMERCIAL

## 2025-03-25 ENCOUNTER — HOSPITAL ENCOUNTER (OUTPATIENT)
Dept: RADIOLOGY | Facility: MEDICAL CENTER | Age: 55
End: 2025-03-25
Attending: PHYSICIAN ASSISTANT
Payer: COMMERCIAL

## 2025-03-25 DIAGNOSIS — R31.0 GROSS HEMATURIA: ICD-10-CM

## 2025-03-25 PROCEDURE — 700117 HCHG RX CONTRAST REV CODE 255: Performed by: PHYSICIAN ASSISTANT

## 2025-03-25 PROCEDURE — 74178 CT ABD&PLV WO CNTR FLWD CNTR: CPT

## 2025-03-25 RX ADMIN — IOHEXOL 100 ML: 350 INJECTION, SOLUTION INTRAVENOUS at 09:02

## 2025-04-02 ENCOUNTER — HOSPITAL ENCOUNTER (OUTPATIENT)
Dept: RADIOLOGY | Facility: MEDICAL CENTER | Age: 55
End: 2025-04-02
Attending: PHYSICIAN ASSISTANT
Payer: COMMERCIAL

## 2025-04-02 ENCOUNTER — RESULTS FOLLOW-UP (OUTPATIENT)
Dept: MEDICAL GROUP | Facility: MEDICAL CENTER | Age: 55
End: 2025-04-02

## 2025-04-02 DIAGNOSIS — N64.4 BREAST PAIN: ICD-10-CM

## 2025-04-02 PROCEDURE — G0279 TOMOSYNTHESIS, MAMMO: HCPCS

## 2025-04-04 ENCOUNTER — HOSPITAL ENCOUNTER (OUTPATIENT)
Facility: MEDICAL CENTER | Age: 55
End: 2025-04-04
Attending: PHYSICIAN ASSISTANT
Payer: COMMERCIAL

## 2025-04-04 ENCOUNTER — TELEPHONE (OUTPATIENT)
Dept: UROLOGY | Facility: MEDICAL CENTER | Age: 55
End: 2025-04-04

## 2025-04-04 ENCOUNTER — PROCEDURE VISIT (OUTPATIENT)
Dept: UROLOGY | Facility: MEDICAL CENTER | Age: 55
End: 2025-04-04
Payer: COMMERCIAL

## 2025-04-04 DIAGNOSIS — N20.0 KIDNEY STONES: ICD-10-CM

## 2025-04-04 DIAGNOSIS — R31.0 GROSS HEMATURIA: Primary | ICD-10-CM

## 2025-04-04 DIAGNOSIS — R39.15 URINARY URGENCY: ICD-10-CM

## 2025-04-04 LAB
APPEARANCE UR: CLEAR
BILIRUB UR STRIP-MCNC: NORMAL MG/DL
COLOR UR AUTO: YELLOW
GLUCOSE UR STRIP.AUTO-MCNC: NORMAL MG/DL
KETONES UR STRIP.AUTO-MCNC: NORMAL MG/DL
LEUKOCYTE ESTERASE UR QL STRIP.AUTO: NORMAL
NITRITE UR QL STRIP.AUTO: NORMAL
PH UR STRIP.AUTO: 6 [PH] (ref 5–8)
PROT UR QL STRIP: NORMAL MG/DL
RBC UR QL AUTO: NORMAL
SP GR UR STRIP.AUTO: 1.02
UROBILINOGEN UR STRIP-MCNC: 0.2 MG/DL

## 2025-04-04 PROCEDURE — 52000 CYSTOURETHROSCOPY: CPT | Performed by: PHYSICIAN ASSISTANT

## 2025-04-04 PROCEDURE — 81002 URINALYSIS NONAUTO W/O SCOPE: CPT | Performed by: PHYSICIAN ASSISTANT

## 2025-04-04 PROCEDURE — 87086 URINE CULTURE/COLONY COUNT: CPT

## 2025-04-04 PROCEDURE — 99213 OFFICE O/P EST LOW 20 MIN: CPT | Mod: 25 | Performed by: PHYSICIAN ASSISTANT

## 2025-04-04 RX ORDER — ESTRADIOL 0.1 MG/G
CREAM VAGINAL
Qty: 42.5 G | Refills: 2 | Status: SHIPPED | OUTPATIENT
Start: 2025-04-04

## 2025-04-04 NOTE — PROGRESS NOTES
Reason for visit:   Cystoscopy -- gross hemturia    SOCORRO   Michael Larsen is a 55 y.o. female presenting for a diagnostic flexible cystoscopy. I last saw the patient in the office on 3/12/25 regarding gross hematuria, and I recommended a CT U and cystoscopy. The patient had a CT U on 3/25/25 and showed a 4 mm LEFT intra-renal stone, but was otherwise normal.    The patient denies any changes to her health since I last saw her. She does report some chronic issues with bladder pressure and discomfort. Additionally she reports some urinary urgency.     She mentions that she would like to proceed with stone removal surgery for her 4-5 mm stone. She understands that she could consider surveillance, but she has had such bad experience with stones that she would prefer to proceed with surgical removal for peace of mind.     3/12/25 OV:  SOCORRO   Michael Larsen is a 55 y.o. female with PMH of kidney stones, presenting to [facility] care with [facility]. The patient was previously followed by [facility] and was last seen on 4/14/23.       At the beginning of February, she developed severe bilateral flank pain and gross hematuria. She presented to her PCP office for evaluation and they ordered an US kidney and urinalysis. POCT urinalysis showed trace blood and no evidence of infection. US kidneys and bladder showed a 5 mm upper pole renal stone.      She continues to have visible blood in the urine. She last noticed blood in the urine 3 days ago. She does have ongoing bilateral flank discomfort and just doesn't feel right.      She denies a history of recurrent UTIs. Her brother has a history of kidney stones, she denies any family history of  CA. She is a previous pack per day cigarette smoker since she was 12, but quit in 2012. She denies any gross hematuria today. She is having some dysuria today and this started a couple months ago.      PVR: 20 mL     4/14/23 OV with Henry Sanderson PA-C:  SOCORRO  Kidney/Ureteral stones Reported by patient. Chief Complaint: the patient presents today for a return visit regarding a history of kidney stones  Alleviating factors: in the past the patient has been treated with ureteroscopy and with last treatment 2020  Associated Symptoms current symptoms include; no flank pain; no lower back pain; no abdominal pain; no dysuria; no urgency; no hematuria; no fevers; frequency ; nocturia x2 at most ; occasional urethral pains as if she is peeing nails  Stone Prevention Treatment family history with their , ( brother ); prior stone composition is Calcium Oxalate  Context: since the last visit there has not been interval stone passage; the last stone event was 2020; the most recent imaging was completed 23; the most recent imaging revealed no stones  Modifying Factors: the patient is using the following for prevention; increased fluids; lemon therapy Notes: Urolithiasis Timeline  Imagin23 VALERIE=no stones 22 KUB=no stones 20 CT abd/pel without=7 mm left UVJ stone, left renal stones up to 6 mm 20 CT abd/pel without=left stent in place with 9 mm left renal stone 20 CT abd/pel without=9 mm left UPJ stone with mild hydro  Labs: 20 stone analysis=Ca Ox  Procedures: 2020 left CULTS    Urologic PMH:    Kidney stones     Details of the procedure were explained in full to the patient with all questions answered. Risks of the procedure were reviewed including; hematuria, dysuria, pain, infection and retention of urine. The patient was in full understanding of the risks. Written signed consent was obtained.      Procedure:   The patient was prepped and draped in a sterile fashion. Glydo 2% lidocaine jelly was inserted into the urethra, patient tolerated well. Flexible cystoscopy was performed under sterile conditions, patient tolerated well. The bladder was examined systematically including scope retroflexion. There were no immediate complications.       Findings:    Urethra: Normal, midline, no evidence of stricture   Bladder: Normal bladder mucosa, no papillary bladder lesions seen, no erythematous patches    Ureteric orifices: L seen, R seen     Assessment and plan:     Gross hematuria     -- We reviewed results of CT U -- no  abnormalities, 5 mm LEFT intra-renal stone seen again  -- Cystoscopy normal today  -- Continue with good daily water intake of at least 2L     5 mm LEFT renal stone    -- I personally reviewed CT U -- we reviewed findings of 5 mm LEFT renal stone   -- Discussed option for surveillance -- Patient declines, she has a long history of stones and would like to electively have this stone removed   -- Recommend good daily water intake of at least 2 L  -- Stone dietary advice  -- Plan for for electively cystoscopy, LEFT URS with LL and +/- stent insertion      Bladder pressure and urgency    -- POCT urinalysis today is WNL  -- Cystoscopy today is WNL  -- Recommend estrace cream using the fingertip method  -- Avoid bladder irritants  -- Follow-up as needed    The patient was instructed to call the office if she develops any fevers, chills, nausea/vomiting, persistent blood in the urine or general feelings of being unwell. she was instructed to remain well hydrated after the procedure.    Aliya Arevalo PA-C

## 2025-04-07 LAB
BACTERIA UR CULT: NORMAL
SIGNIFICANT IND 70042: NORMAL
SITE SITE: NORMAL
SOURCE SOURCE: NORMAL

## 2025-04-09 NOTE — Clinical Note
Member Name: Michael Larsen   Member Number: [id number]   Reference Number: [id number]   Approved Services: Outpatient Procedure   Approved Service Dates: 06/06/2025 - 12/06/2025   Requesting Provider: Rayne Borjas   Requested Provider: [facility]     Dear Michael Larsen:     The following medical service(s) requested by Rayne Borjas have been approved:    Procedure Code Procedure Code Name Requested Quantity Approved Quantity Status   99133 (CPT®) PA CYSTO/URETERO W/LITHOTRIPSY 1 1 Authorized       Approved Quantity means the number of visits approved for medication treatments and/or medical services.    The services should be provided by [facility] no later than 12/06/2025. Please contact the provider listed below with any questions.     Provider Information:  [facility]  [phone]    Your plan benefit may require a deductible, co-payment or coinsurance for these services. This authorization does not guarantee [facility] will pay the claim for services that you receive. Payment by [facility] for these services is subject to the terms of your Evidence of Coverage or Summary Plan Description, your eligibility at the time of service, and confirmation of benefit coverage.    For any questions or additional information, please contact Customer Service:    [facility]  Customer Service: [phone] or toll free [phone]  TTY users dial: [phone]   Call Center Hours: Mon - Fri 7 AM to 8 PM PST   Office Hours: Mon - Fri 8 AM to 5 PM [facility]   E-mail: [email or web address]   Website: [email or web address]       This information is available for free in other languages. Please contact Customer Service at the phone number above for more information. [facility] complies with applicable Federal civil rights laws and does not discriminate on the basis of race, color, national origin, age, disability or  sex.      Sincerely,     Healthcare Utilization Management Department     Cc: [facility]   Rayne Borjas

## 2025-05-07 ENCOUNTER — APPOINTMENT (OUTPATIENT)
Dept: ADMISSIONS | Facility: MEDICAL CENTER | Age: 55
End: 2025-05-07
Attending: STUDENT IN AN ORGANIZED HEALTH CARE EDUCATION/TRAINING PROGRAM
Payer: COMMERCIAL

## 2025-06-02 ENCOUNTER — PRE-ADMISSION TESTING (OUTPATIENT)
Dept: ADMISSIONS | Facility: MEDICAL CENTER | Age: 55
End: 2025-06-02
Attending: STUDENT IN AN ORGANIZED HEALTH CARE EDUCATION/TRAINING PROGRAM
Payer: COMMERCIAL

## 2025-06-02 DIAGNOSIS — Z01.810 PRE-OPERATIVE CARDIOVASCULAR EXAMINATION: Primary | ICD-10-CM

## 2025-06-02 DIAGNOSIS — Z01.812 PRE-OPERATIVE LABORATORY EXAMINATION: ICD-10-CM

## 2025-06-02 LAB
ALBUMIN SERPL BCP-MCNC: 3.9 G/DL (ref 3.2–4.9)
ALBUMIN/GLOB SERPL: 1.3 G/DL
ALP SERPL-CCNC: 110 U/L (ref 30–99)
ALT SERPL-CCNC: 14 U/L (ref 2–50)
ANION GAP SERPL CALC-SCNC: 6 MMOL/L (ref 7–16)
AST SERPL-CCNC: 21 U/L (ref 12–45)
BILIRUB SERPL-MCNC: 0.5 MG/DL (ref 0.1–1.5)
BUN SERPL-MCNC: 12 MG/DL (ref 8–22)
CALCIUM ALBUM COR SERPL-MCNC: 9.5 MG/DL (ref 8.5–10.5)
CALCIUM SERPL-MCNC: 9.4 MG/DL (ref 8.5–10.5)
CHLORIDE SERPL-SCNC: 104 MMOL/L (ref 96–112)
CO2 SERPL-SCNC: 31 MMOL/L (ref 20–33)
CREAT SERPL-MCNC: 0.75 MG/DL (ref 0.5–1.4)
EKG IMPRESSION: NORMAL
ERYTHROCYTE [DISTWIDTH] IN BLOOD BY AUTOMATED COUNT: 46 FL (ref 35.9–50)
GFR SERPLBLD CREATININE-BSD FMLA CKD-EPI: 93 ML/MIN/1.73 M 2
GLOBULIN SER CALC-MCNC: 3 G/DL (ref 1.9–3.5)
GLUCOSE SERPL-MCNC: 99 MG/DL (ref 65–99)
HCT VFR BLD AUTO: 44.9 % (ref 37–47)
HGB BLD-MCNC: 14.3 G/DL (ref 12–16)
MCH RBC QN AUTO: 29 PG (ref 27–33)
MCHC RBC AUTO-ENTMCNC: 31.8 G/DL (ref 32.2–35.5)
MCV RBC AUTO: 91.1 FL (ref 81.4–97.8)
PLATELET # BLD AUTO: 349 K/UL (ref 164–446)
PMV BLD AUTO: 9.4 FL (ref 9–12.9)
POTASSIUM SERPL-SCNC: 3.7 MMOL/L (ref 3.6–5.5)
PROT SERPL-MCNC: 6.9 G/DL (ref 6–8.2)
RBC # BLD AUTO: 4.93 M/UL (ref 4.2–5.4)
SODIUM SERPL-SCNC: 141 MMOL/L (ref 135–145)
WBC # BLD AUTO: 11 K/UL (ref 4.8–10.8)

## 2025-06-02 PROCEDURE — 93005 ELECTROCARDIOGRAM TRACING: CPT | Mod: TC

## 2025-06-02 PROCEDURE — 93010 ELECTROCARDIOGRAM REPORT: CPT | Performed by: INTERNAL MEDICINE

## 2025-06-02 PROCEDURE — 87086 URINE CULTURE/COLONY COUNT: CPT

## 2025-06-02 PROCEDURE — 36415 COLL VENOUS BLD VENIPUNCTURE: CPT

## 2025-06-02 PROCEDURE — 85027 COMPLETE CBC AUTOMATED: CPT

## 2025-06-02 PROCEDURE — 80053 COMPREHEN METABOLIC PANEL: CPT

## 2025-06-02 RX ORDER — POLYETHYLENE GLYCOL 3350 17 G/17G
17 POWDER, FOR SOLUTION ORAL DAILY
COMMUNITY

## 2025-06-02 RX ORDER — BISMUTH SUBSALICYLATE 262 MG/1
524 TABLET, CHEWABLE ORAL
COMMUNITY

## 2025-06-02 RX ORDER — SIMETHICONE 125 MG
125 TABLET,CHEWABLE ORAL EVERY 6 HOURS PRN
COMMUNITY

## 2025-06-02 RX ORDER — COVID-19 ANTIGEN TEST
KIT MISCELLANEOUS
COMMUNITY

## 2025-06-02 NOTE — PREADMIT AVS NOTE
"Current Medications   Medication Instructions    FIBER ADULT GUMMIES PO Stop 7 days before surgery    Cholecalciferol (VITAMIN D3 PO) Stop 7 days before surgery    NON SPECIFIED OTC \"Weight Management\" Stop 7 days before surgery    Multiple Vitamin (MULTIVITAMIN ADULT PO) Stop 7 days before surgery    simethicone (MYLICON) 125 MG chewable tablet Continue taking medication as prescribed, including morning of procedure     polyethylene glycol/lytes (MIRALAX) 17 g Pack Follow instructions from surgeon or specialist.    Naproxen Sodium (ALEVE) 220 MG Cap Stop 5 days before surgery    psyllium (METAMUCIL) 51.7 % Pack Stop 7 days before surgery    bismuth subsalicylate (PEPTO-BISMOL) 262 MG Chew Tab Follow instructions from surgeon or specialist.              Probiotic Product (PROBIOTIC BLEND PO) Stop 7 days before surgery    acetaminophen (TYLENOL) 500 MG Tab Continue taking medication as prescribed, including morning of procedure      "

## 2025-06-02 NOTE — OR NURSING
"FOR SURGERY ON: 6/6/25  Patient provided medication instructions per [facility]'s Guideline for Pre-Operative Medication Management. AVS sent to [facility]. Preparing For Your Procedure packet reviewed with patient. Patient reports she has fasting instructions provided by her surgeon. Patient advised to contact surgeon with any additional questions or concerns.     Patient verbalized understanding of their instructions.    Fall risk - Reports feeling \"dizzy all the time\"  Standby assist to transfer    IODINE allergy - surgery scheduler notified    Adverse reaction to anesthesia: PONV  "

## 2025-06-03 ENCOUNTER — TELEPHONE (OUTPATIENT)
Dept: MEDICAL GROUP | Age: 55
End: 2025-06-03
Payer: COMMERCIAL

## 2025-06-03 DIAGNOSIS — M79.643 PAIN OF HAND, UNSPECIFIED LATERALITY: Primary | ICD-10-CM

## 2025-06-03 NOTE — TELEPHONE ENCOUNTER
Caller Name: Michael Larsen   Call Back Number: [phone]     How would the patient prefer to be contacted with a response: Phone call OK to leave a detailed message    2. SPECIFIC Action To Be Taken: Referral pending, please sign.    3. Diagnosis/Clinical Reason for Request: pt has been following up with PT and they are placing a referral to neurology, due to insurance they need the referral to be placed by PCP.     4. Specialty & Provider Name/Lab/Imaging Location: Neurology    5. Is appointment scheduled for requested order/referral: yes - July 2025    Patient was informed they will receive a return phone call from the office ONLY if there are any questions before processing their request. Advised to call back if they haven't received a call from the referral department in 5 days.

## 2025-06-04 LAB
BACTERIA UR CULT: NORMAL
SIGNIFICANT IND 70042: NORMAL
SITE SITE: NORMAL
SOURCE SOURCE: NORMAL

## 2025-06-05 ENCOUNTER — TELEPHONE (OUTPATIENT)
Dept: UROLOGY | Facility: MEDICAL CENTER | Age: 55
End: 2025-06-05
Payer: COMMERCIAL

## 2025-06-05 DIAGNOSIS — M54.12 CERVICAL RADICULOPATHY: Primary | ICD-10-CM

## 2025-06-06 ENCOUNTER — PHARMACY VISIT (OUTPATIENT)
Dept: PHARMACY | Facility: MEDICAL CENTER | Age: 55
End: 2025-06-06
Payer: COMMERCIAL

## 2025-06-06 ENCOUNTER — APPOINTMENT (OUTPATIENT)
Dept: RADIOLOGY | Facility: MEDICAL CENTER | Age: 55
End: 2025-06-06
Attending: STUDENT IN AN ORGANIZED HEALTH CARE EDUCATION/TRAINING PROGRAM
Payer: COMMERCIAL

## 2025-06-06 ENCOUNTER — ANESTHESIA (OUTPATIENT)
Dept: SURGERY | Facility: MEDICAL CENTER | Age: 55
End: 2025-06-06
Payer: COMMERCIAL

## 2025-06-06 ENCOUNTER — ANESTHESIA EVENT (OUTPATIENT)
Dept: SURGERY | Facility: MEDICAL CENTER | Age: 55
End: 2025-06-06
Payer: COMMERCIAL

## 2025-06-06 ENCOUNTER — HOSPITAL ENCOUNTER (OUTPATIENT)
Facility: MEDICAL CENTER | Age: 55
End: 2025-06-07
Attending: STUDENT IN AN ORGANIZED HEALTH CARE EDUCATION/TRAINING PROGRAM | Admitting: STUDENT IN AN ORGANIZED HEALTH CARE EDUCATION/TRAINING PROGRAM
Payer: COMMERCIAL

## 2025-06-06 DIAGNOSIS — N20.0 NEPHROLITHIASIS: Primary | ICD-10-CM

## 2025-06-06 LAB — PATHOLOGY CONSULT NOTE: NORMAL

## 2025-06-06 PROCEDURE — 160039 HCHG SURGERY MINUTES - EA ADDL 1 MIN LEVEL 3: Performed by: STUDENT IN AN ORGANIZED HEALTH CARE EDUCATION/TRAINING PROGRAM

## 2025-06-06 PROCEDURE — 700101 HCHG RX REV CODE 250: Performed by: ANESTHESIOLOGY

## 2025-06-06 PROCEDURE — 160015 HCHG STAT PREOP MINUTES: Performed by: STUDENT IN AN ORGANIZED HEALTH CARE EDUCATION/TRAINING PROGRAM

## 2025-06-06 PROCEDURE — A9270 NON-COVERED ITEM OR SERVICE: HCPCS | Performed by: ANESTHESIOLOGY

## 2025-06-06 PROCEDURE — 160009 HCHG ANES TIME/MIN: Performed by: STUDENT IN AN ORGANIZED HEALTH CARE EDUCATION/TRAINING PROGRAM

## 2025-06-06 PROCEDURE — 88300 SURGICAL PATH GROSS: CPT | Mod: 26 | Performed by: PATHOLOGY

## 2025-06-06 PROCEDURE — 96374 THER/PROPH/DIAG INJ IV PUSH: CPT

## 2025-06-06 PROCEDURE — 82365 CALCULUS SPECTROSCOPY: CPT

## 2025-06-06 PROCEDURE — 160035 HCHG PACU - 1ST 60 MINS PHASE I: Performed by: STUDENT IN AN ORGANIZED HEALTH CARE EDUCATION/TRAINING PROGRAM

## 2025-06-06 PROCEDURE — 700101 HCHG RX REV CODE 250: Performed by: STUDENT IN AN ORGANIZED HEALTH CARE EDUCATION/TRAINING PROGRAM

## 2025-06-06 PROCEDURE — 160028 HCHG SURGERY MINUTES - 1ST 30 MINS LEVEL 3: Performed by: STUDENT IN AN ORGANIZED HEALTH CARE EDUCATION/TRAINING PROGRAM

## 2025-06-06 PROCEDURE — 52353 CYSTOURETERO W/LITHOTRIPSY: CPT | Mod: LT | Performed by: STUDENT IN AN ORGANIZED HEALTH CARE EDUCATION/TRAINING PROGRAM

## 2025-06-06 PROCEDURE — 74018 RADEX ABDOMEN 1 VIEW: CPT

## 2025-06-06 PROCEDURE — C1769 GUIDE WIRE: HCPCS | Performed by: STUDENT IN AN ORGANIZED HEALTH CARE EDUCATION/TRAINING PROGRAM

## 2025-06-06 PROCEDURE — 700111 HCHG RX REV CODE 636 W/ 250 OVERRIDE (IP): Performed by: ANESTHESIOLOGY

## 2025-06-06 PROCEDURE — 160002 HCHG RECOVERY MINUTES (STAT): Performed by: STUDENT IN AN ORGANIZED HEALTH CARE EDUCATION/TRAINING PROGRAM

## 2025-06-06 PROCEDURE — G0378 HOSPITAL OBSERVATION PER HR: HCPCS

## 2025-06-06 PROCEDURE — C1747 HCHG SHELL REV 278 C1747: HCPCS | Performed by: STUDENT IN AN ORGANIZED HEALTH CARE EDUCATION/TRAINING PROGRAM

## 2025-06-06 PROCEDURE — 700102 HCHG RX REV CODE 250 W/ 637 OVERRIDE(OP): Performed by: ANESTHESIOLOGY

## 2025-06-06 PROCEDURE — C1894 INTRO/SHEATH, NON-LASER: HCPCS | Performed by: STUDENT IN AN ORGANIZED HEALTH CARE EDUCATION/TRAINING PROGRAM

## 2025-06-06 PROCEDURE — 88300 SURGICAL PATH GROSS: CPT | Performed by: PATHOLOGY

## 2025-06-06 PROCEDURE — C1758 CATHETER, URETERAL: HCPCS | Performed by: STUDENT IN AN ORGANIZED HEALTH CARE EDUCATION/TRAINING PROGRAM

## 2025-06-06 PROCEDURE — 110371 HCHG SHELL REV 272: Performed by: STUDENT IN AN ORGANIZED HEALTH CARE EDUCATION/TRAINING PROGRAM

## 2025-06-06 PROCEDURE — 700111 HCHG RX REV CODE 636 W/ 250 OVERRIDE (IP): Mod: JZ | Performed by: ANESTHESIOLOGY

## 2025-06-06 PROCEDURE — A9270 NON-COVERED ITEM OR SERVICE: HCPCS | Performed by: STUDENT IN AN ORGANIZED HEALTH CARE EDUCATION/TRAINING PROGRAM

## 2025-06-06 PROCEDURE — 700102 HCHG RX REV CODE 250 W/ 637 OVERRIDE(OP): Performed by: STUDENT IN AN ORGANIZED HEALTH CARE EDUCATION/TRAINING PROGRAM

## 2025-06-06 PROCEDURE — 160036 HCHG PACU - EA ADDL 30 MINS PHASE I: Performed by: STUDENT IN AN ORGANIZED HEALTH CARE EDUCATION/TRAINING PROGRAM

## 2025-06-06 PROCEDURE — 700111 HCHG RX REV CODE 636 W/ 250 OVERRIDE (IP): Mod: JZ | Performed by: STUDENT IN AN ORGANIZED HEALTH CARE EDUCATION/TRAINING PROGRAM

## 2025-06-06 PROCEDURE — RXMED WILLOW AMBULATORY MEDICATION CHARGE: Performed by: STUDENT IN AN ORGANIZED HEALTH CARE EDUCATION/TRAINING PROGRAM

## 2025-06-06 PROCEDURE — 700105 HCHG RX REV CODE 258: Performed by: STUDENT IN AN ORGANIZED HEALTH CARE EDUCATION/TRAINING PROGRAM

## 2025-06-06 PROCEDURE — 160048 HCHG OR STATISTICAL LEVEL 1-5: Performed by: STUDENT IN AN ORGANIZED HEALTH CARE EDUCATION/TRAINING PROGRAM

## 2025-06-06 PROCEDURE — 96375 TX/PRO/DX INJ NEW DRUG ADDON: CPT

## 2025-06-06 RX ORDER — DEXTROSE MONOHYDRATE, SODIUM CHLORIDE, AND POTASSIUM CHLORIDE 50; 1.49; 4.5 G/1000ML; G/1000ML; G/1000ML
INJECTION, SOLUTION INTRAVENOUS CONTINUOUS
Status: DISPENSED | OUTPATIENT
Start: 2025-06-06 | End: 2025-06-06

## 2025-06-06 RX ORDER — DIPHENHYDRAMINE HYDROCHLORIDE 50 MG/ML
12.5 INJECTION, SOLUTION INTRAMUSCULAR; INTRAVENOUS
Status: DISCONTINUED | OUTPATIENT
Start: 2025-06-06 | End: 2025-06-06 | Stop reason: HOSPADM

## 2025-06-06 RX ORDER — OXYCODONE HYDROCHLORIDE 5 MG/1
5 TABLET ORAL
Refills: 0 | Status: DISCONTINUED | OUTPATIENT
Start: 2025-06-06 | End: 2025-06-07 | Stop reason: HOSPADM

## 2025-06-06 RX ORDER — ACETAMINOPHEN 500 MG
1000 TABLET ORAL EVERY 6 HOURS PRN
Status: DISCONTINUED | OUTPATIENT
Start: 2025-06-11 | End: 2025-06-07 | Stop reason: HOSPADM

## 2025-06-06 RX ORDER — SIMETHICONE 125 MG
125 TABLET,CHEWABLE ORAL 3 TIMES DAILY PRN
Status: DISCONTINUED | OUTPATIENT
Start: 2025-06-06 | End: 2025-06-07 | Stop reason: HOSPADM

## 2025-06-06 RX ORDER — ACETAMINOPHEN 500 MG
500-1000 TABLET ORAL EVERY 6 HOURS PRN
Status: DISCONTINUED | OUTPATIENT
Start: 2025-06-06 | End: 2025-06-07 | Stop reason: HOSPADM

## 2025-06-06 RX ORDER — HALOPERIDOL 5 MG/ML
1 INJECTION INTRAMUSCULAR EVERY 6 HOURS PRN
Status: DISCONTINUED | OUTPATIENT
Start: 2025-06-06 | End: 2025-06-07 | Stop reason: HOSPADM

## 2025-06-06 RX ORDER — PREDNISONE 20 MG/1
20 TABLET ORAL DAILY
Qty: 3 TABLET | Refills: 0 | Status: SHIPPED | OUTPATIENT
Start: 2025-06-06

## 2025-06-06 RX ORDER — CEFAZOLIN SODIUM 1 G/3ML
INJECTION, POWDER, FOR SOLUTION INTRAMUSCULAR; INTRAVENOUS PRN
Status: DISCONTINUED | OUTPATIENT
Start: 2025-06-06 | End: 2025-06-06 | Stop reason: HOSPADM

## 2025-06-06 RX ORDER — POLYETHYLENE GLYCOL 3350 17 G/17G
17 POWDER, FOR SOLUTION ORAL DAILY
Status: DISCONTINUED | OUTPATIENT
Start: 2025-06-07 | End: 2025-06-07 | Stop reason: HOSPADM

## 2025-06-06 RX ORDER — HYDROMORPHONE HYDROCHLORIDE 1 MG/ML
0.1 INJECTION, SOLUTION INTRAMUSCULAR; INTRAVENOUS; SUBCUTANEOUS
Status: DISCONTINUED | OUTPATIENT
Start: 2025-06-06 | End: 2025-06-06 | Stop reason: HOSPADM

## 2025-06-06 RX ORDER — PHENAZOPYRIDINE HYDROCHLORIDE 200 MG/1
200 TABLET, FILM COATED ORAL
Status: DISCONTINUED | OUTPATIENT
Start: 2025-06-06 | End: 2025-06-07 | Stop reason: HOSPADM

## 2025-06-06 RX ORDER — HYDRALAZINE HYDROCHLORIDE 20 MG/ML
10 INJECTION INTRAMUSCULAR; INTRAVENOUS EVERY 6 HOURS PRN
Status: DISCONTINUED | OUTPATIENT
Start: 2025-06-06 | End: 2025-06-07 | Stop reason: HOSPADM

## 2025-06-06 RX ORDER — HYDROXYZINE HYDROCHLORIDE 25 MG/1
25 TABLET, FILM COATED ORAL NIGHTLY PRN
Status: DISCONTINUED | OUTPATIENT
Start: 2025-06-06 | End: 2025-06-07 | Stop reason: HOSPADM

## 2025-06-06 RX ORDER — OXYCODONE HYDROCHLORIDE 5 MG/1
2.5 TABLET ORAL
Refills: 0 | Status: DISCONTINUED | OUTPATIENT
Start: 2025-06-06 | End: 2025-06-07 | Stop reason: HOSPADM

## 2025-06-06 RX ORDER — DEXAMETHASONE SODIUM PHOSPHATE 4 MG/ML
INJECTION, SOLUTION INTRA-ARTICULAR; INTRALESIONAL; INTRAMUSCULAR; INTRAVENOUS; SOFT TISSUE PRN
Status: DISCONTINUED | OUTPATIENT
Start: 2025-06-06 | End: 2025-06-06 | Stop reason: SURG

## 2025-06-06 RX ORDER — ONDANSETRON 2 MG/ML
4 INJECTION INTRAMUSCULAR; INTRAVENOUS EVERY 4 HOURS PRN
Status: DISCONTINUED | OUTPATIENT
Start: 2025-06-06 | End: 2025-06-07 | Stop reason: HOSPADM

## 2025-06-06 RX ORDER — IBUPROFEN 800 MG/1
800 TABLET, FILM COATED ORAL 3 TIMES DAILY PRN
Status: DISCONTINUED | OUTPATIENT
Start: 2025-06-09 | End: 2025-06-07 | Stop reason: HOSPADM

## 2025-06-06 RX ORDER — SODIUM CHLORIDE, SODIUM LACTATE, POTASSIUM CHLORIDE, CALCIUM CHLORIDE 600; 310; 30; 20 MG/100ML; MG/100ML; MG/100ML; MG/100ML
INJECTION, SOLUTION INTRAVENOUS CONTINUOUS
Status: ACTIVE | OUTPATIENT
Start: 2025-06-06 | End: 2025-06-06

## 2025-06-06 RX ORDER — HYDROMORPHONE HYDROCHLORIDE 1 MG/ML
0.25 INJECTION, SOLUTION INTRAMUSCULAR; INTRAVENOUS; SUBCUTANEOUS
Status: DISCONTINUED | OUTPATIENT
Start: 2025-06-06 | End: 2025-06-07 | Stop reason: HOSPADM

## 2025-06-06 RX ORDER — HYDROMORPHONE HYDROCHLORIDE 1 MG/ML
0.2 INJECTION, SOLUTION INTRAMUSCULAR; INTRAVENOUS; SUBCUTANEOUS
Status: DISCONTINUED | OUTPATIENT
Start: 2025-06-06 | End: 2025-06-06 | Stop reason: HOSPADM

## 2025-06-06 RX ORDER — PHENAZOPYRIDINE HYDROCHLORIDE 200 MG/1
200 TABLET, FILM COATED ORAL 3 TIMES DAILY PRN
Qty: 9 TABLET | Refills: 0 | Status: SHIPPED | OUTPATIENT
Start: 2025-06-06

## 2025-06-06 RX ORDER — OXYCODONE HCL 5 MG/5 ML
5 SOLUTION, ORAL ORAL
Status: COMPLETED | OUTPATIENT
Start: 2025-06-06 | End: 2025-06-06

## 2025-06-06 RX ORDER — ONDANSETRON 2 MG/ML
INJECTION INTRAMUSCULAR; INTRAVENOUS PRN
Status: DISCONTINUED | OUTPATIENT
Start: 2025-06-06 | End: 2025-06-06 | Stop reason: HOSPADM

## 2025-06-06 RX ORDER — HYDROMORPHONE HYDROCHLORIDE 1 MG/ML
0.4 INJECTION, SOLUTION INTRAMUSCULAR; INTRAVENOUS; SUBCUTANEOUS
Status: DISCONTINUED | OUTPATIENT
Start: 2025-06-06 | End: 2025-06-06 | Stop reason: HOSPADM

## 2025-06-06 RX ORDER — DEXAMETHASONE SODIUM PHOSPHATE 4 MG/ML
4 INJECTION, SOLUTION INTRA-ARTICULAR; INTRALESIONAL; INTRAMUSCULAR; INTRAVENOUS; SOFT TISSUE EVERY 6 HOURS
Status: COMPLETED | OUTPATIENT
Start: 2025-06-06 | End: 2025-06-07

## 2025-06-06 RX ORDER — PHENAZOPYRIDINE HYDROCHLORIDE 200 MG/1
200 TABLET, FILM COATED ORAL ONCE
Status: COMPLETED | OUTPATIENT
Start: 2025-06-06 | End: 2025-06-06

## 2025-06-06 RX ORDER — BISMUTH SUBSALICYLATE 262 MG/1
524 TABLET, CHEWABLE ORAL
Status: DISCONTINUED | OUTPATIENT
Start: 2025-06-06 | End: 2025-06-07 | Stop reason: HOSPADM

## 2025-06-06 RX ORDER — METHOCARBAMOL 100 MG/ML
1000 INJECTION, SOLUTION INTRAMUSCULAR; INTRAVENOUS ONCE
Status: COMPLETED | OUTPATIENT
Start: 2025-06-06 | End: 2025-06-06

## 2025-06-06 RX ORDER — HYDRALAZINE HYDROCHLORIDE 20 MG/ML
5 INJECTION INTRAMUSCULAR; INTRAVENOUS
Status: DISCONTINUED | OUTPATIENT
Start: 2025-06-06 | End: 2025-06-06 | Stop reason: HOSPADM

## 2025-06-06 RX ORDER — TAMSULOSIN HYDROCHLORIDE 0.4 MG/1
0.4 CAPSULE ORAL DAILY
Qty: 7 CAPSULE | Refills: 0 | Status: SHIPPED | OUTPATIENT
Start: 2025-06-06

## 2025-06-06 RX ORDER — ALBUTEROL SULFATE 5 MG/ML
2.5 SOLUTION RESPIRATORY (INHALATION)
Status: DISCONTINUED | OUTPATIENT
Start: 2025-06-06 | End: 2025-06-06 | Stop reason: HOSPADM

## 2025-06-06 RX ORDER — ACETAMINOPHEN 500 MG
1000 TABLET ORAL ONCE
Status: COMPLETED | OUTPATIENT
Start: 2025-06-06 | End: 2025-06-06

## 2025-06-06 RX ORDER — ONDANSETRON 2 MG/ML
4 INJECTION INTRAMUSCULAR; INTRAVENOUS
Status: COMPLETED | OUTPATIENT
Start: 2025-06-06 | End: 2025-06-06

## 2025-06-06 RX ORDER — MORPHINE SULFATE 0.5 MG/ML
2 INJECTION, SOLUTION EPIDURAL; INTRATHECAL; INTRAVENOUS ONCE
Refills: 0 | Status: DISCONTINUED | OUTPATIENT
Start: 2025-06-06 | End: 2025-06-06

## 2025-06-06 RX ORDER — KETOROLAC TROMETHAMINE 15 MG/ML
15 INJECTION, SOLUTION INTRAMUSCULAR; INTRAVENOUS EVERY 6 HOURS
Status: DISCONTINUED | OUTPATIENT
Start: 2025-06-06 | End: 2025-06-07 | Stop reason: HOSPADM

## 2025-06-06 RX ORDER — LIDOCAINE HYDROCHLORIDE 20 MG/ML
INJECTION, SOLUTION EPIDURAL; INFILTRATION; INTRACAUDAL; PERINEURAL PRN
Status: DISCONTINUED | OUTPATIENT
Start: 2025-06-06 | End: 2025-06-06 | Stop reason: SURG

## 2025-06-06 RX ORDER — MEPERIDINE HYDROCHLORIDE 25 MG/ML
12.5 INJECTION INTRAMUSCULAR; INTRAVENOUS; SUBCUTANEOUS
Status: DISCONTINUED | OUTPATIENT
Start: 2025-06-06 | End: 2025-06-06 | Stop reason: HOSPADM

## 2025-06-06 RX ORDER — SCOPOLAMINE 1 MG/3D
1 PATCH, EXTENDED RELEASE TRANSDERMAL
Status: DISCONTINUED | OUTPATIENT
Start: 2025-06-06 | End: 2025-06-07 | Stop reason: HOSPADM

## 2025-06-06 RX ORDER — MORPHINE SULFATE 4 MG/ML
2 INJECTION INTRAVENOUS ONCE
Status: COMPLETED | OUTPATIENT
Start: 2025-06-06 | End: 2025-06-06

## 2025-06-06 RX ORDER — DIPHENHYDRAMINE HYDROCHLORIDE 50 MG/ML
25 INJECTION, SOLUTION INTRAMUSCULAR; INTRAVENOUS EVERY 6 HOURS PRN
Status: DISCONTINUED | OUTPATIENT
Start: 2025-06-06 | End: 2025-06-07 | Stop reason: HOSPADM

## 2025-06-06 RX ORDER — HALOPERIDOL 5 MG/ML
1 INJECTION INTRAMUSCULAR
Status: DISCONTINUED | OUTPATIENT
Start: 2025-06-06 | End: 2025-06-06 | Stop reason: HOSPADM

## 2025-06-06 RX ORDER — EPHEDRINE SULFATE 50 MG/ML
5 INJECTION, SOLUTION INTRAVENOUS
Status: DISCONTINUED | OUTPATIENT
Start: 2025-06-06 | End: 2025-06-06 | Stop reason: HOSPADM

## 2025-06-06 RX ORDER — ACETAMINOPHEN 500 MG
1000 TABLET ORAL EVERY 6 HOURS
Status: DISCONTINUED | OUTPATIENT
Start: 2025-06-06 | End: 2025-06-07 | Stop reason: HOSPADM

## 2025-06-06 RX ORDER — DEXAMETHASONE SODIUM PHOSPHATE 4 MG/ML
4 INJECTION, SOLUTION INTRA-ARTICULAR; INTRALESIONAL; INTRAMUSCULAR; INTRAVENOUS; SOFT TISSUE
Status: DISCONTINUED | OUTPATIENT
Start: 2025-06-06 | End: 2025-06-07 | Stop reason: HOSPADM

## 2025-06-06 RX ORDER — SODIUM CHLORIDE, SODIUM LACTATE, POTASSIUM CHLORIDE, CALCIUM CHLORIDE 600; 310; 30; 20 MG/100ML; MG/100ML; MG/100ML; MG/100ML
INJECTION, SOLUTION INTRAVENOUS CONTINUOUS
Status: DISCONTINUED | OUTPATIENT
Start: 2025-06-06 | End: 2025-06-06 | Stop reason: HOSPADM

## 2025-06-06 RX ORDER — OXYCODONE HCL 5 MG/5 ML
10 SOLUTION, ORAL ORAL
Status: COMPLETED | OUTPATIENT
Start: 2025-06-06 | End: 2025-06-06

## 2025-06-06 RX ADMIN — FENTANYL CITRATE 50 MCG: 50 INJECTION, SOLUTION INTRAMUSCULAR; INTRAVENOUS at 14:02

## 2025-06-06 RX ADMIN — PHENAZOPYRIDINE 200 MG: 200 TABLET ORAL at 22:43

## 2025-06-06 RX ADMIN — FENTANYL CITRATE 50 MCG: 50 INJECTION, SOLUTION INTRAMUSCULAR; INTRAVENOUS at 13:48

## 2025-06-06 RX ADMIN — FENTANYL CITRATE 25 MCG: 50 INJECTION, SOLUTION INTRAMUSCULAR; INTRAVENOUS at 14:42

## 2025-06-06 RX ADMIN — DEXAMETHASONE SODIUM PHOSPHATE 4 MG: 4 INJECTION, SOLUTION INTRAMUSCULAR; INTRAVENOUS at 18:39

## 2025-06-06 RX ADMIN — FENTANYL CITRATE 50 MCG: 50 INJECTION, SOLUTION INTRAMUSCULAR; INTRAVENOUS at 13:39

## 2025-06-06 RX ADMIN — Medication 100 MG: at 13:39

## 2025-06-06 RX ADMIN — SODIUM CHLORIDE, POTASSIUM CHLORIDE, SODIUM LACTATE AND CALCIUM CHLORIDE: 600; 310; 30; 20 INJECTION, SOLUTION INTRAVENOUS at 13:31

## 2025-06-06 RX ADMIN — ONDANSETRON 4 MG: 2 INJECTION INTRAMUSCULAR; INTRAVENOUS at 13:39

## 2025-06-06 RX ADMIN — FENTANYL CITRATE 25 MCG: 50 INJECTION, SOLUTION INTRAMUSCULAR; INTRAVENOUS at 14:38

## 2025-06-06 RX ADMIN — ACETAMINOPHEN 1000 MG: 500 TABLET ORAL at 18:39

## 2025-06-06 RX ADMIN — LIDOCAINE HYDROCHLORIDE 100 MG: 20 INJECTION, SOLUTION EPIDURAL; INFILTRATION; INTRACAUDAL; PERINEURAL at 13:39

## 2025-06-06 RX ADMIN — ACETAMINOPHEN 1000 MG: 500 TABLET ORAL at 15:31

## 2025-06-06 RX ADMIN — ONDANSETRON 4 MG: 2 INJECTION INTRAMUSCULAR; INTRAVENOUS at 15:17

## 2025-06-06 RX ADMIN — HYDROMORPHONE HYDROCHLORIDE 0.4 MG: 1 INJECTION, SOLUTION INTRAMUSCULAR; INTRAVENOUS; SUBCUTANEOUS at 15:06

## 2025-06-06 RX ADMIN — POTASSIUM CHLORIDE, DEXTROSE MONOHYDRATE AND SODIUM CHLORIDE: 150; 5; 450 INJECTION, SOLUTION INTRAVENOUS at 21:42

## 2025-06-06 RX ADMIN — PHENAZOPYRIDINE 200 MG: 200 TABLET ORAL at 15:31

## 2025-06-06 RX ADMIN — HALOPERIDOL LACTATE 1 MG: 5 INJECTION, SOLUTION INTRAMUSCULAR at 16:30

## 2025-06-06 RX ADMIN — HYDROMORPHONE HYDROCHLORIDE 0.2 MG: 1 INJECTION, SOLUTION INTRAMUSCULAR; INTRAVENOUS; SUBCUTANEOUS at 14:58

## 2025-06-06 RX ADMIN — DIPHENHYDRAMINE HYDROCHLORIDE 25 MG: 50 INJECTION, SOLUTION INTRAMUSCULAR; INTRAVENOUS at 20:07

## 2025-06-06 RX ADMIN — DEXAMETHASONE SODIUM PHOSPHATE 8 MG: 4 INJECTION INTRA-ARTICULAR; INTRALESIONAL; INTRAMUSCULAR; INTRAVENOUS; SOFT TISSUE at 13:39

## 2025-06-06 RX ADMIN — HYDRALAZINE HYDROCHLORIDE 5 MG: 20 INJECTION INTRAMUSCULAR; INTRAVENOUS at 16:10

## 2025-06-06 RX ADMIN — MORPHINE SULFATE 2 MG: 0.5 INJECTION, SOLUTION EPIDURAL; INTRATHECAL; INTRAVENOUS at 15:00

## 2025-06-06 RX ADMIN — HYDRALAZINE HYDROCHLORIDE 5 MG: 20 INJECTION INTRAMUSCULAR; INTRAVENOUS at 17:05

## 2025-06-06 RX ADMIN — FENTANYL CITRATE 50 MCG: 50 INJECTION, SOLUTION INTRAMUSCULAR; INTRAVENOUS at 14:06

## 2025-06-06 RX ADMIN — METHOCARBAMOL 1000 MG: 100 INJECTION, SOLUTION INTRAMUSCULAR; INTRAVENOUS at 15:31

## 2025-06-06 RX ADMIN — HYDRALAZINE HYDROCHLORIDE 5 MG: 20 INJECTION INTRAMUSCULAR; INTRAVENOUS at 17:17

## 2025-06-06 RX ADMIN — CEFAZOLIN 2 G: 1 INJECTION, POWDER, FOR SOLUTION INTRAMUSCULAR; INTRAVENOUS at 13:41

## 2025-06-06 RX ADMIN — OXYCODONE HYDROCHLORIDE 10 MG: 5 SOLUTION ORAL at 14:38

## 2025-06-06 RX ADMIN — HYDROMORPHONE HYDROCHLORIDE 0.4 MG: 1 INJECTION, SOLUTION INTRAMUSCULAR; INTRAVENOUS; SUBCUTANEOUS at 15:15

## 2025-06-06 RX ADMIN — MORPHINE SULFATE 2 MG: 4 INJECTION, SOLUTION INTRAMUSCULAR; INTRAVENOUS at 15:43

## 2025-06-06 RX ADMIN — HYDRALAZINE HYDROCHLORIDE 10 MG: 20 INJECTION INTRAMUSCULAR; INTRAVENOUS at 18:40

## 2025-06-06 RX ADMIN — KETOROLAC TROMETHAMINE 15 MG: 15 INJECTION, SOLUTION INTRAMUSCULAR; INTRAVENOUS at 18:38

## 2025-06-06 RX ADMIN — PROPOFOL 150 MG: 10 INJECTION, EMULSION INTRAVENOUS at 13:39

## 2025-06-06 ASSESSMENT — PAIN DESCRIPTION - PAIN TYPE
TYPE: SURGICAL PAIN

## 2025-06-06 ASSESSMENT — PATIENT HEALTH QUESTIONNAIRE - PHQ9
SUM OF ALL RESPONSES TO PHQ9 QUESTIONS 1 AND 2: 0
2. FEELING DOWN, DEPRESSED, IRRITABLE, OR HOPELESS: NOT AT ALL
1. LITTLE INTEREST OR PLEASURE IN DOING THINGS: NOT AT ALL

## 2025-06-06 ASSESSMENT — FIBROSIS 4 INDEX
FIB4 SCORE: 0.92
FIB4 SCORE: 0.92

## 2025-06-06 ASSESSMENT — PAIN SCALES - GENERAL: PAIN_LEVEL: 0

## 2025-06-06 NOTE — ANESTHESIA PREPROCEDURE EVALUATION
Case: [id number] Date/Time: 06/06/25 1245    Procedure: CYSTOSCOPY, LEFT URETEROSCOPY, LASER LITHOTRIPSY, STENT PLACEMENT    Pre-op diagnosis: LEFT RENAL STONE    Location: [facility] ROOM 25 / SURGERY SAME DAY [facility]    Surgeons: Rayne Borjas M.D.            Relevant Problems   GI   (positive) GERD (gastroesophageal reflux disease)         (positive) Nephrolithiasis      Other   (positive) Hand arthritis       Physical Exam    Airway   Mallampati: II  TM distance: >3 FB  Neck ROM: limited       Cardiovascular - normal exam  Rhythm: regular  Rate: normal    (-) murmur     Dental - normal exam           Pulmonary - normal examBreath sounds clear to auscultation     Abdominal    Neurological - normal exam                   Anesthesia Plan    ASA 2       Plan - general       Airway plan will be ETT          Induction: intravenous    Postoperative Plan: Postoperative administration of opioids is intended.    Pertinent diagnostic labs and testing reviewed    Informed Consent:    Anesthetic plan and risks discussed with patient.    Use of blood products discussed with: patient whom consented to blood products.

## 2025-06-06 NOTE — ANESTHESIA TIME REPORT
Anesthesia Start and Stop Event Times       Date Time Event    6/6/2025 1320 Ready for Procedure     1331 Anesthesia Start     1422 Anesthesia Stop          Responsible Staff  06/06/25      Name Role Begin End    James Dominguez M.D. Anesth 1331 1422          Overtime Reason:  no overtime (within assigned shift)    Comments:

## 2025-06-06 NOTE — DISCHARGE INSTRUCTIONS
If any questions arise, call your provider.  If your provider is not available, please feel free to call the Surgical Center at [phone].    MEDICATIONS: Resume taking daily medication.  Take prescribed pain medication with food.  If no medication is prescribed, you may take non-aspirin pain medication if needed.  PAIN MEDICATION CAN BE VERY CONSTIPATING.  Take a stool softener or laxative such as senokot, pericolace, or milk of magnesia if needed.    Last pain medication given at     2:38 pm: Oxycodone    3:30 pm: Tylenol and Pyridium.     What to Expect Post Anesthesia    Rest and take it easy for the first 24 hours.  A responsible adult is recommended to remain with you during that time.  It is normal to feel sleepy.  We encourage you to not do anything that requires balance, judgment or coordination.    FOR 24 HOURS DO NOT:  Drive, operate machinery or run household appliances.  Drink beer or alcoholic beverages.  Make important decisions or sign legal documents.    To avoid nausea, slowly advance diet as tolerated, avoiding spicy or greasy foods for the first day.  Add more substantial food to your diet according to your provider's instructions.  Babies can be fed formula or breast milk as soon as they are hungry.  INCREASE FLUIDS AND FIBER TO AVOID CONSTIPATION.    MILD FLU-LIKE SYMPTOMS ARE NORMAL.  YOU MAY EXPERIENCE GENERALIZED MUSCLE ACHES, THROAT IRRITATION, HEADACHE AND/OR SOME NAUSEA.

## 2025-06-06 NOTE — OR NURSING
1420: Pt arrived from OR to PACU 3. Connected to monitor. Report received from anesthesia & RN. VSS. Oxygen at 6L via mask with oral airway in place, dc'd upon arrival to PACU. Breaths calm, even, and unlabored.  No signs of pain.   1438: Medicated for pain, see MAR. Pt tolerating po sips.   1444: Updated spouse Dowain on patient status in recovery. Waiting in lobby.  1505: Medicated additionally for pain, see MAR.   1520: Spoke with Dr Dominguez regarding patient's severe pain, not under control after fentanyl, dilaudid, and oxycodone. Pt reports she has tolerated morphine well in the past and its the only thing that helped with her last kidney stone. Order received for Tylenol po and morphine; see MAR.   1525 Orders received from Dr Borjas for IV robaxin and po pyridium; entered in epic; see MAR.   1554 Spouse updated via phone call.   1558 Spouse brought to bedside, updated to status and plan of care. Patient appears more comfortable, states pain is tolerable at this time. Denies any other needs.   1615 Pt had episode of medium emesis, and urinary urgency. Patient escorted to bathroom via [place], ambulated to commode with RN assistance. Small amount of hematuria noted in urine, pain with urination. Linen change done at this time. Reports pain is 8/10 at this time, patient moaning and grimacing.   1630 Pt medicated additionally for nausea, see MAR.   1640 Dr Dominguez contacted re: patient's pain. Order received to give additional 2mg morphine, see MAR. Dr Borjas contacted regarding patient's difficulty with pain control, and nausea/vomiting. Orders received for patient to stay overnight, patient and spouse agreeable to plan.   1740 Report given to T2 EDVIN Mcconnell; verbalizes understanding and all questions answered at this time.     1754 Patient transported to T205 via [place] with portable 02 , side rails up, with all personal belongings, accompanied by EDVIN Knutson and spouse

## 2025-06-06 NOTE — DISCHARGE INSTR - OTHER INFO
What to Expect       Increased Urinary Urgency and Frequency: You may experience an increased urgency to urinate and more frequent urination than usual.   Pain or Discomfort: Some discomfort or mild pain is common, especially during urination. This can vary from person to person, with some individuals experiencing more pain than others. Pain may also be felt in the lower abdomen or groin area.   Hematuria (Blood in Urine): It's not uncommon to notice some blood in your urine (hematuria). The more active you are, the more blood you may see. You are not causing permanent damange by being active. Drink plenty of water if your urine becomes bloody.   Urinary Tract Infections (UTIs):  If you develop symptoms such as fever, chills, burning during urination, or cloudy, foul-smelling urine, contact your healthcare provider, as you may need antibiotics.   Medications: Your doctor may prescribe medications to help manage the discomfort and other symptoms associated with the stent. These may include pain relievers and medications to relax the urinary tract.   Follow-up Appointments: You will likely have scheduled follow-up appointments with your healthcare provider to monitor the stent's function and assess your symptoms. These appointments may include imaging studies, such as X-rays or ultrasounds.   Activity Restrictions: There are no activity restrictions.    Fluid Intake: Drinking plenty of water can help flush the urinary system and reduce the risk of UTIs. Your doctor may recommend increasing your fluid intake.

## 2025-06-06 NOTE — OP REPORT
SURGEON: Dr. Rayne Borjas      ANESTHESIA: General (general endotracheal tube)      PRE-OPERATIVE DIAGNOSIS: left intra-renal stone    POST-OPERATIVE DIAGNOSIS: Same      NAME OF PROCEDURE: Cystoscopy, left ureteroscopy with laser lithotripsy, stone basket extraction, and physician interpretation of fluoroscopy total time <1 hour    FINDINGS OF PROCEDURE: 5 mm stone in left lower pole fragmented and basket extracted. No residual stone fragments. No ureteral edema or trauma.     EBL: 0cc      COMPLICATIONS: None      PATIENT CONDITION: stable      INDICATIONS: Michael Larsen is a 55 y.o. female who agreed to above procedure for further management of kidney stones after complete discussion of risks, benefits, and alternatives.      PROCEDURE:     After informed consent was obtained in the preoperative care unit, the patient was taken to the OR on a stretcher. The patient was properly identified and placed in supine position per OR protocol. The patient was given a prophylactic dose of ancef 2 grams. General (general endotracheal tube) was administered. The patient was then placed in dorsal lithotomy, prepped and draped in a standard sterile fashion.  A timeout was performed with all parties in agreement.       A 22Fr rigid cystoscope was inserted per urethra. A full inspection of the bladder was completed. There were no lesions or masses, the UOs were in orthotopic position effluxing clear urine. A sensor wire was passed into the left ureteral orifice up to the level of the kidney, confirmed under fluoroscopy. A dual lumen was passed over the sensor wire into the distal ureter and a second sensor wire was passed through the dual lumen up to the level of the kidney confirmed with fluoroscopy and the dual lumen was removed.    A 11/13Fr x 28cm  ureteral access sheath was passed over one of the sensor wires and advanced to the level of the UPJ under fluoroscopic guidance. The inner lumen and one of the sensor wires  was removed. The flexible ureteroscope was assembled and advanced through the sheath to the level of the kidney under direct vision, where a thorough pyeloscopy was performed. I proceeded with laser lithotripsy. The stone basket was used to remove any large remaining stone fragments.  I performed a final pyeloscopy which was negative for any residual stone burden. I removed the ureteroscope and ureteral access sheath under direct vision to perform a final ureteral survey. No stone fragments or ureteral injuries were seen.    I drained the patient's bladder. This concluded the procedure. The patient tolerated it well and was transferred to the PACU in stable condition.        DISPOSITION: The patient will be discharged home with plan for renal US in 6 weeks.

## 2025-06-06 NOTE — ANESTHESIA PROCEDURE NOTES
Airway    Date/Time: 6/6/2025 1:39 PM    Performed by: James Dominguez M.D.  Authorized by: James Dominguez M.D.    Location:  OR  Urgency:  Elective  Indications for Airway Management:  Anesthesia      Spontaneous Ventilation: absent    Sedation Level:  Deep  Preoxygenated: Yes    Patient Position:  Sniffing  Mask Difficulty Assessment:  0 - not attempted  Final Airway Type:  Endotracheal airway  Final Endotracheal Airway:  ETT  Cuffed: Yes    Technique Used for Successful ETT Placement:  Direct laryngoscopy    Insertion Site:  Oral  Blade Type:  [place]  Laryngoscope Blade/Videolaryngoscope Blade Size:  3  ETT Size (mm):  6.5  Measured from:  Teeth  ETT to Teeth (cm):  22  Placement Verified by: auscultation and capnometry    Cormack-Lehane Classification:  Grade I - full view of glottis  Number of Attempts at Approach:  1  Ventilation Between Attempts:  None  Number of Other Approaches Attempted:  0

## 2025-06-06 NOTE — H&P
Urology History & Physical Note    Date  2025    Primary Care Physician  Chiquis Infante P.A.-C.    CC  Left kidney stone    HPI  This is a 55 y.o. female who presented with 5 mm left renal stone, here  for definitive stone management. No fever or chills. No major changes in overall health.    Past Medical History[1]    Past Surgical History[2]    Current Medications[3]    Social History     Socioeconomic History    Marital status:      Spouse name: Not on file    Number of children: Not on file    Years of education: Not on file    Highest education level: Some college, no degree   Occupational History    Not on file   Tobacco Use    Smoking status: Former     Current packs/day: 0.00     Average packs/day: 1 pack/day for 31.0 years (31.0 ttl pk-yrs)     Types: Cigarettes     Start date: 1981     Quit date: 2012     Years since quittin.1    Smokeless tobacco: Never   Vaping Use    Vaping status: Never Used   Substance and Sexual Activity    Alcohol use: Not Currently    Drug use: Yes     Frequency: 7.0 times per week     Types: Inhaled     Comment: marijuanna occ, last 3 days ago    Sexual activity: Yes     Partners: Male     Birth control/protection: None   Other Topics Concern    Not on file   Social History Narrative    Not on file     Social Drivers of Health     Financial Resource Strain: Low Risk  (6/3/2020)    Overall Financial Resource Strain (CARDIA)     Difficulty of Paying Living Expenses: Not very hard   Food Insecurity: No Food Insecurity (2024)    Hunger Vital Sign     Worried About Running Out of Food in the Last Year: Never true     Ran Out of Food in the Last Year: Never true   Transportation Needs: No Transportation Needs (2024)    PRAPARE - Transportation     Lack of Transportation (Medical): No     Lack of Transportation (Non-Medical): No   Physical Activity: Insufficiently Active (6/3/2020)    Exercise Vital Sign     Days of Exercise per Week: 3 days      Minutes of Exercise per Session: 30 min   Stress: No Stress Concern Present (6/3/2020)    [employer] - Occupational Stress Questionnaire     Feeling of Stress : Not at all   Social Connections: Socially Integrated (6/3/2020)    Social Connection and Isolation Panel [NHANES]     Frequency of Communication with Friends and Family: Three times a week     Frequency of Social Gatherings with Friends and Family: Twice a week     Attends Shinto Services: 1 to 4 times per year     Active Member of Clubs or Organizations: Yes     Attends Club or Organization Meetings: 1 to 4 times per year     Marital Status:    Intimate Partner Violence: Not At Risk (9/27/2024)    Humiliation, Afraid, Rape, and Kick questionnaire     Fear of Current or Ex-Partner: No     Emotionally Abused: No     Physically Abused: No     Sexually Abused: No   Housing Stability: Low Risk  (9/27/2024)    Housing Stability Vital Sign     Unable to Pay for Housing in the Last Year: No     Number of Times Moved in the Last Year: 1     Homeless in the Last Year: No       Family History   Problem Relation Age of Onset    Hypertension Mother     Lung Disease Mother         COPD    Heart Disease Maternal Grandmother     Lung Disease Maternal Uncle         lung cancer       Allergies  Iodine, [place], Hydrocodone, Ibuprofen, Codeine, Other food, and Penicillins    Review of Systems  Negative    Physical Exam  Constitutional:       Appearance: Normal appearance.   HENT:      Head: Normocephalic and atraumatic.      Mouth/Throat:      Mouth: Mucous membranes are moist.   Pulmonary:      Effort: Pulmonary effort is normal.   Skin:     General: Skin is warm.   Neurological:      General: No focal deficit present.      Mental Status: She is alert.   Psychiatric:         Mood and Affect: Mood normal.         Behavior: Behavior normal.         Vital Signs  Blood Pressure: (!) 171/103   Temperature: 36.2 °C (97.2 °F)   Pulse: 78  "  Respiration: 18   Pulse Oximetry: 99 %       Labs:                    Radiology:  DX-PORTABLE FLUORO > 1 HOUR    (Results Pending)   [id number] VIEW    (Results Pending)         Assessment/Plan:  Left Kidney Stone   Procedure(s):  CYSTOSCOPY, LEFT URETEROSCOPY, LASER LITHOTRIPSY, STENT PLACEMENT  NPO for OR   DC home following procedure       [1]   Past Medical History:  Diagnosis Date    Anesthesia     PONV    Anxiety     Bowel habit changes     constipation     Dental disorder     Upper and lower dentures    Dizzy     Patient reports she is \"dizzy all the time\", risk for falling, needs assistance when standing ot changing positions    Fatty liver     GERD (gastroesophageal reflux disease)     Hand arthritis 05/20/2024    Heart burn     High cholesterol     Hypertension     Indigestion     Kidney disease     kidney stones    Marijuana use 12/20/2020    \"7 times a week\"    Neck, shoulder, low back (patient relates this to kidney stones)     PONV (postoperative nausea and vomiting)     Psychiatric problem     depression    Umbilical hernia     Urinary bladder disorder     Frequent UTI's   [2]   Past Surgical History:  Procedure Laterality Date    PB TOTAL DISC ARTHROPLASTY, CERVICAL, SINGLE  09/27/2024    Procedure: C4-6 ANTERIOR PLACEMENT OF ARTIFICIAL DISC;  Surgeon: Néstor Moreno M.D.;  Location: [facility];  Service: Neurosurgery    ME CYSTOSCOPY,INSERT URETERAL STENT  12/27/2020    Procedure: CYSTOSCOPY, WITH URETERAL STENT INSERTION;  Surgeon: Sky Santos M.D.;  Location: [facility];  Service: Urology    ME CYSTO/URETERO/PYELOSCOPY, DX Left 12/27/2020    Procedure: LEFT URETEROSCOPY WITH STONE BASKETING;  Surgeon: Sky Santos M.D.;  Location: [facility];  Service: Urology    ME CYSTOSCOPY,INSERT URETERAL STENT Left 12/24/2020    Procedure: CYSTOSCOPY;  Surgeon: Ed Rosenthal M.D.;  Location: [facility];  Service: Urology    ME CYSTO/URETERO/PYELOSCOPY, DX " Left 12/24/2020    Procedure: URETEROSCOPY;  Surgeon: Ed Rosenthal M.D.;  Location: SURGERY [facility];  Service: Urology    LASERTRIPSY Left 12/24/2020    Procedure: LITHOTRIPSY, USING LASER .. .;  Surgeon: Ed Rosenthal M.D.;  Location: SURGERY [facility];  Service: Urology    CA CYSTOSCOPY,INSERT URETERAL STENT N/A 12/20/2020    Procedure: CYSTOSCOPY, WITH URETERAL STENT removal;  Surgeon: Ed Rosenthal M.D.;  Location: [facility];  Service: Urology    CA CYSTOSCOPY,INSERT URETERAL STENT Left 12/11/2020    Procedure: CYSTOSCOPY, WITH URETERAL STENT INSERTION;  Surgeon: Daniel Paulson M.D.;  Location: [facility];  Service: Urology    HYSTEROSCOPY WITH VIDEO OPERATIVE N/A 07/29/2019    Procedure: HYSTEROSCOPY, WITH VIDEO IMAGING -WITH MYOSURE;  Surgeon: Prieto Santa M.D.;  Location: SURGERY SAME DAY [facility];  Service: Obstetrics    DILATION AND CURETTAGE N/A 07/29/2019    Procedure: DILATION AND CURETTAGE;  Surgeon: Prieto Santa M.D.;  Location: SURGERY SAME DAY [facility];  Service: Obstetrics    BREAST BIOPSY  2019    LUMPECTOMY Left 02/1988    LAPAROSCOPY  04/1987    DENTAL EXTRACTION(S)     [3]   Current Facility-Administered Medications   Medication Dose Route Frequency Provider Last Rate Last Admin    lidocaine (Xylocaine) 1 % injection 0.5 mL  0.5 mL Intradermal Once PRN Rayne Borjas M.D.        lactated ringers infusion   Intravenous Continuous Rayne Borjas M.D.

## 2025-06-06 NOTE — PROGRESS NOTES
Patient needed referral to [facility] neurosurgery for who she sees for cervical radiculopathy.  I did put in a referral today June 5, 2025 in you an urgent fashion

## 2025-06-06 NOTE — ANESTHESIA POSTPROCEDURE EVALUATION
Patient: Michael Larsen    Procedure Summary       Date: 06/06/25 Room / Location: [facility] ROOM 25 / SURGERY SAME DAY [facility]    Anesthesia Start: 1331 Anesthesia Stop: 1422    Procedure: CYSTOSCOPY, LEFT URETEROSCOPY, LASER LITHOTRIPSY, (Left: Abdomen) Diagnosis: (LEFT RENAL STONE)    Surgeons: Rayne Borjas M.D. Responsible Provider: James Dominguez M.D.    Anesthesia Type: general ASA Status: 2            Final Anesthesia Type: general  Last vitals  BP   Blood Pressure: (!) 171/103    Temp   36.2 °C (97.2 °F)    Pulse   78   Resp   18    SpO2   99 %      Anesthesia Post Evaluation    Patient location during evaluation: PACU  Patient participation: complete - patient participated  Level of consciousness: awake and alert  Pain score: 0    Airway patency: patent  Anesthetic complications: no  Cardiovascular status: hemodynamically stable  Respiratory status: acceptable  Hydration status: euvolemic    PONV: none          No notable events documented.     Nurse Pain Score: 6 (NPRS)

## 2025-06-07 VITALS
WEIGHT: 195.99 LBS | DIASTOLIC BLOOD PRESSURE: 80 MMHG | BODY MASS INDEX: 32.65 KG/M2 | HEIGHT: 65 IN | OXYGEN SATURATION: 92 % | SYSTOLIC BLOOD PRESSURE: 141 MMHG | RESPIRATION RATE: 16 BRPM | HEART RATE: 83 BPM | TEMPERATURE: 99.6 F

## 2025-06-07 LAB
ANION GAP SERPL CALC-SCNC: 11 MMOL/L (ref 7–16)
BUN SERPL-MCNC: 15 MG/DL (ref 8–22)
CALCIUM SERPL-MCNC: 9.2 MG/DL (ref 8.5–10.5)
CHLORIDE SERPL-SCNC: 108 MMOL/L (ref 96–112)
CO2 SERPL-SCNC: 21 MMOL/L (ref 20–33)
CREAT SERPL-MCNC: 0.74 MG/DL (ref 0.5–1.4)
ERYTHROCYTE [DISTWIDTH] IN BLOOD BY AUTOMATED COUNT: 43.9 FL (ref 35.9–50)
GFR SERPLBLD CREATININE-BSD FMLA CKD-EPI: 94 ML/MIN/1.73 M 2
GLUCOSE SERPL-MCNC: 140 MG/DL (ref 65–99)
HCT VFR BLD AUTO: 43 % (ref 37–47)
HGB BLD-MCNC: 14.5 G/DL (ref 12–16)
MCH RBC QN AUTO: 29.5 PG (ref 27–33)
MCHC RBC AUTO-ENTMCNC: 33.7 G/DL (ref 32.2–35.5)
MCV RBC AUTO: 87.6 FL (ref 81.4–97.8)
PLATELET # BLD AUTO: 348 K/UL (ref 164–446)
PMV BLD AUTO: 9.6 FL (ref 9–12.9)
POTASSIUM SERPL-SCNC: 3.5 MMOL/L (ref 3.6–5.5)
RBC # BLD AUTO: 4.91 M/UL (ref 4.2–5.4)
SODIUM SERPL-SCNC: 140 MMOL/L (ref 135–145)
WBC # BLD AUTO: 15.2 K/UL (ref 4.8–10.8)

## 2025-06-07 PROCEDURE — 700102 HCHG RX REV CODE 250 W/ 637 OVERRIDE(OP): Performed by: STUDENT IN AN ORGANIZED HEALTH CARE EDUCATION/TRAINING PROGRAM

## 2025-06-07 PROCEDURE — 96376 TX/PRO/DX INJ SAME DRUG ADON: CPT

## 2025-06-07 PROCEDURE — 96375 TX/PRO/DX INJ NEW DRUG ADDON: CPT

## 2025-06-07 PROCEDURE — 85027 COMPLETE CBC AUTOMATED: CPT

## 2025-06-07 PROCEDURE — G0378 HOSPITAL OBSERVATION PER HR: HCPCS

## 2025-06-07 PROCEDURE — A9270 NON-COVERED ITEM OR SERVICE: HCPCS | Performed by: STUDENT IN AN ORGANIZED HEALTH CARE EDUCATION/TRAINING PROGRAM

## 2025-06-07 PROCEDURE — 700111 HCHG RX REV CODE 636 W/ 250 OVERRIDE (IP): Mod: JZ | Performed by: STUDENT IN AN ORGANIZED HEALTH CARE EDUCATION/TRAINING PROGRAM

## 2025-06-07 PROCEDURE — 80048 BASIC METABOLIC PNL TOTAL CA: CPT

## 2025-06-07 PROCEDURE — 99231 SBSQ HOSP IP/OBS SF/LOW 25: CPT | Performed by: UROLOGY

## 2025-06-07 RX ADMIN — SIMETHICONE 125 MG: 125 TABLET, CHEWABLE ORAL at 09:14

## 2025-06-07 RX ADMIN — OXYCODONE 5 MG: 5 TABLET ORAL at 02:26

## 2025-06-07 RX ADMIN — HYDROMORPHONE HYDROCHLORIDE 0.25 MG: 1 INJECTION, SOLUTION INTRAMUSCULAR; INTRAVENOUS; SUBCUTANEOUS at 09:27

## 2025-06-07 RX ADMIN — DIPHENHYDRAMINE HYDROCHLORIDE 25 MG: 50 INJECTION, SOLUTION INTRAMUSCULAR; INTRAVENOUS at 05:38

## 2025-06-07 RX ADMIN — KETOROLAC TROMETHAMINE 15 MG: 15 INJECTION, SOLUTION INTRAMUSCULAR; INTRAVENOUS at 02:25

## 2025-06-07 RX ADMIN — DEXAMETHASONE SODIUM PHOSPHATE 4 MG: 4 INJECTION, SOLUTION INTRAMUSCULAR; INTRAVENOUS at 02:26

## 2025-06-07 RX ADMIN — KETOROLAC TROMETHAMINE 15 MG: 15 INJECTION, SOLUTION INTRAMUSCULAR; INTRAVENOUS at 09:14

## 2025-06-07 RX ADMIN — HYDROMORPHONE HYDROCHLORIDE 0.25 MG: 1 INJECTION, SOLUTION INTRAMUSCULAR; INTRAVENOUS; SUBCUTANEOUS at 03:41

## 2025-06-07 RX ADMIN — DEXAMETHASONE SODIUM PHOSPHATE 4 MG: 4 INJECTION, SOLUTION INTRAMUSCULAR; INTRAVENOUS at 09:15

## 2025-06-07 ASSESSMENT — SOCIAL DETERMINANTS OF HEALTH (SDOH)
WITHIN THE PAST 12 MONTHS, YOU WORRIED THAT YOUR FOOD WOULD RUN OUT BEFORE YOU GOT THE MONEY TO BUY MORE: NEVER TRUE
WITHIN THE PAST 12 MONTHS, THE FOOD YOU BOUGHT JUST DIDN'T LAST AND YOU DIDN'T HAVE MONEY TO GET MORE: NEVER TRUE
WITHIN THE LAST YEAR, HAVE YOU BEEN HUMILIATED OR EMOTIONALLY ABUSED IN OTHER WAYS BY YOUR PARTNER OR EX-PARTNER?: NO
WITHIN THE LAST YEAR, HAVE TO BEEN RAPED OR FORCED TO HAVE ANY KIND OF SEXUAL ACTIVITY BY YOUR PARTNER OR EX-PARTNER?: NO
IN THE PAST 12 MONTHS, HAS THE ELECTRIC, GAS, OIL, OR WATER COMPANY THREATENED TO SHUT OFF SERVICE IN YOUR HOME?: NO
WITHIN THE LAST YEAR, HAVE YOU BEEN KICKED, HIT, SLAPPED, OR OTHERWISE PHYSICALLY HURT BY YOUR PARTNER OR EX-PARTNER?: NO
WITHIN THE LAST YEAR, HAVE YOU BEEN AFRAID OF YOUR PARTNER OR EX-PARTNER?: NO

## 2025-06-07 ASSESSMENT — LIFESTYLE VARIABLES
ON A TYPICAL DAY WHEN YOU DRINK ALCOHOL HOW MANY DRINKS DO YOU HAVE: 0
HAVE YOU EVER FELT YOU SHOULD CUT DOWN ON YOUR DRINKING: NO
HOW MANY TIMES IN THE PAST YEAR HAVE YOU HAD 5 OR MORE DRINKS IN A DAY: 0
AVERAGE NUMBER OF DAYS PER WEEK YOU HAVE A DRINK CONTAINING ALCOHOL: 0
EVER HAD A DRINK FIRST THING IN THE MORNING TO STEADY YOUR NERVES TO GET RID OF A HANGOVER: NO
ALCOHOL_USE: NO
TOTAL SCORE: 0
CONSUMPTION TOTAL: NEGATIVE
TOTAL SCORE: 0
EVER FELT BAD OR GUILTY ABOUT YOUR DRINKING: NO
TOTAL SCORE: 0
HAVE PEOPLE ANNOYED YOU BY CRITICIZING YOUR DRINKING: NO

## 2025-06-07 ASSESSMENT — COGNITIVE AND FUNCTIONAL STATUS - GENERAL
DAILY ACTIVITIY SCORE: 24
SUGGESTED CMS G CODE MODIFIER MOBILITY: CH
MOBILITY SCORE: 24
SUGGESTED CMS G CODE MODIFIER DAILY ACTIVITY: CH

## 2025-06-07 ASSESSMENT — ENCOUNTER SYMPTOMS
VOMITING: 1
FLANK PAIN: 1
NAUSEA: 1

## 2025-06-07 ASSESSMENT — PAIN DESCRIPTION - PAIN TYPE
TYPE: SURGICAL PAIN
TYPE: ACUTE PAIN;SURGICAL PAIN
TYPE: ACUTE PAIN;SURGICAL PAIN
TYPE: SURGICAL PAIN
TYPE: ACUTE PAIN

## 2025-06-07 NOTE — PROGRESS NOTES
Surgical Progress Note    Author: Néstor Guadalupe M.D. Date & Time created: 2025   7:33 AM     Interval Events:  Postoperative day 1 from left ureteroscopic stone lasering and extraction.  No ureteral stent was placed.  Patient had pain nausea and vomiting overnight.  She had Dilaudid at 3 AM.  Currently her plan is significantly improved.  She is tolerating eating some oatmeal and half a banana this morning without nausea or vomiting.  She feels if this is stable she would like to be discharged.    Review of Systems   Gastrointestinal:  Positive for nausea and vomiting.   Genitourinary:  Positive for flank pain.   All other systems reviewed and are negative.    Hemodynamics:  Temp (24hrs), Av.7 °C (98 °F), Min:36.1 °C (96.9 °F), Max:37.6 °C (99.6 °F)  Temperature: 37.6 °C (99.6 °F)  Pulse  Av.5  Min: 60  Max: 98   Blood Pressure: (!) 141/80     Respiratory:    Respiration: 16, Pulse Oximetry: 92 %           Neuro:  GCS       Fluids:    Intake/Output Summary (Last 24 hours) at 2025 0733  Last data filed at 2025 0103  Gross per 24 hour   Intake 400 ml   Output 150 ml   Net 250 ml     Weight: 88.9 kg (195 lb 15.8 oz)  Current Diet Order   Procedures    Diet Order Diet: Clear Liquid     Physical Exam  Pulmonary:      Effort: Pulmonary effort is normal.   Abdominal:      General: Abdomen is flat.      Palpations: Abdomen is soft.      Tenderness: There is left CVA tenderness.       Labs:  Recent Results (from the past 24 hours)   Histology Request    Collection Time: 25  2:13 PM   Result Value Ref Range    Pathology Request Sent to Histo    CBC without Differential    Collection Time: 25  5:26 AM   Result Value Ref Range    WBC 15.2 (H) 4.8 - 10.8 K/uL    RBC 4.91 4.20 - 5.40 M/uL    Hemoglobin 14.5 12.0 - 16.0 g/dL    Hematocrit 43.0 37.0 - 47.0 %    MCV 87.6 81.4 - 97.8 fL    MCH 29.5 27.0 - 33.0 pg    MCHC 33.7 32.2 - 35.5 g/dL    RDW 43.9 35.9 - 50.0 fL    Platelet Count 348 164  - 446 K/uL    MPV 9.6 9.0 - 12.9 fL   Basic Metabolic Panel    Collection Time: 06/07/25  5:26 AM   Result Value Ref Range    Sodium 140 135 - 145 mmol/L    Potassium 3.5 (L) 3.6 - 5.5 mmol/L    Chloride 108 96 - 112 mmol/L    Co2 21 20 - 33 mmol/L    Glucose 140 (H) 65 - 99 mg/dL    Bun 15 8 - 22 mg/dL    Creatinine 0.74 0.50 - 1.40 mg/dL    Calcium 9.2 8.5 - 10.5 mg/dL    Anion Gap 11.0 7.0 - 16.0   ESTIMATED GFR    Collection Time: 06/07/25  5:26 AM   Result Value Ref Range    GFR (CKD-EPI) 94 >60 mL/min/1.73 m 2     Medical Decision Making, by Problem:  Active Hospital Problems    Diagnosis     Nephrolithiasis [N20.0]      Plan:  Will monitor to make sure she has no more vomiting for the next couple hours and then proceed with discharge.  The patient is in full agreement with this plan.  She is already picked up their medicines from the pharmacy.    Discussed patient condition with RN and Patient    Néstor Guadalupe M.D., F.A.C.S.  Urologic Oncology  Vice-Chair, Department of Surgery  Former[facility]

## 2025-06-07 NOTE — PROGRESS NOTES
Report received from PACU RN. Assume care. Pt is AAOx4.  BP.>190 -Hydralazine given, otherwise other  VSS. C/o pain, but back to asleep, able to void in the commode,  All question answered. Pt has call light within reach,  bed is in the lowest position. Pt has no other needs at this time.

## 2025-06-07 NOTE — PROGRESS NOTES
Received report from Enedina PARDO. Assumed care of pt. Call light in reach. Bed in lowest position. Plan of care discussed with pt. Pt agreeing to plan of care. Communication board updated.

## 2025-06-07 NOTE — CARE PLAN
The patient is Stable - Low risk of patient condition declining or worsening    Shift Goals  Clinical Goals: n/v control, post op surgical care, wean off O2, pain control  Patient Goals: sleep, go home  Family Goals: OLAMIDE    Progress made toward(s) clinical / shift goals:    Problem: Pain - Post Surgery  Goal: Alleviation or reduction of pain post surgery  Description: Target End Date:  5/7/2025  1.  Pain assessed q2 hours for 24 hours, then q4 hours during use of PCA2.  Transition to oral medications as appropriate3.  Epidural assessment if applicable4.  Ice to surgical site per order  Outcome: Progressing     Problem: Respiratory  Goal: Patient will achieve/maintain optimum respiratory ventilation and gas exchange  Description: Target End Date:  5/7/2025  1.  Assess and monitor rate, rhythm, depth and effort of respiration2.  Breath sounds assessed qshift and/or as needed3.  Assess O2 saturation, administer/titrate oxygen as ordered4.  Position patient for maximum ventilatory efficiency5.  Turn, cough, and deep breath with splinting to improve effectiveness6.  Collaborate with RT to administer medication/treatments per order7.  Encourage use of incentive spirometer and encourage patient to cough after use and utilize splinting techniques if applicable8.  Airway suctioning9.  Monitor sputum production for changes in color, consistency and [id number]. Perform frequent oral [id number]. Alternate physical activity with rest periods  Outcome: Progressing

## 2025-06-07 NOTE — PROGRESS NOTES
Report received from Belen RN. Assume care. Pt is AAOx4.  Assessment completed. VSS. Denies pain, fully ambulatory, Pt was update for the care for the day. White board updated, All question answered. Pt has call light within reach,  bed is in the lowest position. Pt has no other needs at this time.

## 2025-06-07 NOTE — PROGRESS NOTES
4 Eyes Skin Assessment Completed by EDVIN Rowe and EDVIN Wharton.    Skin assessment is primarily focused on high risk bony prominences. Pay special attention to skin beneath and around medical devices, high risk bony prominences, skin to skin areas and areas where the patient lacks sensation to feel pain and areas where the patient previously had breakdown.     Head (Occipital):  WDL   Ears (Under Medical Devices): WDL   Nose (Under Medical Devices): WDL   Mouth:  WDL   Neck: WDL   Breast/Chest:  WDL   Shoulder Blades:  WDL   Spine:   WDL   (R) Arm/Elbow/Hand: WDL   (L) Arm/Elbow/Hand: WDL   Abdomen: WDL   Pannus/Groin:  WDL   Sacrum/Coccyx:   WDL   (R) Ischial Tuberosity (Sit Bones):  WDL   (L) Ischial Tuberosity (Sit Bones):  WDL   (R) Leg:  WDL   (L) Leg:  WDL   (R) Heel:  WDL   (R) Foot/Toe: WDL   (L) Heel: WDL   (L) Foot/Toe:  WDL       DEVICES IN USE:   Respiratory Devices:  Pulse ox  Feeding Devices:  N/A   Lines & BP Monitoring Devices:  Peripheral IV and Pulse ox    Orthopedic Devices:  N/A  Miscellaneous Devices:  N/A    PROTOCOL INTERVENTIONS:   Standard/Trauma Bed:  Already in place    WOUND PHOTOS:   N/A no wounds identified    WOUND CONSULT:   N/A, no advanced wound care needs identified

## 2025-06-07 NOTE — PROGRESS NOTES
Michael Larsen has been provided discharge instructions, to include follow up care, home medications, and activity/diet reviewed. Copy of discharge instructions in patient chart, signed and reviewed. Patient verbalizes the understanding of the discharge instructions. PIV removed, tip intact, pressure dressing applied. Arm band removed. Patient did not have home meds during admit. Questions and concerns addressed prior to leaving the discharge lounge. Transported via car, accompanied by family. Patient discharge to home.

## 2025-06-10 LAB
APPEARANCE STONE: NORMAL
COMPN STONE: NORMAL
SPECIMEN WT: 15 MG

## 2025-07-08 ENCOUNTER — APPOINTMENT (OUTPATIENT)
Dept: UROLOGY | Facility: MEDICAL CENTER | Age: 55
End: 2025-07-08
Payer: COMMERCIAL

## 2025-07-11 ENCOUNTER — APPOINTMENT (OUTPATIENT)
Dept: RADIOLOGY | Facility: MEDICAL CENTER | Age: 55
End: 2025-07-11
Attending: STUDENT IN AN ORGANIZED HEALTH CARE EDUCATION/TRAINING PROGRAM
Payer: COMMERCIAL

## 2025-07-11 DIAGNOSIS — N20.0 NEPHROLITHIASIS: ICD-10-CM

## 2025-07-11 PROCEDURE — 76775 US EXAM ABDO BACK WALL LIM: CPT

## 2025-07-16 DIAGNOSIS — N20.0 KIDNEY STONES: Primary | ICD-10-CM

## 2025-07-22 ENCOUNTER — OFFICE VISIT (OUTPATIENT)
Dept: UROLOGY | Facility: MEDICAL CENTER | Age: 55
End: 2025-07-22
Payer: COMMERCIAL

## 2025-07-22 DIAGNOSIS — N20.0 KIDNEY STONES: Primary | ICD-10-CM

## 2025-07-22 DIAGNOSIS — R39.15 URINARY URGENCY: ICD-10-CM

## 2025-07-22 DIAGNOSIS — N32.81 OVERACTIVE BLADDER: ICD-10-CM

## 2025-07-22 PROCEDURE — 99214 OFFICE O/P EST MOD 30 MIN: CPT | Performed by: PHYSICIAN ASSISTANT

## 2025-07-22 RX ORDER — MIRABEGRON 25 MG/1
25 TABLET, FILM COATED, EXTENDED RELEASE ORAL DAILY
Qty: 30 TABLET | Refills: 2 | Status: SHIPPED | OUTPATIENT
Start: 2025-07-22

## 2025-07-22 NOTE — PROGRESS NOTES
Reason for visit:   Kidney stones    HPI   55 y.o. female presenting for review of kidney stones. The patient is s/p cystoscopy, left ureteroscopy with laser lithotripsy and stone basket extraction with Dr Borjas on 6/6/25.     The patient had a post-op US KUB on 7/11/25 that was a normal scan. The patient reports that she is doing well from a stone standpoint and denies any abdominal/flank pain.     She does continue to report on going SP pressure, urgency and nocturia x4. We had discussed this at our last visit together and she had decided to work on limiting bladder irritants and working on nocturia guidelines. She continues to drink about 48 ounces of water daily, lemonade and 1 cup of black tea in AM. These symptoms do not interfere with her quality of life, but they are bothersome.     She denies any gross hematuria or dysuria.    She presents with her , Jazmine.    6/10/25 Calculi analysis:   Calculi Composition See Note See Note CM See Note CM   Comment: Calculi composed primarily of:  70% calcium oxalate monohydrate, and  30% calcium oxalate dihydrate.       4/4/25 Office cystoscopy:  SOCORRO Larsen is a 55 y.o. female presenting for a diagnostic flexible cystoscopy. I last saw the patient in the office on 3/12/25 regarding gross hematuria, and I recommended a CT U and cystoscopy. The patient had a CT U on 3/25/25 and showed a 4 mm LEFT intra-renal stone, but was otherwise normal.     The patient denies any changes to her health since I last saw her. She does report some chronic issues with bladder pressure and discomfort. Additionally she reports some urinary urgency.      She mentions that she would like to proceed with stone removal surgery for her 4-5 mm stone. She understands that she could consider surveillance, but she has had such bad experience with stones that she would prefer to proceed with surgical removal for peace of mind.      3/12/25 OV:  SOCORRO Larsen is a 55  y.o. female with PMH of kidney stones, presenting to \[facility]\"" care with [facility]. The patient was previously followed by [facility] and was last seen on 4/14/23.       At the beginning of February, she developed severe bilateral flank pain and gross hematuria. She presented to her PCP office for evaluation and they ordered an US kidney and urinalysis. POCT urinalysis showed trace blood and no evidence of infection. US kidneys and bladder showed a 5 mm upper pole renal stone.      She continues to have visible blood in the urine. She last noticed blood in the urine 3 days ago. She does have ongoing bilateral flank discomfort and just doesn't feel right.      She denies a history of recurrent UTIs. Her brother has a history of kidney stones, she denies any family history of  CA. She is a previous pack per day cigarette smoker since she was 12, but quit in 2012. She denies any gross hematuria today. She is having some dysuria today and this started a couple months ago.      PVR: 20 mL     4/14/23 OV with Henry Sanderson PA-C:  HPI Kidney/Ureteral stones Reported by patient. Chief Complaint: the patient presents today for a return visit regarding a history of kidney stones  Alleviating factors: in the past the patient has been treated with ureteroscopy and with last treatment 12/2020  Associated Symptoms current symptoms include; no flank pain; no lower back pain; no abdominal pain; no dysuria; no urgency; no hematuria; no fevers; frequency ; nocturia x2 at most ; occasional urethral pains as if she is peeing nails  Stone Prevention Treatment family history with their , ( brother ); prior stone composition is Calcium Oxalate  Context: since the last visit there has not been interval stone passage; the last stone event was 12/2020; the most recent imaging was completed 4/14/23; the most recent imaging revealed no stones  Modifying Factors: the patient is using the following for prevention; increased  fluids; lemon therapy Notes: Urolithiasis Timeline  Imagin23 VALERIE=no stones 22 KUB=no stones 20 CT abd/pel without=7 mm left UVJ stone, left renal stones up to 6 mm 20 CT abd/pel without=left stent in place with 9 mm left renal stone 20 CT abd/pel without=9 mm left UPJ stone with mild hydro  Labs: 20 stone analysis=Ca Ox  Procedures: 2020 left CULTS    Urologic PMH:    Kidney stones      Family History:   Brother -- kidney stones     There were no vitals filed for this visit.      Physical Exam:   General:  Alert & Oriented, NAD     Assessment and plan:     Kidney stones    -- We reviewed results of US KUB -- WNL  -- Calculi analysis reviewed   -- Continue with good daily water intake of at least 2L  -- Stone dietary advice reviewed  -- Repeat US in 6 months for stone surveillance -- Dr Borjas previously ordered  -- Follow-up in 6 months to review updated US KUB    OAB    -- Continue behavioral options for management of OAB symptoms including: timed voiding, good day time fluid management, nocturia guidelines and elimination of all bladder irritants in diet  -- We also discussed the option to trial a medication for OAB symptoms, specifically discussing Trospium (anticholinergic) and Myrbetriq (Beta agonist) and highlighting that there is a black box warning for the development of dementia in most anticholinergics, but Trospium is safe   -- We reviewed that all OAB medications have side effects that include: dry mouth, dry eyes, constipation and urinary retention   -- We very briefly touched on procedural options for treatment of OAB -- including PTNS and intravesical botox, but I would recommend behavioral management prior to considering an invasive treatment option   -- After discussion, the patient has elected to trial myrbetriq 25 mg daily for symptoms  -- Follow-up in 6 months for review as I will be on maternity leave and she does not wish to see another provider -- she  will message me via [employer] in a few months if there are any issues with medications    I personally reviewed the patient's pertinent medical records and labs/images available to me.     Aliya Arevalo PA-C

## 2025-07-22 NOTE — PATIENT INSTRUCTIONS
General diet advice for stone formers:   1) Adequate fluid intake of 2 L (68 ounces) daily -- mostly water    2) Avoid adding salt to your foods and choose foods that have less than 40mg of salt per serving   3) Avoid excessive animal protein (no more than 6 ounces daily)   4) Incorporate citrus into fluids (lemon or lime slices/juice)    5) Maintain normal dairy intake      Nighttime Urination (Nocturia) Guidelines:     Try these recommendations for at least 14 days to see if they are helpful:     Avoid all fluids with caffeine for 6 - 8 hours before bedtime.   Avoid all alcoholic beverages for 6 - 8 hours before bedtime.   Do not drink ANY fluids for 3 hours before bedtime (a small sip of fluid with nighttime medications is okay).   Do not have any high liquid content food for 3 hours before bedtime (ex. Fruits like grapes and melons, Jello, ice cream and yogurt).   Do not drink fluid during the night.   Completely empty your bladder before bedtime.

## 2025-07-24 ENCOUNTER — OFFICE VISIT (OUTPATIENT)
Dept: MEDICAL GROUP | Age: 55
End: 2025-07-24
Payer: COMMERCIAL

## 2025-07-24 VITALS
HEART RATE: 83 BPM | HEIGHT: 65 IN | BODY MASS INDEX: 33.49 KG/M2 | WEIGHT: 201 LBS | DIASTOLIC BLOOD PRESSURE: 82 MMHG | TEMPERATURE: 97.3 F | SYSTOLIC BLOOD PRESSURE: 138 MMHG | OXYGEN SATURATION: 98 %

## 2025-07-24 DIAGNOSIS — Z00.00 ANNUAL PHYSICAL EXAM: Primary | ICD-10-CM

## 2025-07-24 DIAGNOSIS — B35.1 ONYCHOMYCOSIS: ICD-10-CM

## 2025-07-24 DIAGNOSIS — M48.02 CERVICAL STENOSIS OF SPINAL CANAL: ICD-10-CM

## 2025-07-24 DIAGNOSIS — D72.829 LEUKOCYTOSIS, UNSPECIFIED TYPE: ICD-10-CM

## 2025-07-24 DIAGNOSIS — R73.09 ELEVATED GLUCOSE: ICD-10-CM

## 2025-07-24 DIAGNOSIS — Z13.220 SCREENING FOR LIPID DISORDERS: ICD-10-CM

## 2025-07-24 DIAGNOSIS — Z13.21 ENCOUNTER FOR VITAMIN DEFICIENCY SCREENING: ICD-10-CM

## 2025-07-24 DIAGNOSIS — N20.0 KIDNEY STONE ON LEFT SIDE: ICD-10-CM

## 2025-07-24 DIAGNOSIS — R42 VERTIGO: ICD-10-CM

## 2025-07-24 DIAGNOSIS — R32 URINARY INCONTINENCE, UNSPECIFIED TYPE: ICD-10-CM

## 2025-07-24 DIAGNOSIS — R53.83 OTHER FATIGUE: ICD-10-CM

## 2025-07-24 PROCEDURE — 99396 PREV VISIT EST AGE 40-64: CPT | Performed by: PHYSICIAN ASSISTANT

## 2025-07-24 PROCEDURE — 3075F SYST BP GE 130 - 139MM HG: CPT | Performed by: PHYSICIAN ASSISTANT

## 2025-07-24 PROCEDURE — 3079F DIAST BP 80-89 MM HG: CPT | Performed by: PHYSICIAN ASSISTANT

## 2025-07-24 RX ORDER — MECLIZINE HCL 12.5 MG 12.5 MG/1
12.5 TABLET ORAL 3 TIMES DAILY PRN
Qty: 30 TABLET | Refills: 0 | Status: SHIPPED | OUTPATIENT
Start: 2025-07-24

## 2025-07-24 ASSESSMENT — FIBROSIS 4 INDEX: FIB4 SCORE: 0.92

## 2025-07-24 NOTE — PROGRESS NOTES
Subjective:     History of Present Illness  The patient presents for an annual exam.    She has been experiencing frequent urination, including four times during the night. This issue has been discussed with her urologist, Dr. Jeet Martinez, who prescribed mirabegron. However, she has not yet started this medication. She is also avoiding steroids. She is currently using estradiol vaginal cream and has discontinued duloxetine.    She acknowledges her weight is affecting her health. She finds it difficult to go to bed hungry at night and reports a lack of willpower. She is aware that she has a higher body weight and that this is impacting her health. She is considering changing her diet and increasing her physical activity.    She has been dealing with a toenail issue for over a year. The last doctor she saw prescribed a medication that turned her toenail yellow. The nail starts to push up like a tent. She has been trimming and filing the nail as it grows. She used to wear poor quality nail polish, which caused the nail to lose its shine. She had an accident with this toe when she was a child, resulting in the loss of the nail and a fractured toe.    She has been experiencing dizziness and vertigo for a couple of years, which prevents her from lying on her right side. This has made her a fall risk. She is interested in seeing an ENT specialist.    She has been told not to look down a lot because of her neck. She can not do anything with her right arm. She has an appointment with a surgeon on 08/07/2025 to discuss her MRI results for cervical stenosis. She experiences monthly hand pain, lacks strength in her arm, and it hurts and is stiffened. Despite six months of physical therapy, there has been no improvement. She has been off work for three years due to these issues.    She had a metabolic panel done for Dr. Gomez, which showed low potassium and high blood sugar levels. Her white blood cell count was elevated but  has since trended down. She is unsure if she has a history of elevated cholesterol or low vitamin D. She takes vitamin D3 daily. She has a blood pressure machine at home and her readings are usually normal.    She had a 5 mm kidney stone on the left side, which was removed via lithotripsy by Dr. Li. She had a follow-up two days ago and an ultrasound was performed, which showed no more stones. They want to see her in six months. She found out from that surgery that she can not take OxyContin as it caused her to vomit and raised her blood pressure, leading to an overnight stay in the hospital.    Social History:  Occupations: [employer]  Living Condition: She lives in [place] and helps take care of her mother-in-law who has dementia. [place].    PAST SURGICAL HISTORY:  - Lithotripsy for kidney stone on the left side  - Childhood accident resulting in loss of toenail and fractured toe    FAMILY HISTORY  Her sister-in-law lost 100 pounds due to kidney issues and the need for dialysis. Her brother is a severe diabetic and also lost over 100 pounds.      Current medicines (including changes today)  Current Medications[1]  She  has a past medical history of Anesthesia, Anxiety, Bowel habit changes, Dental disorder, Dizzy, Fatty liver, GERD (gastroesophageal reflux disease), Hand arthritis (05/20/2024), Heart burn, High cholesterol, Hypertension, Indigestion, Kidney disease, Marijuana use (12/20/2020), Neck, shoulder, low back (patient relates this to kidney stones), PONV (postoperative nausea and vomiting), Psychiatric problem, Umbilical hernia, and Urinary bladder disorder.    She has no past medical history of Anemia, Blood transfusion without reported diagnosis, Clotting disorder ([facility]), COPD (chronic obstructive pulmonary disease) ([facility]), Depression, HIV (human immunodeficiency virus infection) ([facility]), Hyperlipidemia, or Substance abuse ([facility]).    ROS   No chest  "pain, no shortness of breath, no abdominal pain  Positive ROS as per HPI.  All other systems reviewed and are negative.     Objective:     /82 (BP Location: Left arm, Patient Position: Sitting, BP Cuff Size: Adult)   Pulse 83   Temp 36.3 °C (97.3 °F) (Temporal)   Ht 1.651 m (5' 5\")   Wt 91.2 kg (201 lb)   SpO2 98%  Body mass index is 33.45 kg/m².   Physical Exam  Respiratory: Clear to auscultation, no wheezing, rales or rhonchi  Cardiovascular: Regular rate and rhythm, no murmurs, rubs, or gallops  Other: Toenail shows signs of trauma or slight onychomycosis, with good growth underneath  Constitutional: Alert, no distress.  Skin: Warm, dry, good turgor, no rashes in visible areas.  Eye: Equal, round and reactive, conjunctiva clear, lids normal.  ENMT: Lips without lesions, good dentition, oropharynx clear.  Neck: Trachea midline, no masses, no thyromegaly. No cervical or supraclavicular lymphadenopathy  Respiratory: Unlabored respiratory effort, lungs clear to auscultation, no wheezes, no ronchi.  Cardiovascular: Normal S1, S2, no murmur, no edema.  Abdomen: Soft, non-tender, no masses, no hepatosplenomegaly.  Psych: Alert and oriented x3, normal affect and mood.      Results  Labs   - Metabolic Panel: Potassium was slightly low. Blood sugar was high at 140.   - Complete Blood Count (CBC): White blood cell count was elevated.    Imaging   - Ultrasound of the left kidney: No more kidney stones.        Assessment and Plan:   The following treatment plan was discussed    Assessment & Plan  1. Urinary incontinence.  - Reports frequent urination, including 4 times per night.  - Mirabegron was prescribed by her urologist, but she has not yet picked it up.  - Weight loss was discussed as a primary intervention to improve incontinence.  - If the medication is too expensive, she will contact her urologist for an alternative.    2. Obesity.  - Weight loss was emphasized as crucial for improving urinary incontinence " and overall health.  - The potential use of Ozempic or Zepbound was discussed, but she expressed concerns about the cost.  - Advised to consider lifestyle changes, including dietary modifications and increased physical activity.  - Labs will be ordered to check for diabetes and other metabolic conditions.    3. Onychomycosis.  - The condition appears to be improving as the new nail growth looks healthy.  - Terbinafine was discussed as a treatment option, but she decided to postpone this for 6 months.  - If there is no improvement after this period, she will inform us and the medication will be prescribed.  - Monitoring the nail growth and trimming as needed.    4. Benign paroxysmal positional vertigo.  - Symptoms suggest benign paroxysmal positional vertigo.  - Referral to an ENT specialist will be made for further evaluation.  - The prescription for meclizine will be held off until after the ENT consultation.  - Discussed the potential benefits of vestibular rehabilitation.    5. Cervical stenosis.  - Experiences pain and stiffness in her neck and right arm, which has not improved with 6 months of physical therapy.  - An MRI has been conducted, and she will discuss the results with her surgeon on 08/07/2025.  - Continues to experience limitations in daily activities due to the condition.  - Awaiting further recommendations from the surgeon.    6. Health maintenance.  - Blood pressure readings today are slightly elevated but not concerning.  - Care gaps are up to date.  - A comprehensive set of labs will be ordered, including cholesterol, sugars, liver function, kidney function, CBC, metabolic panel, vitamin D, and thyroid function tests.  - The pneumonia vaccine was offered but declined at this time.  - Discussed the importance of regular monitoring and follow-up.      ORDERS:  1. Annual physical exam (Primary)    - Comp Metabolic Panel; Future  - LIPID PANEL  - CBC WITH DIFFERENTIAL; Future  - TSH WITH REFLEX TO  FT4; Future  - VITAMIN D 25-HYDROXY  - HEMOGLOBIN A1C; Future    2. Kidney stone on left side      3. Urinary incontinence, unspecified type      4. Cervical stenosis of spinal canal      5. Leukocytosis, unspecified type    - CBC WITH DIFFERENTIAL; Future    6. Elevated glucose    - Comp Metabolic Panel; Future  - HEMOGLOBIN A1C; Future    7. Other fatigue    - TSH WITH REFLEX TO FT4; Future    8. Screening for lipid disorders    - LIPID PANEL    9. Encounter for vitamin deficiency screening    - VITAMIN D 25-HYDROXY    10. Onychomycosis      11. Vertigo    - Referral to ENT  - meclizine (ANTIVERT) 12.5 MG Tab; Take 1 Tablet by mouth 3 times a day as needed for Dizziness or Nausea/Vomiting.  Dispense: 30 Tablet; Refill: 0      Anticipatory guidance discussed at length including regular daily exercise with a goal of 150 minutes a week.  Recommendation to eat the Mediterranean diet to decrease cardiovascular risk.  Encouraged avoiding high fructose corn syrup and other simple sugars to reduce risk of obesity and type 2 diabetes.    Follow Up: 12 weeks    Please note that this dictation was created using voice recognition software. I have made every reasonable attempt to correct obvious errors, but I expect that there are errors of grammar and possibly content that I did not discover before finalizing the note.      Attestation      Verbal consent was acquired by the patient to use [employer] ambient listening note generation during this visit Yes                  [1]   Current Outpatient Medications   Medication Sig Dispense Refill    meclizine (ANTIVERT) 12.5 MG Tab Take 1 Tablet by mouth 3 times a day as needed for Dizziness or Nausea/Vomiting. 30 Tablet 0    mirabegron ER (MYRBETRIQ) 25 MG TABLET SR 24 HR Take 1 Tablet by mouth every day. 30 Tablet 2    Cholecalciferol (VITAMIN D3 PO) Take  by mouth. Patient reports taking one gummy po daily      estradiol (ESTRACE) 0.1 MG/GM vaginal cream Apply a pea sized  amount to urethra/vaginal opening every night for 1 week. After the first week you may use nightly three times per week (M/W/F) 42.5 g 2     No current facility-administered medications for this visit.

## 2025-07-30 ENCOUNTER — HOSPITAL ENCOUNTER (OUTPATIENT)
Dept: LAB | Facility: MEDICAL CENTER | Age: 55
End: 2025-07-30
Attending: PHYSICIAN ASSISTANT
Payer: COMMERCIAL

## 2025-07-30 DIAGNOSIS — D72.829 LEUKOCYTOSIS, UNSPECIFIED TYPE: ICD-10-CM

## 2025-07-30 DIAGNOSIS — R73.09 ELEVATED GLUCOSE: ICD-10-CM

## 2025-07-30 DIAGNOSIS — R53.83 OTHER FATIGUE: ICD-10-CM

## 2025-07-30 DIAGNOSIS — Z00.00 ANNUAL PHYSICAL EXAM: ICD-10-CM

## 2025-07-30 LAB
25(OH)D3 SERPL-MCNC: 55 NG/ML (ref 30–100)
ALBUMIN SERPL BCP-MCNC: 4.1 G/DL (ref 3.2–4.9)
ALBUMIN/GLOB SERPL: 1.4 G/DL
ALP SERPL-CCNC: 107 U/L (ref 30–99)
ALT SERPL-CCNC: 13 U/L (ref 2–50)
ANION GAP SERPL CALC-SCNC: 9 MMOL/L (ref 7–16)
AST SERPL-CCNC: 21 U/L (ref 12–45)
BASOPHILS # BLD AUTO: 0.6 % (ref 0–1.8)
BASOPHILS # BLD: 0.05 K/UL (ref 0–0.12)
BILIRUB SERPL-MCNC: 0.8 MG/DL (ref 0.1–1.5)
BUN SERPL-MCNC: 13 MG/DL (ref 8–22)
CALCIUM ALBUM COR SERPL-MCNC: 9.4 MG/DL (ref 8.5–10.5)
CALCIUM SERPL-MCNC: 9.5 MG/DL (ref 8.5–10.5)
CHLORIDE SERPL-SCNC: 107 MMOL/L (ref 96–112)
CHOLEST SERPL-MCNC: 200 MG/DL (ref 100–199)
CO2 SERPL-SCNC: 26 MMOL/L (ref 20–33)
CREAT SERPL-MCNC: 0.73 MG/DL (ref 0.5–1.4)
EOSINOPHIL # BLD AUTO: 0.26 K/UL (ref 0–0.51)
EOSINOPHIL NFR BLD: 2.9 % (ref 0–6.9)
ERYTHROCYTE [DISTWIDTH] IN BLOOD BY AUTOMATED COUNT: 45.2 FL (ref 35.9–50)
EST. AVERAGE GLUCOSE BLD GHB EST-MCNC: 117 MG/DL
GFR SERPLBLD CREATININE-BSD FMLA CKD-EPI: 96 ML/MIN/1.73 M 2
GLOBULIN SER CALC-MCNC: 2.9 G/DL (ref 1.9–3.5)
GLUCOSE SERPL-MCNC: 98 MG/DL (ref 65–99)
HBA1C MFR BLD: 5.7 % (ref 4–5.6)
HCT VFR BLD AUTO: 45.8 % (ref 37–47)
HDLC SERPL-MCNC: 46 MG/DL
HGB BLD-MCNC: 14.5 G/DL (ref 12–16)
IMM GRANULOCYTES # BLD AUTO: 0.02 K/UL (ref 0–0.11)
IMM GRANULOCYTES NFR BLD AUTO: 0.2 % (ref 0–0.9)
LDLC SERPL CALC-MCNC: 133 MG/DL
LYMPHOCYTES # BLD AUTO: 3.18 K/UL (ref 1–4.8)
LYMPHOCYTES NFR BLD: 35.5 % (ref 22–41)
MCH RBC QN AUTO: 28.7 PG (ref 27–33)
MCHC RBC AUTO-ENTMCNC: 31.7 G/DL (ref 32.2–35.5)
MCV RBC AUTO: 90.7 FL (ref 81.4–97.8)
MONOCYTES # BLD AUTO: 0.6 K/UL (ref 0–0.85)
MONOCYTES NFR BLD AUTO: 6.7 % (ref 0–13.4)
NEUTROPHILS # BLD AUTO: 4.85 K/UL (ref 1.82–7.42)
NEUTROPHILS NFR BLD: 54.1 % (ref 44–72)
NRBC # BLD AUTO: 0 K/UL
NRBC BLD-RTO: 0 /100 WBC (ref 0–0.2)
PLATELET # BLD AUTO: 330 K/UL (ref 164–446)
PMV BLD AUTO: 9.8 FL (ref 9–12.9)
POTASSIUM SERPL-SCNC: 4.1 MMOL/L (ref 3.6–5.5)
PROT SERPL-MCNC: 7 G/DL (ref 6–8.2)
RBC # BLD AUTO: 5.05 M/UL (ref 4.2–5.4)
SODIUM SERPL-SCNC: 142 MMOL/L (ref 135–145)
TRIGL SERPL-MCNC: 105 MG/DL (ref 0–149)
TSH SERPL DL<=0.005 MIU/L-ACNC: 1.58 UIU/ML (ref 0.38–5.33)
WBC # BLD AUTO: 9 K/UL (ref 4.8–10.8)

## 2025-07-30 PROCEDURE — 36415 COLL VENOUS BLD VENIPUNCTURE: CPT

## 2025-07-30 PROCEDURE — 83036 HEMOGLOBIN GLYCOSYLATED A1C: CPT

## 2025-07-30 PROCEDURE — 80061 LIPID PANEL: CPT

## 2025-07-30 PROCEDURE — 80053 COMPREHEN METABOLIC PANEL: CPT

## 2025-07-30 PROCEDURE — 85025 COMPLETE CBC W/AUTO DIFF WBC: CPT

## 2025-07-30 PROCEDURE — 82306 VITAMIN D 25 HYDROXY: CPT

## 2025-07-30 PROCEDURE — 84443 ASSAY THYROID STIM HORMONE: CPT

## 2025-10-07 ENCOUNTER — APPOINTMENT (OUTPATIENT)
Dept: MEDICAL GROUP | Age: 55
End: 2025-10-07
Payer: COMMERCIAL

## (undated) DEVICE — GLOVE BIOGEL SZ 7 SURGICAL PF LTX - (50PR/BX 4BX/CA)

## (undated) DEVICE — SCOPE DIGITAL URETEROSCOPE DISPOSABLE (10EA/PK)

## (undated) DEVICE — BAG URODRAIN WITH TUBING - (20/CA)

## (undated) DEVICE — DRAPE UNDER BUTTOCKS FLUID - (20/CA)

## (undated) DEVICE — SUTURE GENERAL

## (undated) DEVICE — TUBE CONNECTING SUCTION - CLEAR PLASTIC STERILE 72 IN (50EA/CA)

## (undated) DEVICE — GOWN SURGEONS X-LARGE - DISP. (30/CA)

## (undated) DEVICE — SUTURE ETHILON 2-0 FSLX 30 (36PK/BX)"

## (undated) DEVICE — ELECTRODE DUAL RETURN W/ CORD - (50/PK)

## (undated) DEVICE — TUBING CLEARLINK DUO-VENT - C-FLO (48EA/CA)

## (undated) DEVICE — MASK ANESTHESIA ADULT  - (100/CA)

## (undated) DEVICE — LACTATED RINGERS INJ 1000 ML - (14EA/CA 60CA/PF)

## (undated) DEVICE — KIT ANESTHESIA W/CIRCUIT & 3/LT BAG W/FILTER (20EA/CA)

## (undated) DEVICE — SENSOR SPO2 NEO LNCS ADHESIVE (20/BX) SEE USER NOTES

## (undated) DEVICE — KIT  I.V. START (100EA/CA)

## (undated) DEVICE — SHEET THYROID - (10EA/CA)

## (undated) DEVICE — PACK CYSTO III (4EA/CA)

## (undated) DEVICE — SET EXTENSION WITH 2 PORTS (48EA/CA) ***PART #2C8610 IS A SUBSTITUTE*****

## (undated) DEVICE — BASKET ZERO TIP

## (undated) DEVICE — GLOVE PROTEXIS W/NEU-THERA SZ 8.5 (50PR/BX)

## (undated) DEVICE — CATHETER URETHRAL OPEN END AXXCESS (10EA/BX)

## (undated) DEVICE — WIRE GUIDE SENSOR DUAL FLEX - 5/BX

## (undated) DEVICE — SHEATH ACCESS URETERAL NAVIGATOR HD STAINLESS STEEL HYDROPHILIC L28 CM OD11-13 FR (1EA)

## (undated) DEVICE — Device

## (undated) DEVICE — TUBING INFLOW HYSTEROSCOPY (10EA/CA)

## (undated) DEVICE — SET LEADWIRE 5 LEAD BEDSIDE DISPOSABLE ECG (1SET OF 5/EA)

## (undated) DEVICE — GOWN SURGICAL X-LARGE ULTRA - FILM-REINFORCED (20/CA)

## (undated) DEVICE — HUMID-VENT HEAT AND MOISTURE EXCHANGE- (50/BX)

## (undated) DEVICE — CATHETER URET OPEN END 6FR (10EA/BX)

## (undated) DEVICE — CATHETER IV 20 GA X 1-1/4 ---SURG.& SDS ONLY--- (50EA/BX)

## (undated) DEVICE — NEPTUNE 4 PORT MANIFOLD - (20/PK)

## (undated) DEVICE — WATER IRRIGATION STERILE 1000ML (12EA/CA)

## (undated) DEVICE — SLEEVE, VASO, THIGH, MED

## (undated) DEVICE — GOWN WARMING STANDARD FLEX - (30/CA)

## (undated) DEVICE — GLOVE BIOGEL PI INDICATOR SZ 7.5 SURGICAL PF LF -(50/BX 4BX/CA)

## (undated) DEVICE — SYRINGE NON SAFETY 10 CC 21 GA X 1-1/2 IN (100/BX 4BX/CA)

## (undated) DEVICE — SPONGE PEANUT - (5/PK 50PK/CA)

## (undated) DEVICE — SODIUM CHL IRRIGATION 0.9% 1000ML (12EA/CA)

## (undated) DEVICE — PROTECTOR ULNA NERVE - (36PR/CA)

## (undated) DEVICE — GLOVE BIOGEL SZ 7.5 SURGICAL PF LTX - (50PR/BX 4BX/CA)

## (undated) DEVICE — BOVIE BLADE COATED &INSULATED - 25/PK

## (undated) DEVICE — LACTATED RINGERS INJ. 500 ML - (24EA/CA)

## (undated) DEVICE — PACK NEURO - (2EA/CA)

## (undated) DEVICE — JELLY SURGILUBE STERILE TUBE 4.25 OZ (1/EA)

## (undated) DEVICE — SET IRRIGATION CYSTOSCOPY Y-TYPE L81 IN (20EA/CA)

## (undated) DEVICE — STERI STRIP COMPOUND BENZOIN - TINCTURE 0.6ML WITH APPLICATOR (40EA/BX)

## (undated) DEVICE — PACK CYSTO III (2EA/CA)

## (undated) DEVICE — TRAY SRGPRP PVP IOD WT PRP - (20/CA)

## (undated) DEVICE — CANISTER SUCTION 3000ML MECHANICAL FILTER AUTO SHUTOFF MEDI-VAC NONSTERILE LF DISP (40EA/CA)

## (undated) DEVICE — TUBE CONNECT SUCTION CLEAR 120 X 1/4" (50EA/CA)"

## (undated) DEVICE — CLOSURE SKIN STRIP 1/2 X 4 IN - (STERI STRIP) (50/BX 4BX/CA)

## (undated) DEVICE — BAG SPONGE COUNT 10.25 X 32 - BLUE (250/CA)

## (undated) DEVICE — GLOVE, LITE (PAIR)

## (undated) DEVICE — SODIUM CHL. IRRIGATION 0.9% 3000ML (4EA/CA 65CA/PF)

## (undated) DEVICE — WATER IRRIG. STER 3000 ML - (4/CA)

## (undated) DEVICE — COVER LIGHT HANDLE ALC PLUS DISP (18EA/BX)

## (undated) DEVICE — GOWN SURGEONS LARGE - (32/CA)

## (undated) DEVICE — SUCTION INSTRUMENT YANKAUER BULBOUS TIP W/O VENT (50EA/CA)

## (undated) DEVICE — ELECTRODE 850 FOAM ADHESIVE - HYDROGEL RADIOTRNSPRNT (50/PK)

## (undated) DEVICE — BAG DRAINAGE LINGEMAN CYSTO/URO (20EA/CA)

## (undated) DEVICE — SUTURE 3-0 VICRYL PLUS RB-1 - 8 X 18 INCH (12/BX)

## (undated) DEVICE — FIBER LASER MOSES 200 UM (1/EA)

## (undated) DEVICE — PACK CYSTOSCOPY III - (8/CA)

## (undated) DEVICE — SOLUTION SORBITOL 3000ML (4/CA)

## (undated) DEVICE — SHEATH NAVIGATOR 11/13 X 36 URETERAL ACCESS (5EA/BX)

## (undated) DEVICE — CATHETER URET DUAL LUMEN

## (undated) DEVICE — GLOVE BIOGEL SZ 8.5 SURGICAL PF LTX - (50PR/BX 4BX/CA)

## (undated) DEVICE — GLOVE BIOGEL INDICATOR SZ 6.5 SURGICAL PF LTX - (50PR/BX 4BX/CA)

## (undated) DEVICE — NEEDLE SPINAL NON-SAFETY 18 GA X 3 IN (25EA/BX)

## (undated) DEVICE — SPONGE GAUZESTER 4 X 4 4PLY - (128PK/CA)

## (undated) DEVICE — CANISTER SUCTION RIGID RED 1500CC (40EA/CA)

## (undated) DEVICE — TUBING OUTFLOW HYSTEROSCPY (10EA/BX)

## (undated) DEVICE — SCREW DISTRACTION 14MM YELLOW - STERILE (10EA/BX) (5TX4=20)

## (undated) DEVICE — KIT SURGIFLO W/OUT THROMBIN - (6EA/BX)

## (undated) DEVICE — COVER MAYO STAND X-LG - (22EA/CA)

## (undated) DEVICE — CONNECTOR HOSE NEPTUNE FOR CYSTO ROOM

## (undated) DEVICE — DEVICE TISSUE REMOVAL MYOSURE

## (undated) DEVICE — TUBE E-T HI-LO CUFF 7.0MM (10EA/PK)

## (undated) DEVICE — CANISTER SUCTION 3000ML MECHANICAL FILTER AUTO SHUTOFF MEDI-VAC NONSTERILE LF DISP  (40EA/CA)

## (undated) DEVICE — CORDS BIPOLAR COAGULATION - 12FT STERILE DISP. (10EA/BX)

## (undated) DEVICE — HEAD HOLDER JUNIOR/ADULT

## (undated) DEVICE — DRILL BIT 15 MM FLUTELESS

## (undated) DEVICE — SPONGE GAUZESTER. 2X2 4-PL - (2/PK 50PK/BX 30BX/CS)

## (undated) DEVICE — RESTRAINTS LIMB DISP. - (12/BX 4BX/CA)

## (undated) DEVICE — GLOVE SZ 7.5 BIOGEL PI MICRO - PF LF (50PR/BX)

## (undated) DEVICE — SCOPE DIGITAL URETEROSCOPE DISPOSABLE

## (undated) DEVICE — BLADE SURGICAL #15 - (50/BX 3BX/CA)

## (undated) DEVICE — CHLORAPREP 26 ML APPLICATOR - ORANGE TINT(25/CA)

## (undated) DEVICE — PAD SANITARY 11IN MAXI IND WRAPPED  (12EA/PK 24PK/CA)

## (undated) DEVICE — SENSOR OXIMETER ADULT SPO2 RD SET (20EA/BX)

## (undated) DEVICE — DRAPE 36X28IN RAD CARM BND BG - (25/CA) O

## (undated) DEVICE — KIT EVACUATER 3 SPRING PVC LF 1/8 DRAIN SIZE (10EA/CA)"

## (undated) DEVICE — SUTURE 4-0 MONOCRYL PLUS PS-1 - 27 INCH (36/BX)

## (undated) DEVICE — MASK OXYGEN VNYL ADLT MED CONC WITH 7 FOOT TUBING - (50EA/CA)

## (undated) DEVICE — JELLY SURGILUBE STERILE FOIL 5 GM (150EA/BX)

## (undated) DEVICE — CANNULA O2 COMFORT SOFT EAR ADULT 7 FT TUBING (50/CA)

## (undated) DEVICE — TOWEL STOP TIMEOUT SAFETY FLAG (40EA/CA)

## (undated) DEVICE — GLOVE BIOGEL PI ORTHO SZ 7 PF LF (40PR/BX)

## (undated) DEVICE — DRAPE MICROSCOPE ARMATEC 120IN X 46IN (10EA/CA)

## (undated) DEVICE — SYRINGE SAFETY 10 ML 18 GA X 1 1/2 BLUNT LL (100/BX 4BX/CA)

## (undated) DEVICE — BASKET ZERO 1.9FR X 120CM

## (undated) DEVICE — DRESSING TRANSPARENT FILM TEGADERM 4 X 4.75" (50EA/BX)"

## (undated) DEVICE — GOWN SURGICAL XX-LARGE - (28EA/CA) SIRUS NON REINFORCED

## (undated) DEVICE — TOOL MR8 14CM MATCH HD SYM-TRI 3MM DIAMETER (1/EA)

## (undated) DEVICE — TOOL DISSECTING BIT MR8 OD3MM L14CM (1EA)

## (undated) DEVICE — FORCEPS ELECTROSURGICAL DISPOSABLE CODMAN 8IN 1.5MM

## (undated) DEVICE — SLEEVE VASO DVT COMPRESSION CALF MED - (10PR/CA)